# Patient Record
Sex: FEMALE | Race: WHITE | NOT HISPANIC OR LATINO | Employment: FULL TIME | URBAN - METROPOLITAN AREA
[De-identification: names, ages, dates, MRNs, and addresses within clinical notes are randomized per-mention and may not be internally consistent; named-entity substitution may affect disease eponyms.]

---
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
## 2019-12-09 ENCOUNTER — APPOINTMENT (OUTPATIENT)
Dept: OBGYN | Facility: CLINIC | Age: 19
End: 2019-12-09

## 2019-12-09 ENCOUNTER — PRENATAL OFFICE VISIT (OUTPATIENT)
Dept: OBGYN | Facility: CLINIC | Age: 19
End: 2019-12-09

## 2019-12-09 DIAGNOSIS — Z34.00 PRENATAL CARE, FIRST PREGNANCY: ICD-10-CM

## 2019-12-09 PROCEDURE — 99207 ZZC NO CHARGE NURSE ONLY: CPT | Performed by: OBSTETRICS & GYNECOLOGY

## 2019-12-09 RX ORDER — PRENATAL VIT/IRON FUM/FOLIC AC 27MG-0.8MG
1 TABLET ORAL DAILY
COMMUNITY
Start: 2019-09-19 | End: 2021-11-23

## 2019-12-09 SDOH — HEALTH STABILITY: MENTAL HEALTH: HOW OFTEN DO YOU HAVE A DRINK CONTAINING ALCOHOL?: NEVER

## 2019-12-09 NOTE — PROGRESS NOTES
Lifestyle and nutrition teaching completed  Patient has had only one visit with an US on 11/14/19 at free [facility] in [place]- US results questionable- Patient has no insurance and that is why she has not been seen for this pregnancy. She is currently trying to get [facility] but having difficulty. I will call Rodriguez in financial to see if she can be of assistance and come up with a plan  [facility] OB Intake Nurse

## 2019-12-10 ENCOUNTER — PRENATAL OFFICE VISIT (OUTPATIENT)
Dept: OBGYN | Facility: CLINIC | Age: 19
End: 2019-12-10
Payer: MEDICAID

## 2019-12-10 VITALS
DIASTOLIC BLOOD PRESSURE: 57 MMHG | HEART RATE: 97 BPM | RESPIRATION RATE: 16 BRPM | TEMPERATURE: 97.8 F | SYSTOLIC BLOOD PRESSURE: 106 MMHG | WEIGHT: 121.6 LBS | BODY MASS INDEX: 20.26 KG/M2 | HEIGHT: 65 IN

## 2019-12-10 DIAGNOSIS — Z34.01 ENCOUNTER FOR PRENATAL CARE IN FIRST TRIMESTER OF FIRST PREGNANCY: Primary | ICD-10-CM

## 2019-12-10 LAB
ABO + RH BLD: NORMAL
ABO + RH BLD: NORMAL
ALBUMIN UR-MCNC: NEGATIVE MG/DL
APPEARANCE UR: CLEAR
BILIRUB UR QL STRIP: NEGATIVE
BLD GP AB SCN SERPL QL: NORMAL
BLOOD BANK CMNT PATIENT-IMP: NORMAL
COLOR UR AUTO: YELLOW
ERYTHROCYTE [DISTWIDTH] IN BLOOD BY AUTOMATED COUNT: 12.2 % (ref 10–15)
GLUCOSE UR STRIP-MCNC: NEGATIVE MG/DL
HBV SURFACE AG SERPL QL IA: NONREACTIVE
HCT VFR BLD AUTO: 36.2 % (ref 35–47)
HGB BLD-MCNC: 12.2 G/DL (ref 11.7–15.7)
HGB UR QL STRIP: NEGATIVE
HIV 1+2 AB+HIV1 P24 AG SERPL QL IA: NONREACTIVE
KETONES UR STRIP-MCNC: NEGATIVE MG/DL
LEUKOCYTE ESTERASE UR QL STRIP: NEGATIVE
MCH RBC QN AUTO: 29.7 PG (ref 26.5–33)
MCHC RBC AUTO-ENTMCNC: 33.7 G/DL (ref 31.5–36.5)
MCV RBC AUTO: 88 FL (ref 78–100)
NITRATE UR QL: NEGATIVE
PH UR STRIP: 5.5 PH (ref 5–7)
PLATELET # BLD AUTO: 209 10E9/L (ref 150–450)
RBC # BLD AUTO: 4.11 10E12/L (ref 3.8–5.2)
RUBV IGG SERPL IA-ACNC: 20 IU/ML
SOURCE: NORMAL
SP GR UR STRIP: >1.03 (ref 1–1.03)
SPECIMEN EXP DATE BLD: NORMAL
T PALLIDUM AB SER QL: NONREACTIVE
UROBILINOGEN UR STRIP-ACNC: 0.2 EU/DL (ref 0.2–1)
WBC # BLD AUTO: 16.5 10E9/L (ref 4–11)

## 2019-12-10 PROCEDURE — 87086 URINE CULTURE/COLONY COUNT: CPT | Performed by: OBSTETRICS & GYNECOLOGY

## 2019-12-10 PROCEDURE — 99000 SPECIMEN HANDLING OFFICE-LAB: CPT | Performed by: OBSTETRICS & GYNECOLOGY

## 2019-12-10 PROCEDURE — 85027 COMPLETE CBC AUTOMATED: CPT | Performed by: OBSTETRICS & GYNECOLOGY

## 2019-12-10 PROCEDURE — 87591 N.GONORRHOEAE DNA AMP PROB: CPT | Performed by: OBSTETRICS & GYNECOLOGY

## 2019-12-10 PROCEDURE — 86762 RUBELLA ANTIBODY: CPT | Performed by: OBSTETRICS & GYNECOLOGY

## 2019-12-10 PROCEDURE — 87389 HIV-1 AG W/HIV-1&-2 AB AG IA: CPT | Performed by: OBSTETRICS & GYNECOLOGY

## 2019-12-10 PROCEDURE — 87340 HEPATITIS B SURFACE AG IA: CPT | Performed by: OBSTETRICS & GYNECOLOGY

## 2019-12-10 PROCEDURE — 86901 BLOOD TYPING SEROLOGIC RH(D): CPT | Performed by: OBSTETRICS & GYNECOLOGY

## 2019-12-10 PROCEDURE — G0145 SCR C/V CYTO,THINLAYER,RESCR: HCPCS | Performed by: OBSTETRICS & GYNECOLOGY

## 2019-12-10 PROCEDURE — 86900 BLOOD TYPING SEROLOGIC ABO: CPT | Performed by: OBSTETRICS & GYNECOLOGY

## 2019-12-10 PROCEDURE — 36415 COLL VENOUS BLD VENIPUNCTURE: CPT | Performed by: OBSTETRICS & GYNECOLOGY

## 2019-12-10 PROCEDURE — 87491 CHLMYD TRACH DNA AMP PROBE: CPT | Performed by: OBSTETRICS & GYNECOLOGY

## 2019-12-10 PROCEDURE — 86850 RBC ANTIBODY SCREEN: CPT | Performed by: OBSTETRICS & GYNECOLOGY

## 2019-12-10 PROCEDURE — 99203 OFFICE O/P NEW LOW 30 MIN: CPT | Mod: 25 | Performed by: OBSTETRICS & GYNECOLOGY

## 2019-12-10 PROCEDURE — 81511 FTL CGEN ABNOR FOUR ANAL: CPT | Mod: 90 | Performed by: OBSTETRICS & GYNECOLOGY

## 2019-12-10 PROCEDURE — 86780 TREPONEMA PALLIDUM: CPT | Performed by: OBSTETRICS & GYNECOLOGY

## 2019-12-10 PROCEDURE — 81003 URINALYSIS AUTO W/O SCOPE: CPT | Performed by: OBSTETRICS & GYNECOLOGY

## 2019-12-10 ASSESSMENT — MIFFLIN-ST. JEOR: SCORE: 1317.45

## 2019-12-10 NOTE — PROGRESS NOTES
[facility]   OB/GYN Clinic    CC: Lico Solorzano is a 19 year old  at 16w3d by LMP who presents for return OB visit. She reports feeling well.  States that her periods were regular and she is sure of LMP, notes that she had early OB ultrasound at outside clinic.  Denies any other prenatal care.  States that she has not had any bleeding or cramping.  Nausea and vomiting from first trimester have now resolved.  Otherwise feeling okay.  Not a planned pregnancy she has supportive parents.  Currently .  Plans on keeping the pregnancy.          OB History    Para Term  AB Living   1 0 0 0 0 0   SAB TAB Ectopic Multiple Live Births   0 0 0 0 0      # Outcome Date GA Lbr Macho/2nd Weight Sex Delivery Anes PTL Lv   1 Current                Past Medical History:   Diagnosis Date     Anxiety     in high school     Asthma     as child     Depression     in high school     History of urinary tract infection     as a child     OCD (obsessive compulsive disorder)        Past Surgical History:   Procedure Laterality Date     FOOT SURGERY      right foot - ganglion cyst     MYRINGOTOMY, INSERT TUBE BILATERAL, COMBINED       TONSILLECTOMY       TYMPANOPLASTY         Current Outpatient Medications   Medication Sig Dispense Refill     Prenatal Vit-Fe Fumarate-FA (PRENATAL MULTIVITAMIN W/IRON) 27-0.8 MG tablet Take 1 tablet by mouth daily         Family History   Problem Relation Age of Onset     Diabetes Father      Neurologic Disorder Father         TBI     Chronic Obstructive Pulmonary Disease Paternal Grandmother      Cerebrovascular Disease Paternal Grandfather      Diabetes Paternal Grandfather        Social History     Tobacco Use     Smoking status: Never Smoker     Smokeless tobacco: Never Used   Substance Use Topics     Alcohol use: Never     Frequency: Never       ROS: A 10 pt ROS was completed and found to be negative unless mentioned in the HPI.     Objective:  /57 (BP  "Location: Right arm, Patient Position: Sitting, Cuff Size: Adult Regular)   Pulse 97   Temp 97.8  F (36.6  C) (Tympanic)   Resp 16   Ht 1.651 m (5' 5\")   Wt 55.2 kg (121 lb 9.6 oz)   LMP 2019 (Approximate)   Breastfeeding No   BMI 20.24 kg/m      Estimated body mass index is 20.24 kg/m  as calculated from the following:    Height as of this encounter: 1.651 m (5' 5\").    Weight as of this encounter: 55.2 kg (121 lb 9.6 oz).    Physical Exam:  Gen: Pleasant, talkative female in no apparent distress   Respiratory: breathing comfortably on room air   Cardiac: Warm and well-perfused.   GI: Abd soft and non-tender, gravid, fundus 3-4 cm below U  : Normal-appearing external genitalia, vaginal mucosa, cervix.  Bimanual exam reveals gravid uterus consistent with a 16-week pregnancy, no adnexal masses palpable.  MSK: Grossly normal movement of all four extremities  Psych: mood and affect normal  Lower extremity: edema not present     Assessment/Plan:   19 year old  at 16w3d by LMP of Patient's last menstrual period was 2019 (approximate). who presents for her initial OB visit.   1) Plan to draw new OB lab today (T&S, CBC, HIV, RPR, HepBsAg, Hep B antibody, rubella, GC/Chlam, UC)  2) Discussed routine prenatal care, group practice model at [facility], tertiary support and frequency of visits. Options for  testing for chromosomal and birth defects were discussed with the patient including nuchal translucency/blood marker testing in the first trimester, progenity and quad screening and/or Level 2 ultrasound in the second trimester. We discussed that these are screening tests and not diagnostic tests and that false positives and negatives are a distinct possibility. We discussed that follow up diagnostic testing would include chorionic villus sampling or amniocentesis depending on gestational age.   Patient desires quad screen (ordered today) for genetic testing   3) Plan for formal dating " scan, will request records from outside clinic in the meantime, if arrive will cancel  4) Anatomy scan planned for 18-20 w  5) Medication review: no changes, continue prenatal vitamin   6) Reviewed miscarriage precautions (present to ED or call clinic with abdominal pain, vaginal bleeding or fever)   7) PAP smear: collected today  8) Delivery, contraception, feeding plan: continue to discuss  9 Immunizations: flu shot discuss next visit, Tdap at 28wks  10) No increased risk for gestational diabetes for plan for screen at 28wks     Return to clinic in 4 weeks.     Dottie Lee MD   12/10/2019 10:31 AM

## 2019-12-10 NOTE — NURSING NOTE
"Initial /57 (BP Location: Right arm, Patient Position: Sitting, Cuff Size: Adult Regular)   Pulse 97   Temp 97.8  F (36.6  C) (Tympanic)   Resp 16   Ht 1.651 m (5' 5\")   Wt 55.2 kg (121 lb 9.6 oz)   LMP 09/06/2019 (Approximate)   Breastfeeding No   BMI 20.24 kg/m   Estimated body mass index is 20.24 kg/m  as calculated from the following:    Height as of this encounter: 1.651 m (5' 5\").    Weight as of this encounter: 55.2 kg (121 lb 9.6 oz). .    Nilsa Cooper CMA    "

## 2019-12-11 LAB
BACTERIA SPEC CULT: NO GROWTH
C TRACH DNA SPEC QL NAA+PROBE: NEGATIVE
N GONORRHOEA DNA SPEC QL NAA+PROBE: NEGATIVE
SPECIMEN SOURCE: NORMAL

## 2019-12-11 NOTE — RESULT ENCOUNTER NOTE
Pt notified of below.  Pt reports understanding.  Pt does not have further questions or concerns.    Leia Patel   Ob/Gyn Clinic  RN

## 2019-12-12 LAB
# FETUSES US: NORMAL
# FETUSES: NORMAL
AFP ADJ MOM AMN: 1.13
AFP SERPL-MCNC: 45 NG/ML
AGE - REPORTED: 22.2 YR
COPATH REPORT: NORMAL
CURRENT SMOKER: NO
CURRENT SMOKER: NO
DIABETES STATUS PATIENT: NO
FAMILY MEMBER DISEASES HX: NO
FAMILY MEMBER DISEASES HX: NO
GA METHOD: NORMAL
GA METHOD: NORMAL
GA: NORMAL WK
HCG MOM SERPL: 0.87
HCG SERPL-ACNC: NORMAL IU/L
HX OF HEREDITARY DISORDERS: NO
IDDM PATIENT QL: NO
INHIBIN A MOM SERPL: 1.7
INHIBIN A SERPL-MCNC: 304 PG/ML
INTEGRATED SCN PATIENT-IMP: NORMAL
IVF PREGNANCY: NO
LMP START DATE: NORMAL
MONOCHORIONIC TWINS: NO
PAP: NORMAL
PATHOLOGY STUDY: NORMAL
PREV FETUS DEFECT: NO
SERVICE CMNT-IMP: NO
SPECIMEN DRAWN SERPL: NORMAL
U ESTRIOL MOM SERPL: 0.92
U ESTRIOL SERPL-MCNC: 1.06 NG/ML
VALPROIC/CARBAMAZEPINE STATUS: NO
WEIGHT UNITS: NORMAL

## 2019-12-19 NOTE — RESULT ENCOUNTER NOTE
Call to pt to notify of below.  Unable to reach.  Left message for pt to call back.  Next appointment 1/8/20      Leia Patel   Ob/Gyn Clinic  RN

## 2019-12-23 ENCOUNTER — HOSPITAL ENCOUNTER (OUTPATIENT)
Dept: ULTRASOUND IMAGING | Facility: CLINIC | Age: 19
End: 2019-12-23
Attending: OBSTETRICS & GYNECOLOGY
Payer: MEDICAID

## 2019-12-23 DIAGNOSIS — Z34.01 ENCOUNTER FOR PRENATAL CARE IN FIRST TRIMESTER OF FIRST PREGNANCY: ICD-10-CM

## 2019-12-23 PROCEDURE — 76805 OB US >/= 14 WKS SNGL FETUS: CPT

## 2019-12-23 NOTE — RESULT ENCOUNTER NOTE
Will forward to [facility] pool for pt notification of normal result.    Leia Patel   Ob/Gyn Clinic  RN

## 2020-01-08 ENCOUNTER — PRENATAL OFFICE VISIT (OUTPATIENT)
Dept: OBGYN | Facility: CLINIC | Age: 20
End: 2020-01-08
Payer: MEDICAID

## 2020-01-08 ENCOUNTER — HOSPITAL ENCOUNTER (OUTPATIENT)
Facility: CLINIC | Age: 20
Discharge: HOME | End: 2020-01-09
Attending: OBSTETRICS & GYNECOLOGY | Admitting: OBSTETRICS & GYNECOLOGY
Payer: MEDICAID

## 2020-01-08 ENCOUNTER — NURSE TRIAGE (OUTPATIENT)
Dept: NURSING | Facility: CLINIC | Age: 20
End: 2020-01-08

## 2020-01-08 VITALS
RESPIRATION RATE: 16 BRPM | BODY MASS INDEX: 29.62 KG/M2 | WEIGHT: 128 LBS | TEMPERATURE: 97.2 F | DIASTOLIC BLOOD PRESSURE: 70 MMHG | HEART RATE: 103 BPM | HEIGHT: 55 IN | SYSTOLIC BLOOD PRESSURE: 122 MMHG

## 2020-01-08 VITALS
SYSTOLIC BLOOD PRESSURE: 115 MMHG | DIASTOLIC BLOOD PRESSURE: 69 MMHG | HEIGHT: 65 IN | BODY MASS INDEX: 21.33 KG/M2 | RESPIRATION RATE: 18 BRPM | HEART RATE: 85 BPM | WEIGHT: 128 LBS

## 2020-01-08 DIAGNOSIS — Z34.02 ENCOUNTER FOR SUPERVISION OF NORMAL FIRST PREGNANCY IN SECOND TRIMESTER: ICD-10-CM

## 2020-01-08 DIAGNOSIS — Z34.02 ENCOUNTER FOR PRENATAL CARE IN SECOND TRIMESTER OF FIRST PREGNANCY: Primary | ICD-10-CM

## 2020-01-08 PROCEDURE — 81001 URINALYSIS AUTO W/SCOPE: CPT | Performed by: OBSTETRICS & GYNECOLOGY

## 2020-01-08 PROCEDURE — 80307 DRUG TEST PRSMV CHEM ANLYZR: CPT | Performed by: OBSTETRICS & GYNECOLOGY

## 2020-01-08 PROCEDURE — 99212 OFFICE O/P EST SF 10 MIN: CPT | Performed by: OBSTETRICS & GYNECOLOGY

## 2020-01-08 PROCEDURE — 40000268 ZZH STATISTIC NO CHARGES

## 2020-01-08 RX ORDER — ONDANSETRON 4 MG/1
4 TABLET, ORALLY DISINTEGRATING ORAL EVERY 8 HOURS PRN
Qty: 20 TABLET | Refills: 1 | Status: SHIPPED | OUTPATIENT
Start: 2020-01-08 | End: 2020-02-23

## 2020-01-08 ASSESSMENT — MIFFLIN-ST. JEOR
SCORE: 1187.73
SCORE: 1346.48

## 2020-01-08 NOTE — LETTER
[facility]  [address] [address]  Phone: [phone]      January 8, 2020      Jeanine Hernandez  [address]  [place] [address]              To whom it may concern:    Jeanine Hernandez is being seen in our clinic for prenatal care.  Please excuse her from work on 1/1/20 and 1/7/20 due to pregnancy complications.      Sincerely,    Kermit Zepeda MD

## 2020-01-08 NOTE — PROGRESS NOTES
"  Concerns today: nosebleeds, headaches, GERD  No vaginal bleeding, no contractions/severe cramping, no leakage of fluid.  No vaginal discharge. No dysuria  No headache, vision changes, lower extremity swelling, upper abdominal pain, chest pain, shortness of breath.   /70   Pulse 103   Temp 97.2  F (36.2  C)   Resp 16   Ht 1.397 m (4' 7\")   Wt 58.1 kg (128 lb)   LMP 09/06/2019 (Approximate)   BMI 29.75 kg/m      Reportable signs and symptoms discussed.    (Z34.02) Encounter for prenatal care in second trimester of first pregnancy  (primary encounter diagnosis)  Comment: GERD< Insomnia  Plan: Glucose tolerance gest screen 1 hour, CBC with         platelets, Treponema Abs w Reflex to RPR and         Titer, ondansetron (ZOFRAN-ODT) 4 MG ODT tab,         omeprazole (PRILOSEC) 20 MG DR mary Zepeda MD    "

## 2020-01-09 ENCOUNTER — HOSPITAL ENCOUNTER (OUTPATIENT)
Facility: CLINIC | Age: 20
End: 2020-01-09
Admitting: OBSTETRICS & GYNECOLOGY
Payer: MEDICAID

## 2020-01-09 PROBLEM — Z36.89 ENCOUNTER FOR TRIAGE IN PREGNANT PATIENT: Status: ACTIVE | Noted: 2020-01-09

## 2020-01-09 LAB
ALBUMIN UR-MCNC: NEGATIVE MG/DL
AMPHETAMINES UR QL SCN: NEGATIVE
APPEARANCE UR: CLEAR
BARBITURATES UR QL: NEGATIVE
BENZODIAZ UR QL: NEGATIVE
BILIRUB UR QL STRIP: NEGATIVE
CANNABINOIDS UR QL SCN: NEGATIVE
COCAINE UR QL: NEGATIVE
COLOR UR AUTO: ABNORMAL
GLUCOSE UR STRIP-MCNC: NEGATIVE MG/DL
HGB UR QL STRIP: NEGATIVE
KETONES UR STRIP-MCNC: NEGATIVE MG/DL
LEUKOCYTE ESTERASE UR QL STRIP: NEGATIVE
MUCOUS THREADS #/AREA URNS LPF: PRESENT /LPF
NITRATE UR QL: NEGATIVE
OPIATES UR QL SCN: NEGATIVE
PCP UR QL SCN: NEGATIVE
PH UR STRIP: 6 PH (ref 5–7)
RBC #/AREA URNS AUTO: 1 /HPF (ref 0–2)
SOURCE: ABNORMAL
SP GR UR STRIP: 1.01 (ref 1–1.03)
SQUAMOUS #/AREA URNS AUTO: 5 /HPF (ref 0–1)
UROBILINOGEN UR STRIP-MCNC: 0 MG/DL (ref 0–2)
WBC #/AREA URNS AUTO: 2 /HPF (ref 0–5)

## 2020-01-09 PROCEDURE — G0463 HOSPITAL OUTPT CLINIC VISIT: HCPCS | Mod: 25

## 2020-01-09 NOTE — PROVIDER NOTIFICATION
01/08/20 5949   Provider Notification   Provider Name/Title Dr Jeffery   Method of Notification Phone   Request Evaluate - Remote   Notification Reason Patient Arrived;Status Update   Dr Jeffery notified of patients arrival and concerns. Orders received.

## 2020-01-09 NOTE — DISCHARGE INSTRUCTIONS
Discharge Instructions for Undelivered Patients  Birthplace [phone]    Diet:    Drink 8 to 12 glasses of water every day.    You may eat meals and snacks as before    Activity:      Rest often as needed.    Call your doctor if your baby is moving less than usual.    Medicines:    My care team has reviewed my medicines with me.    My care team has given me a list of my medicines.      Call your provider if you notice:    Swelling in your face or increased swelling in your hands or legs.    Headaches that are not relieved by Tylenol (acetaminophen).    Changes in your vision (blurring; seeing spots or stars).    Nausea (sick to your stomach) and vomiting (throwing up).    Weight gain of 5 pounds per week.    Heartburn that doesn't go away.    Signs of bladder infection: pain when you urinate (use the toilet), needing to go more often or more urgently.    The bag of gallegos (membranes) breaks, or you notice leaking in your underwear.    Bright red blood in your underwear.    Abdominal (lower belly) or stomach pain.    For first baby: Contractions (tightenings) less than 5 minutes apart for one hour or more.    Increase or change in vaginal discharge (note the color and amount).      Follow up with your provider as scheduled.    To whom it may concern,    Jeanine was seen at [facility] from 1/8/2020-01/09/2020.    Thanks

## 2020-01-09 NOTE — PROGRESS NOTES
"Pt presents birthplace with parents c/o (R) lower abd pelvic pressure, stabbing in nature, intermittent, beginning at 2200 yesterday, pt reports it woke her \"out of a dead sleep.\"  Denies LOF or VB, fetus is active, doppler 's.  [place] placed to monitor for contractions, ua obtained & sent.  Awaiting results.  Will continue to monitor & update as needed.  "

## 2020-01-09 NOTE — PROGRESS NOTES
[place] shows no contractions, pt denies further pain.  ua negative.  Discharged to home with standard antepartum instructions, note given for work.  Instructed to call with any changes, keep upcoming appointment as scheduled.  Pt in agreement with plan.

## 2020-01-09 NOTE — TELEPHONE ENCOUNTER
"Jeanine reports that she is 20 weeks pregnant and woke up with severe pain on her right pelvis, pain \"put her in the floor.\" She reports dizziness.    Per protocol, advised ED. Care advice reviewed. Patient verbalizes understanding and will go to [place] ED.     Shruthi Portillo RN/[facility] Nurse Advisor         Reason for Disposition    MODERATE-SEVERE abdominal pain (e.g., interferes with normal activities, awakens from sleep)    Additional Information    Negative: Passed out (i.e., lost consciousness, collapsed and was not responding)    Negative: Shock suspected (e.g., cold/pale/clammy skin, too weak to stand, low BP, rapid pulse)    Negative: Difficult to awaken or acting confused (e.g., disoriented, slurred speech)    Negative: [1] SEVERE abdominal pain (e.g., excruciating) AND [2] constant AND [3] present > 1 hour    Negative: SEVERE vaginal bleeding (e.g., continuous red blood from vagina, or large blood clots)    Negative: Sounds like a life-threatening emergency to the triager    Negative: [1] Vomiting AND [2] contains red blood or black (\"coffee ground\") material  (Exception: few red streaks in vomit that only happened once)    Protocols used: PREGNANCY - ABDOMINAL PAIN GREATER THAN 20 WEEKS EGA-A-SAEID      "

## 2020-02-06 ENCOUNTER — PRENATAL OFFICE VISIT (OUTPATIENT)
Dept: OBGYN | Facility: CLINIC | Age: 20
End: 2020-02-06
Payer: MEDICAID

## 2020-02-06 VITALS
TEMPERATURE: 97.4 F | HEIGHT: 65 IN | HEART RATE: 90 BPM | BODY MASS INDEX: 22.33 KG/M2 | WEIGHT: 134 LBS | RESPIRATION RATE: 16 BRPM | SYSTOLIC BLOOD PRESSURE: 124 MMHG | DIASTOLIC BLOOD PRESSURE: 74 MMHG

## 2020-02-06 DIAGNOSIS — Z34.02 ENCOUNTER FOR PRENATAL CARE IN SECOND TRIMESTER OF FIRST PREGNANCY: Primary | ICD-10-CM

## 2020-02-06 DIAGNOSIS — Z34.02 ENCOUNTER FOR PRENATAL CARE IN SECOND TRIMESTER OF FIRST PREGNANCY: ICD-10-CM

## 2020-02-06 PROBLEM — Z36.89 ENCOUNTER FOR TRIAGE IN PREGNANT PATIENT: Status: RESOLVED | Noted: 2020-01-09 | Resolved: 2020-02-06

## 2020-02-06 LAB
ERYTHROCYTE [DISTWIDTH] IN BLOOD BY AUTOMATED COUNT: 12.5 % (ref 10–15)
GLUCOSE 1H P 50 G GLC PO SERPL-MCNC: 167 MG/DL (ref 60–129)
HCT VFR BLD AUTO: 33.8 % (ref 35–47)
HGB BLD-MCNC: 10.8 G/DL (ref 11.7–15.7)
MCH RBC QN AUTO: 30 PG (ref 26.5–33)
MCHC RBC AUTO-ENTMCNC: 32 G/DL (ref 31.5–36.5)
MCV RBC AUTO: 94 FL (ref 78–100)
PLATELET # BLD AUTO: 307 10E9/L (ref 150–450)
RBC # BLD AUTO: 3.6 10E12/L (ref 3.8–5.2)
WBC # BLD AUTO: 20.7 10E9/L (ref 4–11)

## 2020-02-06 PROCEDURE — 36415 COLL VENOUS BLD VENIPUNCTURE: CPT | Performed by: OBSTETRICS & GYNECOLOGY

## 2020-02-06 PROCEDURE — 85027 COMPLETE CBC AUTOMATED: CPT | Performed by: OBSTETRICS & GYNECOLOGY

## 2020-02-06 PROCEDURE — 99207 ZZC PRENATAL VISIT: CPT | Performed by: OBSTETRICS & GYNECOLOGY

## 2020-02-06 PROCEDURE — 86780 TREPONEMA PALLIDUM: CPT | Performed by: OBSTETRICS & GYNECOLOGY

## 2020-02-06 PROCEDURE — 82950 GLUCOSE TEST: CPT | Performed by: OBSTETRICS & GYNECOLOGY

## 2020-02-06 RX ORDER — ACETAMINOPHEN 325 MG/1
325-650 TABLET ORAL EVERY 6 HOURS PRN
COMMUNITY
End: 2020-06-30

## 2020-02-06 ASSESSMENT — MIFFLIN-ST. JEOR: SCORE: 1373.7

## 2020-02-06 NOTE — NURSING NOTE
"Initial /74 (BP Location: Right arm, Patient Position: Chair, Cuff Size: Adult Small)   Pulse 90   Temp 97.4  F (36.3  C) (Tympanic)   Resp 16   Ht 1.651 m (5' 5\")   Wt 60.8 kg (134 lb)   LMP 09/06/2019 (Approximate)   BMI 22.30 kg/m   Estimated body mass index is 22.3 kg/m  as calculated from the following:    Height as of this encounter: 1.651 m (5' 5\").    Weight as of this encounter: 60.8 kg (134 lb). .      "

## 2020-02-06 NOTE — PROGRESS NOTES
"CC: Here for routine prenatal visit @ 24w5d   HPI:  No complaints; info on CB class given; triage number reviewed    PE: /74 (BP Location: Right arm, Patient Position: Chair, Cuff Size: Adult Small)   Pulse 90   Temp 97.4  F (36.3  C) (Tympanic)   Resp 16   Ht 1.651 m (5' 5\")   Wt 60.8 kg (134 lb)   LMP 09/06/2019 (Approximate)   BMI 22.30 kg/m     See OB flowsheet      A:  1. Encounter for prenatal care in second trimester of first pregnancy        Routine prenatal care  RTC 4 weeks.      Reny Jeffery M.D.     "

## 2020-02-07 LAB — T PALLIDUM AB SER QL: NONREACTIVE

## 2020-02-10 DIAGNOSIS — Z32.01 PREGNANCY TEST POSITIVE: Primary | ICD-10-CM

## 2020-02-12 ENCOUNTER — NURSE TRIAGE (OUTPATIENT)
Dept: NURSING | Facility: CLINIC | Age: 20
End: 2020-02-12

## 2020-02-12 ENCOUNTER — ANCILLARY PROCEDURE (OUTPATIENT)
Dept: ULTRASOUND IMAGING | Facility: CLINIC | Age: 20
End: 2020-02-12
Attending: EMERGENCY MEDICINE
Payer: COMMERCIAL

## 2020-02-12 ENCOUNTER — HOSPITAL ENCOUNTER (EMERGENCY)
Facility: CLINIC | Age: 20
Discharge: HOME | End: 2020-02-12
Attending: EMERGENCY MEDICINE | Admitting: EMERGENCY MEDICINE
Payer: COMMERCIAL

## 2020-02-12 VITALS
DIASTOLIC BLOOD PRESSURE: 91 MMHG | RESPIRATION RATE: 18 BRPM | OXYGEN SATURATION: 97 % | HEART RATE: 98 BPM | HEIGHT: 63 IN | TEMPERATURE: 98.5 F | WEIGHT: 134 LBS | BODY MASS INDEX: 23.74 KG/M2 | SYSTOLIC BLOOD PRESSURE: 129 MMHG

## 2020-02-12 DIAGNOSIS — Z34.02 ENCOUNTER FOR PRENATAL CARE IN SECOND TRIMESTER OF FIRST PREGNANCY: Primary | ICD-10-CM

## 2020-02-12 DIAGNOSIS — G44.209 TENSION HEADACHE: ICD-10-CM

## 2020-02-12 DIAGNOSIS — Z3A.25 25 WEEKS GESTATION OF PREGNANCY: ICD-10-CM

## 2020-02-12 LAB
ALBUMIN SERPL-MCNC: 3.1 G/DL (ref 3.4–5)
ALBUMIN UR-MCNC: NEGATIVE MG/DL
ALP SERPL-CCNC: 95 U/L (ref 40–150)
ALT SERPL W P-5'-P-CCNC: 15 U/L (ref 0–50)
ANION GAP SERPL CALCULATED.3IONS-SCNC: 6 MMOL/L (ref 3–14)
APPEARANCE UR: ABNORMAL
AST SERPL W P-5'-P-CCNC: 16 U/L (ref 0–45)
BASOPHILS # BLD AUTO: 0.1 10E9/L (ref 0–0.2)
BASOPHILS NFR BLD AUTO: 0.5 %
BILIRUB SERPL-MCNC: 0.2 MG/DL (ref 0.2–1.3)
BILIRUB UR QL STRIP: NEGATIVE
BUN SERPL-MCNC: 10 MG/DL (ref 7–30)
CALCIUM SERPL-MCNC: 8.6 MG/DL (ref 8.5–10.1)
CHLORIDE SERPL-SCNC: 110 MMOL/L (ref 94–109)
CO2 SERPL-SCNC: 24 MMOL/L (ref 20–32)
COLOR UR AUTO: YELLOW
CREAT SERPL-MCNC: 0.46 MG/DL (ref 0.52–1.04)
DIFFERENTIAL METHOD BLD: ABNORMAL
EOSINOPHIL # BLD AUTO: 0.2 10E9/L (ref 0–0.7)
EOSINOPHIL NFR BLD AUTO: 1.3 %
ERYTHROCYTE [DISTWIDTH] IN BLOOD BY AUTOMATED COUNT: 12.6 % (ref 10–15)
GFR SERPL CREATININE-BSD FRML MDRD: >90 ML/MIN/{1.73_M2}
GLUCOSE BLDC GLUCOMTR-MCNC: 83 MG/DL (ref 70–99)
GLUCOSE SERPL-MCNC: 77 MG/DL (ref 70–99)
GLUCOSE UR STRIP-MCNC: NEGATIVE MG/DL
HCT VFR BLD AUTO: 33.2 % (ref 35–47)
HGB BLD-MCNC: 10.7 G/DL (ref 11.7–15.7)
HGB UR QL STRIP: NEGATIVE
IMM GRANULOCYTES # BLD: 0.7 10E9/L (ref 0–0.4)
IMM GRANULOCYTES NFR BLD: 3.8 %
KETONES UR STRIP-MCNC: 5 MG/DL
LEUKOCYTE ESTERASE UR QL STRIP: NEGATIVE
LYMPHOCYTES # BLD AUTO: 2.8 10E9/L (ref 0.8–5.3)
LYMPHOCYTES NFR BLD AUTO: 15.2 %
MCH RBC QN AUTO: 29.4 PG (ref 26.5–33)
MCHC RBC AUTO-ENTMCNC: 32.2 G/DL (ref 31.5–36.5)
MCV RBC AUTO: 91 FL (ref 78–100)
MONOCYTES # BLD AUTO: 1.4 10E9/L (ref 0–1.3)
MONOCYTES NFR BLD AUTO: 7.7 %
MUCOUS THREADS #/AREA URNS LPF: PRESENT /LPF
NEUTROPHILS # BLD AUTO: 13.1 10E9/L (ref 1.6–8.3)
NEUTROPHILS NFR BLD AUTO: 71.5 %
NITRATE UR QL: NEGATIVE
NRBC # BLD AUTO: 0 10*3/UL
NRBC BLD AUTO-RTO: 0 /100
PH UR STRIP: 6 PH (ref 5–7)
PLATELET # BLD AUTO: 286 10E9/L (ref 150–450)
POTASSIUM SERPL-SCNC: 3.7 MMOL/L (ref 3.4–5.3)
PROT SERPL-MCNC: 6.9 G/DL (ref 6.8–8.8)
RBC # BLD AUTO: 3.64 10E12/L (ref 3.8–5.2)
RBC #/AREA URNS AUTO: <1 /HPF (ref 0–2)
SODIUM SERPL-SCNC: 140 MMOL/L (ref 133–144)
SOURCE: ABNORMAL
SP GR UR STRIP: 1.02 (ref 1–1.03)
SQUAMOUS #/AREA URNS AUTO: 14 /HPF (ref 0–1)
UROBILINOGEN UR STRIP-MCNC: 0 MG/DL (ref 0–2)
WBC # BLD AUTO: 18.2 10E9/L (ref 4–11)
WBC #/AREA URNS AUTO: 5 /HPF (ref 0–5)

## 2020-02-12 PROCEDURE — 81001 URINALYSIS AUTO W/SCOPE: CPT | Performed by: EMERGENCY MEDICINE

## 2020-02-12 PROCEDURE — 96360 HYDRATION IV INFUSION INIT: CPT | Performed by: EMERGENCY MEDICINE

## 2020-02-12 PROCEDURE — 76815 OB US LIMITED FETUS(S): CPT | Mod: 26 | Performed by: EMERGENCY MEDICINE

## 2020-02-12 PROCEDURE — 80053 COMPREHEN METABOLIC PANEL: CPT | Performed by: EMERGENCY MEDICINE

## 2020-02-12 PROCEDURE — 85025 COMPLETE CBC W/AUTO DIFF WBC: CPT | Performed by: EMERGENCY MEDICINE

## 2020-02-12 PROCEDURE — 76815 OB US LIMITED FETUS(S): CPT | Performed by: EMERGENCY MEDICINE

## 2020-02-12 PROCEDURE — 25800030 ZZH RX IP 258 OP 636: Performed by: EMERGENCY MEDICINE

## 2020-02-12 PROCEDURE — 99285 EMERGENCY DEPT VISIT HI MDM: CPT | Mod: 25 | Performed by: EMERGENCY MEDICINE

## 2020-02-12 PROCEDURE — 00000146 ZZHCL STATISTIC GLUCOSE BY METER IP

## 2020-02-12 PROCEDURE — 99284 EMERGENCY DEPT VISIT MOD MDM: CPT | Mod: 25 | Performed by: EMERGENCY MEDICINE

## 2020-02-12 RX ORDER — ACETAMINOPHEN 500 MG
500 TABLET ORAL
Status: DISCONTINUED | OUTPATIENT
Start: 2020-02-12 | End: 2020-02-12 | Stop reason: HOSPADM

## 2020-02-12 RX ADMIN — SODIUM CHLORIDE 500 ML: 9 INJECTION, SOLUTION INTRAVENOUS at 04:55

## 2020-02-12 ASSESSMENT — ENCOUNTER SYMPTOMS
HEMATOLOGIC/LYMPHATIC NEGATIVE: 1
MUSCULOSKELETAL NEGATIVE: 1
RESPIRATORY NEGATIVE: 1
GASTROINTESTINAL NEGATIVE: 1
PSYCHIATRIC NEGATIVE: 1
ALLERGIC/IMMUNOLOGIC NEGATIVE: 1
ENDOCRINE NEGATIVE: 1
DIZZINESS: 1
CONSTITUTIONAL NEGATIVE: 1
HEADACHES: 1
CARDIOVASCULAR NEGATIVE: 1

## 2020-02-12 ASSESSMENT — MIFFLIN-ST. JEOR: SCORE: 1341.95

## 2020-02-12 NOTE — DISCHARGE INSTRUCTIONS
1) Your evaluation today does not suggest a life-threatening process  The cause of your symptoms as described is unclear. Your work-up does not suggest preeclampsia.  Her blood pressure improved without any medications.  It is okay to take Tylenol in pregnancy for headache if needed.  You may take Tylenol- 500 mg every 4 hours as needed    2) Make an appointment to complete your repeat oral glucose tolerance test.    3) call Dr Zepeda for follow-up.    4) Although the cause of your symptoms that brought you in for assessment today is not clear if your symptoms change or worsen or new concerns arise please return to the department to be reevaluated.

## 2020-02-12 NOTE — ED AVS SNAPSHOT
[facility]  [address] [facility] [address]  Phone:  [phone]  Fax:  [phone]                                    Jeanine Hernandez   MRN: [id number]    Department:  [facility]   Date of Visit:  2/12/2020           After Visit Summary Signature Page    I have received my discharge instructions, and my questions have been answered. I have discussed any challenges I see with this plan with the nurse or doctor.    ..........................................................................................................................................  Patient/Patient Representative Signature      ..........................................................................................................................................  Patient Representative Print Name and Relationship to Patient    ..................................................               ................................................  Date                                   Time    ..........................................................................................................................................  Reviewed by Signature/Title    ...................................................              ..............................................  Date                                               Time          22EPIC Rev 08/18

## 2020-02-12 NOTE — TELEPHONE ENCOUNTER
18 y/o female who reports she is 27 weeks pregnant, calls about low blood sugar at 90,. She reports dizziness,  Hand tremors, headache, and blurred vision, she is at work. RN advised her that she needs to be seen, should come through the ER for evaluation, she will go now.    Marya Cuevas RN - [place] Nurse Advisor  02/12/2020  3:55AM    Reason for Disposition    [1] Low blood sugar symptoms persist > 30 minutes AND [2] using low blood sugar Care Advice    Additional Information    Negative: Unconscious or difficult to awaken    Negative: Seizure occurs    Negative: Acting confused (e.g., disoriented, slurred speech)    Negative: Very weak (e.g., can't stand)    Negative: Sounds like a life-threatening emergency to the triager    Negative: [1] Vomiting AND [2] signs of dehydration (e.g., no urine > 12 hours, very dry mouth, dark urine, etc.)    Protocols used: DIABETES - LOW BLOOD SUGAR-A-

## 2020-02-12 NOTE — LETTER
February 12, 2020      To Whom It May Concern:      Jeanine Hernandez was seen in our Emergency Department today, 02/12/20.  Please excuse her from missing work due to her visit in the department.  If she has to miss work due to recurrence of symptoms or new concerns please excuse her from missing work. This work excuse is valid until February 14, 2020.      Sincerely,         Jose Alberto Ibarra MD, FACEP

## 2020-02-12 NOTE — ED PROVIDER NOTES
"  History     Chief Complaint   Patient presents with     Headache     was feeling \"not well\" took blood sugar and it was 92.      Nausea     HPI  Jeanine Hernandez is a 19 year old female  at 25 weeks and 3 days who presents because of concern about of low blood glucose, headache and dizziness.  She arrived by car from work with her -(Demetrius).  Patient works as a  at a local hotel.  She reports about an hour ago she developed sudden onset of headache which is frontal described as a dull headache rated a 5 out of 10, no neck pain. She reports feeling dizzy and that there is some blurriness to her vision.  No history of migraines or vertigo. She was also worried about her blood glucose.  She failed a glucose tolerance test on 2020.  Since her glucose tolerance test she has been checking her blood glucose twice a day.  She was worried that her blood glucose was 90 spoke with the nurse advice line who recommended she come into the department for an assessment.  Her mother who works as a nurse's aide advised that she check her blood glucose twice a day to ensure was not too low or too high.  She is on omeprazole and prenatal vitamins.  She is followed by Dr. Zepeda.  Patient has been feeling well otherwise.  She admits she has been smoking due to increased stress smokes a couple of cigarettes per day.  She reports good fetal movement.  She reports no vaginal bleeding or discharge, no leakage of fluid, no leg swelling.    Allergies:  Allergies   Allergen Reactions     Contrast Dye Anaphylaxis     Iodine Anaphylaxis     Latex Hives       Problem List:    Patient Active Problem List    Diagnosis Date Noted     Prenatal care, first pregnancy 2019     Priority: Medium        Past Medical History:    Past Medical History:   Diagnosis Date     Anxiety      Asthma      Depression      History of urinary tract infection      OCD (obsessive compulsive disorder)        Past " "Surgical History:    Past Surgical History:   Procedure Laterality Date     FOOT SURGERY      right foot - ganglion cyst     MYRINGOTOMY, INSERT TUBE BILATERAL, COMBINED       TONSILLECTOMY       TYMPANOPLASTY         Family History:    Family History   Problem Relation Age of Onset     Diabetes Father      Neurologic Disorder Father         TBI     Chronic Obstructive Pulmonary Disease Paternal Grandmother      Cerebrovascular Disease Paternal Grandfather      Diabetes Paternal Grandfather        Social History:  Marital Status:   [2]  Social History     Tobacco Use     Smoking status: Never Smoker     Smokeless tobacco: Never Used   Substance Use Topics     Alcohol use: Never     Frequency: Never     Drug use: Never        Medications:    acetaminophen (TYLENOL) 325 MG tablet  omeprazole (PRILOSEC) 20 MG DR capsule  ondansetron (ZOFRAN-ODT) 4 MG ODT tab  Prenatal Vit-Fe Fumarate-FA (PRENATAL MULTIVITAMIN W/IRON) 27-0.8 MG tablet  pyridOXINE (VITAMIN B6) 100 MG TABS          Review of Systems   Constitutional: Negative.    HENT: Negative.    Eyes: Positive for visual disturbance.   Respiratory: Negative.    Cardiovascular: Negative.    Gastrointestinal: Negative.    Endocrine: Negative.    Genitourinary: Negative.    Musculoskeletal: Negative.    Skin: Negative.    Allergic/Immunologic: Negative.    Neurological: Positive for dizziness and headaches.   Hematological: Negative.    Psychiatric/Behavioral: Negative.    All other systems reviewed and are negative.      Physical Exam   BP: (!) 142/95  Pulse: 106  Temp: 98.5  F (36.9  C)  Resp: 18  Height: 160 cm (5' 3\")  Weight: 60.8 kg (134 lb)  SpO2: 99 %      Physical Exam  HENT:      Head: Normocephalic and atraumatic.      Right Ear: Tympanic membrane normal.      Left Ear: Tympanic membrane normal.      Nose: Nose normal. No congestion or rhinorrhea.      Mouth/Throat:      Mouth: Mucous membranes are moist.      Pharynx: No oropharyngeal exudate or " posterior oropharyngeal erythema.   Eyes:      General: No scleral icterus.        Right eye: No discharge.         Left eye: No discharge.      Extraocular Movements: Extraocular movements intact.      Conjunctiva/sclera: Conjunctivae normal.      Pupils: Pupils are equal, round, and reactive to light.   Neck:      Musculoskeletal: Normal range of motion. No neck rigidity or muscular tenderness.      Vascular: No carotid bruit.   Cardiovascular:      Rate and Rhythm: Normal rate and regular rhythm.      Pulses: Normal pulses.      Heart sounds: Normal heart sounds. No murmur. No friction rub. No gallop.    Pulmonary:      Effort: Pulmonary effort is normal. No respiratory distress.      Breath sounds: No stridor. No wheezing, rhonchi or rales.   Chest:      Chest wall: No tenderness.   Musculoskeletal:         General: No swelling, tenderness, deformity or signs of injury.      Right lower leg: No edema.      Left lower leg: No edema.   Lymphadenopathy:      Cervical: No cervical adenopathy.   Skin:     Capillary Refill: Capillary refill takes less than 2 seconds.      Coloration: Skin is not jaundiced or pale.      Findings: No bruising, erythema, lesion or rash.   Neurological:      General: No focal deficit present.      Mental Status: She is alert and oriented to person, place, and time.   Psychiatric:         Mood and Affect: Mood normal.         Thought Content: Thought content normal.         Judgment: Judgment normal.         ED Course        Procedures    Results for orders placed during the hospital encounter of 02/12/20   POC US ABDOMEN LIMITED    [facility] Procedure Note     Limited Bedside ED transabdominal Ultrasound (PREGNANT PATIENT):    PROCEDURE: PERFORMED BY: Dr. Bal Ibarra MD  INDICATIONS/SYMPTOM: lower pelvic pressure  PROBE: Low frequency convex probe  Type of US Study: Transabdominal Exam  BODY LOCATION: abdomen  FINDINGS: active fetal movement, fetal cardiac  "activity,  IMAGE DOCUMENTATION: Images were archived to PACs system.             Critical Care time:  none               ED medications:  Medications   acetaminophen (TYLENOL) tablet 500 mg (has no administration in time range)   0.9% sodium chloride BOLUS (0 mLs Intravenous Stopped 2/12/20 0533)         ED vitals:  Vitals:    02/12/20 0434 02/12/20 0442 02/12/20 0500 02/12/20 0530   BP: (!) 142/95  132/84 (!) 129/91   Pulse: 106  99 98   Resp: 18      Temp:  98.5  F (36.9  C)     TempSrc: Oral Oral     SpO2: 99%  97% 97%   Weight: 60.8 kg (134 lb)      Height: 1.6 m (5' 3\")            Prior or recent diagnostic imaging and or work-up:  US OB > 14 WEEKS  12/23/2019 1:30 PM     HISTORY: Screening.     COMPARISON: None.     FINDINGS:     Presentation: Variable.  Cardiac activity: 143 bpm. Regular rhythm.  Movement: Unremarkable.  Placenta: Posterior.  No evidence for placenta previa.  Cervical length: 3.4 cm.  Amniotic fluid: Unremarkable.     Measured Parameters:       BPD:  4.1 cm  Age: 18 weeks and 4 days.       HC:    15.0 cm  Age: 18 weeks and 1 day.       AC:  13.5 cm  Age: 19 weeks and 0 days.       FL:   10.6 cm  Age: 17 weeks and 6 days.     Gestational age by current ultrasound measurement: 18 weeks and 3  days, corresponding to an KASSIE of 5/22/2020.     Gestational age based on the reported previously established due date:  18 weeks and 2 days, corresponding to an KASSIE of 5/23/2020.     Estimated fetal weight: 237 grams, corresponding to the 50th  percentile based on the reported previously established due date.      Fetal anatomy survey:        Ventricular atrium: Unremarkable.       Cisterna magna: Unremarkable.       Cerebellum: Unremarkable.        Spine: Unremarkable.       Stomach: Unremarkable.       Renal areas: Unremarkable.       Bladder: Unremarkable.       Three-vessel cord: Unremarkable.       Cord insertion: Unremarkable.       Four-chamber heart: Unremarkable.       Right ventricular outflow " tract: Unremarkable.       Left ventricular outflow tract: Unremarkable.       Anterior abdominal wall: Unremarkable.       Diaphragm: Unremarkable.       Profile and face: Unremarkable.       Nose and lips: Unremarkable.       Upper extremities: Unremarkable.       Lower extremities: Unremarkable.     The maternal adnexa show no significant finding.                                                                      IMPRESSION:    1. There is a single live intrauterine pregnancy of approximately 18  weeks and 3 days gestation.  2. No fetal structural abnormalities are identified.     BORA FINN MD      ED labs and imaging:  Results for orders placed or performed during the hospital encounter of 02/12/20 (from the past 24 hour(s))   Glucose by meter   Result Value Ref Range    Glucose 83 70 - 99 mg/dL   POC US ABDOMEN LIMITED    Impression    [facility] Procedure Note     Limited Bedside ED transabdominal Ultrasound (PREGNANT PATIENT):    PROCEDURE: PERFORMED BY: Dr. Bal Ibarra MD  INDICATIONS/SYMPTOM: lower pelvic pressure  PROBE: Low frequency convex probe  Type of US Study: Transabdominal Exam  BODY LOCATION: abdomen  FINDINGS: active fetal movement, fetal cardiac activity,  IMAGE DOCUMENTATION: Images were archived to PACs system.   CBC with platelets differential   Result Value Ref Range    WBC 18.2 (H) 4.0 - 11.0 10e9/L    RBC Count 3.64 (L) 3.8 - 5.2 10e12/L    Hemoglobin 10.7 (L) 11.7 - 15.7 g/dL    Hematocrit 33.2 (L) 35.0 - 47.0 %    MCV 91 78 - 100 fl    MCH 29.4 26.5 - 33.0 pg    MCHC 32.2 31.5 - 36.5 g/dL    RDW 12.6 10.0 - 15.0 %    Platelet Count 286 150 - 450 10e9/L    Diff Method Automated Method     % Neutrophils 71.5 %    % Lymphocytes 15.2 %    % Monocytes 7.7 %    % Eosinophils 1.3 %    % Basophils 0.5 %    % Immature Granulocytes 3.8 %    Nucleated RBCs 0 0 /100    Absolute Neutrophil 13.1 (H) 1.6 - 8.3 10e9/L    Absolute Lymphocytes 2.8 0.8 - 5.3 10e9/L    Absolute  Monocytes 1.4 (H) 0.0 - 1.3 10e9/L    Absolute Eosinophils 0.2 0.0 - 0.7 10e9/L    Absolute Basophils 0.1 0.0 - 0.2 10e9/L    Abs Immature Granulocytes 0.7 (H) 0 - 0.4 10e9/L    Absolute Nucleated RBC 0.0    Comprehensive metabolic panel   Result Value Ref Range    Sodium 140 133 - 144 mmol/L    Potassium 3.7 3.4 - 5.3 mmol/L    Chloride 110 (H) 94 - 109 mmol/L    Carbon Dioxide 24 20 - 32 mmol/L    Anion Gap 6 3 - 14 mmol/L    Glucose 77 70 - 99 mg/dL    Urea Nitrogen 10 7 - 30 mg/dL    Creatinine 0.46 (L) 0.52 - 1.04 mg/dL    GFR Estimate >90 >60 mL/min/[1.73_m2]    GFR Estimate If Black >90 >60 mL/min/[1.73_m2]    Calcium 8.6 8.5 - 10.1 mg/dL    Bilirubin Total 0.2 0.2 - 1.3 mg/dL    Albumin 3.1 (L) 3.4 - 5.0 g/dL    Protein Total 6.9 6.8 - 8.8 g/dL    Alkaline Phosphatase 95 40 - 150 U/L    ALT 15 0 - 50 U/L    AST 16 0 - 45 U/L   UA reflex to Microscopic   Result Value Ref Range    Color Urine Yellow     Appearance Urine Slightly Cloudy     Glucose Urine Negative NEG^Negative mg/dL    Bilirubin Urine Negative NEG^Negative    Ketones Urine 5 (A) NEG^Negative mg/dL    Specific Gravity Urine 1.020 1.003 - 1.035    Blood Urine Negative NEG^Negative    pH Urine 6.0 5.0 - 7.0 pH    Protein Albumin Urine Negative NEG^Negative mg/dL    Urobilinogen mg/dL 0.0 0.0 - 2.0 mg/dL    Nitrite Urine Negative NEG^Negative    Leukocyte Esterase Urine Negative NEG^Negative    Source Midstream Urine     RBC Urine <1 0 - 2 /HPF    WBC Urine 5 0 - 5 /HPF    Squamous Epithelial /HPF Urine 14 (H) 0 - 1 /HPF    Mucous Urine Present (A) NEG^Negative /LPF           Assessments & Plan (with Medical Decision Making)   Clinical impression: 19-year-old female G1, P0 at 25 week and 3 days who presented with concern about her blood glucose report of  dull headache and feeling dizzy.  Her work-up is not suggestive of preeclampsia however the exact cause of symptoms are reported and described is not clear. she is encouraged to keep close  follow-up with her gynecologist and return to the department for reevaluation if symptoms persist or progress.  She arrived by private vehicle  with her .  Pregnancy is reported to be uncomplicated to date, followed by Dr Zepeda.  On omeprazole and prenatal.  She was worried about her blood glucose because she failed a glucose tolerance test about a week ago (on 2020).  She has been checking her blood glucose frequently at the recommendation of her mother who works as a nurse's aide.  Blood glucoses ranged in the 90s. With frontal headache (rated a 5 out of 10), dizzy (not vertiginous),worry about her blood glucose of 90 the nurse advice line recommended she present to the department for further care.  On arrival in the department her blood glucose is 83.  GCS is 15.  Her arrival blood pressure was elevated 142/95, normalized without intervention (129/91 at discharge). She was afebrile.  Alert oriented x3.  No focal neurologic deficits,   abdomen.  She reported  fetal movements today without leakage of fluid, vaginal spotting or bleeding. Bedside point-of-care ultrasound captured active fetal movement with fetal cardiac activity (see images in PACS).      ED Course and Plan:  I reviewed the medical records including triage conversation prior to arrival in the department, clinic visit on 2020, and  prior vital signs. .  Blood pressure has been normal in clinic-most recent blood pressure on 2020 was 124/74.  OB ultrasound from 2019- see detail in report above.   Patient was offered Tylenol for frontal headache that was described as  dull, 5 out of 10 which she declined.  She was monitored with frequent vital signs (blood pressure).With elevated blood pressure reading from baseline report of headache and dizziness, work-up was initiated to exclude preeclampsia as a cause of her symptoms.     Work up in the department revealed white count- 18.2- upper limits of normal (improved  from 20.7 on 2/6/2020), anemia in pregnancy is stbale (Hgb is 10.7, hematocrit-33.2- unchanged from 2/6/2020), normal midstream  urinalysis without proteinuria.  With elevated blood pressure reading on arrival although my clinical suspicion that she has preeclampsia is low I reviewed her presentation with OB/GYN on duty- (Dr LAURA Meadows) who supported my plan of care to discharge patient home with close follow-up with her obstetrician.    Patient reports she has yet to schedule a follow-up repeat glucose tolerance test.  She is advised to.schedule a follow-up for her  3-hour glucose tolerance test and review her symptoms and visit in the department with her obstetrician.  She was counseled on smoking in pregnancy. We discussed and reviewed reasons to return to the department to be reevaluated and reexamined patient and her  expressed comfort, understanding agreement of plan of care          Disclaimer: This note consists of symbols derived from keyboarding, dictation and/or voice recognition software. As a result, there may be errors in the script that have gone undetected. Please consider this when interpreting information found in this chart.  I have reviewed the nursing notes.    I have reviewed the findings, diagnosis, plan and need for follow up with the patient.       New Prescriptions    No medications on file       Final diagnoses:   Tension headache - Report of sudden onset frontal headache an hour prior to arrival dull in nature   25 weeks gestation of pregnancy - without obvious abnormality on assessment       2/12/2020   [facility] EMERGENCY DEPARTMENT     Bal Ibarra MD  02/12/20 0600

## 2020-02-20 DIAGNOSIS — Z34.02 ENCOUNTER FOR PRENATAL CARE IN SECOND TRIMESTER OF FIRST PREGNANCY: ICD-10-CM

## 2020-02-20 LAB
GLUCOSE 1H P 100 G GLC PO SERPL-MCNC: 156 MG/DL (ref 60–179)
GLUCOSE 2H P 100 G GLC PO SERPL-MCNC: 130 MG/DL (ref 60–154)
GLUCOSE 3H P 100 G GLC PO SERPL-MCNC: 87 MG/DL (ref 60–139)
GLUCOSE P FAST SERPL-MCNC: 75 MG/DL (ref 60–94)

## 2020-02-20 PROCEDURE — 36415 COLL VENOUS BLD VENIPUNCTURE: CPT | Performed by: OBSTETRICS & GYNECOLOGY

## 2020-02-20 PROCEDURE — 82952 GTT-ADDED SAMPLES: CPT | Performed by: OBSTETRICS & GYNECOLOGY

## 2020-02-20 PROCEDURE — 82951 GLUCOSE TOLERANCE TEST (GTT): CPT | Performed by: OBSTETRICS & GYNECOLOGY

## 2020-02-23 ENCOUNTER — MYC REFILL (OUTPATIENT)
Dept: OBGYN | Facility: CLINIC | Age: 20
End: 2020-02-23

## 2020-02-23 DIAGNOSIS — Z34.02 ENCOUNTER FOR PRENATAL CARE IN SECOND TRIMESTER OF FIRST PREGNANCY: ICD-10-CM

## 2020-02-24 RX ORDER — ONDANSETRON 4 MG/1
4 TABLET, ORALLY DISINTEGRATING ORAL EVERY 8 HOURS PRN
Qty: 20 TABLET | Refills: 1 | Status: ON HOLD | OUTPATIENT
Start: 2020-02-24 | End: 2020-05-19

## 2020-02-28 ENCOUNTER — MYC REFILL (OUTPATIENT)
Dept: OBGYN | Facility: CLINIC | Age: 20
End: 2020-02-28

## 2020-02-28 DIAGNOSIS — Z34.02 ENCOUNTER FOR PRENATAL CARE IN SECOND TRIMESTER OF FIRST PREGNANCY: ICD-10-CM

## 2020-03-05 ENCOUNTER — PRENATAL OFFICE VISIT (OUTPATIENT)
Dept: OBGYN | Facility: CLINIC | Age: 20
End: 2020-03-05
Payer: COMMERCIAL

## 2020-03-05 VITALS
TEMPERATURE: 97.3 F | DIASTOLIC BLOOD PRESSURE: 71 MMHG | RESPIRATION RATE: 16 BRPM | BODY MASS INDEX: 24.79 KG/M2 | HEIGHT: 63 IN | SYSTOLIC BLOOD PRESSURE: 127 MMHG | HEART RATE: 93 BPM | WEIGHT: 139.9 LBS

## 2020-03-05 DIAGNOSIS — Z34.02 ENCOUNTER FOR SUPERVISION OF NORMAL FIRST PREGNANCY IN SECOND TRIMESTER: Primary | ICD-10-CM

## 2020-03-05 PROCEDURE — 90715 TDAP VACCINE 7 YRS/> IM: CPT | Performed by: OBSTETRICS & GYNECOLOGY

## 2020-03-05 PROCEDURE — 90471 IMMUNIZATION ADMIN: CPT | Performed by: OBSTETRICS & GYNECOLOGY

## 2020-03-05 PROCEDURE — 99207 ZZC PRENATAL VISIT: CPT | Performed by: OBSTETRICS & GYNECOLOGY

## 2020-03-05 ASSESSMENT — MIFFLIN-ST. JEOR: SCORE: 1363.71

## 2020-03-05 NOTE — PROGRESS NOTES
Concerns: none  No vaginal bleeding, loss of fluid, or contractions  Cephalic position confirmed by Leopold maneuvers.  Reportable signs and symptoms discussed.  Tdap given today  Discussed kick counts and fetal movement.  Discussed PTL, PROM, and when to call or come in.  RTC in 2 weeks.      Kermit Zepeda MD

## 2020-03-05 NOTE — PROGRESS NOTES
Prior to immunization administration, verified patients identity using patient s name and date of birth. Please see Immunization Activity for additional information.     Screening Questionnaire for Adult Immunization    Are you sick today?   No   Do you have allergies to medications, food, a vaccine component or latex?   No   Have you ever had a serious reaction after receiving a vaccination?   No   Do you have a long-term health problem with heart, lung, kidney, or metabolic disease (e.g., diabetes), asthma, a blood disorder, no spleen, complement component deficiency, a cochlear implant, or a spinal fluid leak?  Are you on long-term aspirin therapy?   No   Do you have cancer, leukemia, HIV/AIDS, or any other immune system problem?   No   Do you have a parent, brother, or sister with an immune system problem?   No   In the past 3 months, have you taken medications that affect  your immune system, such as prednisone, other steroids, or anticancer drugs; drugs for the treatment of rheumatoid arthritis, Crohn s disease, or psoriasis; or have you had radiation treatments?   No   Have you had a seizure, or a brain or other nervous system problem?   No   During the past year, have you received a transfusion of blood or blood    products, or been given immune (gamma) globulin or antiviral drug?   No   For women: Are you pregnant or is there a chance you could become       pregnant during the next month?   current   Have you received any vaccinations in the past 4 weeks?   No       Per orders of Dr. Zepeda, injection of Tdap given by Evangelina Gonzalez CMA. Patient instructed to remain in clinic for 15 minutes afterwards, and to report any adverse reaction to me immediately.       Screening performed by Evangelina Gonzalez CMA on 3/5/2020 at 10:06 AM.

## 2020-03-11 ENCOUNTER — HEALTH MAINTENANCE LETTER (OUTPATIENT)
Age: 20
End: 2020-03-11

## 2020-03-18 ENCOUNTER — VIRTUAL VISIT (OUTPATIENT)
Dept: FAMILY MEDICINE | Facility: OTHER | Age: 20
End: 2020-03-18

## 2020-03-18 ENCOUNTER — NURSE TRIAGE (OUTPATIENT)
Dept: NURSING | Facility: CLINIC | Age: 20
End: 2020-03-18

## 2020-03-18 NOTE — PROGRESS NOTES
"Date: 2020 00:40:54  Clinician: Ela Rosas  Clinician NPI: [id number]  Patient: Jeanine Hernandez  Patient : 2000  Patient Address: [address] [place] [address]  Patient Phone: [phone]  Visit Protocol: URI  Patient Summary:  Jeanine is a 20 year old ( : 2000 ) female who initiated a Visit for COVID-19 (Coronavirus) evaluation and screening. When asked the question \"Please sign me up to receive news, health information and promotions from [employer].\", Jeanine responded \"No\".    Jeanine states her symptoms started today.   Jeanine denies having wheezing, sore throat, cough, nasal congestion, fever, ear pain, malaise, headache, rhinitis, enlarged lymph nodes, facial pain or pressure, myalgias, chills, and teeth pain. She also denies taking antibiotic medication for the symptoms and having recent facial or sinus surgery in the past 60 days. She is not experiencing dyspnea.    Pertinent COVID-19 (Coronavirus) information  Jeanine has not traveled internationally or to the areas where COVID-19 (Coronavirus) is widespread in the last 14 days before the start of her symptoms.   Jeanine has not had a close contact with a laboratory-confirmed COVID-19 patient within 14 days of symptom onset. She also has not had a close contact with a suspected COVID-19 patient within 14 days of symptom onset.   Jeanine is not a healthcare worker and does not work in a healthcare facility.   Triage Point(s) temporarily suspended for COVID-19 (Coronavirus) screening  Jeanine reported the following symptoms which were previously protocol referral points. These protocol referral points have temporarily been removed for purposes of COVID-19 (Coronavirus) screening.   Denied all URI symptoms   Pertinent medical history  Jeanine does not get yeast infections when she takes antibiotics.   Jeanine needs a return to work/school note.   Weight: 140 lbs   Jeanine smokes or uses " smokeless tobacco.   She is pregnant and denies breastfeeding.   Weight: 140 lbs    MEDICATIONS: prenatal vitamins with calcium-iron fumarate-folic acid oral, pyridoxine (vitamin B6) oral, omeprazole-sodium bicarbonate oral, ALLERGIES: Latex, Natural Rubber, Iodine and Iodide Containing Products  Clinician Response:  Deadaina Solorzano,   At this point your symptoms and history do not correlate with testing for COVID-19.&nbsp; Take care!    COVID-19 (Coronavirus) General Information  With the increase in the number of COVID-19 (Coronavirus) cases, we understand you may have some questions. Below is some helpful information on COVID-19 (Coronavirus).  How can I protect myself and others from the COVID-19 (Coronavirus)?  Because there is currently no vaccine to prevent infection, the best way to protect yourself is to avoid being exposed to this virus. Put distance between yourself and other people if COVID-19 (Coronavirus) is spreading in your community. The virus is thought to spread mainly from person-to-person.     Between people who are in close contact with one another (within about 6 about) for prolonged period (10 minutes or longer).    Through respiratory droplets produced when an infected person coughs or sneezes.     The CDC recommends the following additional steps to protect yourself and others:     Wash your hands often with soap and water for at least 20 seconds, especially after blowing your nose, coughing, or sneezing; going to the bathroom; and before eating or preparing food.  Use an alcohol-based hand  that contains at least 60 percent alcohol if soap and water are not available.        Avoid touching your eyes, nose and mouth with unwashed hands.    Avoid close contact with people who are sick.    Stay home when you are sick.    Cover your cough or sneeze with a tissue, then throw the tissue in the trash.    Clean and disinfect frequently touched objects and surfaces.     You can help stop  COVID-19 (Coronavirus) by knowing the signs and symptoms:     Fever    Cough    Shortness of breath     Contact your healthcare provider if   Develop symptoms   AND   Have been in close contact with a person known to have COVID-19 (Coronavirus) or live in or have recently traveled from an area with ongoing spread of COVID-19 (Coronavirus). Call ahead before you go to a doctor's office or emergency room. Tell them about your recent travel and your symptoms.   For the most up to date information, visit the CDC's website.  Steps to help prevent the spread of COVID-19 (Coronavirus) if you are sick  If you are sick with COVID-19 (Coronavirus) or suspect you are infected with the virus that causes COVID-19 (Coronavirus), follow the steps below to help prevent the disease from spreading&nbsp;to people in your home and community.     Stay home except to get medical care. Home isolation may be started in consultation with your healthcare clinician.    Separate yourself from other people and animals in your home.    Call ahead before visiting your doctor if you have a medical appointment.    Wear a facemask when you are around other people.    Cover your cough and sneezes.    Clean your hands often.    Avoid sharing personal household items.    Clean and disinfect frequently touched objects and surfaces everyday.    You will need to have someone drop off medications or household supplies (if needed) at your house without coming inside or in contact with you or others living in your house.    Monitor your symptoms and seek prompt medical care if your illness is worsening (e.g. Difficulty breathing).    Discontinue home isolation only in consultation with your healthcare provider.     For more detailed and up to date information on what to do if you are sick, visit this link: What to Do If You Are Sick With Coronavirus Disease 2019 (COVID-19).  Do I need to be tested for COVID-19 (Coronavirus)?     At this time, the limited  "number of tests available are controlled by the state and local health departments and are being reserved for more seriously ill patients, those with known exposure to confirmed patients, and those with recent travel (within 14 days) to countries with high rates of COVID-19 (Coronavirus).    Decisions on which patients receive testing will be based on the local spread of COVID-19 (Coronavirus) as well as the symptoms. Your healthcare provider will make the final decision on whether you should be tested.    In the meantime, if you have concerns that you may have been exposed, it is reasonable to practice \"social distancing.\"&nbsp; If you are ill with a cold or flu-like illness, please monitor your symptoms and reach out to your healthcare provider if your symptoms worsen.    For more up to date information, visit this link: COVID-19 (Coronavirus) Frequently Asked Questions and Answers.      Diagnosis: Cough  Diagnosis ICD: R05  "

## 2020-03-18 NOTE — TELEPHONE ENCOUNTER
Caller is pregnant and AKSSIE is 05/23/20. Caller states she was in contact with a co-worker tonight that says she may have been in contact with someone suspected of the corona virus. Caller denies any symptoms and caller directed to [email or web address]. Caller verbalized and understands directives.    Reason for Disposition    Health Information question, no triage required and triager able to answer question    Additional Information    Negative: [1] Caller is not with the adult (patient) AND [2] reporting urgent symptoms    Negative: Lab result questions    Negative: Medication questions    Negative: Caller can't be reached by phone    Negative: Caller has already spoken to PCP or another triager    Negative: RN needs further essential information from caller in order to complete triage    Negative: Requesting regular office appointment    Negative: [1] Caller requesting NON-URGENT health information AND [2] PCP's office is the best resource    Protocols used: INFORMATION ONLY CALL-A-

## 2020-03-19 ENCOUNTER — VIRTUAL VISIT (OUTPATIENT)
Dept: OBGYN | Facility: CLINIC | Age: 20
End: 2020-03-19
Payer: COMMERCIAL

## 2020-03-19 DIAGNOSIS — Z34.03 ENCOUNTER FOR SUPERVISION OF NORMAL FIRST PREGNANCY IN THIRD TRIMESTER: Primary | ICD-10-CM

## 2020-03-19 PROCEDURE — 99207 ZZC PRENATAL VISIT: CPT | Performed by: OBSTETRICS & GYNECOLOGY

## 2020-03-19 NOTE — PROGRESS NOTES
"Jeanine Hernandez is a 20 year old female who is being evaluated via a billable telephone visit.      The patient has been notified of following:     \"This telephone visit will be conducted via a call between you and your physician/provider. We have found that certain health care needs can be provided without the need for a physical exam.  This service lets us provide the care you need with a short phone conversation.  If a prescription is necessary we can send it directly to your pharmacy.  If lab work is needed we can place an order for that and you can then stop by our lab to have the test done at a later time.    If during the course of the call the physician/provider feels a telephone visit is not appropriate, you will not be charged for this service.\"     Jeanine Hernandez complains of    Chief Complaint   Patient presents with     Prenatal Care     BM are more frequent- started stool softeners       I have reviewed and updated the patient's Past Medical History, Social History, Family History and Medication List.    Mee Sidhu CMA  Still working  ALLERGIES  Contrast dye; Iodine; and Latex    Additional provider notes: see OB note    Assessment/Plan:  (Z34.03) Encounter for supervision of normal first pregnancy in third trimester  (primary encounter diagnosis)  Comment:   Plan:           Phone call duration: 10 minutes    Kermit Zepeda MD          "

## 2020-03-19 NOTE — PROGRESS NOTES
Concerns: still working @ [employer]  They are not doing any screening of clients  I have contacted the offices of [employer] Shelton and Allison Clarke to look for guidance for her Boss  Doing well.  No concerns today.  No vaginal bleeding, LOF.  Reportable signs and symptoms discussed.  Discussed kick counts and fetal movement.  Discussed PTL, PROM, and when to call or come in.  RTC 2 weeks.    Kermit Zepeda MD

## 2020-03-23 ENCOUNTER — TELEPHONE (OUTPATIENT)
Dept: OBGYN | Facility: CLINIC | Age: 20
End: 2020-03-23

## 2020-03-23 ENCOUNTER — MYC MEDICAL ADVICE (OUTPATIENT)
Dept: OBGYN | Facility: CLINIC | Age: 20
End: 2020-03-23

## 2020-03-23 DIAGNOSIS — F32.A DEPRESSION, UNSPECIFIED DEPRESSION TYPE: Primary | ICD-10-CM

## 2020-03-23 NOTE — TELEPHONE ENCOUNTER
Per patient request/ Dr. Pizarro I called patient and offered her phone number for Pregnancy and Postpartum Support. Patient was thankful and had no further questions.   Mee Sidhu, [facility]

## 2020-03-31 ENCOUNTER — TELEPHONE (OUTPATIENT)
Dept: OBGYN | Facility: CLINIC | Age: 20
End: 2020-03-31

## 2020-03-31 DIAGNOSIS — Z34.03 ENCOUNTER FOR PRENATAL CARE IN THIRD TRIMESTER OF FIRST PREGNANCY: Primary | ICD-10-CM

## 2020-03-31 NOTE — TELEPHONE ENCOUNTER
Attempted to reach patient x2 ( [phone] )  to follow up on symptoms. Improving or worsening?    Unable to reach. Left message for patient to return call to clinic.    Leia Patel   [facility]  RN

## 2020-03-31 NOTE — TELEPHONE ENCOUNTER
"Call received from   Spoke with patient on the phone.      S-(situation): Patient reports about 0800 noticing constant pressure in her pelvic/abdominal area. Patient reports she was at home doing some light cleaning when she noticed this. Patient reports pressure is constant and she rates this a 6/10. Patient reports some nausea as well. Patient reports this is \" very uncomfortable.\" Patient reports she has also experienced 2 contractions in the last hour that have lasted about 45 seconds each. Patient denies any bleeding, abnormal discharge or leaking of fluid. Patient reports baby is active. Patient denies any fever. Patient reports normal bowel movement today. Patient denies any recent intercourse. Patient denies dysuria but  reports frequent urination, when she feels she has emptied her bladder and stands up to be finished, she immediately needs to sit back down to go to the bathroom again.    B-(background):  at 32W3D    A-(assessment): abdominal/pelvic pressure, contractions noted 2 30 minutes apart in last hour.    R-(recommendations): Reassurance provided. Comfort measures. Rest off of your feet. Monitor. Will check with the on-call provider for further recommendations. May need further evaluation in [facility] ([phone]).    Please review and advise.  Thank you.    Leia Patel   [facility]  RN        "

## 2020-03-31 NOTE — TELEPHONE ENCOUNTER
Please re-try patient periodically.  We can plan on getting a UA C&S at lab.      Otherwise, I would normally advise her to work more slowly and to hydrate regularly.  Her contractions don't sound as worrisome if they're only 30 min apart.     Samira Meadows M.D.

## 2020-03-31 NOTE — TELEPHONE ENCOUNTER
"Patient returning call to clinic.  Pt notified of below.    Patient reports one time discharge of whitish discharge. Still having abdominal pressure. No bleeding. Contractions every 45 - 50 minutes, lasting about 50 seconds. Patient reports baby is active.    Reviewed information as below with patient. Encouraged patient to come to lab today for appointment to rule out UTI. Encouraged patient to continue to monitor symptoms. Call with bright red bleeding, leaking of fluid like her water broke, contractions more than 6 per hour, decreased fetal movement or increasing pain. Encouraged patient to push oral hydration and rest off of feet to try to take it easy.   Patient unable to come to lab today for appointment or be off of her feet today because she \" has to work.\"    Stressed importance to patient of close monitoring and calling with worsening/changing symptoms. If after clinic hours, patient to call Nurse Advise Line. Patient reports she has the phone number.    Patient in agreement with plan.    Leia Patel   [facility]  RN    "

## 2020-03-31 NOTE — TELEPHONE ENCOUNTER
Reason for Call:  Other     Detailed comments: EDC:  05/23/2020    Pt states she is 33 weeks pregnant and is experiencing constant abdominal pressure/pain and contraction-like feelings approximately 30 minutes apart for the last hour. Also having frequent urination - Please advise    Transferred to RN    Phone Number Patient can be reached at: Home number on file [phone] (home)    Best Time: Any    Can we leave a detailed message on this number? YES    Call taken on 3/31/2020 at 8:49 AM by Denise Behrendt

## 2020-03-31 NOTE — TELEPHONE ENCOUNTER
Call to pt to notify of below.  Unable to reach.  Left message for pt to call back.    Leia Patel   [facility]  RN

## 2020-04-01 DIAGNOSIS — Z34.03 ENCOUNTER FOR PRENATAL CARE IN THIRD TRIMESTER OF FIRST PREGNANCY: ICD-10-CM

## 2020-04-01 LAB
ALBUMIN UR-MCNC: NEGATIVE MG/DL
APPEARANCE UR: ABNORMAL
BACTERIA #/AREA URNS HPF: ABNORMAL /HPF
BILIRUB UR QL STRIP: NEGATIVE
COLOR UR AUTO: YELLOW
GLUCOSE UR STRIP-MCNC: NEGATIVE MG/DL
HGB UR QL STRIP: NEGATIVE
KETONES UR STRIP-MCNC: NEGATIVE MG/DL
LEUKOCYTE ESTERASE UR QL STRIP: NEGATIVE
NITRATE UR QL: NEGATIVE
NON-SQ EPI CELLS #/AREA URNS LPF: ABNORMAL /LPF
PH UR STRIP: 7 PH (ref 5–7)
RBC #/AREA URNS AUTO: ABNORMAL /HPF
SOURCE: ABNORMAL
SP GR UR STRIP: <=1.005 (ref 1–1.03)
UROBILINOGEN UR STRIP-ACNC: 0.2 EU/DL (ref 0.2–1)
WBC #/AREA URNS AUTO: ABNORMAL /HPF

## 2020-04-01 PROCEDURE — 87086 URINE CULTURE/COLONY COUNT: CPT | Performed by: OBSTETRICS & GYNECOLOGY

## 2020-04-01 PROCEDURE — 81001 URINALYSIS AUTO W/SCOPE: CPT | Performed by: OBSTETRICS & GYNECOLOGY

## 2020-04-02 ENCOUNTER — PRENATAL OFFICE VISIT (OUTPATIENT)
Dept: OBGYN | Facility: CLINIC | Age: 20
End: 2020-04-02
Payer: COMMERCIAL

## 2020-04-02 VITALS
HEART RATE: 105 BPM | HEIGHT: 63 IN | BODY MASS INDEX: 26.45 KG/M2 | DIASTOLIC BLOOD PRESSURE: 74 MMHG | RESPIRATION RATE: 16 BRPM | TEMPERATURE: 97.3 F | SYSTOLIC BLOOD PRESSURE: 120 MMHG | WEIGHT: 149.3 LBS

## 2020-04-02 DIAGNOSIS — Z34.03 ENCOUNTER FOR PRENATAL CARE IN THIRD TRIMESTER OF FIRST PREGNANCY: Primary | ICD-10-CM

## 2020-04-02 LAB
BACTERIA SPEC CULT: NORMAL
Lab: NORMAL
SPECIMEN SOURCE: NORMAL

## 2020-04-02 PROCEDURE — 99207 ZZC PRENATAL VISIT: CPT | Performed by: OBSTETRICS & GYNECOLOGY

## 2020-04-02 ASSESSMENT — MIFFLIN-ST. JEOR: SCORE: 1406.35

## 2020-04-02 NOTE — PROGRESS NOTES
"CC: Here for routine prenatal visit @ 32w5d   HPI: + FM, no ctx, no LOF, no VB.  Various aches/pains in her pelvis and around her belly button.       PE: /74 (BP Location: Left arm, Patient Position: Chair, Cuff Size: Adult Regular)   Pulse 105   Temp 97.3  F (36.3  C) (Tympanic)   Resp 16   Ht 1.6 m (5' 3\")   Wt 67.7 kg (149 lb 4.8 oz)   LMP 09/06/2019 (Approximate)   Breastfeeding No   BMI 26.45 kg/m     See OB flowsheet    A/P G1 @ 32w5d normal pregnancy    1. Routine prenatal care.  Reviewed aches/pains of pregnancy.  Recently laid off.  Plan SI maternity belt    2 weeks virtual visit    Samira Meadows M.D.    "

## 2020-04-02 NOTE — NURSING NOTE
"Initial /74 (BP Location: Left arm, Patient Position: Chair, Cuff Size: Adult Regular)   Pulse 105   Temp 97.3  F (36.3  C) (Tympanic)   Resp 16   Ht 1.6 m (5' 3\")   Wt 67.7 kg (149 lb 4.8 oz)   LMP 09/06/2019 (Approximate)   Breastfeeding No   BMI 26.45 kg/m   Estimated body mass index is 26.45 kg/m  as calculated from the following:    Height as of this encounter: 1.6 m (5' 3\").    Weight as of this encounter: 67.7 kg (149 lb 4.8 oz). .    Mee Sidhu, L[facility]    "

## 2020-04-07 ENCOUNTER — TELEPHONE (OUTPATIENT)
Dept: OBGYN | Facility: CLINIC | Age: 20
End: 2020-04-07

## 2020-04-07 NOTE — TELEPHONE ENCOUNTER
Cannot write that letter, but we can write one to her employer that she must maintain 6 feet from others.  We are writing letters after 37 weeks.      If others are providing money/credit cards/paperwork, then she needs to step back to allow for them to move forward so she can maintain space between.  She needs to have hand  present so she can sterilize and/or wash between interactions.  She should be wiping counter/register between interactions, etc.     Samira Meadows M.D.

## 2020-04-07 NOTE — TELEPHONE ENCOUNTER
LOV: 4/2/2020.    Called patient. Informed her that we are currently recommending the same guidelines as CDC- social distancing, frequent hand washing, face covering or mask if possible.     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 People are still close to her due to needing to exchange money and sign papers. Wondering if she can have a note excusing her from work the remainder of her pregnancy? Will route to provider for consideration of request.     Asia OBRIEN RN   [facility]

## 2020-04-07 NOTE — TELEPHONE ENCOUNTER
Reason for call:  Patient reporting a symptom    Symptom or request: 33 wks pregnant - pt wondering what the guidelines are for pregnant women and working - with COVID guidelines?    Duration (how long have symptoms been present):     Have you been treated for this before? No    Additional comments:     Phone Number patient can be reached at:  Home number on file [phone] (home)    Best Time:      Can we leave a detailed message on this number:  YES    Call taken on 4/7/2020 at 9:49 AM by Kathryn Escalante

## 2020-04-16 ENCOUNTER — VIRTUAL VISIT (OUTPATIENT)
Dept: OBGYN | Facility: CLINIC | Age: 20
End: 2020-04-16
Payer: COMMERCIAL

## 2020-04-16 VITALS — BODY MASS INDEX: 26.45 KG/M2 | HEIGHT: 63 IN

## 2020-04-16 DIAGNOSIS — Z34.02 ENCOUNTER FOR PRENATAL CARE IN SECOND TRIMESTER OF FIRST PREGNANCY: ICD-10-CM

## 2020-04-16 PROCEDURE — 99207 ZZC PRENATAL VISIT: CPT | Performed by: OBSTETRICS & GYNECOLOGY

## 2020-04-16 NOTE — PROGRESS NOTES
"Jeanine Hernandez is a 20 year old female who is being evaluated via a billable telephone visit.      The patient has been notified of following:     \"This telephone visit will be conducted via a call between you and your physician/provider. We have found that certain health care needs can be provided without the need for a physical exam.  This service lets us provide the care you need with a short phone conversation.  If a prescription is necessary we can send it directly to your pharmacy.  If lab work is needed we can place an order for that and you can then stop by our lab to have the test done at a later time.    Telephone visits are billed at different rates depending on your insurance coverage. During this emergency period, for some insurers they may be billed the same as an in-person visit.  Please reach out to your insurance provider with any questions.    If during the course of the call the physician/provider feels a telephone visit is not appropriate, you will not be charged for this service.\"    Patient has given verbal consent for Telephone visit?yes    No ctx, vb or lof. Active baby.  Reviewed covid restrictions and precautions.   Planning epidural, bresatfeeding. PTL precautions reviewed.    Phone call duration: 12 minutes      Deja Stone MD    "

## 2020-04-16 NOTE — PROGRESS NOTES
"Jeanine Hernandez is a 20 year old female who is being evaluated via a billable telephone visit.      The patient has been notified of following:     \"This telephone visit will be conducted via a call between you and your physician/provider. We have found that certain health care needs can be provided without the need for a physical exam.  This service lets us provide the care you need with a short phone conversation.  If a prescription is necessary we can send it directly to your pharmacy.  If lab work is needed we can place an order for that and you can then stop by our lab to have the test done at a later time.    Telephone visits are billed at different rates depending on your insurance coverage. During this emergency period, for some insurers they may be billed the same as an in-person visit.  Please reach out to your insurance provider with any questions.    If during the course of the call the physician/provider feels a telephone visit is not appropriate, you will not be charged for this service.\"    Patient has given verbal consent for Telephone visit?  Yes   .    "

## 2020-04-30 ENCOUNTER — PRENATAL OFFICE VISIT (OUTPATIENT)
Dept: OBGYN | Facility: CLINIC | Age: 20
End: 2020-04-30
Payer: COMMERCIAL

## 2020-04-30 VITALS
TEMPERATURE: 98.1 F | SYSTOLIC BLOOD PRESSURE: 138 MMHG | BODY MASS INDEX: 28.53 KG/M2 | DIASTOLIC BLOOD PRESSURE: 84 MMHG | HEART RATE: 111 BPM | HEIGHT: 63 IN | WEIGHT: 161 LBS

## 2020-04-30 DIAGNOSIS — Z34.03 ENCOUNTER FOR PRENATAL CARE IN THIRD TRIMESTER OF FIRST PREGNANCY: Primary | ICD-10-CM

## 2020-04-30 PROCEDURE — 99207 ZZC PRENATAL VISIT: CPT | Performed by: OBSTETRICS & GYNECOLOGY

## 2020-04-30 PROCEDURE — 87653 STREP B DNA AMP PROBE: CPT | Performed by: OBSTETRICS & GYNECOLOGY

## 2020-04-30 ASSESSMENT — MIFFLIN-ST. JEOR: SCORE: 1459.42

## 2020-04-30 NOTE — PROGRESS NOTES
"CC: Here for routine prenatal visit @ 36w5d   HPI:  Still getting B[facility]; reviewed s/s of labor, when/where to call, COVID in pregnancy info packet given; GBS done.    PE: /84 (BP Location: Left arm, Patient Position: Chair, Cuff Size: Adult Regular)   Pulse 111   Temp 98.1  F (36.7  C) (Tympanic)   Ht 1.6 m (5' 3\")   Wt 73 kg (161 lb)   LMP 09/06/2019 (Approximate)   BMI 28.52 kg/m     See OB flowsheet      A:  1. Encounter for prenatal care in third trimester of first pregnancy    - Strep, Group B by PCR      Routine prenatal care  RTC 1 weeks.      Reny Jeffery M.D.     "

## 2020-04-30 NOTE — NURSING NOTE
"Initial /84 (BP Location: Left arm, Patient Position: Chair, Cuff Size: Adult Regular)   Pulse 111   Temp 98.1  F (36.7  C) (Tympanic)   Ht 1.6 m (5' 3\")   Wt 73 kg (161 lb)   LMP 09/06/2019 (Approximate)   BMI 28.52 kg/m   Estimated body mass index is 28.52 kg/m  as calculated from the following:    Height as of this encounter: 1.6 m (5' 3\").    Weight as of this encounter: 73 kg (161 lb). .      "

## 2020-05-01 LAB
GP B STREP DNA SPEC QL NAA+PROBE: NEGATIVE
SPECIMEN SOURCE: NORMAL

## 2020-05-07 ENCOUNTER — PRENATAL OFFICE VISIT (OUTPATIENT)
Dept: OBGYN | Facility: CLINIC | Age: 20
End: 2020-05-07
Payer: COMMERCIAL

## 2020-05-07 VITALS
SYSTOLIC BLOOD PRESSURE: 128 MMHG | RESPIRATION RATE: 18 BRPM | WEIGHT: 162 LBS | HEIGHT: 63 IN | TEMPERATURE: 98.6 F | HEART RATE: 103 BPM | DIASTOLIC BLOOD PRESSURE: 86 MMHG | BODY MASS INDEX: 28.7 KG/M2

## 2020-05-07 DIAGNOSIS — Z34.93 PRENATAL CARE, THIRD TRIMESTER: Primary | ICD-10-CM

## 2020-05-07 PROCEDURE — 99207 ZZC PRENATAL VISIT: CPT | Performed by: OBSTETRICS & GYNECOLOGY

## 2020-05-07 ASSESSMENT — MIFFLIN-ST. JEOR: SCORE: 1463.96

## 2020-05-07 NOTE — PROGRESS NOTES
"CC: Here for routine prenatal visit @ 37w5d     HPI: + FM, no ctx, no LOF, no VB.  No concerns today     PE: /86 (BP Location: Right arm, Patient Position: Chair, Cuff Size: Adult Regular)   Pulse 103   Temp 98.6  F (37  C) (Tympanic)   Resp 18   Ht 1.6 m (5' 3\")   Wt 73.5 kg (162 lb)   LMP 09/06/2019 (Approximate)   BMI 28.70 kg/m     FH: 38  Doptones: 150s  SVE: 1/30/-2 soft, MP     A/P G1 @ 37w5d normal pregnancy     1. Routine prenatal care.  Failed GCT, passed GTT. GBS neg    Reviewed covid restrictions and precautions.   Planning epidural, bresatfeeding. PTL precautions reviewed.    Return to clinic in 1 week    Dottie Lee MD   5/7/2020 11:45 AM     "

## 2020-05-07 NOTE — NURSING NOTE
"Initial /86 (BP Location: Right arm, Patient Position: Chair, Cuff Size: Adult Regular)   Pulse 103   Temp 98.6  F (37  C) (Tympanic)   Resp 18   Ht 1.6 m (5' 3\")   Wt 73.5 kg (162 lb)   LMP 09/06/2019 (Approximate)   BMI 28.70 kg/m   Estimated body mass index is 28.7 kg/m  as calculated from the following:    Height as of this encounter: 1.6 m (5' 3\").    Weight as of this encounter: 73.5 kg (162 lb). .      "

## 2020-05-13 ENCOUNTER — NURSE TRIAGE (OUTPATIENT)
Dept: NURSING | Facility: CLINIC | Age: 20
End: 2020-05-13

## 2020-05-13 NOTE — TELEPHONE ENCOUNTER
KASSIE 5/20/20  38w4d  Planning to deliver at [place]   Sees OB MDs (all of them)  1st pregnancy    Patient thinks she is in Labor  Symptoms started 1:18am  Pains in lower region every 17 min     Lasts 45-60 sec  Radiates down with kind of a stabbing feeling  Rates pain 4-7/10, each one is different    Baby moves as normal, except during a contraction she stops moving    No leakage of fluid from vagina  No vaginal bleeding   No fever  No hand or facial swelling  No cough  No shortness of breath  No known exposure to COVID19    Triaged to a disposition of Home Care. Advised on symptoms of true labor and when to call back and or go to L&D. Patient is agreeable     COVID 19 Nurse Triage Plan/Patient Instructions    Please be aware that novel coronavirus (COVID-19) may be circulating in the community. If you develop symptoms such as fever, cough, or SOB or if you have concerns about the presence of another infection including coronavirus (COVID-19), please contact your health care provider or visit [email or web address].     Disposition/Instructions    Patient to stay at home and follow home care protocol based instructions.     Thank you for limiting contact with others, wearing a simple mask to cover your cough, practice good hand hygiene habits and accessing our virtual services where possible to limit the spread of this virus.    For more information about COVID19 and options for caring for yourself at home, please visit the CDC website at https://www.cdc.gov/coronavirus/2019-ncov/about/steps-when-sick.html  For more options for care at [facility], please visit our website at [email or web address]    For more information, please use the [place] Department of Health COVID-19 Website: https://www.health.state.[place]/diseases/coronavirus/index.html  [facility] ([facility]) COVID-19 Hotlines (Interpreters available):      Health questions: Phone Number: [phone] or  "[phone] and Hours: 7 a.m. to 7 p.m.    Schools and  questions: Phone Number: [phone] or [phone] and Hours 7 a.m. to 7 p.m.    Reason for Disposition    [1] First baby (primipara) AND [2] contractions < 6 minutes apart  AND [3] present 2 hours    Additional Information    Negative: Passed out (i.e., lost consciousness, collapsed and was not responding)    Negative: Shock suspected (e.g., cold/pale/clammy skin, too weak to stand, low BP, rapid pulse)    Negative: Difficult to awaken or acting confused (e.g., disoriented, slurred speech)    Negative: [1] SEVERE abdominal pain (e.g., excruciating) AND [2] constant AND [3] present > 1 hour    Negative: Severe bleeding (e.g., continuous red blood from vagina, or large blood clots)    Negative: Umbilical cord hanging out of the vagina (shiny, white, curled appearance, \"like telephone cord\")    Negative: Uncontrollable urge to push (i.e., feels like baby is coming out now)    Negative: Can see baby    Negative: Sounds like a life-threatening emergency to the triager    Negative: Pregnant < 37 weeks (i.e., )    Negative: [1] Uncertain delivery date AND [2] possibly pregnant < 37 weeks (i.e., )    Protocols used: PREGNANCY - LABOR-A-AH      "

## 2020-05-14 ENCOUNTER — PRENATAL OFFICE VISIT (OUTPATIENT)
Dept: OBGYN | Facility: CLINIC | Age: 20
End: 2020-05-14
Payer: COMMERCIAL

## 2020-05-14 ENCOUNTER — HOSPITAL ENCOUNTER (INPATIENT)
Facility: CLINIC | Age: 20
LOS: 1 days | Discharge: HOME | DRG: 833 | End: 2020-05-14
Attending: OBSTETRICS & GYNECOLOGY | Admitting: OBSTETRICS & GYNECOLOGY
Payer: COMMERCIAL

## 2020-05-14 VITALS
TEMPERATURE: 98 F | DIASTOLIC BLOOD PRESSURE: 85 MMHG | SYSTOLIC BLOOD PRESSURE: 142 MMHG | RESPIRATION RATE: 16 BRPM | HEART RATE: 100 BPM

## 2020-05-14 VITALS
WEIGHT: 166 LBS | SYSTOLIC BLOOD PRESSURE: 129 MMHG | HEART RATE: 93 BPM | BODY MASS INDEX: 29.41 KG/M2 | RESPIRATION RATE: 16 BRPM | HEIGHT: 63 IN | DIASTOLIC BLOOD PRESSURE: 90 MMHG | TEMPERATURE: 97.7 F

## 2020-05-14 DIAGNOSIS — R03.0 ELEVATED BP WITHOUT DIAGNOSIS OF HYPERTENSION: ICD-10-CM

## 2020-05-14 PROBLEM — O13.9 GESTATIONAL HYPERTENSION: Status: ACTIVE | Noted: 2020-05-14

## 2020-05-14 PROBLEM — Z34.00 SUPERVISION OF NORMAL FIRST PREGNANCY: Status: ACTIVE | Noted: 2020-05-14

## 2020-05-14 LAB
ABO + RH BLD: NORMAL
ABO + RH BLD: NORMAL
ALT SERPL W P-5'-P-CCNC: 11 U/L (ref 0–50)
AMPHETAMINES UR QL SCN: NEGATIVE
ANION GAP SERPL CALCULATED.3IONS-SCNC: 7 MMOL/L (ref 3–14)
AST SERPL W P-5'-P-CCNC: 17 U/L (ref 0–45)
BARBITURATES UR QL: NEGATIVE
BENZODIAZ UR QL: NEGATIVE
BLD GP AB SCN SERPL QL: NORMAL
BLOOD BANK CMNT PATIENT-IMP: NORMAL
BUN SERPL-MCNC: 9 MG/DL (ref 7–30)
CALCIUM SERPL-MCNC: 8.7 MG/DL (ref 8.5–10.1)
CANNABINOIDS UR QL SCN: NEGATIVE
CHLORIDE SERPL-SCNC: 105 MMOL/L (ref 94–109)
CO2 SERPL-SCNC: 22 MMOL/L (ref 20–32)
COCAINE UR QL: NEGATIVE
CREAT SERPL-MCNC: 0.65 MG/DL (ref 0.52–1.04)
CREAT UR-MCNC: 123 MG/DL
ERYTHROCYTE [DISTWIDTH] IN BLOOD BY AUTOMATED COUNT: 12.2 % (ref 10–15)
GFR SERPL CREATININE-BSD FRML MDRD: >90 ML/MIN/{1.73_M2}
GLUCOSE SERPL-MCNC: 74 MG/DL (ref 70–99)
HCT VFR BLD AUTO: 37 % (ref 35–47)
HGB BLD-MCNC: 12.3 G/DL (ref 11.7–15.7)
MCH RBC QN AUTO: 29.9 PG (ref 26.5–33)
MCHC RBC AUTO-ENTMCNC: 33.2 G/DL (ref 31.5–36.5)
MCV RBC AUTO: 90 FL (ref 78–100)
OPIATES UR QL SCN: NEGATIVE
PCP UR QL SCN: NEGATIVE
PLATELET # BLD AUTO: 187 10E9/L (ref 150–450)
POTASSIUM SERPL-SCNC: 3.7 MMOL/L (ref 3.4–5.3)
PROT UR-MCNC: 0.36 G/L
PROT/CREAT 24H UR: 0.29 G/G CR (ref 0–0.2)
RBC # BLD AUTO: 4.11 10E12/L (ref 3.8–5.2)
SODIUM SERPL-SCNC: 134 MMOL/L (ref 133–144)
SPECIMEN EXP DATE BLD: NORMAL
URATE SERPL-MCNC: 4.2 MG/DL (ref 2.6–6)
WBC # BLD AUTO: 21.6 10E9/L (ref 4–11)

## 2020-05-14 PROCEDURE — 86780 TREPONEMA PALLIDUM: CPT | Performed by: OBSTETRICS & GYNECOLOGY

## 2020-05-14 PROCEDURE — 84156 ASSAY OF PROTEIN URINE: CPT | Performed by: OBSTETRICS & GYNECOLOGY

## 2020-05-14 PROCEDURE — 59426 ANTEPARTUM CARE ONLY: CPT | Performed by: OBSTETRICS & GYNECOLOGY

## 2020-05-14 PROCEDURE — 80048 BASIC METABOLIC PNL TOTAL CA: CPT | Performed by: OBSTETRICS & GYNECOLOGY

## 2020-05-14 PROCEDURE — 86901 BLOOD TYPING SEROLOGIC RH(D): CPT | Performed by: OBSTETRICS & GYNECOLOGY

## 2020-05-14 PROCEDURE — 86850 RBC ANTIBODY SCREEN: CPT | Performed by: OBSTETRICS & GYNECOLOGY

## 2020-05-14 PROCEDURE — 84460 ALANINE AMINO (ALT) (SGPT): CPT | Performed by: OBSTETRICS & GYNECOLOGY

## 2020-05-14 PROCEDURE — 84550 ASSAY OF BLOOD/URIC ACID: CPT | Performed by: OBSTETRICS & GYNECOLOGY

## 2020-05-14 PROCEDURE — 36415 COLL VENOUS BLD VENIPUNCTURE: CPT | Performed by: OBSTETRICS & GYNECOLOGY

## 2020-05-14 PROCEDURE — 85027 COMPLETE CBC AUTOMATED: CPT | Performed by: OBSTETRICS & GYNECOLOGY

## 2020-05-14 PROCEDURE — 84450 TRANSFERASE (AST) (SGOT): CPT | Performed by: OBSTETRICS & GYNECOLOGY

## 2020-05-14 PROCEDURE — 86900 BLOOD TYPING SEROLOGIC ABO: CPT | Performed by: OBSTETRICS & GYNECOLOGY

## 2020-05-14 PROCEDURE — 99207 ZZC PRENATAL VISIT: CPT | Performed by: OBSTETRICS & GYNECOLOGY

## 2020-05-14 PROCEDURE — 80307 DRUG TEST PRSMV CHEM ANLYZR: CPT | Performed by: OBSTETRICS & GYNECOLOGY

## 2020-05-14 PROCEDURE — 12000000 ZZH R&B MED SURG/OB

## 2020-05-14 RX ORDER — IBUPROFEN 800 MG/1
800 TABLET, FILM COATED ORAL
Status: DISCONTINUED | OUTPATIENT
Start: 2020-05-14 | End: 2020-05-14 | Stop reason: HOSPADM

## 2020-05-14 RX ORDER — METHYLERGONOVINE MALEATE 0.2 MG/ML
200 INJECTION INTRAVENOUS
Status: DISCONTINUED | OUTPATIENT
Start: 2020-05-14 | End: 2020-05-14 | Stop reason: HOSPADM

## 2020-05-14 RX ORDER — ONDANSETRON 2 MG/ML
4 INJECTION INTRAMUSCULAR; INTRAVENOUS EVERY 6 HOURS PRN
Status: DISCONTINUED | OUTPATIENT
Start: 2020-05-14 | End: 2020-05-14 | Stop reason: HOSPADM

## 2020-05-14 RX ORDER — NIFEDIPINE 10 MG/1
10 CAPSULE ORAL
Status: CANCELLED | OUTPATIENT
Start: 2020-05-14

## 2020-05-14 RX ORDER — CARBOPROST TROMETHAMINE 250 UG/ML
250 INJECTION, SOLUTION INTRAMUSCULAR
Status: DISCONTINUED | OUTPATIENT
Start: 2020-05-14 | End: 2020-05-14 | Stop reason: HOSPADM

## 2020-05-14 RX ORDER — CARBOPROST TROMETHAMINE 250 UG/ML
250 INJECTION, SOLUTION INTRAMUSCULAR
Status: CANCELLED | OUTPATIENT
Start: 2020-05-14

## 2020-05-14 RX ORDER — HYDROXYZINE HYDROCHLORIDE 50 MG/1
100 TABLET, FILM COATED ORAL
Status: DISCONTINUED | OUTPATIENT
Start: 2020-05-14 | End: 2020-05-14 | Stop reason: HOSPADM

## 2020-05-14 RX ORDER — OXYTOCIN/0.9 % SODIUM CHLORIDE 30/500 ML
100-340 PLASTIC BAG, INJECTION (ML) INTRAVENOUS CONTINUOUS PRN
Status: DISCONTINUED | OUTPATIENT
Start: 2020-05-14 | End: 2020-05-14 | Stop reason: HOSPADM

## 2020-05-14 RX ORDER — ONDANSETRON 2 MG/ML
4 INJECTION INTRAMUSCULAR; INTRAVENOUS EVERY 6 HOURS PRN
Status: CANCELLED | OUTPATIENT
Start: 2020-05-14

## 2020-05-14 RX ORDER — NALOXONE HYDROCHLORIDE 0.4 MG/ML
.1-.4 INJECTION, SOLUTION INTRAMUSCULAR; INTRAVENOUS; SUBCUTANEOUS
Status: CANCELLED | OUTPATIENT
Start: 2020-05-14

## 2020-05-14 RX ORDER — OXYTOCIN 10 [USP'U]/ML
10 INJECTION, SOLUTION INTRAMUSCULAR; INTRAVENOUS
Status: CANCELLED | OUTPATIENT
Start: 2020-05-14

## 2020-05-14 RX ORDER — MAGNESIUM SULFATE HEPTAHYDRATE 500 MG/ML
4 INJECTION, SOLUTION INTRAMUSCULAR; INTRAVENOUS
Status: CANCELLED | OUTPATIENT
Start: 2020-05-14

## 2020-05-14 RX ORDER — MAGNESIUM SULFATE HEPTAHYDRATE 40 MG/ML
4 INJECTION, SOLUTION INTRAVENOUS
Status: CANCELLED | OUTPATIENT
Start: 2020-05-14

## 2020-05-14 RX ORDER — ACETAMINOPHEN 325 MG/1
650 TABLET ORAL EVERY 4 HOURS PRN
Status: CANCELLED | OUTPATIENT
Start: 2020-05-14

## 2020-05-14 RX ORDER — HYDRALAZINE HYDROCHLORIDE 20 MG/ML
10 INJECTION INTRAMUSCULAR; INTRAVENOUS
Status: CANCELLED | OUTPATIENT
Start: 2020-05-14

## 2020-05-14 RX ORDER — MAGNESIUM SULFATE HEPTAHYDRATE 40 MG/ML
2 INJECTION, SOLUTION INTRAVENOUS
Status: CANCELLED | OUTPATIENT
Start: 2020-05-14

## 2020-05-14 RX ORDER — NALOXONE HYDROCHLORIDE 0.4 MG/ML
.1-.4 INJECTION, SOLUTION INTRAMUSCULAR; INTRAVENOUS; SUBCUTANEOUS
Status: DISCONTINUED | OUTPATIENT
Start: 2020-05-14 | End: 2020-05-14 | Stop reason: HOSPADM

## 2020-05-14 RX ORDER — SODIUM CHLORIDE, SODIUM LACTATE, POTASSIUM CHLORIDE, CALCIUM CHLORIDE 600; 310; 30; 20 MG/100ML; MG/100ML; MG/100ML; MG/100ML
10-125 INJECTION, SOLUTION INTRAVENOUS CONTINUOUS
Status: CANCELLED | OUTPATIENT
Start: 2020-05-14

## 2020-05-14 RX ORDER — TRANEXAMIC ACID 10 MG/ML
1 INJECTION, SOLUTION INTRAVENOUS EVERY 30 MIN PRN
Status: CANCELLED | OUTPATIENT
Start: 2020-05-14

## 2020-05-14 RX ORDER — FENTANYL CITRATE 50 UG/ML
50-100 INJECTION, SOLUTION INTRAMUSCULAR; INTRAVENOUS
Status: CANCELLED | OUTPATIENT
Start: 2020-05-14

## 2020-05-14 RX ORDER — LIDOCAINE 40 MG/G
CREAM TOPICAL
Status: CANCELLED | OUTPATIENT
Start: 2020-05-14

## 2020-05-14 RX ORDER — METHYLERGONOVINE MALEATE 0.2 MG/ML
200 INJECTION INTRAVENOUS
Status: CANCELLED | OUTPATIENT
Start: 2020-05-14

## 2020-05-14 RX ORDER — OXYCODONE AND ACETAMINOPHEN 5; 325 MG/1; MG/1
1 TABLET ORAL
Status: CANCELLED | OUTPATIENT
Start: 2020-05-14

## 2020-05-14 RX ORDER — MORPHINE SULFATE 10 MG/ML
10 INJECTION, SOLUTION INTRAMUSCULAR; INTRAVENOUS
Status: DISCONTINUED | OUTPATIENT
Start: 2020-05-14 | End: 2020-05-14 | Stop reason: HOSPADM

## 2020-05-14 RX ORDER — OXYTOCIN 10 [USP'U]/ML
10 INJECTION, SOLUTION INTRAMUSCULAR; INTRAVENOUS
Status: DISCONTINUED | OUTPATIENT
Start: 2020-05-14 | End: 2020-05-14 | Stop reason: HOSPADM

## 2020-05-14 RX ORDER — ACETAMINOPHEN 325 MG/1
650 TABLET ORAL EVERY 4 HOURS PRN
Status: DISCONTINUED | OUTPATIENT
Start: 2020-05-14 | End: 2020-05-14 | Stop reason: HOSPADM

## 2020-05-14 RX ORDER — LORAZEPAM 2 MG/ML
2 INJECTION INTRAMUSCULAR
Status: CANCELLED | OUTPATIENT
Start: 2020-05-14

## 2020-05-14 RX ORDER — MISOPROSTOL 100 UG/1
25 TABLET ORAL
Status: CANCELLED | OUTPATIENT
Start: 2020-05-14

## 2020-05-14 RX ORDER — MISOPROSTOL 100 UG/1
25 TABLET ORAL
Status: DISCONTINUED | OUTPATIENT
Start: 2020-05-14 | End: 2020-05-14 | Stop reason: HOSPADM

## 2020-05-14 RX ORDER — TERBUTALINE SULFATE 1 MG/ML
0.25 INJECTION, SOLUTION SUBCUTANEOUS
Status: DISCONTINUED | OUTPATIENT
Start: 2020-05-14 | End: 2020-05-14 | Stop reason: HOSPADM

## 2020-05-14 RX ORDER — OXYTOCIN/0.9 % SODIUM CHLORIDE 30/500 ML
100-340 PLASTIC BAG, INJECTION (ML) INTRAVENOUS CONTINUOUS PRN
Status: CANCELLED | OUTPATIENT
Start: 2020-05-14

## 2020-05-14 RX ORDER — HYDRALAZINE HYDROCHLORIDE 20 MG/ML
5 INJECTION INTRAMUSCULAR; INTRAVENOUS
Status: CANCELLED | OUTPATIENT
Start: 2020-05-14

## 2020-05-14 RX ORDER — SODIUM CHLORIDE, SODIUM LACTATE, POTASSIUM CHLORIDE, CALCIUM CHLORIDE 600; 310; 30; 20 MG/100ML; MG/100ML; MG/100ML; MG/100ML
INJECTION, SOLUTION INTRAVENOUS CONTINUOUS
Status: DISCONTINUED | OUTPATIENT
Start: 2020-05-14 | End: 2020-05-14 | Stop reason: HOSPADM

## 2020-05-14 RX ORDER — SODIUM CHLORIDE, SODIUM LACTATE, POTASSIUM CHLORIDE, CALCIUM CHLORIDE 600; 310; 30; 20 MG/100ML; MG/100ML; MG/100ML; MG/100ML
INJECTION, SOLUTION INTRAVENOUS CONTINUOUS
Status: CANCELLED | OUTPATIENT
Start: 2020-05-14

## 2020-05-14 RX ORDER — TRANEXAMIC ACID 10 MG/ML
1 INJECTION, SOLUTION INTRAVENOUS EVERY 30 MIN PRN
Status: DISCONTINUED | OUTPATIENT
Start: 2020-05-14 | End: 2020-05-14 | Stop reason: HOSPADM

## 2020-05-14 RX ORDER — IBUPROFEN 400 MG/1
800 TABLET, FILM COATED ORAL
Status: CANCELLED | OUTPATIENT
Start: 2020-05-14

## 2020-05-14 RX ORDER — OXYCODONE AND ACETAMINOPHEN 5; 325 MG/1; MG/1
1 TABLET ORAL
Status: DISCONTINUED | OUTPATIENT
Start: 2020-05-14 | End: 2020-05-14 | Stop reason: HOSPADM

## 2020-05-14 ASSESSMENT — MIFFLIN-ST. JEOR: SCORE: 1482.1

## 2020-05-14 NOTE — NURSING NOTE
"Initial BP (!) 129/90 (BP Location: Left arm, Patient Position: Chair, Cuff Size: Adult Regular)   Pulse 93   Temp 97.7  F (36.5  C) (Tympanic)   Resp 16   Ht 1.6 m (5' 3\")   Wt 75.3 kg (166 lb)   LMP 09/06/2019 (Approximate)   BMI 29.41 kg/m   Estimated body mass index is 29.41 kg/m  as calculated from the following:    Height as of this encounter: 1.6 m (5' 3\").    Weight as of this encounter: 75.3 kg (166 lb). .      "

## 2020-05-14 NOTE — PROGRESS NOTES
Labs reviewed by  Plan to wait for random urine protein results and access blood pressures. Patient denies HA and visual disturbances. Reflexes +1 bilaterally without clonus. +1 edema in rt foot.

## 2020-05-14 NOTE — PROGRESS NOTES
"CC: Here for routine prenatal visit @ 38w5d   HPI:  Pt denies headache, scotomata or RUQ pain; does report increased pedal edema      PE: BP (!) 129/90 (BP Location: Left arm, Patient Position: Chair, Cuff Size: Adult Regular)   Pulse 93   Temp 97.7  F (36.5  C) (Tympanic)   Resp 16   Ht 1.6 m (5' 3\")   Wt 75.3 kg (166 lb)   LMP 09/06/2019 (Approximate)   BMI 29.41 kg/m     See OB flowsheet  REPEAT BP  136/93      A:  1. Elevated BP without diagnosis of hypertension        Recommend to BC for pre-E labs, serial BP and consideration of IOL if appropriate  Discussed with BC staff and Dr. Rosa Jeffery MD  [facility]        Reny Jeffery M.D.     "

## 2020-05-14 NOTE — PROGRESS NOTES
Patient  escorted to 2052, had elevated bl pr in clinic x 2. Plan to do serial blood pressures and preeclampsia labs.

## 2020-05-15 ENCOUNTER — HOSPITAL ENCOUNTER (INPATIENT)
Facility: CLINIC | Age: 20
LOS: 4 days | Discharge: HOME | End: 2020-05-19
Attending: OBSTETRICS & GYNECOLOGY | Admitting: OBSTETRICS & GYNECOLOGY
Payer: COMMERCIAL

## 2020-05-15 LAB
ALT SERPL W P-5'-P-CCNC: 8 U/L (ref 0–50)
AMPHETAMINES UR QL SCN: NEGATIVE
AST SERPL W P-5'-P-CCNC: 17 U/L (ref 0–45)
BARBITURATES UR QL: NEGATIVE
BENZODIAZ UR QL: NEGATIVE
CANNABINOIDS UR QL SCN: NEGATIVE
COCAINE UR QL: NEGATIVE
CREAT SERPL-MCNC: 0.65 MG/DL (ref 0.52–1.04)
GFR SERPL CREATININE-BSD FRML MDRD: >90 ML/MIN/{1.73_M2}
OPIATES UR QL SCN: NEGATIVE
PCP UR QL SCN: NEGATIVE
SARS-COV-2 PCR COMMENT: NORMAL
SARS-COV-2 RNA SPEC QL NAA+PROBE: NEGATIVE
SARS-COV-2 RNA SPEC QL NAA+PROBE: NORMAL
SPECIMEN SOURCE: NORMAL
SPECIMEN SOURCE: NORMAL
T PALLIDUM AB SER QL: NONREACTIVE

## 2020-05-15 PROCEDURE — 12000000 ZZH R&B MED SURG/OB

## 2020-05-15 PROCEDURE — 36415 COLL VENOUS BLD VENIPUNCTURE: CPT | Performed by: OBSTETRICS & GYNECOLOGY

## 2020-05-15 PROCEDURE — 87635 SARS-COV-2 COVID-19 AMP PRB: CPT | Performed by: OBSTETRICS & GYNECOLOGY

## 2020-05-15 PROCEDURE — 84156 ASSAY OF PROTEIN URINE: CPT | Performed by: OBSTETRICS & GYNECOLOGY

## 2020-05-15 PROCEDURE — 82565 ASSAY OF CREATININE: CPT | Performed by: OBSTETRICS & GYNECOLOGY

## 2020-05-15 PROCEDURE — 86900 BLOOD TYPING SEROLOGIC ABO: CPT | Performed by: OBSTETRICS & GYNECOLOGY

## 2020-05-15 PROCEDURE — 84460 ALANINE AMINO (ALT) (SGPT): CPT | Performed by: OBSTETRICS & GYNECOLOGY

## 2020-05-15 PROCEDURE — 80307 DRUG TEST PRSMV CHEM ANLYZR: CPT | Performed by: OBSTETRICS & GYNECOLOGY

## 2020-05-15 PROCEDURE — 25000132 ZZH RX MED GY IP 250 OP 250 PS 637: Performed by: OBSTETRICS & GYNECOLOGY

## 2020-05-15 PROCEDURE — 86780 TREPONEMA PALLIDUM: CPT | Performed by: OBSTETRICS & GYNECOLOGY

## 2020-05-15 PROCEDURE — 84450 TRANSFERASE (AST) (SGOT): CPT | Performed by: OBSTETRICS & GYNECOLOGY

## 2020-05-15 RX ORDER — ACETAMINOPHEN 325 MG/1
650 TABLET ORAL EVERY 4 HOURS PRN
Status: DISCONTINUED | OUTPATIENT
Start: 2020-05-15 | End: 2020-05-17

## 2020-05-15 RX ORDER — CARBOPROST TROMETHAMINE 250 UG/ML
250 INJECTION, SOLUTION INTRAMUSCULAR
Status: DISCONTINUED | OUTPATIENT
Start: 2020-05-15 | End: 2020-05-17

## 2020-05-15 RX ORDER — IBUPROFEN 800 MG/1
800 TABLET, FILM COATED ORAL
Status: DISCONTINUED | OUTPATIENT
Start: 2020-05-15 | End: 2020-05-17

## 2020-05-15 RX ORDER — NALOXONE HYDROCHLORIDE 0.4 MG/ML
.1-.4 INJECTION, SOLUTION INTRAMUSCULAR; INTRAVENOUS; SUBCUTANEOUS
Status: DISCONTINUED | OUTPATIENT
Start: 2020-05-15 | End: 2020-05-17

## 2020-05-15 RX ORDER — METHYLERGONOVINE MALEATE 0.2 MG/ML
200 INJECTION INTRAVENOUS
Status: DISCONTINUED | OUTPATIENT
Start: 2020-05-15 | End: 2020-05-17

## 2020-05-15 RX ORDER — FENTANYL CITRATE 50 UG/ML
50-100 INJECTION, SOLUTION INTRAMUSCULAR; INTRAVENOUS
Status: DISCONTINUED | OUTPATIENT
Start: 2020-05-15 | End: 2020-05-17

## 2020-05-15 RX ORDER — TRANEXAMIC ACID 10 MG/ML
1 INJECTION, SOLUTION INTRAVENOUS EVERY 30 MIN PRN
Status: DISCONTINUED | OUTPATIENT
Start: 2020-05-15 | End: 2020-05-17

## 2020-05-15 RX ORDER — ONDANSETRON 2 MG/ML
4 INJECTION INTRAMUSCULAR; INTRAVENOUS EVERY 6 HOURS PRN
Status: DISCONTINUED | OUTPATIENT
Start: 2020-05-15 | End: 2020-05-17

## 2020-05-15 RX ORDER — MISOPROSTOL 100 UG/1
25 TABLET ORAL
Status: DISCONTINUED | OUTPATIENT
Start: 2020-05-15 | End: 2020-05-17

## 2020-05-15 RX ORDER — OXYTOCIN 10 [USP'U]/ML
10 INJECTION, SOLUTION INTRAMUSCULAR; INTRAVENOUS
Status: DISCONTINUED | OUTPATIENT
Start: 2020-05-15 | End: 2020-05-17

## 2020-05-15 RX ORDER — SODIUM CHLORIDE, SODIUM LACTATE, POTASSIUM CHLORIDE, CALCIUM CHLORIDE 600; 310; 30; 20 MG/100ML; MG/100ML; MG/100ML; MG/100ML
INJECTION, SOLUTION INTRAVENOUS CONTINUOUS
Status: DISCONTINUED | OUTPATIENT
Start: 2020-05-15 | End: 2020-05-17

## 2020-05-15 RX ORDER — OXYCODONE AND ACETAMINOPHEN 5; 325 MG/1; MG/1
1 TABLET ORAL
Status: DISCONTINUED | OUTPATIENT
Start: 2020-05-15 | End: 2020-05-17

## 2020-05-15 RX ORDER — OXYTOCIN/0.9 % SODIUM CHLORIDE 30/500 ML
100-340 PLASTIC BAG, INJECTION (ML) INTRAVENOUS CONTINUOUS PRN
Status: DISCONTINUED | OUTPATIENT
Start: 2020-05-15 | End: 2020-05-17

## 2020-05-15 RX ADMIN — Medication 25 MCG: at 20:53

## 2020-05-15 RX ADMIN — Medication 25 MCG: at 18:19

## 2020-05-15 RX ADMIN — Medication 25 MCG: at 16:08

## 2020-05-15 NOTE — DISCHARGE INSTRUCTIONS
Please return to the Birthplace at 1pm on 5/15/2020 for your induction of labor.  Bring all belongings with you.  Call the birthplace at anytime with questions or concerns.      [facility]- [phone]

## 2020-05-15 NOTE — TELEPHONE ENCOUNTER
KASSIE 5/20/20  38w4d  Planning to deliver at [place]   Sees OB MDs (all of them)  1st pregnancy    Patient thinks she is in Labor  Symptoms started 1:18am  Pains in lower region every 17 min     Lasts 45-60 sec  Radiates down with kind of a stabbing feeling  Rates pain 4-7/10, each one is different    Baby moves as normal, except during a contraction she stops moving    No leakage of fluid from vagina  No vaginal bleeding   No fever  No hand or facial swelling  No cough  No shortness of breath  No known exposure to COVID19    Triaged to a disposition of Home Care. Advised on symptoms of true labor and when to call back and or go to L&D. Patient is agreeable     COVID 19 Nurse Triage Plan/Patient Instructions    Please be aware that novel coronavirus (COVID-19) may be circulating in the community. If you develop symptoms such as fever, cough, or SOB or if you have concerns about the presence of another infection including coronavirus (COVID-19), please contact your health care provider or visit [email or web address].     Disposition/Instructions    Patient to stay at home and follow home care protocol based instructions.     Thank you for limiting contact with others, wearing a simple mask to cover your cough, practice good hand hygiene habits and accessing our virtual services where possible to limit the spread of this virus.    For more information about COVID19 and options for caring for yourself at home, please visit the CDC website at https://www.cdc.gov/coronavirus/2019-ncov/about/steps-when-sick.html  For more options for care at [facility], please visit our website at [email or web address]    For more information, please use the [place] Department of Health COVID-19 Website: https://www.health.state.[place]/diseases/coronavirus/index.html  [facility] ([facility]) COVID-19 Hotlines (Interpreters available):      Health questions: Phone Number: [phone] or  [phone] and Hours: 7 a.m. to 7 p.m.    Schools and  questions: Phone Number: [phone] or [phone] and Hours 7 a.m. to 7 p.m.      Additional Information    Negative: [1] History of prior delivery (multipara) AND [2] contractions < 10 minutes apart AND [3] present 1 hour    Negative: [1] History of rapid prior delivery AND [2] contractions < 10 minutes apart    Negative: [1] Leakage of fluid from vagina AND [2] green or brown in color    Negative: [1] Leakage of fluid from vagina AND [2] leakage started > 4 hours ago    Negative: Vaginal bleeding or spotting    Negative: Baby moving less today (e.g., kick count < 5 in 1 hour or < 10 in 2 hours)    Negative: Severe headache or headache that won't go away    Negative: New blurred vision or vision changes    Negative: MODERATE-SEVERE abdominal pain    Negative: Fever > 100.4 F (38.0 C)    Negative: [1] Leakage of fluid from vagina AND [2] leakage started < 4 hours ago    Negative: Patient sounds very sick or weak to the triager    [1] First baby (primipara) AND [2] contractions > 5 minutes apart, or for < 2 hours    Protocols used: PREGNANCY - LABOR-A-    Marya Siddiqi RN on 5/14/2020 at 10:27 PM

## 2020-05-15 NOTE — PROGRESS NOTES
Pt arrived at 1345 with spouse to birthplace.  Oriented to room and plan of care.  Admission done and iv placed.  Dr Stone saw pt.  Gibbs score was 3 and Cytotec started at 16:08.  Category 1 tracing at present

## 2020-05-15 NOTE — PROGRESS NOTES
Patient presented for rule out gHTN/preE. On arrival BPs normotensive to mild range. No symptoms of preE. HELLP labs collected and WNL. Protein does not meet criteria at 0.29. Patient stayed for continued BP monitoring and did eventually meet criteria for gHTN with mild range BPs >4h apart. Therefore discussed with patient recommendation for IOL. She strongly desires discharge to home overnight. Discussed with nursing who state that given nursing staffing would be unable to start her cervical ripening tonight. Therefore did discuss with patient the option of discharge to home overnight with plan for IOL tomorrow mid-day. The patient is agreeable with this plan of care. Her  plans to  BP cuff for home and patient understanding of BP parameters. Also reviewed signs/symptoms of preE with patient who states understanding and will return should these develop.     Otherwise plan for IOL tomorrow afternoon for gHTN.    Dottie Lee MD

## 2020-05-15 NOTE — PROGRESS NOTES
Discharge and return for induction tomorrow per Dr. Lee's telephone orders. Reviewed discharge instructions with patient and spouse.  Patient aware of pre-e s/s.  Given number for birthplace and instructed patient to call with any concerns or questions.  Patient to return to birthplace at 5/15/2020 at 1300 for induction of labor.  All questions answered at this time.  Patient ambulated to door with spouse and RN at 2005.

## 2020-05-15 NOTE — H&P
[facility] Labor and Delivery H&P  May 15, 2020  Jeanine Hernandez  [id number]      HPI: Jeanine Hernandez is a 20 year old  at 38w6d by 12wk  who presents for elective induction of labor for gestational HTN.     She states that she is feeling well today.  + FM, no ctx, VB, or LOF.  She denies fever, HA, scotoma, nausea, vomiting, CP, SOB, RUQ pain, constipation, diarrhea, and acute swelling.        Pregnancy notable for:  --failed GCT, passed GTT    OBHX:   OB History    Para Term  AB Living   1 0 0 0 0 0   SAB TAB Ectopic Multiple Live Births   0 0 0 0 0      # Outcome Date GA Lbr Macho/2nd Weight Sex Delivery Anes PTL Lv   1 Current                MedicalHX:   Past Medical History:   Diagnosis Date     Anxiety     in high school     Asthma     as child     Depression     in high school     History of urinary tract infection     as a child     OCD (obsessive compulsive disorder)        SurgicalHX:   Past Surgical History:   Procedure Laterality Date     FOOT SURGERY      right foot - ganglion cyst     MYRINGOTOMY, INSERT TUBE BILATERAL, COMBINED       ORTHOPEDIC SURGERY       TONSILLECTOMY       TYMPANOPLASTY         Medications:   No current facility-administered medications on file prior to encounter.   acetaminophen (TYLENOL) 325 MG tablet, Take 325-650 mg by mouth every 6 hours as needed for mild pain  Doxylamine Succinate, Sleep, (UNISOM PO),   omeprazole (PRILOSEC) 20 MG DR capsule, Take 1 capsule (20 mg) by mouth daily  Prenatal Vit-Fe Fumarate-FA (PRENATAL MULTIVITAMIN W/IRON) 27-0.8 MG tablet, Take 1 tablet by mouth daily  Docusate Calcium (STOOL SOFTENER PO),   ondansetron (ZOFRAN-ODT) 4 MG ODT tab, Take 1 tablet (4 mg) by mouth every 8 hours as needed for nausea (Patient not taking: Reported on 3/19/2020)  pyridOXINE (VITAMIN B6) 100 MG TABS, Take 25 mg by mouth daily        Allergies:  Allergies   Allergen Reactions     Contrast Dye Anaphylaxis     Iodine Anaphylaxis     Latex Hives        FamilyHX:  Family History   Problem Relation Age of Onset     Diabetes Father      Neurologic Disorder Father         TBI     Chronic Obstructive Pulmonary Disease Paternal Grandmother      Cerebrovascular Disease Paternal Grandfather      Diabetes Paternal Grandfather        SocialHX:   Social History     Socioeconomic History     Marital status:      Spouse name: Not on file     Number of children: Not on file     Years of education: Not on file     Highest education level: Not on file   Occupational History     Not on file   Social Needs     Financial resource strain: Not on file     Food insecurity     Worry: Not on file     Inability: Not on file     Transportation needs     Medical: Not on file     Non-medical: Not on file   Tobacco Use     Smoking status: Never Smoker     Smokeless tobacco: Never Used   Substance and Sexual Activity     Alcohol use: Never     Frequency: Never     Drug use: Never     Sexual activity: Yes     Partners: Male   Lifestyle     Physical activity     Days per week: Not on file     Minutes per session: Not on file     Stress: Not on file   Relationships     Social connections     Talks on phone: Not on file     Gets together: Not on file     Attends Spiritism service: Not on file     Active member of club or organization: Not on file     Attends meetings of clubs or organizations: Not on file     Relationship status: Not on file     Intimate partner violence     Fear of current or ex partner: Not on file     Emotionally abused: Not on file     Physically abused: Not on file     Forced sexual activity: Not on file   Other Topics Concern     Not on file   Social History Narrative     Not on file       ROS: 10-point ROS negative except as in HPI     Physical Exam:  Vitals:    05/15/20 1350   BP: 134/89   Resp: 16   Temp: 98.3  F (36.8  C)   TempSrc: Oral     GEN: resting comfortably in bed, NAD   CV: regular rate, warm and well-perfused  PULM: no increased work of  breathing  ABD: soft, gravid, non-tender, non-distended  EXT: no edema, non-tender to palpation  CVX: 60/-2  Presentation: cephalic by leopold's  EFW: 8.5 lbs  Membranes: intact    NST:  FHT: baseline nl, mod variability, + accels, no decels  TOCO: occ ctx      Labs:   Component      Latest Ref Rng & Units 5/15/2020   Amphetamine Qual Urine      NEG:Negative Negative   Barbiturates Qual Urine      NEG:Negative Negative   Benzodiazepine Qual Urine      NEG:Negative Negative   Cannabinoids Qual Urine      NEG:Negative Negative   Cocaine Qual Urine      NEG:Negative Negative   Opiates Qualitative Urine      NEG:Negative Negative   Pcp Qual Urine      NEG:Negative Negative   Creatinine      0.52 - 1.04 mg/dL 0.65   GFR Estimate      >60 mL/min/1.73:m2 >90   GFR Estimate If Black      >60 mL/min/1.73:m2 >90   SARS-CoV-2 Virus Specimen Source       Nasopharyngeal   SARS-CoV-2 PCR Result       NEGATIVE   SARS-CoV-2 PCR Comment       This automated, real-time RT-PCR assay by [employer] on the [employer] has . . .   COVID-19 Virus PCR to [facility] - Source       Nasopharyngeal   COVID-19 Virus PCR to [facility] - Result       Test received-See reflex to IDDL test SARS CoV2 (COVID-19) Virus RT-PCR   AST      0 - 45 U/L 17   ALT      0 - 50 U/L 8        Lab Results   Component Value Date    ABO O 2020    RH Pos 2020    AS Neg 2020    HEPBANG Nonreactive 12/10/2019    CHPCRT Negative 12/10/2019    GCPCRT Negative 12/10/2019    HGB 12.3 2020       GBS Status:   Lab Results   Component Value Date    GBS Negative 2020       Lab Results   Component Value Date    PAP NIL 12/10/2019       A/P: Jeanine Hernandez is a 20 year old female  at 38w6d by 12wk US who presents for induction of labor for gestational hypertension.     Admit to L&D. Place PIV. Draw labs: T&S, CBC, RPR and HELLP labs.  Labor: Induction; plan oral misoprostol and then pitocin once favorable. Pt knows all  steps.  FWB: Category 1 FHT.  Continue EFM and toco  Pain: At maternal discretion   PNC: Rh POS, Rubella immune, GBS neg    Deja Stone MD  OB/GYN

## 2020-05-16 ENCOUNTER — ANESTHESIA EVENT (OUTPATIENT)
Dept: OBGYN | Facility: CLINIC | Age: 20
End: 2020-05-16
Payer: COMMERCIAL

## 2020-05-16 ENCOUNTER — ANESTHESIA (OUTPATIENT)
Dept: OBGYN | Facility: CLINIC | Age: 20
End: 2020-05-16
Payer: COMMERCIAL

## 2020-05-16 LAB
PROT UR-MCNC: 0.14 G/L
PROT/CREAT 24H UR: 0.27 G/G CR (ref 0–0.2)
T PALLIDUM AB SER QL: NONREACTIVE

## 2020-05-16 PROCEDURE — 25000128 H RX IP 250 OP 636: Performed by: NURSE ANESTHETIST, CERTIFIED REGISTERED

## 2020-05-16 PROCEDURE — 3E0R3BZ INTRODUCTION OF ANESTHETIC AGENT INTO SPINAL CANAL, PERCUTANEOUS APPROACH: ICD-10-PCS | Performed by: OBSTETRICS & GYNECOLOGY

## 2020-05-16 PROCEDURE — 25000128 H RX IP 250 OP 636: Performed by: OBSTETRICS & GYNECOLOGY

## 2020-05-16 PROCEDURE — 3E033VJ INTRODUCTION OF OTHER HORMONE INTO PERIPHERAL VEIN, PERCUTANEOUS APPROACH: ICD-10-PCS | Performed by: OBSTETRICS & GYNECOLOGY

## 2020-05-16 PROCEDURE — 25800030 ZZH RX IP 258 OP 636: Performed by: OBSTETRICS & GYNECOLOGY

## 2020-05-16 PROCEDURE — 40000671 ZZH STATISTIC ANESTHESIA CASE

## 2020-05-16 PROCEDURE — 00HU33Z INSERTION OF INFUSION DEVICE INTO SPINAL CANAL, PERCUTANEOUS APPROACH: ICD-10-PCS | Performed by: OBSTETRICS & GYNECOLOGY

## 2020-05-16 PROCEDURE — 37000011 ZZH ANESTHESIA WARD SERVICE: Performed by: NURSE ANESTHETIST, CERTIFIED REGISTERED

## 2020-05-16 PROCEDURE — 25000125 ZZHC RX 250: Performed by: NURSE ANESTHETIST, CERTIFIED REGISTERED

## 2020-05-16 PROCEDURE — 12000000 ZZH R&B MED SURG/OB

## 2020-05-16 PROCEDURE — 25000125 ZZHC RX 250: Performed by: OBSTETRICS & GYNECOLOGY

## 2020-05-16 PROCEDURE — 25000132 ZZH RX MED GY IP 250 OP 250 PS 637: Performed by: OBSTETRICS & GYNECOLOGY

## 2020-05-16 PROCEDURE — 10907ZC DRAINAGE OF AMNIOTIC FLUID, THERAPEUTIC FROM PRODUCTS OF CONCEPTION, VIA NATURAL OR ARTIFICIAL OPENING: ICD-10-PCS | Performed by: OBSTETRICS & GYNECOLOGY

## 2020-05-16 RX ORDER — NALBUPHINE HYDROCHLORIDE 10 MG/ML
2.5-5 INJECTION, SOLUTION INTRAMUSCULAR; INTRAVENOUS; SUBCUTANEOUS EVERY 6 HOURS PRN
Status: DISCONTINUED | OUTPATIENT
Start: 2020-05-16 | End: 2020-05-17

## 2020-05-16 RX ORDER — AMPICILLIN 2 G/1
2 INJECTION, POWDER, FOR SOLUTION INTRAVENOUS EVERY 4 HOURS
Status: DISCONTINUED | OUTPATIENT
Start: 2020-05-16 | End: 2020-05-16 | Stop reason: DRUGHIGH

## 2020-05-16 RX ORDER — HYDRALAZINE HYDROCHLORIDE 20 MG/ML
10 INJECTION INTRAMUSCULAR; INTRAVENOUS
Status: DISCONTINUED | OUTPATIENT
Start: 2020-05-16 | End: 2020-05-17

## 2020-05-16 RX ORDER — EPHEDRINE SULFATE 50 MG/ML
INJECTION, SOLUTION INTRAMUSCULAR; INTRAVENOUS; SUBCUTANEOUS
Status: DISCONTINUED
Start: 2020-05-16 | End: 2020-05-16 | Stop reason: HOSPADM

## 2020-05-16 RX ORDER — LIDOCAINE 40 MG/G
CREAM TOPICAL
Status: DISCONTINUED | OUTPATIENT
Start: 2020-05-16 | End: 2020-05-17

## 2020-05-16 RX ORDER — SODIUM CHLORIDE, SODIUM LACTATE, POTASSIUM CHLORIDE, CALCIUM CHLORIDE 600; 310; 30; 20 MG/100ML; MG/100ML; MG/100ML; MG/100ML
10-125 INJECTION, SOLUTION INTRAVENOUS CONTINUOUS
Status: DISCONTINUED | OUTPATIENT
Start: 2020-05-16 | End: 2020-05-17

## 2020-05-16 RX ORDER — EPHEDRINE SULFATE 50 MG/ML
5 INJECTION, SOLUTION INTRAMUSCULAR; INTRAVENOUS; SUBCUTANEOUS
Status: DISCONTINUED | OUTPATIENT
Start: 2020-05-16 | End: 2020-05-17

## 2020-05-16 RX ORDER — LORAZEPAM 2 MG/ML
2 INJECTION INTRAMUSCULAR
Status: DISCONTINUED | OUTPATIENT
Start: 2020-05-16 | End: 2020-05-17

## 2020-05-16 RX ORDER — LIDOCAINE HYDROCHLORIDE AND EPINEPHRINE 15; 5 MG/ML; UG/ML
INJECTION, SOLUTION EPIDURAL PRN
Status: DISCONTINUED | OUTPATIENT
Start: 2020-05-16 | End: 2020-05-17

## 2020-05-16 RX ORDER — HYDRALAZINE HYDROCHLORIDE 20 MG/ML
5 INJECTION INTRAMUSCULAR; INTRAVENOUS
Status: DISCONTINUED | OUTPATIENT
Start: 2020-05-16 | End: 2020-05-17

## 2020-05-16 RX ORDER — LIDOCAINE HYDROCHLORIDE 10 MG/ML
INJECTION, SOLUTION INFILTRATION; PERINEURAL PRN
Status: DISCONTINUED | OUTPATIENT
Start: 2020-05-16 | End: 2020-05-17

## 2020-05-16 RX ORDER — HYDROXYZINE HYDROCHLORIDE 50 MG/1
50 TABLET, FILM COATED ORAL ONCE
Status: DISCONTINUED | OUTPATIENT
Start: 2020-05-16 | End: 2020-05-17

## 2020-05-16 RX ORDER — NALOXONE HYDROCHLORIDE 0.4 MG/ML
.1-.4 INJECTION, SOLUTION INTRAMUSCULAR; INTRAVENOUS; SUBCUTANEOUS
Status: DISCONTINUED | OUTPATIENT
Start: 2020-05-16 | End: 2020-05-16

## 2020-05-16 RX ORDER — AMPICILLIN 2 G/1
2 INJECTION, POWDER, FOR SOLUTION INTRAVENOUS EVERY 6 HOURS
Status: DISCONTINUED | OUTPATIENT
Start: 2020-05-16 | End: 2020-05-17

## 2020-05-16 RX ORDER — OXYTOCIN/0.9 % SODIUM CHLORIDE 30/500 ML
1-24 PLASTIC BAG, INJECTION (ML) INTRAVENOUS CONTINUOUS
Status: DISCONTINUED | OUTPATIENT
Start: 2020-05-16 | End: 2020-05-17

## 2020-05-16 RX ORDER — MORPHINE SULFATE 10 MG/ML
10 INJECTION, SOLUTION INTRAMUSCULAR; INTRAVENOUS ONCE
Status: DISCONTINUED | OUTPATIENT
Start: 2020-05-16 | End: 2020-05-17

## 2020-05-16 RX ADMIN — Medication 2 MILLI-UNITS/MIN: at 13:36

## 2020-05-16 RX ADMIN — Medication 10 ML/HR: at 22:53

## 2020-05-16 RX ADMIN — Medication 25 MCG: at 07:43

## 2020-05-16 RX ADMIN — LIDOCAINE HYDROCHLORIDE AND EPINEPHRINE 15 MG: 15; 5 INJECTION, SOLUTION EPIDURAL at 11:31

## 2020-05-16 RX ADMIN — ONDANSETRON 4 MG: 2 INJECTION INTRAMUSCULAR; INTRAVENOUS at 21:33

## 2020-05-16 RX ADMIN — ACETAMINOPHEN 650 MG: 325 TABLET, FILM COATED ORAL at 21:15

## 2020-05-16 RX ADMIN — Medication 25 MCG: at 05:39

## 2020-05-16 RX ADMIN — GENTAMICIN SULFATE 160 MG: 40 INJECTION, SOLUTION INTRAMUSCULAR; INTRAVENOUS at 22:27

## 2020-05-16 RX ADMIN — Medication 25 MCG: at 03:32

## 2020-05-16 RX ADMIN — Medication 10 ML/HR: at 16:55

## 2020-05-16 RX ADMIN — Medication 25 MCG: at 01:19

## 2020-05-16 RX ADMIN — ACETAMINOPHEN 650 MG: 325 TABLET, FILM COATED ORAL at 07:43

## 2020-05-16 RX ADMIN — LIDOCAINE HYDROCHLORIDE 30 MG: 10 INJECTION, SOLUTION INFILTRATION; PERINEURAL at 11:17

## 2020-05-16 RX ADMIN — AMPICILLIN SODIUM 2 G: 2 INJECTION, POWDER, FOR SOLUTION INTRAMUSCULAR; INTRAVENOUS at 21:37

## 2020-05-16 RX ADMIN — LIDOCAINE HYDROCHLORIDE AND EPINEPHRINE 45 MG: 15; 5 INJECTION, SOLUTION EPIDURAL at 11:26

## 2020-05-16 RX ADMIN — Medication 10 ML/HR: at 11:33

## 2020-05-16 NOTE — PLAN OF CARE
Pt VSS,  occasional higher pressure before epidural but none meeting criteria for medication.  Epidural placed at 1130.  AROM at 1421 and pitocin started at 1336.  Pt now in regular pattern/category 1 tracing.  Enc that pt rest.  Position changes approx every 45 min.  Family folder reviewed.

## 2020-05-16 NOTE — ANESTHESIA PREPROCEDURE EVALUATION
Anesthesia Pre-Procedure Evaluation    Patient: Jeanine Hernandez   MRN: [id number] : 2000          Preoperative Diagnosis: * No pre-op diagnosis entered *    * No procedures listed *    Past Medical History:   Diagnosis Date     Anxiety     in high school     Asthma     as child     Depression     in high school     History of urinary tract infection     as a child     OCD (obsessive compulsive disorder)      Past Surgical History:   Procedure Laterality Date     FOOT SURGERY      right foot - ganglion cyst     MYRINGOTOMY, INSERT TUBE BILATERAL, COMBINED       ORTHOPEDIC SURGERY       TONSILLECTOMY       TYMPANOPLASTY         Anesthesia Evaluation       history and physical reviewed .      No history of anesthetic complications          ROS/MED HX    ENT/Pulmonary:     (+)asthma , . .    Neurologic: Comment: anxiety      Cardiovascular:         METS/Exercise Tolerance:     Hematologic:         Musculoskeletal:         GI/Hepatic:     (+) GERD       Renal/Genitourinary:         Endo:         Psychiatric:         Infectious Disease:         Malignancy:         Other:                          Physical Exam  Normal systems: cardiovascular, pulmonary and dental    Airway   Mallampati: II  TM distance: > 3 FB  Neck ROM: full  Mouth opening: > 3 cm    Dental     Cardiovascular       Pulmonary             Lab Results   Component Value Date    WBC 21.6 (H) 2020    HGB 12.3 2020    HCT 37.0 2020     2020     2020    POTASSIUM 3.7 2020    CHLORIDE 105 2020    CO2 22 2020    BUN 9 2020    CR 0.65 05/15/2020    GLC 74 2020    KARTHIK 8.7 2020    ALBUMIN 3.1 (L) 2020    PROTTOTAL 6.9 2020    ALT 8 05/15/2020    AST 17 05/15/2020    ALKPHOS 95 2020    BILITOTAL 0.2 2020       Preop Vitals  BP Readings from Last 3 Encounters:   20 138/88   20 (!) 142/85   20 (!) 129/90    Pulse Readings from Last 3  "Encounters:   05/16/20 82   05/14/20 100   05/14/20 93      Resp Readings from Last 3 Encounters:   05/16/20 16   05/14/20 16   05/14/20 16    SpO2 Readings from Last 3 Encounters:   02/12/20 97%      Temp Readings from Last 1 Encounters:   05/16/20 36.7  C (98.1  F) (Oral)    Ht Readings from Last 1 Encounters:   05/14/20 1.6 m (5' 3\")      Wt Readings from Last 1 Encounters:   05/16/20 76 kg (167 lb 8 oz)    Estimated body mass index is 29.67 kg/m  as calculated from the following:    Height as of 5/14/20: 1.6 m (5' 3\").    Weight as of this encounter: 76 kg (167 lb 8 oz).       Anesthesia Plan      History & Physical Review      ASA Status:  .  OB Epidural Asa: 2            Postoperative Care      Consents  Anesthetic plan, risks, benefits and alternatives discussed with:  Patient..                 WILMER Cordova CRNA  "

## 2020-05-16 NOTE — PROGRESS NOTES
Pt anxious this am.  Feels jumpy(legs).  Pt feels like she needs to move.  Requested tub bath.  Tylenol given, tub and extra cushioned mattress put on bed.  Will recheck vitals when out of tub.  Category 1 tracing prior to getting into tub.

## 2020-05-16 NOTE — PROGRESS NOTES
Iv with bolus infiltrated.  Stopped and placed in right arm.  No cytotec given at 0930  Due to excessive uterine contractions

## 2020-05-16 NOTE — PROGRESS NOTES
Fetal heart rate increasing.  Moms temp is 98.8.  Iv fluid bolus.apical heart rate [id number].  Pitocin satopped

## 2020-05-16 NOTE — PROGRESS NOTES
Pt up ad ortega in room/up and down to the bathroom multiple times.  Pt anxious about fetus.  Reassured fetal tracing moderate with accels.  Md to see per pt request.  Difficult to achieve contraction tracing due to pt movement.  Will continue to monitor.

## 2020-05-16 NOTE — PROGRESS NOTES
The RN indicated to me that the patient was requesting a  delivery.  I had a long discussion with she and her  regarding the risks and benefits options and alternatives.  She has received misoprostol since yesterday afternoon.  Her last dose was at 730 this morning.  At that point her Gibbs was 4.  She is having contractions and feeling them.  /88   Pulse 82   Temp 98.1  F (36.7  C) (Oral)   Resp 16   Wt 76 kg (167 lb 8 oz)   LMP 2019 (Approximate)   BMI 29.67 kg/m    Fetal heart tones are category 1.  SVE 1.5/80/mid/moderate/-1 station  Gibbs score is 8    Assessment   term intrauterine pregnancy    pregnancy-induced hypertension  Misoprostol cervical ripening successful    Plan:  #1 epidural   #2 amniotomy   #3 expect vaginal delivery  #4 we did discuss risks and benefits of  delivery.  Will table that procedure for the present time  Kermit Zepeda MD

## 2020-05-16 NOTE — ANESTHESIA PROCEDURE NOTES
Procedure note : epidural catheter  Staff -       CRNA: Deja Doss APRN CRNA    Performed By: CRNA        Pre-Procedure    Location: OB      Pre-Anesthestic Checklist: patient identified, IV checked, site marked, risks and benefits discussed, informed consent, monitors and equipment checked, pre-op evaluation, at physician/surgeon's request and post-op pain management    Timeout  Correct Patient: Yes   Correct Procedure: Yes   Correct Site: Yes   Correct Laterality: Yes   Correct Position: Yes   Site Marked: Yes   .   Procedure Documentation    Diagnosis:labor pain .    Procedure: epidural catheter, .   Patient Position:sitting Insertion Site:L3-4  (midline approach) Injection technique: LORT saline   Local skin infiltrated with 3 mL of 1% lidocaine.  KAREN at 6 cm    Patient Prep/Sterile Barriers; chlorhexidine gluconate and isopropyl alcohol.  .  Needle: ToCastTVy needle   Needle Gauge: 17.    Needle Length (Inches) 3.5   # of attempts: 1 and # of redirects:  .    Catheter: 19 G . .  Catheter threaded easily  .  12 cm at skin.   .    Assessment/Narrative  Paresthesias: No.  .  .  . Test dose of 3 mL lidocaine 1.5% w/ 1:200,000 epinephrine at. Test dose negative for signs of intravascular, subdural or intrathecal injection.

## 2020-05-16 NOTE — PROGRESS NOTES
Epidural in place and infusing  She is very comfortable.  Pitocin @ 2 [employer] units/ min   Anabelle q 2-3    /73   Pulse 82   Temp 97.7  F (36.5  C) (Oral)   Resp 16   Wt 76 kg (167 lb 8 oz)   LMP 09/06/2019 (Approximate)   SpO2 98%   BMI 29.67 kg/m     FHR Cat I  SVE   1.5/80/mid / soft/ \  AROM copious fluid  - clear   Vertex @ -1 station     A) term IUP   PIH  Pitocin augmentation  Large BRITANY  AROM  Epidural  Negative for COVID 19    P) expect  Vaginal delivery  Pitocin augmentation  .

## 2020-05-16 NOTE — PROGRESS NOTES
This writer in room since 1530.  Multiple position changes done with pt to facilitate delivery.  Pt tolerated well.  Side lying x2/hands and knees and high fowlers.

## 2020-05-16 NOTE — PROGRESS NOTES
Repositioned.  Iv infusing.  Decreased pitocin to 4 due to excessive uterine contractions.  Cervix 3/80/0 and has labor shakes.  Enc to rest.  Will continue to monitor

## 2020-05-17 PROCEDURE — 25000128 H RX IP 250 OP 636: Performed by: NURSE ANESTHETIST, CERTIFIED REGISTERED

## 2020-05-17 PROCEDURE — 25800030 ZZH RX IP 258 OP 636: Performed by: OBSTETRICS & GYNECOLOGY

## 2020-05-17 PROCEDURE — 25000125 ZZHC RX 250: Performed by: OBSTETRICS & GYNECOLOGY

## 2020-05-17 PROCEDURE — 25000132 ZZH RX MED GY IP 250 OP 250 PS 637: Performed by: OBSTETRICS & GYNECOLOGY

## 2020-05-17 PROCEDURE — 59410 OBSTETRICAL CARE: CPT | Performed by: OBSTETRICS & GYNECOLOGY

## 2020-05-17 PROCEDURE — 88307 TISSUE EXAM BY PATHOLOGIST: CPT | Performed by: OBSTETRICS & GYNECOLOGY

## 2020-05-17 PROCEDURE — 72200001 ZZH LABOR CARE VAGINAL DELIVERY SINGLE

## 2020-05-17 PROCEDURE — 25000128 H RX IP 250 OP 636: Performed by: OBSTETRICS & GYNECOLOGY

## 2020-05-17 PROCEDURE — 12000000 ZZH R&B MED SURG/OB

## 2020-05-17 PROCEDURE — 88307 TISSUE EXAM BY PATHOLOGIST: CPT | Mod: 26 | Performed by: OBSTETRICS & GYNECOLOGY

## 2020-05-17 RX ORDER — IBUPROFEN 800 MG/1
800 TABLET, FILM COATED ORAL EVERY 6 HOURS PRN
Status: DISCONTINUED | OUTPATIENT
Start: 2020-05-17 | End: 2020-05-19 | Stop reason: HOSPADM

## 2020-05-17 RX ORDER — NALOXONE HYDROCHLORIDE 0.4 MG/ML
.1-.4 INJECTION, SOLUTION INTRAMUSCULAR; INTRAVENOUS; SUBCUTANEOUS
Status: DISCONTINUED | OUTPATIENT
Start: 2020-05-17 | End: 2020-05-19 | Stop reason: HOSPADM

## 2020-05-17 RX ORDER — TRANEXAMIC ACID 10 MG/ML
1 INJECTION, SOLUTION INTRAVENOUS EVERY 30 MIN PRN
Status: DISCONTINUED | OUTPATIENT
Start: 2020-05-17 | End: 2020-05-19 | Stop reason: HOSPADM

## 2020-05-17 RX ORDER — OXYTOCIN/0.9 % SODIUM CHLORIDE 30/500 ML
340 PLASTIC BAG, INJECTION (ML) INTRAVENOUS CONTINUOUS PRN
Status: DISCONTINUED | OUTPATIENT
Start: 2020-05-17 | End: 2020-05-19 | Stop reason: HOSPADM

## 2020-05-17 RX ORDER — MODIFIED LANOLIN
OINTMENT (GRAM) TOPICAL
Status: DISCONTINUED | OUTPATIENT
Start: 2020-05-17 | End: 2020-05-19 | Stop reason: HOSPADM

## 2020-05-17 RX ORDER — FENTANYL CITRATE 50 UG/ML
INJECTION, SOLUTION INTRAMUSCULAR; INTRAVENOUS PRN
Status: DISCONTINUED | OUTPATIENT
Start: 2020-05-17 | End: 2020-05-17

## 2020-05-17 RX ORDER — AMOXICILLIN 250 MG
2 CAPSULE ORAL 2 TIMES DAILY
Status: DISCONTINUED | OUTPATIENT
Start: 2020-05-17 | End: 2020-05-19 | Stop reason: HOSPADM

## 2020-05-17 RX ORDER — MISOPROSTOL 200 UG/1
800 TABLET ORAL
Status: DISCONTINUED | OUTPATIENT
Start: 2020-05-17 | End: 2020-05-19 | Stop reason: HOSPADM

## 2020-05-17 RX ORDER — OXYTOCIN/0.9 % SODIUM CHLORIDE 30/500 ML
100 PLASTIC BAG, INJECTION (ML) INTRAVENOUS CONTINUOUS
Status: DISCONTINUED | OUTPATIENT
Start: 2020-05-17 | End: 2020-05-19 | Stop reason: HOSPADM

## 2020-05-17 RX ORDER — HYDROCORTISONE 2.5 %
CREAM (GRAM) TOPICAL 3 TIMES DAILY PRN
Status: DISCONTINUED | OUTPATIENT
Start: 2020-05-17 | End: 2020-05-19 | Stop reason: HOSPADM

## 2020-05-17 RX ORDER — BUPIVACAINE HYDROCHLORIDE 2.5 MG/ML
INJECTION, SOLUTION EPIDURAL; INFILTRATION; INTRACAUDAL PRN
Status: DISCONTINUED | OUTPATIENT
Start: 2020-05-17 | End: 2020-05-17

## 2020-05-17 RX ORDER — HYDROCODONE BITARTRATE AND ACETAMINOPHEN 5; 325 MG/1; MG/1
1 TABLET ORAL EVERY 4 HOURS PRN
Status: DISCONTINUED | OUTPATIENT
Start: 2020-05-17 | End: 2020-05-19 | Stop reason: HOSPADM

## 2020-05-17 RX ORDER — BISACODYL 10 MG
10 SUPPOSITORY, RECTAL RECTAL DAILY PRN
Status: DISCONTINUED | OUTPATIENT
Start: 2020-05-19 | End: 2020-05-19 | Stop reason: HOSPADM

## 2020-05-17 RX ORDER — AMOXICILLIN 250 MG
1 CAPSULE ORAL 2 TIMES DAILY
Status: DISCONTINUED | OUTPATIENT
Start: 2020-05-17 | End: 2020-05-19 | Stop reason: HOSPADM

## 2020-05-17 RX ORDER — ACETAMINOPHEN 325 MG/1
650 TABLET ORAL EVERY 4 HOURS PRN
Status: DISCONTINUED | OUTPATIENT
Start: 2020-05-17 | End: 2020-05-19 | Stop reason: HOSPADM

## 2020-05-17 RX ORDER — OXYTOCIN 10 [USP'U]/ML
10 INJECTION, SOLUTION INTRAMUSCULAR; INTRAVENOUS
Status: DISCONTINUED | OUTPATIENT
Start: 2020-05-17 | End: 2020-05-19 | Stop reason: HOSPADM

## 2020-05-17 RX ADMIN — BUPIVACAINE HYDROCHLORIDE 8 ML: 2.5 INJECTION, SOLUTION EPIDURAL; INFILTRATION; INTRACAUDAL at 01:01

## 2020-05-17 RX ADMIN — TRANEXAMIC ACID 1 G: 10 INJECTION, SOLUTION INTRAVENOUS at 13:07

## 2020-05-17 RX ADMIN — BUPIVACAINE HYDROCHLORIDE 3 ML: 2.5 INJECTION, SOLUTION EPIDURAL; INFILTRATION; INTRACAUDAL at 07:17

## 2020-05-17 RX ADMIN — FENTANYL CITRATE 100 MCG: 50 INJECTION, SOLUTION INTRAMUSCULAR; INTRAVENOUS at 07:09

## 2020-05-17 RX ADMIN — IBUPROFEN 800 MG: 800 TABLET ORAL at 20:06

## 2020-05-17 RX ADMIN — ACETAMINOPHEN 650 MG: 325 TABLET, FILM COATED ORAL at 23:44

## 2020-05-17 RX ADMIN — Medication 10 ML/HR: at 07:20

## 2020-05-17 RX ADMIN — ACETAMINOPHEN 650 MG: 325 TABLET, FILM COATED ORAL at 05:40

## 2020-05-17 RX ADMIN — Medication 10 ML/HR: at 03:31

## 2020-05-17 RX ADMIN — FENTANYL CITRATE 100 MCG: 50 INJECTION, SOLUTION INTRAMUSCULAR; INTRAVENOUS at 01:01

## 2020-05-17 RX ADMIN — ACETAMINOPHEN 650 MG: 325 TABLET, FILM COATED ORAL at 01:24

## 2020-05-17 RX ADMIN — AMPICILLIN SODIUM 2 G: 2 INJECTION, POWDER, FOR SOLUTION INTRAMUSCULAR; INTRAVENOUS at 08:49

## 2020-05-17 RX ADMIN — BUPIVACAINE HYDROCHLORIDE 3 ML: 2.5 INJECTION, SOLUTION EPIDURAL; INFILTRATION; INTRACAUDAL at 07:14

## 2020-05-17 RX ADMIN — SENNOSIDES AND DOCUSATE SODIUM 1 TABLET: 8.6; 5 TABLET ORAL at 20:06

## 2020-05-17 RX ADMIN — IBUPROFEN 800 MG: 800 TABLET ORAL at 13:51

## 2020-05-17 RX ADMIN — ACETAMINOPHEN 650 MG: 325 TABLET, FILM COATED ORAL at 08:49

## 2020-05-17 RX ADMIN — SODIUM CHLORIDE, POTASSIUM CHLORIDE, SODIUM LACTATE AND CALCIUM CHLORIDE: 600; 310; 30; 20 INJECTION, SOLUTION INTRAVENOUS at 01:40

## 2020-05-17 RX ADMIN — ACETAMINOPHEN 650 MG: 325 TABLET, FILM COATED ORAL at 18:43

## 2020-05-17 RX ADMIN — BUPIVACAINE HYDROCHLORIDE 4 ML: 2.5 INJECTION, SOLUTION EPIDURAL; INFILTRATION; INTRACAUDAL at 07:11

## 2020-05-17 RX ADMIN — AMPICILLIN SODIUM 2 G: 2 INJECTION, POWDER, FOR SOLUTION INTRAMUSCULAR; INTRAVENOUS at 03:35

## 2020-05-17 RX ADMIN — GENTAMICIN SULFATE 120 MG: 40 INJECTION, SOLUTION INTRAMUSCULAR; INTRAVENOUS at 06:02

## 2020-05-17 NOTE — PROGRESS NOTES
Transferred pt to 48 around 1550-able to walk there.  Voided large amount-fundus firm-no clots.  Blood pressure checked and pt oriented to room and plan of care.  Enc to call for needs.  Later up to bathroom, again large amount voided-no clots.  Tub bath taken

## 2020-05-17 NOTE — PROGRESS NOTES
Patient experienced tachycardia to 120s with fetal tachycardia to the 160s to 170s and decreased variability.  Pitocin was discontinued.  She subsequently developed a fever.  Cervix is unchanged at the present time.  However her Pitocin had not been infusing for very long and had been off for hours.  BP (!) 140/86   Pulse 82   Temp 101.6  F (38.7  C) (Rectal)   Resp 18   Wt 76 kg (167 lb 8 oz)   LMP 2019 (Approximate)   SpO2 99%   BMI 29.67 kg/m       Assessment  Term intrauterine pregnancy  Pregnancy-induced hypertension  AROM  Chorioamnionitis  Plan  Ampicillin 2 g IV every 6 hours  Gentamicin 160 mg IV every 8 hours    Tylenol oral  Restart Pitocin as fetus tolerates    Would consider  section if no significant improvement, fetal intolerance or lack of progress in labor.    Kermit Zepeda MD

## 2020-05-17 NOTE — PROGRESS NOTES
Pt continue to have a little more bleeding than usual.  This writer is weighing pads.  Enc pt to let us know if she has any blood clots.  Fundus firm.  No complaints of feeling lightheaded or dizzy.

## 2020-05-17 NOTE — PROGRESS NOTES
Mulitiple position changes (spinning) performed with patient. Patient tolerated well. Fever decreasing. Patient states she is feeling much better.

## 2020-05-17 NOTE — L&D DELIVERY NOTE
"Jeanine Hernandez is a 20 year old  @ 39+1 Weeks EGA  She presented for IOL  on 5/15/2020  Pregnancy was complicated by PIH with normal labs  GBS neg  Membrane status: AROM @ 14:21 on 2020  Labored with: Pitocin to max 8 tre International units/ min   Labor complicated by early onset chorioamnionitis  Pain meds Epidural   FHR Cat II  Delivered a viable Female  @ 12:30 over intact perineum  Weight # oz   APGAR's 8/9  Placenta delivered @ 12:33 , was complete with a 3vessel cord- sent to Lab for evaluation  Laceration 1st degree Left labial- not repaired  Repaired with 3/O vicryl   BVrisk bleeding and clots passed post delivery- Misoprostol 800 mcg and TXA 1 gram IV given with good results   ml  \"Jessie\"    Kermit Zepeda MD      "

## 2020-05-17 NOTE — ANESTHESIA POSTPROCEDURE EVALUATION
Patient: Jeanine Hernandez    * No procedures listed *    Diagnosis:* No pre-op diagnosis entered *  Diagnosis Additional Information: No value filed.    Anesthesia Type:  No value filed.    Note:  Anesthesia Post Evaluation    Patient location during evaluation: Floor  Patient participation: Able to fully participate in evaluation  Level of consciousness: awake and alert  Pain management: adequate  Airway patency: patent  Cardiovascular status: acceptable and hemodynamically stable  Respiratory status: acceptable  Hydration status: acceptable  PONV: none     Anesthetic complications: None          Last vitals:  Vitals:    05/17/20 0909 05/17/20 1012 05/17/20 1030   BP:   128/83   Pulse:      Resp:      Temp:  36.8  C (98.2  F)    SpO2: 97%  99%         Electronically Signed By: WILMER Echevarria CRNA  May 17, 2020  1:02 PM

## 2020-05-17 NOTE — PROGRESS NOTES
Viable female born vaginally at 12:30.  Placed infant on mothers abdomen for skin to skin.  Bleeding notred /requested MD to return.  Cytotec 800 given po per Debbie BOWMAN.  On recheck Md ordered TXA

## 2020-05-17 NOTE — PLAN OF CARE
1400: Up to BR by yunior sampson, voided large amount.  Passed several large clots, see pp QBL.  Patient back in bed, fundus firm at U/U.

## 2020-05-17 NOTE — CONSULTS
Care Transitions:    CTS note. Call received from birth place that a cord tissue segment was sent for drug detection panel. CTS to follow for results.      Ruby Rojas RN, Care Coordinator [phone]

## 2020-05-17 NOTE — PROGRESS NOTES
Discussed increased temps, maternal tachycardia, and fetal tachycardia with MD. Tylenol and antibiotics ordered and started. Will continue to monitor temps and maternal-fetal status.

## 2020-05-17 NOTE — PROGRESS NOTES
Patient requesting epidural bolus. CRNA bedside. Patient tolerated bolus well. Patient reports decrease in pain.

## 2020-05-18 LAB — HGB BLD-MCNC: 7.7 G/DL (ref 11.7–15.7)

## 2020-05-18 PROCEDURE — 85018 HEMOGLOBIN: CPT | Performed by: OBSTETRICS & GYNECOLOGY

## 2020-05-18 PROCEDURE — 12000000 ZZH R&B MED SURG/OB

## 2020-05-18 PROCEDURE — 25000132 ZZH RX MED GY IP 250 OP 250 PS 637: Performed by: OBSTETRICS & GYNECOLOGY

## 2020-05-18 PROCEDURE — 36415 COLL VENOUS BLD VENIPUNCTURE: CPT | Performed by: OBSTETRICS & GYNECOLOGY

## 2020-05-18 RX ORDER — FERROUS GLUCONATE 324(38)MG
324 TABLET ORAL 2 TIMES DAILY WITH MEALS
Status: DISCONTINUED | OUTPATIENT
Start: 2020-05-18 | End: 2020-05-19 | Stop reason: HOSPADM

## 2020-05-18 RX ADMIN — SENNOSIDES AND DOCUSATE SODIUM 2 TABLET: 8.6; 5 TABLET ORAL at 19:55

## 2020-05-18 RX ADMIN — FERROUS GLUCONATE 324 MG: 324 TABLET ORAL at 17:43

## 2020-05-18 RX ADMIN — FERROUS GLUCONATE 324 MG: 324 TABLET ORAL at 10:28

## 2020-05-18 RX ADMIN — IBUPROFEN 800 MG: 800 TABLET ORAL at 05:47

## 2020-05-18 RX ADMIN — IBUPROFEN 800 MG: 800 TABLET ORAL at 12:18

## 2020-05-18 RX ADMIN — SENNOSIDES AND DOCUSATE SODIUM 1 TABLET: 8.6; 5 TABLET ORAL at 10:27

## 2020-05-18 RX ADMIN — ACETAMINOPHEN 650 MG: 325 TABLET, FILM COATED ORAL at 05:47

## 2020-05-18 NOTE — PLAN OF CARE
Data: Vital signs within normal limits. Postpartum checks within normal limits - see flow record. Patient eating and drinking normally. Patient able to empty bladder independently. Patient ambulating independently. No apparent signs of infection. Perineum healing well. Patient is performing self cares and is able to care for infant. Positive attachment behaviors are observed with infant. Support persons are present.  Action:  Pain plan was discussed. Patient will request pain med when she is ready for it. Patient was medicated during the shift for pain. See MAR. Patient education done about breastfeeding,  cares, postpartum cares, pain management/plan, rest, and discharge from hospital. See flow record.  Response: Patient reassessed within 1 hour after each medication for pain. Patient stated that pain had improved. Patient stated that she was comfortable.  Plan: Anticipate discharge on 20.

## 2020-05-18 NOTE — PLAN OF CARE
Having no pain this afternoon when ibuprofen was scheduled  she will ask if she needs anything  states she has done a lot of reading she also seems to know a lot about taking care of her baby

## 2020-05-18 NOTE — PROGRESS NOTES
[facility] Obstetrics Post-Partum Progress Note          Assessment and Plan:    Assessment:   Post-partum day #1  Normal spontaneous vaginal delivery  L&D complications: Chorioamnionitis  Postpartum anemia        Doing well.  No excessive bleeding  Pain well-controlled.      Plan:   Ambulation encouraged  Breast feeding strategies discussed  Start po iron therapy  Discharge tomorrow           Interval History:   Doing well.  Pain is well-controlled.  No fevers.  No history of foul-smelling vaginal discharge.  Good appetite.  Denies chest pain, shortness of breath, nausea or vomiting.  Vaginal bleeding is similar to a heavy menstrual flow.  Ambulatory.  Breastfeeding well.          Significant Problems:    Active Problems:    Gestational hypertension    Vaginal delivery            Review of Systems:    The patient denies any chest pain, shortness of breath, excessive pain, fever, chills, purulent drainage from the wound, nausea or vomiting.          Medications:   All medications related to the patient's surgery have been reviewed          Physical Exam:     All vitals stable  Patient Vitals for the past 12 hrs:   BP Temp Temp src Pulse Resp   05/18/20 0000 135/73 97.6  F (36.4  C) Oral 78 16     Uterine fundus is firm, non-tender and at the level of the umbilicus          Data:     All laboratory data related to this surgery reviewed  Hemoglobin   Date Value Ref Range Status   05/18/2020 7.7 (L) 11.7 - 15.7 g/dL Final   05/14/2020 12.3 11.7 - 15.7 g/dL Final   02/12/2020 10.7 (L) 11.7 - 15.7 g/dL Final   02/06/2020 10.8 (L) 11.7 - 15.7 g/dL Final   12/10/2019 12.2 11.7 - 15.7 g/dL Final     No imaging studies have been ordered    Reny Jeffery MD

## 2020-05-19 VITALS
RESPIRATION RATE: 16 BRPM | BODY MASS INDEX: 29.67 KG/M2 | HEART RATE: 89 BPM | TEMPERATURE: 97.6 F | WEIGHT: 167.5 LBS | DIASTOLIC BLOOD PRESSURE: 72 MMHG | OXYGEN SATURATION: 99 % | SYSTOLIC BLOOD PRESSURE: 138 MMHG

## 2020-05-19 LAB — COPATH REPORT: NORMAL

## 2020-05-19 PROCEDURE — 25000128 H RX IP 250 OP 636: Performed by: OBSTETRICS & GYNECOLOGY

## 2020-05-19 PROCEDURE — 25000132 ZZH RX MED GY IP 250 OP 250 PS 637: Performed by: OBSTETRICS & GYNECOLOGY

## 2020-05-19 RX ORDER — MEDROXYPROGESTERONE ACETATE 150 MG/ML
150 INJECTION, SUSPENSION INTRAMUSCULAR ONCE
Status: COMPLETED | OUTPATIENT
Start: 2020-05-19 | End: 2020-05-19

## 2020-05-19 RX ORDER — ACETAMINOPHEN 325 MG/1
650 TABLET ORAL EVERY 4 HOURS PRN
COMMUNITY
Start: 2020-05-19 | End: 2020-06-10

## 2020-05-19 RX ORDER — IBUPROFEN 200 MG
400-800 TABLET ORAL EVERY 6 HOURS PRN
COMMUNITY
Start: 2020-05-19 | End: 2020-06-10

## 2020-05-19 RX ADMIN — FERROUS GLUCONATE 324 MG: 324 TABLET ORAL at 08:05

## 2020-05-19 RX ADMIN — MEDROXYPROGESTERONE ACETATE 150 MG: 150 INJECTION, SUSPENSION, EXTENDED RELEASE INTRAMUSCULAR at 09:31

## 2020-05-19 NOTE — PLAN OF CARE
Data: Vital signs within normal limits. Postpartum checks within normal limits - see flow record. Patient eating and drinking normally. Patient able to empty bladder independently. . Patient ambulating independently..   No apparent signs of infection. perineum healing well. Patient is performing self cares and is able to care for infant. Positive attachment behaviors are observed with infant. Support persons are present.  Action:  Pain plan was discussed. Patient will request pain med when she is ready for it. Patient was medicated during the shift for cramping. See MAR.Patient education done about  cares, pain management/plan, and rest. See flow record.  Response:   Patient reassessed within 1 hour after each medication for pain. Patient stated that pain had improved. Patient stated that she was comfortable. .   Plan: Anticipate discharge on .

## 2020-05-19 NOTE — DISCHARGE SUMMARY
[facility] Discharge Summary    Jeanine Hernandez MRN# [id number]   Age: 20 year old YOB: 2000     Date of Admission:  5/15/2020  Date of Discharge::  5/19/2020  Admitting Physician:  Kermit Zepeda MD  Discharge Physician:  Samira Meadows MD     Home clinic: [facility]          Admission Diagnoses:   Intrauterine pregnancy at 39w1d  weeks gestation          Discharge Diagnosis:   Normal spontaneous vaginal delivery  Intrauterine pregnancy at 39w1d  weeks gestation  Acute blood loss anemia          Procedures:   Procedure(s): Induction of labor       No other procedures performed during this admission           Medications Prior to Admission:     Medications Prior to Admission   Medication Sig Dispense Refill Last Dose     acetaminophen (TYLENOL) 325 MG tablet Take 325-650 mg by mouth every 6 hours as needed for mild pain   Past Month at Unknown time     omeprazole (PRILOSEC) 20 MG DR capsule Take 1 capsule (20 mg) by mouth daily 30 capsule 3 5/15/2020 at Unknown time     Prenatal Vit-Fe Fumarate-FA (PRENATAL MULTIVITAMIN W/IRON) 27-0.8 MG tablet Take 1 tablet by mouth daily   5/15/2020 at Unknown time     Docusate Calcium (STOOL SOFTENER PO)    More than a month at Unknown time     [DISCONTINUED] Doxylamine Succinate, Sleep, (UNISOM PO)    Past Month at Unknown time     [DISCONTINUED] ondansetron (ZOFRAN-ODT) 4 MG ODT tab Take 1 tablet (4 mg) by mouth every 8 hours as needed for nausea (Patient not taking: Reported on 3/19/2020) 20 tablet 1 More than a month at Unknown time     [DISCONTINUED] pyridOXINE (VITAMIN B6) 100 MG TABS Take 25 mg by mouth daily   More than a month at Unknown time             Discharge Medications:     Current Discharge Medication List      START taking these medications    Details   !! acetaminophen (TYLENOL) 325 MG tablet Take 2 tablets (650 mg) by mouth every 4 hours as needed for mild pain or fever (greater than or equal to 38  C /100.4  F  (oral) or 38.5  C/ 101.4  F (core).)  Qty:      Associated Diagnoses: Vaginal delivery      ibuprofen (ADVIL/MOTRIN) 200 MG tablet Take 2-4 tablets (400-800 mg) by mouth every 6 hours as needed for other (cramping)    Associated Diagnoses: Vaginal delivery       !! - Potential duplicate medications found. Please discuss with provider.      CONTINUE these medications which have NOT CHANGED    Details   !! acetaminophen (TYLENOL) 325 MG tablet Take 325-650 mg by mouth every 6 hours as needed for mild pain      omeprazole (PRILOSEC) 20 MG DR capsule Take 1 capsule (20 mg) by mouth daily  Qty: 30 capsule, Refills: 3    Associated Diagnoses: Encounter for prenatal care in second trimester of first pregnancy      Prenatal Vit-Fe Fumarate-FA (PRENATAL MULTIVITAMIN W/IRON) 27-0.8 MG tablet Take 1 tablet by mouth daily      Docusate Calcium (STOOL SOFTENER PO)        !! - Potential duplicate medications found. Please discuss with provider.      STOP taking these medications       Doxylamine Succinate, Sleep, (UNISOM PO) Comments:   Reason for Stopping:         ondansetron (ZOFRAN-ODT) 4 MG ODT tab Comments:   Reason for Stopping:         pyridOXINE (VITAMIN B6) 100 MG TABS Comments:   Reason for Stopping:                     Consultations:   No consultations were requested during this admission          Brief History of Labor:   Jeanine Hernandez is a 20 year old  @ 39+1 Weeks EGA  She presented for IOL  on 5/15/2020  Pregnancy was complicated by PIH with normal labs  GBS neg  Membrane status: AROM @ 14:21 on 2020  Labored with: Pitocin to max 8 tre International units/ min   Labor complicated by early onset chorioamnionitis  Pain meds Epidural   FHR Cat II  Delivered a viable Female  @ 12:30 over intact perineum  Weight # oz   APGAR's 8/9  Placenta delivered @ 12:33 , was complete with a 3vessel cord- sent to Lab for evaluation  Laceration 1st degree Left labial- not repaired  Repaired with 3/O vicryl  "  BVrisk bleeding and clots passed post delivery- Misoprostol 800 mcg and TXA 1 gram IV given with good results   ml  \"Jessie\"              Hospital Course:   The patient's hospital course was unremarkable.  On discharge, her pain was well controlled. Vaginal bleeding is similar to peak menstrual flow.  Voiding without difficulty.  Ambulating well and tolerating a normal diet.  No fever.  Breastfeeding well.  Infant is stable.   She was discharged on post-partum day #2.    PE: /66   Pulse 105   Temp 97  F (36.1  C) (Oral)   Resp 18   Wt 76 kg (167 lb 8 oz)   LMP 09/06/2019 (Approximate)   SpO2 99%   Breastfeeding Unknown   BMI 29.67 kg/m     NAD  Abd: soft, nontender, fundus firm    Post-partum hemoglobin:   Hemoglobin   Date Value Ref Range Status   05/18/2020 7.7 (L) 11.7 - 15.7 g/dL Final             Discharge Instructions and Follow-Up:   Discharge diet: Regular, high iron diet   Discharge activity: Activity as tolerated   Discharge follow-up: Follow up with OB clinic in 6 weeks   Wound care: Drink plenty of fluids  Ice to area for comfort           Discharge Disposition:   Discharged to home      Attestation:  I have reviewed today's vital signs, notes, medications, labs and imaging.    Samira Meadows MD     "

## 2020-05-19 NOTE — PROGRESS NOTES
Late entry-figueroa catheter was ok to be left in place until pushing per Dr Zepeda due to pt latex and iodine allergy.    Fundal checks were every 15 min with blood pressures during postpartum period.

## 2020-05-19 NOTE — DISCHARGE INSTRUCTIONS
Please call the OB clinic to schedule your six week follow up appointment.      Depo lasts for 3 months.  It will no longer be effective around Aug 11, 2020.

## 2020-05-19 NOTE — PLAN OF CARE
Vitals stable.  Pain well controlled.  Fundus firm; bleeding subsiding.  Discharge instructions reviewed with patient and spouse including postpartum depression, signs and symptoms to call or come in for and follow up appointment.  Depo given.  All questions and concerns addressed at this time.

## 2020-05-19 NOTE — PROGRESS NOTES
Patient discharged per ambulatory with infant in car seat accompanied by spouse and RN.  Left at 1440 Mother verified that her band matches her infant's band by comparing the infant's  MR#.  Discharge instructions given. Encouraged to call for any problems, questions or concerns. .

## 2020-05-20 ENCOUNTER — TELEPHONE (OUTPATIENT)
Dept: OBGYN | Facility: CLINIC | Age: 20
End: 2020-05-20

## 2020-05-20 NOTE — TELEPHONE ENCOUNTER
"Spoke with patient on the phone.    S-(situation): Patient reports having a bleeding episode this morning when she awoke. Patient reports heavier bleeding and cramping. Patient went to the bathroom and is wondering if she passed a piece of tissue? Patient reports it did not seem like a blood clot. Patient reports she saw what \" something that looked like it had veins in it.\" Patient reports after that the bleeding dwindled down. Patient currently denies cramping or heavy bleeding. Patient reports she can feel her uterus about 1 finger below her belly button. Patient reports \" it feels like a hard shelf or ledge.\" Patient reports changing a pad every 3.5-4 hours. Patient denies fever. Patient denies foul odor. Patient reports she had a first degree laceration that was not repaired. Patient report feeling a \"constant stinging sensation where that is.\" Patient reports she is soaking in the tub a couple of times per day. Patient reports the bath tub is very soothing and she does not notice bleeding while in the tub. Patient reports increased stinging with urination, using the cammy-bottle before and after. Patient is breast feeding and bottle feeding afterwards prn. Patient does not feel dizzy or short of breath. Patient does not feel faint or overly week. Patient does not have a headache.     B-(background):  2020    A-(assessment): PPD #3, bleeding, labial laceration    R-(recommendations): Reassurance provided. Monitor bleeding and reviewed bleeding precautions and guidelines ( symptoms )  for when further evaluation is needed.     Use cammy-care bottle when urinating to spray water over the area of the laceration to try to prevent direct contact with urine.     Tub soak ( sitz bath )  3-4 times per day to promote perineal healing.     Ice packs for comfort.  Acetaminophen and ibuprofen alternating for comfort.     Call with questions or concerns.   FNA after clinic hours.  Patient has the phone number.    Pt " in agreement and reports understanding.    Leia Patel   Ob/Gyn Clinic  RN

## 2020-05-20 NOTE — TELEPHONE ENCOUNTER
Reason for Call:  Other call back    Detailed comments: delivered 5-17-20 - was discharged 5-19-20.  Had some bleeding issues after delivery - passed another piese of the placenta the next day.  This morning she has been sleeping with pads on and - diaper like things - soaked in blood.  Pass another fleshy thing in the toilet - has been bleeding a lot throughout the day and it won't stop.  Warmed transferred to clinic RN    Phone Number Patient can be reached at: [phone]    Best Time:     Can we leave a detailed message on this number? YES    Call taken on 5/20/2020 at 3:21 PM by Kathryn Escalante

## 2020-05-23 ENCOUNTER — TELEPHONE (OUTPATIENT)
Dept: CASE MANAGEMENT | Facility: CLINIC | Age: 20
End: 2020-05-23

## 2020-05-23 NOTE — TELEPHONE ENCOUNTER
Care Transitions Note     drug detection panel on cord tissue segment is positive. Per [facility] policy, mandated child protection report was made to [facility]. Spoke with Fidencio Yarbrough ([phone]). All appropriate documents were faxed and [facility] was notified via phone. No other CTS needs at this time.     Priscilla SON RN  Inpatient Care Coordinator  [facility] [phone]  [facility] [phone]

## 2020-06-10 ENCOUNTER — TELEPHONE (OUTPATIENT)
Dept: OBGYN | Facility: CLINIC | Age: 20
End: 2020-06-10

## 2020-06-10 ENCOUNTER — OFFICE VISIT (OUTPATIENT)
Dept: FAMILY MEDICINE | Facility: CLINIC | Age: 20
End: 2020-06-10
Payer: COMMERCIAL

## 2020-06-10 VITALS — WEIGHT: 135.6 LBS | SYSTOLIC BLOOD PRESSURE: 122 MMHG | DIASTOLIC BLOOD PRESSURE: 82 MMHG | BODY MASS INDEX: 24.02 KG/M2

## 2020-06-10 PROCEDURE — 99203 OFFICE O/P NEW LOW 30 MIN: CPT | Performed by: NURSE PRACTITIONER

## 2020-06-10 RX ORDER — FERROUS SULFATE 325(65) MG
325 TABLET ORAL
COMMUNITY
End: 2020-06-30

## 2020-06-10 RX ORDER — SERTRALINE HYDROCHLORIDE 25 MG/1
TABLET, FILM COATED ORAL
Qty: 45 TABLET | Refills: 0 | Status: SHIPPED | OUTPATIENT
Start: 2020-06-10 | End: 2020-06-24

## 2020-06-10 ASSESSMENT — ANXIETY QUESTIONNAIRES
5. BEING SO RESTLESS THAT IT IS HARD TO SIT STILL: NEARLY EVERY DAY
IF YOU CHECKED OFF ANY PROBLEMS ON THIS QUESTIONNAIRE, HOW DIFFICULT HAVE THESE PROBLEMS MADE IT FOR YOU TO DO YOUR WORK, TAKE CARE OF THINGS AT HOME, OR GET ALONG WITH OTHER PEOPLE: EXTREMELY DIFFICULT
2. NOT BEING ABLE TO STOP OR CONTROL WORRYING: NEARLY EVERY DAY
1. FEELING NERVOUS, ANXIOUS, OR ON EDGE: NEARLY EVERY DAY
7. FEELING AFRAID AS IF SOMETHING AWFUL MIGHT HAPPEN: SEVERAL DAYS
GAD7 TOTAL SCORE: 19
3. WORRYING TOO MUCH ABOUT DIFFERENT THINGS: NEARLY EVERY DAY
6. BECOMING EASILY ANNOYED OR IRRITABLE: NEARLY EVERY DAY

## 2020-06-10 ASSESSMENT — ENCOUNTER SYMPTOMS
FATIGUE: 1
HYPERACTIVE: 0
RESPIRATORY NEGATIVE: 1
NERVOUS/ANXIOUS: 1
DECREASED CONCENTRATION: 1
AGITATION: 1

## 2020-06-10 ASSESSMENT — PATIENT HEALTH QUESTIONNAIRE - PHQ9
5. POOR APPETITE OR OVEREATING: NEARLY EVERY DAY
SUM OF ALL RESPONSES TO PHQ QUESTIONS 1-9: 20

## 2020-06-10 NOTE — PATIENT INSTRUCTIONS
Postpartum Depression:  1. Recommend follow-up with Parris Burk for counseling. She will contact you within the next 24 hours to set up a telephone appointment.   2. Take medication as prescribed. Start with 25mg daily for 1 week. You may increase it in one week to 50mg. Please follow-up in 2 weeks for telephone visit to see if further increase in medication is needed. Medication can take a few weeks to see full benefit. Please follow-up right away if you experience suicidal ideation as we may need to change medication.   3. Please follow-up if you notice any change in mood such as racing thoughts, fast speech, difficulty focusing, impulsive behaviors. We may need to adjust medication.

## 2020-06-10 NOTE — TELEPHONE ENCOUNTER
"Spoke with patient on the phone.    S-(situation): Patient feeling very frustrated with life right now. Patient feeling like she \" has to take care of her baby so she survives but really  doesn't want to do it.\" Patient reports she \"lashes out\" at people because she feels so overwhelmed. Patient having a hard time eating or drinking and showering herself. Patient denies being suicidal and denies she would harm her baby but does say \" I feel like I could jump off a bridge.\" patient reports  is supportive and currently home for 2 days with her. Patient also lives with her parents. Patient had depression and anxiety in high school, would be willing to go on medications to try and help.    B-(background): , delivered 2020    A-(assessment): postpartum depression/anxiety    R-(recommendations): Reassurance provided. Therapeutic communication. Recommended office visit today for further evaluation. Recommended also appointment with Behavior Health Clinician - Parris WHIPPLE in FP.    Patient amenable and understanding of appointment. Patient scheduled for today at 1020 in FP.  present in the background with patient - supportive of appointment.    Leia Patel   Ob/Gyn Clinic  RN        "

## 2020-06-10 NOTE — TELEPHONE ENCOUNTER
Reason for call:  Patient reporting a symptom    Symptom or request: Delivered 5-17-20.  Wants to be rechecked for postpartum depression.  Getting very frustrated and crying all the time.  Feels like she is caring for child because she needs to - not because she wants to.  Not eating, drinking, barely showering.    Duration (how long have symptoms been present):     Have you been treated for this before? No    Additional comments:     Phone Number patient can be reached at:  Home number on file [phone] (home)    Best Time:      Can we leave a detailed message on this number:  YES    Call taken on 6/10/2020 at 8:53 AM by Kathryn Escalante

## 2020-06-10 NOTE — PROGRESS NOTES
Subjective     Jeanine Hernandez is a 20 year old female who presents to clinic today for the following health issues:    HPI   Abnormal Mood Symptoms  Onset: 1 and half weeks ago    Description:   Depression: YES  Anxiety: YES    Accompanying Signs & Symptoms:  Still participating in activities that you used to enjoy: no  Fatigue: YES  Irritability: YES  Difficulty concentrating: YES  Changes in appetite: YES- not eating at all  Problems with sleep: YES- no sleep  Heart racing/beating fast : no   Thoughts of hurting yourself or others: none    History:   Recent stress: YES  Prior depression hospitalization: no  Family history of depression: YES- postpartum from mom  Family history of anxiety: no     Precipitating factors:   Alcohol/drug use: no     Alleviating factors:      Therapies Tried and outcome: None    Additional provider notes: Freshman year in high school was diagnosed with depression and was put on a medication (unsure of med) for 1.5 years. Weaned off after symptoms improved.       Patient Active Problem List   Diagnosis     Prenatal care, first pregnancy     Elevated BP without diagnosis of hypertension     Supervision of normal first pregnancy     Gestational hypertension     Vaginal delivery     Past Surgical History:   Procedure Laterality Date     FOOT SURGERY      right foot - ganglion cyst     MYRINGOTOMY, INSERT TUBE BILATERAL, COMBINED       ORTHOPEDIC SURGERY       TONSILLECTOMY       TYMPANOPLASTY         Social History     Tobacco Use     Smoking status: Never Smoker     Smokeless tobacco: Never Used   Substance Use Topics     Alcohol use: Never     Frequency: Never     Family History   Problem Relation Age of Onset     Diabetes Father      Neurologic Disorder Father         TBI     Chronic Obstructive Pulmonary Disease Paternal Grandmother      Cerebrovascular Disease Paternal Grandfather      Diabetes Paternal Grandfather          Current Outpatient Medications   Medication Sig Dispense  Refill     ferrous sulfate (FEROSUL) 325 (65 Fe) MG tablet Take 325 mg by mouth daily (with breakfast)       Prenatal Vit-Fe Fumarate-FA (PRENATAL MULTIVITAMIN W/IRON) 27-0.8 MG tablet Take 1 tablet by mouth daily       acetaminophen (TYLENOL) 325 MG tablet Take 325-650 mg by mouth every 6 hours as needed for mild pain       Docusate Calcium (STOOL SOFTENER PO)        omeprazole (PRILOSEC) 20 MG DR capsule Take 1 capsule (20 mg) by mouth daily (Patient not taking: Reported on 6/10/2020) 30 capsule 3     Allergies   Allergen Reactions     Contrast Dye Anaphylaxis     Iodine Anaphylaxis     Latex Hives     BP Readings from Last 3 Encounters:   06/10/20 122/82   05/19/20 138/72   05/14/20 (!) 142/85    Wt Readings from Last 3 Encounters:   06/10/20 61.5 kg (135 lb 9.6 oz)   05/16/20 76 kg (167 lb 8 oz)   05/14/20 75.3 kg (166 lb)                      Reviewed and updated as needed this visit by Provider         Review of Systems   Constitutional: Positive for fatigue.   HENT: Negative.    Respiratory: Negative.    Genitourinary: Negative.    Skin: Negative.    Psychiatric/Behavioral: Positive for agitation, decreased concentration and mood changes. Negative for self-injury and suicidal ideas. The patient is nervous/anxious. The patient is not hyperactive.             Objective    /82 (BP Location: Right arm, Patient Position: Sitting, Cuff Size: Adult Regular)   Wt 61.5 kg (135 lb 9.6 oz)   LMP 09/06/2019 (Approximate)   BMI 24.02 kg/m    Body mass index is 24.02 kg/m .  Physical Exam  Vitals signs and nursing note reviewed.   Constitutional:       General: She is not in acute distress.     Appearance: Normal appearance. She is not ill-appearing or toxic-appearing.   Skin:     General: Skin is warm and dry.   Neurological:      General: No focal deficit present.      Mental Status: She is alert and oriented to person, place, and time.   Psychiatric:         Attention and Perception: Attention normal.          "Mood and Affect: Affect is tearful.         Speech: Speech is not rapid and pressured or delayed.         Behavior: Behavior is not agitated, aggressive or hyperactive.         Thought Content: Thought content is not paranoid or delusional. Thought content does not include homicidal or suicidal ideation. Thought content does not include homicidal or suicidal plan.            Diagnostic Test Results:  Labs reviewed in Epic        Assessment & Plan     1. Postpartum depression  Personal hx of depression and mom history of postpartum depression. Discussed counseling and medication options. Patient requesting both. Sertraline prescribed, safe for breastfeeding. Instructions on how to take and s/e discussed. Discussed urgent s/s that would warrant immediate follow-up. Contacted [facility] and she will follow-up with patient within 24 hours. Follow-up in 2 weeks (virtual visit okay) to discuss medication and see if further increase is needed. Discussed lactation consult as patient states baby has \"insufficient latch\" and mom is pumping and giving via bottle. She states she doesn't want to see lactation until after she feels more desire to be around her baby. Discussed support system. She has , mom and dad around who can help care for baby when she needs a break. Recommended she use them especially over the next couple weeks while medication is still kicking in.    *Wellbutrin only antidepressant that it appears insurance covers, but it is not safe during lactation. Discussed zoloft with pharmacy and they have coupons to use if needed.    - sertraline (ZOLOFT) 25 MG tablet; Take 25mg (1 tab) daily x 7 days; then increase to 50mg (2 tabs); follow-up with PCP in 2 weeks  Dispense: 45 tablet; Refill: 0     BMI:   Estimated body mass index is 24.02 kg/m  as calculated from the following:    Height as of 5/14/20: 1.6 m (5' 3\").    Weight as of this encounter: 61.5 kg (135 lb 9.6 oz).           See Patient " Instructions    Return in about 2 weeks (around 6/24/2020) for Video/telephone Visit to evaluate new medication.    [facility]

## 2020-06-11 ENCOUNTER — TELEPHONE (OUTPATIENT)
Dept: BEHAVIORAL HEALTH | Facility: CLINIC | Age: 20
End: 2020-06-11

## 2020-06-11 ASSESSMENT — ANXIETY QUESTIONNAIRES: GAD7 TOTAL SCORE: 19

## 2020-06-23 NOTE — PROGRESS NOTES
"Jeanine Hernandez is a 20 year old female who is being evaluated via a billable telephone visit.      The patient has been notified of following:     \"This telephone visit will be conducted via a call between you and your physician/provider. We have found that certain health care needs can be provided without the need for a physical exam.  This service lets us provide the care you need with a short phone conversation.  If a prescription is necessary we can send it directly to your pharmacy.  If lab work is needed we can place an order for that and you can then stop by our lab to have the test done at a later time.    Telephone visits are billed at different rates depending on your insurance coverage. During this emergency period, for some insurers they may be billed the same as an in-person visit.  Please reach out to your insurance provider with any questions.    If during the course of the call the physician/provider feels a telephone visit is not appropriate, you will not be charged for this service.\"    Patient has given verbal consent for Telephone visit?  Yes    What phone number would you like to be contacted at? [phone]    How would you like to obtain your AVS? Katharine Cano     Jeanine Hernandez is a 20 year old female who presents via phone visit today for the following health issues:    HPI         Depression and Anxiety Follow-Up    How are you doing with your depression since your last visit? No change    How are you doing with your anxiety since your last visit?  No change    Are you having other symptoms that might be associated with depression or anxiety? No    Have you had a significant life event? No     Do you have any concerns with your use of alcohol or other drugs? No     Freshman year in high school was diagnosed with depression and was put on a medication (unsure of med) for 1.5 years. Weaned off after symptoms improved.     [facility] called patient, but no appointment scheduled.  She " reports she never got the VM [facility] left her.    Started on zoloft and doesn't' feel that it has helped.  Is up to 50 mg daily.    She reports having 'mental breakdown' about 3 days ago with prolonged crying spell.   was supportive, went to sleep and that helped a little    Feels spouse is somewhat supportive    Plans to go back to work in July    Her mother is providing childcare when she goes back to work    No SI/HI    Social History     Tobacco Use     Smoking status: Never Smoker     Smokeless tobacco: Never Used   Substance Use Topics     Alcohol use: Never     Frequency: Never     Drug use: Never     PHQ 6/10/2020   PHQ-9 Total Score 20   Q9: Thoughts of better off dead/self-harm past 2 weeks Not at all     CONNER-7 SCORE 6/10/2020   Total Score 19     Last PHQ-9 6/10/2020   1.  Little interest or pleasure in doing things 3   2.  Feeling down, depressed, or hopeless 2   3.  Trouble falling or staying asleep, or sleeping too much 3   4.  Feeling tired or having little energy 3   5.  Poor appetite or overeating 3   6.  Feeling bad about yourself 3   7.  Trouble concentrating 3   8.  Moving slowly or restless 0   Q9: Thoughts of better off dead/self-harm past 2 weeks 0   PHQ-9 Total Score 20   Difficulty at work, home, or with people Very difficult     CONNER-7  6/10/2020   1. Feeling nervous, anxious, or on edge 3   2. Not being able to stop or control worrying 3   3. Worrying too much about different things 3   4. Trouble relaxing 3   5. Being so restless that it is hard to sit still 3   6. Becoming easily annoyed or irritable 3   7. Feeling afraid, as if something awful might happen 1   CONNER-7 Total Score 19   If you checked any problems, how difficult have they made it for you to do your work, take care of things at home, or get along with other people? Extremely difficult     In the past two weeks have you had thoughts of suicide or self-harm?  No.    Do you have concerns about your personal safety or the  safety of others?   No    Suicide Assessment Five-step Evaluation and Treatment (SAFE-T)        Current Outpatient Medications   Medication Sig Dispense Refill     ferrous sulfate (FEROSUL) 325 (65 Fe) MG tablet Take 325 mg by mouth daily (with breakfast)       Prenatal Vit-Fe Fumarate-FA (PRENATAL MULTIVITAMIN W/IRON) 27-0.8 MG tablet Take 1 tablet by mouth daily       sertraline (ZOLOFT) 25 MG tablet Take 25mg (1 tab) daily x 7 days; then increase to 50mg (2 tabs); follow-up with PCP in 2 weeks 45 tablet 0     acetaminophen (TYLENOL) 325 MG tablet Take 325-650 mg by mouth every 6 hours as needed for mild pain       Docusate Calcium (STOOL SOFTENER PO)        omeprazole (PRILOSEC) 20 MG DR capsule Take 1 capsule (20 mg) by mouth daily (Patient not taking: Reported on 6/10/2020) 30 capsule 3       Reviewed and updated as needed this visit by Provider         Review of Systems   Constitutional, HEENT, cardiovascular, pulmonary, gi and gu systems are negative, except as otherwise noted.       Objective   Reported vitals:  LMP 09/06/2019 (Approximate)    healthy, alert and no distress  PSYCH: Alert and oriented times 3; coherent speech, normal   rate and volume, able to articulate logical thoughts, able   to abstract reason, no tangential thoughts, no hallucinations   or delusions  Her affect is anxious  RESP: No cough, no audible wheezing, able to talk in full sentences  Remainder of exam unable to be completed due to telephone visits          Assessment/Plan:  1. Postpartum depression - remains uncontrolled. increase to 100 mg.  Schedule with [facility].  Crisis information, relaxation tips given.  Follow-up in 2 weeks. May consider switching to lexapro if symptoms remain uncontrolled.  - sertraline (ZOLOFT) 100 MG tablet; Take 1 tablet (100 mg) by mouth daily  Dispense: 30 tablet; Refill: 1    Return in about 2 weeks (around 7/8/2020) for Routine Follow-Up/Med Check.      Phone call duration:  15 minutes    Enid Mike  Ottoniel, DO

## 2020-06-24 ENCOUNTER — VIRTUAL VISIT (OUTPATIENT)
Dept: FAMILY MEDICINE | Facility: CLINIC | Age: 20
End: 2020-06-24
Payer: COMMERCIAL

## 2020-06-24 PROCEDURE — 99214 OFFICE O/P EST MOD 30 MIN: CPT | Mod: TEL | Performed by: INTERNAL MEDICINE

## 2020-06-24 RX ORDER — SERTRALINE HYDROCHLORIDE 100 MG/1
100 TABLET, FILM COATED ORAL DAILY
Qty: 30 TABLET | Refills: 1 | Status: SHIPPED | OUTPATIENT
Start: 2020-06-24 | End: 2020-08-26

## 2020-06-24 NOTE — PATIENT INSTRUCTIONS
"1. I am referring you to [facility] [place] for post-partum depression; please make your initial appointment for 1 hour.  The phone number is [phone]   2. Increase sertraline to 100 mg  3. Follow-up in 2 weeks - may switch meds or increase if needed    Relaxation Techniques    Breathing Exercises:    Inhale through your nose counting to \"4\" as you are breathing in  Exhale through your mouth - counting to \"6:\" or more - pursing your lips to slow down the exhalation.  Try to do this exercise 3 times if you are able to -remember even once is going to make a difference.     Beach Ball Exercise:    Imagine you are holding a deflated beach ball with both hands in front of you.  As you inhale -imagine to be trouble filling with air  -until you have experienced a complete inhalation.  As you exhale through your mouth, slightly pursed lips, managing herself squeezing the air out of the beach well.    The following two exercises are from: DBT Skills Training Handouts and Worksheets, 2nd edition.  2015.  Susie Byrd      Half Smile Exercise:    1st: Relax your face from top of her head down to her chin and jaw.  Let go of each facial muscle: Forehead, eyes, brows, cheeks, mouth, tongue - teeth slightly apart  2nd: Left both corners of her lips go slightly up, just so you can feel them.  It is not necessary for others to see it.  A half smile is slightly turned up lips with a relaxed face.  3rd: Adopt a serene facial expression.  Think of the Carmita Lana and here serene face    Willing Hands Exercise:    Notice the positions of your hands - especially if you are feeling some stress  While sitting place your hands on your lap - with hands unclenched - turn your palms up with fingers relaxed                  Mental Health Crisis Numbers:  [place] after hours Crisis Line      [phone]     Options For Deaf + Hard of Hearing   [phone]    Text [place] to 398506 24/7 Crisis Texting line; " average response time is 39 seconds    Trans Lifeline (Fight the epidemic of trans suicide)  2-465-518-3942     https://www.translifeline.org/    [facility]   [phone]   Available 24 hours per day/7 days a week.  Mobile mental health crisis services for children, adults, and families in [facility]. Crisis assessment, intervention, and stabilization services.     [facility]   [phone]    [facility]  [phone]  [facility]   (new 2019) Mobile Mihaela Team 24/7  [phone]     Other Counties:    [place]-  Adults  [phone]   Children [phone]   [place]-  Adults [phone]  Children [phone]     [facility]- [place] 5 pm - 9 pm   [phone]    National Suicide Prevention LifeLine      2-457-270-TALK (8255)     Crisis Mobile Services:  [facility]   [phone]  [facility]  [phone], option #1  [facility]     [phone]  [facility]

## 2020-06-30 ENCOUNTER — VIRTUAL VISIT (OUTPATIENT)
Dept: OBGYN | Facility: CLINIC | Age: 20
End: 2020-06-30
Payer: COMMERCIAL

## 2020-06-30 PROCEDURE — 99207 ZZC POST PARTUM EXAM: CPT | Performed by: OBSTETRICS & GYNECOLOGY

## 2020-06-30 RX ORDER — MEDROXYPROGESTERONE ACETATE 150 MG/ML
150 INJECTION, SUSPENSION INTRAMUSCULAR
COMMUNITY
End: 2021-11-23

## 2020-06-30 RX ORDER — MEDROXYPROGESTERONE ACETATE 150 MG/ML
150 INJECTION, SUSPENSION INTRAMUSCULAR
Status: CANCELLED | OUTPATIENT
Start: 2020-06-30

## 2020-06-30 NOTE — NURSING NOTE
Patient did PHQ-9 2 weeks ago declines to do again today     Patient given depo-provera in birth center 05/27/2020     Next due 08/12-08/26/2020  Will need new order that day.    Haven Huang MA

## 2020-06-30 NOTE — PROGRESS NOTES
"Jeanine Hernandez is a 20 year old female who is being evaluated via a billable telephone visit.      The patient has been notified of following:     \"This telephone visit will be conducted via a call between you and your physician/provider. We have found that certain health care needs can be provided without the need for a physical exam.  This service lets us provide the care you need with a short phone conversation.  If a prescription is necessary we can send it directly to your pharmacy.  If lab work is needed we can place an order for that and you can then stop by our lab to have the test done at a later time.    Telephone visits are billed at different rates depending on your insurance coverage. During this emergency period, for some insurers they may be billed the same as an in-person visit.  Please reach out to your insurance provider with any questions.    If during the course of the call the physician/provider feels a telephone visit is not appropriate, you will not be charged for this service.\"    Patient has given verbal consent for Telephone visit?  Yes    What phone number would you like to be contacted at? [phone]    How would you like to obtain your AVS? Katharine    Phone call duration: 8:11 minutes    SUBJECTIVE:  Jeanine Hernandez is a 20 year old female  here for a postpartum visit.  She had a  on 2020 delivering a healthy baby girl weighing 7 lbs 10 oz at term.      delivery complications:  No  breast feeding:  Yes,  And bottling  bladder problems:  No  bowel problems/hemorrhoids:  No  episiotomy/laceration/incision healed? Yes:   vaginal flow:  None  [place]:  No  contraception:  Depo-Provera  emotional adjustment:  doing well and happy  back to work:  Next week      OBJECTIVE:  Last menstrual period 2019, currently breastfeeding.   General - pleasant female in no acute distress.  No exam today   ASSESSMENT:  normal postpartum exam    PLAN:  Discussed kegel exercises "   May resume normal activities without restrictions  Pap smear was not  done today    The patient will use Depo-Provera for birth control. Full counseling was provided, and all questions answered. Compliance is strongly emphasized.  Return to clinic in one year for an annual, when due for a pap smear or PRN.    Kermit Zepeda MD

## 2020-07-01 ENCOUNTER — TELEPHONE (OUTPATIENT)
Dept: OBGYN | Facility: CLINIC | Age: 20
End: 2020-07-01

## 2020-07-01 NOTE — TELEPHONE ENCOUNTER
Reason for Call:  Other call back    Detailed comments: Pt delivered 5-17-20.  Looking for a return to work note for employer.  Looking to return 7-12-20.  Pt will  letter when done.    Letter typed and given to RLM to sign then will call pt back.    Phone Number Patient can be reached at: Home number on file [phone] (home)    Best Time:     Can we leave a detailed message on this number? YES    Call taken on 7/1/2020 at 2:14 PM by Kathryn Escalante

## 2020-07-01 NOTE — LETTER
[facility]  [address] [place] [address]  [phone]    2020      Jeanine Hernandez                                                                                                                                            To Whom It May Concern:      Jeanine Hernandez ( 2000) is a patient here at [facility].  She has been off from work due to post-partum recovery.  She is able to return to work with no restrictions 2020.      Sincerely,      Kermit Zepeda MD

## 2020-07-17 ENCOUNTER — TELEPHONE (OUTPATIENT)
Dept: FAMILY MEDICINE | Facility: CLINIC | Age: 20
End: 2020-07-17

## 2020-07-17 ENCOUNTER — MEDICAL CORRESPONDENCE (OUTPATIENT)
Dept: HEALTH INFORMATION MANAGEMENT | Facility: CLINIC | Age: 20
End: 2020-07-17

## 2020-07-17 NOTE — TELEPHONE ENCOUNTER
EPDS was done in clinic today during child's 2 month visit and score was 23 with negative psychosis and positive (sometimes) screen for suicidal ideation.   Jeanine has history of depression treated with medication in the past.   Copy of EPDS was given to Jeanine today as well as educational material about postpartum depression.     Plan:   Dr. Maude Renner discussed with Jeanine her depression sx. Pt follows with Dr. Alcazar for medication management and counseling. Pt and family feel safe at this time and has scheduled follow up on 7/23/2020 with Dr. Alcazar and a counseling lev. With [facility] on 7/28/2020      Information above per Verbal order from Dr. Maude Graves [facility] 7/17/2020 11:47 AM

## 2020-07-23 ENCOUNTER — VIRTUAL VISIT (OUTPATIENT)
Dept: FAMILY MEDICINE | Facility: CLINIC | Age: 20
End: 2020-07-23
Payer: COMMERCIAL

## 2020-07-23 PROCEDURE — 99213 OFFICE O/P EST LOW 20 MIN: CPT | Mod: TEL | Performed by: INTERNAL MEDICINE

## 2020-07-23 PROCEDURE — 96127 BRIEF EMOTIONAL/BEHAV ASSMT: CPT | Performed by: INTERNAL MEDICINE

## 2020-07-23 RX ORDER — QUETIAPINE FUMARATE 25 MG/1
25 TABLET, FILM COATED ORAL AT BEDTIME
Qty: 30 TABLET | Refills: 3 | Status: SHIPPED | OUTPATIENT
Start: 2020-07-23 | End: 2020-11-23

## 2020-07-23 ASSESSMENT — PATIENT HEALTH QUESTIONNAIRE - PHQ9
5. POOR APPETITE OR OVEREATING: NEARLY EVERY DAY
SUM OF ALL RESPONSES TO PHQ QUESTIONS 1-9: 14

## 2020-07-23 ASSESSMENT — ANXIETY QUESTIONNAIRES
6. BECOMING EASILY ANNOYED OR IRRITABLE: NEARLY EVERY DAY
1. FEELING NERVOUS, ANXIOUS, OR ON EDGE: MORE THAN HALF THE DAYS
2. NOT BEING ABLE TO STOP OR CONTROL WORRYING: MORE THAN HALF THE DAYS
3. WORRYING TOO MUCH ABOUT DIFFERENT THINGS: NEARLY EVERY DAY
7. FEELING AFRAID AS IF SOMETHING AWFUL MIGHT HAPPEN: MORE THAN HALF THE DAYS
GAD7 TOTAL SCORE: 18
IF YOU CHECKED OFF ANY PROBLEMS ON THIS QUESTIONNAIRE, HOW DIFFICULT HAVE THESE PROBLEMS MADE IT FOR YOU TO DO YOUR WORK, TAKE CARE OF THINGS AT HOME, OR GET ALONG WITH OTHER PEOPLE: SOMEWHAT DIFFICULT
5. BEING SO RESTLESS THAT IT IS HARD TO SIT STILL: NEARLY EVERY DAY

## 2020-07-23 NOTE — PROGRESS NOTES
"Jeanine Hernandez is a 20 year old female who is being evaluated via a billable telephone visit.      The patient has been notified of following:     \"This telephone visit will be conducted via a call between you and your physician/provider. We have found that certain health care needs can be provided without the need for a physical exam.  This service lets us provide the care you need with a short phone conversation.  If a prescription is necessary we can send it directly to your pharmacy.  If lab work is needed we can place an order for that and you can then stop by our lab to have the test done at a later time.    Telephone visits are billed at different rates depending on your insurance coverage. During this emergency period, for some insurers they may be billed the same as an in-person visit.  Please reach out to your insurance provider with any questions.    If during the course of the call the physician/provider feels a telephone visit is not appropriate, you will not be charged for this service.\"    Patient has given verbal consent for Telephone visit?  Yes    What phone number would you like to be contacted at? [phone]    How would you like to obtain your AVS? Katharine Cano     Jeanine Hernandez is a 20 year old female who presents via phone visit today for the following health issues:    HPI    Depression and Anxiety Follow-Up    How are you doing with your depression since your last visit? No change    How are you doing with your anxiety since your last visit?  No change    Are you having other symptoms that might be associated with depression or anxiety? No    Have you had a significant life event? OTHER: happy mood swing - sometimes very happy others very angry     Do you have any concerns with your use of alcohol or other drugs? No     Currently on sertraline 100 mg; we increased from 50 mg on 6/24    Feels depression is improving, but feels that she is more irritable.  Is also noting looser " htan normal stools.    Has appointment with [facility] next week.    She was wondering about adding mood stabilizer to zoloft.  Her mother is RN and she asked her about this.      Feeling very restless at times.  Having troubles sleeping, not directly related to baby not sleeping    She is not breastfeeding    Has passive thoughts of death. Feels safe with home.    Social History     Tobacco Use     Smoking status: Never Smoker     Smokeless tobacco: Never Used   Substance Use Topics     Alcohol use: Never     Frequency: Never     Drug use: Never     PHQ 6/10/2020 7/23/2020   PHQ-9 Total Score 20 14   Q9: Thoughts of better off dead/self-harm past 2 weeks Not at all Several days     CONNER-7 SCORE 6/10/2020 7/23/2020   Total Score 19 18     Last PHQ-9 7/23/2020   1.  Little interest or pleasure in doing things 3   2.  Feeling down, depressed, or hopeless 2   3.  Trouble falling or staying asleep, or sleeping too much 3   4.  Feeling tired or having little energy 3   5.  Poor appetite or overeating 0   6.  Feeling bad about yourself 2   7.  Trouble concentrating 0   8.  Moving slowly or restless 0   Q9: Thoughts of better off dead/self-harm past 2 weeks 1   PHQ-9 Total Score 14   Difficulty at work, home, or with people Somewhat difficult     CONNER-7  7/23/2020   1. Feeling nervous, anxious, or on edge 2   2. Not being able to stop or control worrying 2   3. Worrying too much about different things 3   4. Trouble relaxing 3   5. Being so restless that it is hard to sit still 3   6. Becoming easily annoyed or irritable 3   7. Feeling afraid, as if something awful might happen 2   CONNER-7 Total Score 18   If you checked any problems, how difficult have they made it for you to do your work, take care of things at home, or get along with other people? Somewhat difficult       Suicide Assessment Five-step Evaluation and Treatment (SAFE-T)                     Reviewed and updated as needed this visit by Provider         Review of  Systems   Constitutional, HEENT, cardiovascular, pulmonary, gi and gu systems are negative, except as otherwise noted.       Objective   Reported vitals:  LMP 09/06/2019 (Approximate)    healthy, alert and no distress  PSYCH: Alert and oriented times 3; coherent speech, normal   rate and volume, able to articulate logical thoughts, able   to abstract reason, no tangential thoughts, no hallucinations   or delusions  Her affect is normal  RESP: No cough, no audible wheezing, able to talk in full sentences  Remainder of exam unable to be completed due to telephone visits          Assessment/Plan:    1. Postpartum depression patient opted not to change sertraline to Lexapro but wanted to add a mood stabilizer.  She is also struggling with sleep and not directly related to her baby.  Discussed risks and benefits.  - QUEtiapine (SEROQUEL) 25 MG tablet; Take 1 tablet (25 mg) by mouth At Bedtime  Dispense: 30 tablet; Refill: 3    No follow-ups on file.    1.  Continue the sertraline at the current dose  2.  Add quetiapine (Seroquel) 25 mg at bedtime.  Watch for drowsiness or next-day grogginess.  3.  Keep upcoming appointment with me in the next week  4.  Follow-up in 2 to 4 weeks if mental health is still not well controlled    Phone call duration:  15 minutes    Enid Alcazar DO

## 2020-07-23 NOTE — PATIENT INSTRUCTIONS
1.  Continue the sertraline at the current dose  2.  Add quetiapine (Seroquel) 25 mg at bedtime.  Watch for drowsiness or next-day grogginess.  3.  Keep upcoming appointment with me in the next week  4.  Follow-up in 2 to 4 weeks if mental health is still not well controlled

## 2020-07-24 ASSESSMENT — ANXIETY QUESTIONNAIRES: GAD7 TOTAL SCORE: 18

## 2020-07-28 ENCOUNTER — VIRTUAL VISIT (OUTPATIENT)
Dept: BEHAVIORAL HEALTH | Facility: CLINIC | Age: 20
End: 2020-07-28
Payer: COMMERCIAL

## 2020-07-28 DIAGNOSIS — F32.1 MAJOR DEPRESSIVE DISORDER, SINGLE EPISODE, MODERATE (H): Primary | ICD-10-CM

## 2020-07-28 DIAGNOSIS — F41.1 GAD (GENERALIZED ANXIETY DISORDER): ICD-10-CM

## 2020-07-28 PROCEDURE — 90791 PSYCH DIAGNOSTIC EVALUATION: CPT | Mod: TEL | Performed by: SOCIAL WORKER

## 2020-07-28 ASSESSMENT — ANXIETY QUESTIONNAIRES
5. BEING SO RESTLESS THAT IT IS HARD TO SIT STILL: MORE THAN HALF THE DAYS
1. FEELING NERVOUS, ANXIOUS, OR ON EDGE: NEARLY EVERY DAY
GAD7 TOTAL SCORE: 10
2. NOT BEING ABLE TO STOP OR CONTROL WORRYING: NOT AT ALL
6. BECOMING EASILY ANNOYED OR IRRITABLE: MORE THAN HALF THE DAYS
7. FEELING AFRAID AS IF SOMETHING AWFUL MIGHT HAPPEN: NOT AT ALL
3. WORRYING TOO MUCH ABOUT DIFFERENT THINGS: SEVERAL DAYS
IF YOU CHECKED OFF ANY PROBLEMS ON THIS QUESTIONNAIRE, HOW DIFFICULT HAVE THESE PROBLEMS MADE IT FOR YOU TO DO YOUR WORK, TAKE CARE OF THINGS AT HOME, OR GET ALONG WITH OTHER PEOPLE: VERY DIFFICULT

## 2020-07-28 ASSESSMENT — PATIENT HEALTH QUESTIONNAIRE - PHQ9
SUM OF ALL RESPONSES TO PHQ QUESTIONS 1-9: 14
5. POOR APPETITE OR OVEREATING: MORE THAN HALF THE DAYS
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
## 2020-07-29 ASSESSMENT — ANXIETY QUESTIONNAIRES: GAD7 TOTAL SCORE: 10

## 2020-08-12 ENCOUNTER — VIRTUAL VISIT (OUTPATIENT)
Dept: BEHAVIORAL HEALTH | Facility: CLINIC | Age: 20
End: 2020-08-12
Payer: COMMERCIAL

## 2020-08-12 DIAGNOSIS — F32.1 MAJOR DEPRESSIVE DISORDER, SINGLE EPISODE, MODERATE (H): Primary | ICD-10-CM

## 2020-08-12 DIAGNOSIS — F41.1 GAD (GENERALIZED ANXIETY DISORDER): ICD-10-CM

## 2020-08-12 PROCEDURE — 90834 PSYTX W PT 45 MINUTES: CPT | Mod: TEL | Performed by: SOCIAL WORKER

## 2020-08-12 ASSESSMENT — ANXIETY QUESTIONNAIRES
2. NOT BEING ABLE TO STOP OR CONTROL WORRYING: SEVERAL DAYS
3. WORRYING TOO MUCH ABOUT DIFFERENT THINGS: SEVERAL DAYS
6. BECOMING EASILY ANNOYED OR IRRITABLE: NEARLY EVERY DAY
5. BEING SO RESTLESS THAT IT IS HARD TO SIT STILL: NEARLY EVERY DAY
7. FEELING AFRAID AS IF SOMETHING AWFUL MIGHT HAPPEN: NOT AT ALL
IF YOU CHECKED OFF ANY PROBLEMS ON THIS QUESTIONNAIRE, HOW DIFFICULT HAVE THESE PROBLEMS MADE IT FOR YOU TO DO YOUR WORK, TAKE CARE OF THINGS AT HOME, OR GET ALONG WITH OTHER PEOPLE: SOMEWHAT DIFFICULT
1. FEELING NERVOUS, ANXIOUS, OR ON EDGE: NEARLY EVERY DAY
GAD7 TOTAL SCORE: 14

## 2020-08-12 ASSESSMENT — PATIENT HEALTH QUESTIONNAIRE - PHQ9
5. POOR APPETITE OR OVEREATING: NEARLY EVERY DAY
SUM OF ALL RESPONSES TO PHQ QUESTIONS 1-9: 14

## 2020-08-12 NOTE — LETTER
December 12, 2019      Jeanine Hernandez  [id number]  [place] [address]    Dear MsLesa,      I am happy to inform you that your recent cervical cancer screening test (PAP smear) was normal.      Preventative screenings such as this help to ensure your health for years to come. You should repeat a pap smear in 3 years, unless otherwise directed.      You will still need to return to the clinic every year for your annual exam and other preventive tests.     If you have additional questions regarding this result, please call our registered nurse, Nilsa at [phone].      Sincerely,      Dottie Lee MD/ria       yes

## 2020-08-12 NOTE — PROGRESS NOTES
"Jeanine Hernandez is a 20 year old female who is being evaluated via a telephone visit.      The patient has been notified of the following:     \"We have found that certain health care needs can be provided without the need for a face to face visit.  This service lets us provide the care you need with a short phone conversation.      I will have full access to your [place] medical record during this entire phone call.   I will be taking notes for your medical record.     Since this is like an office visit, we will bill your insurance company for this service.  Please check with your medical insurance if this type of telephone visit/virtual care is covered.  You may be responsible for the cost of this service if insurance coverage is denied.      There are potential benefits and risks of telephone visits (e.g. limits to patient confidentiality) that differ from in-person visits.?  Confidentiality still applies for telephone services, and nobody will record the visit.  It is important to be in a quiet, private space that is free of distractions (including cell phone or other devices) during the visit.??     If during the course of the call I believe a telephone visit is not appropriate, you will not be charged for this service\"    Consent has been obtained for this service by care team member: yes.    Start time: 835 am    End time: 920 am      August 12, 2020      Behavioral Health Clinician Progress Note    Patient Name: Jeanine Hernandez           Service Type: Phone Visit      Service Location:  in clinic        Session Length: 38 - 52      Attendees: Patient    Visit Activities (Refresh list every visit): [facility] Only    Diagnostic Assessment Date: 7/28/20  Treatment Plan Review Date: not completed  See Flowsheets for today's PHQ-9 and CONNER-7 results  Previous PHQ-9:   PHQ-9 SCORE 6/10/2020 7/23/2020 7/28/2020   PHQ-9 Total Score 20 14 14     Previous CONNER-7:   CONNER-7 SCORE 6/10/2020 7/23/2020 7/28/2020   Total Score 19 " 18 10       ALEXEY LEVEL:  No flowsheet data found.    DATA  Extended Session (60+ minutes): No  Interactive Complexity: No  Crisis: No    Treatment Objective(s) Addressed in This Session:  Target Behavior(s): disease management/lifestyle changes Decrease mood instability and irritability    Depressed Mood: Improve quantity and quality of night time sleep / decrease daytime naps  Improve diet, appetite, mindful eating, and / or meal planning  Improve concentration, focus, and mindfulness in daily activities   Feel less fidgety, restless or slow in daily activities / interpersonal interactions  Anxiety: will experience a reduction in anxiety, will develop more effective coping skills to manage anxiety symptoms, will develop healthy cognitive patterns and beliefs and will increase ability to function adaptively  Relationship Problems: will address relationship difficulties in a more adaptive manner  Functional Impairment: will effectively address problems that interfere with adaptive functioning  Mood Instability: will develop better understanding of triggers and coping strategies to stabilize mood    Current Stressors / Issues:  Patient reports that she has noticed increased irritability along with mood swings that occur 1-3 times per day.  She has noticed that she is just unable to sit still and is having difficulty concentrating and completing tasks.  She also reports that her father is diagnosed with bipolar along with her paternal uncle. . She also reports  her paternal grandparents possibly had  bipolar disorder. Patient experienced mood swings prior to being pregnant.  After pregnancy, she experienced increased depressed mood, negative thoughts and low interest and motivation.  Discussed talking to primary care provider about this further.  Patient is willing to follow through as recommended.  She will focus on attempting to get enough sleep, eating healthy and increasing positive activities.  Patient will return  as needed.      Progress on Treatment Objective(s) / Homework:  Worsening - PREPARATION (Decided to change - considering how); Intervened by negotiating a change plan and determining options / strategies for behavior change, identifying triggers, exploring social supports, and working towards setting a date to begin behavior change    Motivational Interviewing    MI Intervention: Expressed Empathy/Understanding, Supported Autonomy, Collaboration, Evocation, Permission to raise concern or advise, Open-ended questions, Reflections: simple and complex, Change talk (evoked) and Reframe     Change Talk Expressed by the Patient: Desire to change Reasons to change Need to change Committment to change    Provider Response to Change Talk: E - Evoked more info from patient about behavior change, A - Affirmed patient's thoughts, decisions, or attempts at behavior change, R - Reflected patient's change talk and S - Summarized patient's change talk statements      Care Plan review completed: No    Medication Review:  No changes to current psychiatric medication(s)    Medication Compliance:  Yes    Changes in Health Issues:   None reported    Chemical Use Review:   Substance Use: Chemical use reviewed, no active concerns identified      Tobacco Use: Yes, increase.  Patient reports frequency of use Approximately 10 cigarettes/day. Patient declined discussion at this time    Assessment: Current Emotional / Mental Status (status of significant symptoms):  Risk status (Self / Other harm or suicidal ideation)  Patient has had a history of suicidal ideation: occasional thoughts on and off  and self-injurious behavior: history  Patient denies current fears or concerns for personal safety.  Patient denies current or recent suicidal ideation or behaviors.  Patient denies current or recent homicidal ideation or behaviors.  Patient denies current or recent self injurious behavior or ideation.  Patient denies other safety concerns.  A safety  and risk management plan has not been developed at this time, however patient was encouraged to call [place] [phone] should there be a change in any of these risk factors.    Appearance:   phone visit   Eye Contact:   phone visit   Psychomotor Behavior: phone visit   Attitude:   Cooperative   Orientation:   All  Speech   Rate / Production: Normal    Volume:  Normal   Mood:    Anxious  Normal  Affect:    phone visit   Thought Content:  Clear   Thought Form:  Coherent  Logical   Insight:    Good     Diagnoses:  1. Major depressive disorder, single episode, moderate (H)    2. CONNER (generalized anxiety disorder)        Collateral Reports Completed:  Communicated with: PCP as needed    Plan: (Homework, other):  Patient was  encouraged to schedule a follow up appointment with the clinic [facility] as needed.  She was also given information about mental health symptoms and treatment options .  CD Recommendations: No indications of CD issues.   PHU Anderson (Mindy),[facility]

## 2020-08-13 ENCOUNTER — TELEPHONE (OUTPATIENT)
Dept: OBGYN | Facility: CLINIC | Age: 20
End: 2020-08-13

## 2020-08-13 DIAGNOSIS — Z30.9 CONTRACEPTIVE MANAGEMENT: Primary | ICD-10-CM

## 2020-08-13 DIAGNOSIS — Z30.42 ENCOUNTER FOR SURVEILLANCE OF INJECTABLE CONTRACEPTIVE: Primary | ICD-10-CM

## 2020-08-13 PROCEDURE — 99207 ZZC NO CHARGE NURSE ONLY: CPT | Performed by: OBSTETRICS & GYNECOLOGY

## 2020-08-13 RX ORDER — MEDROXYPROGESTERONE ACETATE 150 MG/ML
150 INJECTION, SUSPENSION INTRAMUSCULAR
Status: CANCELLED | OUTPATIENT
Start: 2020-08-13

## 2020-08-13 RX ORDER — MEDROXYPROGESTERONE ACETATE 150 MG/ML
150 INJECTION, SUSPENSION INTRAMUSCULAR
Qty: 1 ML | Refills: 0 | Status: CANCELLED | OUTPATIENT
Start: 2020-08-13

## 2020-08-13 RX ORDER — MEDROXYPROGESTERONE ACETATE 150 MG/ML
150 INJECTION, SUSPENSION INTRAMUSCULAR
Status: DISCONTINUED | OUTPATIENT
Start: 2020-08-13 | End: 2023-09-05

## 2020-08-13 ASSESSMENT — ANXIETY QUESTIONNAIRES: GAD7 TOTAL SCORE: 14

## 2020-08-26 ENCOUNTER — MYC REFILL (OUTPATIENT)
Dept: FAMILY MEDICINE | Facility: CLINIC | Age: 20
End: 2020-08-26

## 2020-08-26 RX ORDER — QUETIAPINE FUMARATE 25 MG/1
25 TABLET, FILM COATED ORAL AT BEDTIME
Qty: 30 TABLET | Refills: 3 | Status: CANCELLED | OUTPATIENT
Start: 2020-08-26

## 2020-08-28 RX ORDER — SERTRALINE HYDROCHLORIDE 100 MG/1
100 TABLET, FILM COATED ORAL DAILY
Qty: 30 TABLET | Refills: 1 | Status: SHIPPED | OUTPATIENT
Start: 2020-08-28 | End: 2020-10-28

## 2020-08-28 NOTE — TELEPHONE ENCOUNTER
"Routing refill request to provider for review/approval because:  Labs out of range:  PHQ-9      PHQ 7/23/2020 7/28/2020 8/12/2020   PHQ-9 Total Score 14 14 14   Q9: Thoughts of better off dead/self-harm past 2 weeks Several days Several days Not at all           Requested Prescriptions   Pending Prescriptions Disp Refills     sertraline (ZOLOFT) 100 MG tablet 30 tablet 1     Sig: Take 1 tablet (100 mg) by mouth daily       SSRIs Protocol Failed - 8/26/2020 10:49 PM        Failed - PHQ-9 score less than 5 in past 6 months     Please review last PHQ-9 score.           Passed - Medication is active on med list        Passed - Patient is age 18 or older        Passed - No active pregnancy on record        Passed - No positive pregnancy test in last 12 months        Passed - Recent (6 mo) or future (30 days) visit within the authorizing provider's specialty     Patient had office visit in the last 6 months or has a visit in the next 30 days with authorizing provider or within the authorizing provider's specialty.  See \"Patient Info\" tab in inbasket, or \"Choose Columns\" in Meds & Orders section of the refill encounter.                 "

## 2020-09-04 ENCOUNTER — VIRTUAL VISIT (OUTPATIENT)
Dept: FAMILY MEDICINE | Facility: CLINIC | Age: 20
End: 2020-09-04
Payer: COMMERCIAL

## 2020-09-04 DIAGNOSIS — F39 MOOD DISORDER (H): Primary | ICD-10-CM

## 2020-09-04 PROCEDURE — 99214 OFFICE O/P EST MOD 30 MIN: CPT | Mod: TEL | Performed by: INTERNAL MEDICINE

## 2020-09-04 ASSESSMENT — ANXIETY QUESTIONNAIRES
3. WORRYING TOO MUCH ABOUT DIFFERENT THINGS: SEVERAL DAYS
GAD7 TOTAL SCORE: 12
IF YOU CHECKED OFF ANY PROBLEMS ON THIS QUESTIONNAIRE, HOW DIFFICULT HAVE THESE PROBLEMS MADE IT FOR YOU TO DO YOUR WORK, TAKE CARE OF THINGS AT HOME, OR GET ALONG WITH OTHER PEOPLE: VERY DIFFICULT
5. BEING SO RESTLESS THAT IT IS HARD TO SIT STILL: NEARLY EVERY DAY
7. FEELING AFRAID AS IF SOMETHING AWFUL MIGHT HAPPEN: NOT AT ALL
1. FEELING NERVOUS, ANXIOUS, OR ON EDGE: NEARLY EVERY DAY
6. BECOMING EASILY ANNOYED OR IRRITABLE: NEARLY EVERY DAY
2. NOT BEING ABLE TO STOP OR CONTROL WORRYING: SEVERAL DAYS

## 2020-09-04 ASSESSMENT — PATIENT HEALTH QUESTIONNAIRE - PHQ9
5. POOR APPETITE OR OVEREATING: SEVERAL DAYS
SUM OF ALL RESPONSES TO PHQ QUESTIONS 1-9: 9

## 2020-09-04 NOTE — PATIENT INSTRUCTIONS
1. Decrease sertraline by taking 50 mg daily x 1 week, then stop  2. Continue quetiapine to 25 mg once daily - higher doses would worsen the grogginess.  3. See Psychiatry  4. If symptoms worsening, follow-up with Dr. Alcazar       Behavioral Health Resources for Psychiatry:    [facility]     [phone]    [facility]-works with community providers [phone]    [facility]- [facility]   [phone]    [facility]      [phone]   [facility]     [phone]   Psychiatrist staff available at all 15 [place] locations    [facility] ([facility])  [phone]    [facility]  [phone]    [facility]     [phone]      [facility] (Intake & Assessment)  [phone]    [facility]   [phone]   [facility]     [phone]   [place]

## 2020-09-04 NOTE — PROGRESS NOTES
"Jeanine Hernandez is a 20 year old female who is being evaluated via a billable telephone visit.      The patient has been notified of following:     \"This telephone visit will be conducted via a call between you and your physician/provider. We have found that certain health care needs can be provided without the need for a physical exam.  This service lets us provide the care you need with a short phone conversation.  If a prescription is necessary we can send it directly to your pharmacy.  If lab work is needed we can place an order for that and you can then stop by our lab to have the test done at a later time.    Telephone visits are billed at different rates depending on your insurance coverage. During this emergency period, for some insurers they may be billed the same as an in-person visit.  Please reach out to your insurance provider with any questions.    If during the course of the call the physician/provider feels a telephone visit is not appropriate, you will not be charged for this service.\"    Patient has given verbal consent for Telephone visit?  Yes    What phone number would you like to be contacted at? [phone]    How would you like to obtain your AVS? Katharine Cano     Jeanine Hernandez is a 20 year old female who presents via phone visit today for the following health issues:    HPI    Depression and Anxiety Follow-Up    How are you doing with your depression since your last visit? No change. States that Parris Burk and her talked about possibly having a behavioral issue (bipolar) vs depression.    How are you doing with your anxiety since your last visit?  No change    Are you having other symptoms that might be associated with depression or anxiety? No    Have you had a significant life event? No     Do you have any concerns with your use of alcohol or other drugs? No     She is also concerned she may have bipolar - pressured speech, irritable    Depression is improving, still " feeling angry/irrtiable/aggression    Sleep is better with seroquel and anxiety is improved as well    Feels groggy after taking seroquel        Social History     Tobacco Use     Smoking status: Never Smoker     Smokeless tobacco: Never Used   Substance Use Topics     Alcohol use: Never     Frequency: Never     Drug use: Never     PHQ 7/28/2020 8/12/2020 9/4/2020   PHQ-9 Total Score 14 14 9   Q9: Thoughts of better off dead/self-harm past 2 weeks Several days Not at all Not at all     CONNER-7 SCORE 7/28/2020 8/12/2020 9/4/2020   Total Score 10 14 12     Last PHQ-9 9/4/2020   1.  Little interest or pleasure in doing things 1   2.  Feeling down, depressed, or hopeless 1   3.  Trouble falling or staying asleep, or sleeping too much 0   4.  Feeling tired or having little energy 1   5.  Poor appetite or overeating 0   6.  Feeling bad about yourself 0   7.  Trouble concentrating 3   8.  Moving slowly or restless 3   Q9: Thoughts of better off dead/self-harm past 2 weeks 0   PHQ-9 Total Score 9   Difficulty at work, home, or with people Very difficult     CONNER-7  9/4/2020   1. Feeling nervous, anxious, or on edge 3   2. Not being able to stop or control worrying 1   3. Worrying too much about different things 1   4. Trouble relaxing 1   5. Being so restless that it is hard to sit still 3   6. Becoming easily annoyed or irritable 3   7. Feeling afraid, as if something awful might happen 0   CONNER-7 Total Score 12   If you checked any problems, how difficult have they made it for you to do your work, take care of things at home, or get along with other people? Very difficult       Suicide Assessment Five-step Evaluation and Treatment (SAFE-T)      How many servings of fruits and vegetables do you eat daily?  4 or more    On average, how many sweetened beverages do you drink each day (Examples: soda, juice, sweet tea, etc.  Do NOT count diet or artificially sweetened beverages)?   0    How many days per week do you exercise  enough to make your heart beat faster? 7    How many minutes a day do you exercise enough to make your heart beat faster? 30 - 60    How many days per week do you miss taking your medication? 0             Review of Systems   Constitutional, HEENT, cardiovascular, pulmonary, gi and gu systems are negative, except as otherwise noted.       Objective          Vitals:  No vitals were obtained today due to virtual visit.    healthy, alert and no distress  PSYCH: Alert and oriented times 3; coherent speech, normal   rate and volume, able to articulate logical thoughts, able   to abstract reason, no tangential thoughts, no hallucinations   or delusions  Her affect is normal  RESP: No cough, no audible wheezing, able to talk in full sentences  Remainder of exam unable to be completed due to telephone visits            Assessment/Plan:    Assessment & Plan     Postpartum depression    Mood disorder (H)  Mood disorder questionnaire score was 10 for question 1, 1 for question 2, serious question 3.  See scanned report.  Concern for bipolar.  Stop sertraline.  See psychiatry  - MENTAL HEALTH REFERRAL  - Adult; Psychiatry; Psychiatry; G: Collaborative Care Psychiatry Service/Bridge to Long-Term Psychiatry as indicated ([phone]); Yes; Diagnostic clarification; Yes; We will contact you to schedule the appointment o...           Patient Instructions   1. Decrease sertraline by taking 50 mg daily x 1 week, then stop  2. Continue quetiapine to 25 mg once daily  3. See Psychiatry  4. If symptoms worsening, follow-up with Dr. Alcazar       No follow-ups on file.    Enid Alcazar, DO  [facility]    Phone call duration:  20 minutes

## 2020-09-05 ASSESSMENT — ANXIETY QUESTIONNAIRES: GAD7 TOTAL SCORE: 12

## 2020-09-12 ENCOUNTER — VIRTUAL VISIT (OUTPATIENT)
Dept: FAMILY MEDICINE | Facility: OTHER | Age: 20
End: 2020-09-12
Payer: COMMERCIAL

## 2020-09-12 PROCEDURE — 99421 OL DIG E/M SVC 5-10 MIN: CPT | Performed by: FAMILY MEDICINE

## 2020-09-12 NOTE — PROGRESS NOTES
"Date: 2020 12:07:14  Clinician: Enid Toledo  Clinician NPI: [id number]  Patient: Jeanine Hernandez  Patient : 2000  Patient Address: [address] [place] [address]  Patient Phone: [phone]  Visit Protocol: URI  Patient Summary:  Jeanine is a 20 year old ( : 2000 ) female who initiated a [facility] Visit for COVID-19 (Coronavirus) evaluation and screening. When asked the question \"Please sign me up to receive news, health information and promotions from [facility].\", Jeanine responded \"No\".    Jeanine states her symptoms started 1-2 days ago.   Her symptoms consist of rhinitis, nausea, tooth pain, a cough, nasal congestion, a headache, malaise, and myalgia.   Symptom details     Nasal secretions: The color of her mucus is clear.    Cough: Jeanine coughs a few times an hour and her cough is not more bothersome at night. Phlegm does not come into her throat when she coughs. She does not believe her cough is caused by post-nasal drip.     Headache: She states the headache is moderate (4-6 on a 10 point pain scale).     Tooth pain: The tooth pain is caused by a cavity, recent dental work, or other mouth problems.      Jeanine denies having ear pain, anosmia, facial pain or pressure, fever, vomiting, wheezing, sore throat, ageusia, diarrhea, and chills. She also denies taking antibiotic medication in the past month and having recent facial or sinus surgery in the past 60 days. She is not experiencing dyspnea.   Precipitating events  She has not recently been exposed to someone with influenza. Jeanine has been in close contact with the following high risk individuals: children under the age of 5, adults 65 or older, and people with asthma, heart disease or diabetes.   Pertinent COVID-19 (Coronavirus) information  In the past 14 days, Jeanine has not worked in a congregate living setting.   She does not work or volunteer as healthcare worker or a  and does " not work or volunteer in a healthcare facility.   Jeanine also has not lived in a congregate living setting in the past 14 days. She lives with a healthcare worker.   Jeanine has not had a close contact with a laboratory-confirmed COVID-19 patient within 14 days of symptom onset.   Since December 2019, Jeanine and has not had upper respiratory infection or influenza-like illness. Has not been diagnosed with lab-confirmed COVID-19 test   Pertinent medical history  Jeanine does not get yeast infections when she takes antibiotics.   Jeanine needs a return to work/school note.   Weight: 135 lbs   Jeanine does not smoke or use smokeless tobacco.   She denies pregnancy and denies breastfeeding. She is currently menstruating.   Weight: 135 lbs    MEDICATIONS: Seroquel oral, Zoloft oral, prenatal vitamins with calcium-iron fumarate-folic acid oral, ALLERGIES: Iodine and Iodide Containing Products, Latex, Natural Rubber  Clinician Response:  Dear Jeanine,   Your symptoms show that you may have coronavirus (COVID-19). This illness can cause fever, cough and trouble breathing. Many people get a mild case and get better on their own. Some people can get very sick.  What should I do?  We would like to test you for this virus.   1. Please call [phone] to schedule your visit. Explain that [facility] have [facility]-19 test. Be ready to share your [facility] visit ID number.  The following will serve as your written order for this COVID Test, ordered by me, for the indication of suspected COVID [Z20.828]: The test will be ordered in [employer], our electronic health record, after you are scheduled. It will show as ordered and authorized by Clyde Grey MD.  Order: COVID-19 (Coronavirus) PCR for SYMPTOMATIC testing from [facility].      2. When it's time for your COVID test:  Stay at least 6 feet away from others. (If someone will drive you to your test, stay in the backseat, as far away from the  as you  "can.)   Cover your mouth and nose with a mask, tissue or washcloth.  Go straight to the testing site. Don't make any stops on the way there or back.      3.Starting now: Stay home and away from others (self-isolate) until:   You've had no fever---and no medicine that reduces fever---for one full day (24 hours). And...   Your other symptoms have gotten better. For example, your cough or breathing has improved. And...   At least 10 days have passed since your symptoms started.       During this time, don't leave the house except for testing or medical care.   Stay in your own room, even for meals. Use your own bathroom if you can.   Stay away from others in your home. No hugging, kissing or shaking hands. No visitors.  Don't go to work, school or anywhere else.    Clean \"high touch\" surfaces often (doorknobs, counters, handles, etc.). Use a household cleaning spray or wipes. You'll find a full list of  on the EPA website: www.epa.gov/pesticide-registration/list-n-disinfectants-use-against-sars-cov-2.   Cover your mouth and nose with a mask, tissue or washcloth to avoid spreading germs.  Wash your hands and face often. Use soap and water.  Caregivers in these groups are at risk for severe illness due to COVID-19:  o People 65 years and older  o People who live in a nursing home or long-term care facility  o People with chronic disease (lung, heart, cancer, diabetes, kidney, liver, immunologic)  o People who have a weakened immune system, including those who:   Are in cancer treatment  Take medicine that weakens the immune system, such as corticosteroids  Had a bone marrow or organ transplant  Have an immune deficiency  Have poorly controlled HIV or AIDS  Are obese (body mass index of 40 or higher)  Smoke regularly   o Caregivers should wear gloves while washing dishes, handling laundry and cleaning bedrooms and bathrooms.  o Use caution when washing and drying laundry: Don't shake dirty laundry, and use the " warmest water setting that you can.  o For more tips, go to www.cdc.gov/coronavirus/2019-ncov/downloads/10Things.pdf.    4.Sign up for [employer]. We know it's scary to hear that you might have COVID-19. We want to track your symptoms to make sure you're okay over the next 2 weeks. Please look for an email from [employer]---this is a free, online program that we'll use to keep in touch. To sign up, follow the link in the email. Learn more at [email or web address]  How can I take care of myself?   Get lots of rest. Drink extra fluids (unless a doctor has told you not to).   Take Tylenol (acetaminophen) for fever or pain. If you have liver or kidney problems, ask your family doctor if it's okay to take Tylenol.   Adults can take either:    650 mg (two 325 mg pills) every 4 to 6 hours, or...   1,000 mg (two 500 mg pills) every 8 hours as needed.    Note: Don't take more than 3,000 mg in one day. Acetaminophen is found in many medicines (both prescribed and over-the-counter medicines). Read all labels to be sure you don't take too much.   For children, check the Tylenol bottle for the right dose. The dose is based on the child's age or weight.    If you have other health problems (like cancer, heart failure, an organ transplant or severe kidney disease): Call your specialty clinic if you don't feel better in the next 2 days.       Know when to call 911. Emergency warning signs include:    Trouble breathing or shortness of breath Pain or pressure in the chest that doesn't go away Feeling confused like you haven't felt before, or not being able to wake up Bluish-colored lips or face.  Where can I get more information?    [employer] -- About COVID-19: [email or web address]   CDC -- What to Do If You're Sick: www.cdc.gov/coronavirus/2019-ncov/about/steps-when-sick.html   CDC -- Ending Home Isolation: www.cdc.gov/coronavirus/2019-ncov/hcp/disposition-in-home-patients.html   CDC -- Caring for  Someone: www.cdc.gov/coronavirus/2019-ncov/if-you-are-sick/care-for-someone.html   [facility] -- Interim Guidance for Hospital Discharge to Home: www.health.[facility].[place]/diseases/coronavirus/hcp/hospdischarge.pdf   [facility] clinical trials (COVID-19 research studies): clinicalaffairs.[facility]-clinical-trials    Below are the COVID-19 hotlines at the [place] Department of Health ([facility]). Interpreters are available.    For health questions: Call [phone] or [phone] (7 a.m. to 7 p.m.) For questions about schools and childcare: Call [phone] or 1-816.470.7822 (7 a.m. to 7 p.m.)    Diagnosis: Cough  Diagnosis ICD: R05

## 2020-09-17 ENCOUNTER — ALLIED HEALTH/NURSE VISIT (OUTPATIENT)
Dept: FAMILY MEDICINE | Facility: CLINIC | Age: 20
End: 2020-09-17
Payer: COMMERCIAL

## 2020-09-17 VITALS — BODY MASS INDEX: 23.06 KG/M2 | DIASTOLIC BLOOD PRESSURE: 70 MMHG | WEIGHT: 130.2 LBS | SYSTOLIC BLOOD PRESSURE: 92 MMHG

## 2020-09-17 DIAGNOSIS — Z30.9 CONTRACEPTIVE MANAGEMENT: Primary | ICD-10-CM

## 2020-09-17 LAB — HCG UR QL: NEGATIVE

## 2020-09-17 PROCEDURE — 96372 THER/PROPH/DIAG INJ SC/IM: CPT

## 2020-09-17 PROCEDURE — 81025 URINE PREGNANCY TEST: CPT | Performed by: NURSE PRACTITIONER

## 2020-09-17 PROCEDURE — 99207 ZZC NO CHARGE NURSE ONLY: CPT

## 2020-09-17 RX ADMIN — MEDROXYPROGESTERONE ACETATE 150 MG: 150 INJECTION, SUSPENSION INTRAMUSCULAR at 15:12

## 2020-09-17 NOTE — NURSING NOTE
Clinic Administered Medication Documentation      Depo Provera Documentation    URINE HCG: negative    Depo-Provera Standing Order inclusion/exclusion criteria reviewed.   Patient meets: inclusion criteria     BP: 92/70  LAST PAP/EXAM:   Lab Results   Component Value Date    PAP NIL 12/10/2019       Prior to injection, verified patient identity using patient's name and date of birth. Medication was administered. Please see MAR and medication order for additional information.     Was entire vial of medication used? Yes  Vial/Syringe: Single dose vial  Expiration Date:  3/2021    Patient instructed to remain in clinic for 15 minutes.  NEXT INJECTION DUE: 12/3/20 - 12/17/20    Chuck VALVERDE CMA

## 2020-09-22 ENCOUNTER — MEDICAL CORRESPONDENCE (OUTPATIENT)
Dept: HEALTH INFORMATION MANAGEMENT | Facility: CLINIC | Age: 20
End: 2020-09-22

## 2020-10-18 ENCOUNTER — VIRTUAL VISIT (OUTPATIENT)
Dept: FAMILY MEDICINE | Facility: OTHER | Age: 20
End: 2020-10-18
Payer: COMMERCIAL

## 2020-10-18 ENCOUNTER — AMBULATORY - HEALTHEAST (OUTPATIENT)
Dept: FAMILY MEDICINE | Facility: CLINIC | Age: 20
End: 2020-10-18

## 2020-10-18 DIAGNOSIS — Z20.822 SUSPECTED COVID-19 VIRUS INFECTION: ICD-10-CM

## 2020-10-18 PROCEDURE — 99421 OL DIG E/M SVC 5-10 MIN: CPT | Performed by: PHYSICIAN ASSISTANT

## 2020-10-18 NOTE — PROGRESS NOTES
"Date: 10/18/2020 12:45:22  Clinician: Raghavendra Whitney  Clinician NPI: [id number]  Patient: Jeanine Hernandez  Patient : 2000  Patient Address: [address] [place] [address]  Patient Phone: [phone]  Visit Protocol: URI  Patient Summary:  Jeanine is a 20 year old ( : 2000 ) female who initiated a [facility] Visit for COVID-19 (Coronavirus) evaluation and screening. When asked the question \"Please sign me up to receive news, health information and promotions from [facility].\", Jeanine responded \"No\".    Jeanine states her symptoms started gradually 7-9 days ago. After her symptoms started, they improved and then got worse again.   Her symptoms consist of a headache, a cough, nasal congestion, rhinitis, facial pain or pressure, myalgia, chills, malaise, a sore throat, and tooth pain. She is experiencing difficulty breathing due to nasal congestion but she is not short of breath.   Symptom details     Nasal secretions: The color of her mucus is white and clear.    Cough: Jeanine coughs a few times an hour and her cough is more bothersome at night. Phlegm does not come into her throat when she coughs. She believes her cough is caused by post-nasal drip.     Sore throat: Jeanine reports having mild throat pain (1-3 on a 10 point pain scale), does not have exudate on her tonsils, and can swallow liquids. She is not sure if the lymph nodes in her neck are enlarged. A rash has not appeared on the skin since the sore throat started.     Facial pain or pressure: The facial pain or pressure feels worse when bending over or leaning forward.     Headache: She states the headache is moderate (4-6 on a 10 point pain scale).     Tooth pain: The tooth pain is caused by a cavity, recent dental work, or other mouth problems.      Jeanine denies having ear pain, wheezing, fever, anosmia, vomiting, nausea, ageusia, and diarrhea. She also denies having a sinus infection within the past year and " having recent facial or sinus surgery in the past 60 days.   Precipitating events  Within the past week, Jeanine has not been exposed to someone with strep throat. She has not recently been exposed to someone with influenza. Jeanine has been in close contact with the following high risk individuals: children under the age of 5.   Pertinent COVID-19 (Coronavirus) information  In the past 14 days, Jeanien has not worked in a congregate living setting.   She does not work or volunteer as healthcare worker or a  and does not work or volunteer in a healthcare facility.   Jeanine also has not lived in a congregate living setting in the past 14 days. She lives with a healthcare worker.   Jeanine has not had a close contact with a laboratory-confirmed COVID-19 patient within 14 days of symptom onset.   Since December 2019, Jeanine and has not had upper respiratory infection or influenza-like illness. Has not been diagnosed with lab-confirmed COVID-19 test   Pertinent medical history  Jeanine has taken an antibiotic medication in the past month. Antibiotic details as reported by the patient (free text): Amoxicillen. tooth pulled. Finished 1 week ago   Jeanine does not get yeast infections when she takes antibiotics.   Jeanine needs a return to work/school note.   Weight: 130 lbs   Jeanine smokes or uses smokeless tobacco.   She denies pregnancy and denies breastfeeding. Her last period was over a month ago.   Additional information as reported by the patient (free text): 5 month old daughter is a person under investigation for covid   Weight: 130 lbs    MEDICATIONS: Zoloft oral, prenatal vitamins with calcium-iron fumarate-folic acid oral, ALLERGIES: Iodine and Iodide Containing Products, Latex, Natural Rubber  Clinician Response:  Dear Jeanine,   Your symptoms show that you may have coronavirus (COVID-19). This illness can cause fever, cough and trouble breathing. Many people get a mild  "case and get better on their own. Some people can get very sick.  What should I do?  We would like to test you for this virus.   1. Please call [phone] to schedule your visit. Explain that you were referred by [facility] to have a COVID-19 test. Be ready to share your [facility] visit ID number.  The following will serve as your written order for this COVID Test, ordered by me, for the indication of suspected COVID [Z20.828]: The test will be ordered in [employer], our electronic health record, after you are scheduled. It will show as ordered and authorized by Clyde Grey MD.  Order: COVID-19 (Coronavirus) PCR for SYMPTOMATIC testing from [facility].      2. When it's time for your COVID test:  Stay at least 6 feet away from others. (If someone will drive you to your test, stay in the backseat, as far away from the  as you can.)   Cover your mouth and nose with a mask, tissue or washcloth.  Go straight to the testing site. Don't make any stops on the way there or back.      3.Starting now: Stay home and away from others (self-isolate) until:   You've had no fever---and no medicine that reduces fever---for one full day (24 hours). And...   Your other symptoms have gotten better. For example, your cough or breathing has improved. And...   At least 10 days have passed since your symptoms started.       During this time, don't leave the house except for testing or medical care.   Stay in your own room, even for meals. Use your own bathroom if you can.   Stay away from others in your home. No hugging, kissing or shaking hands. No visitors.  Don't go to work, school or anywhere else.    Clean \"high touch\" surfaces often (doorknobs, counters, handles, etc.). Use a household cleaning spray or wipes. You'll find a full list of  on the EPA website: www.epa.gov/pesticide-registration/list-n-disinfectants-use-against-sars-cov-2.   Cover your mouth and nose with a mask, tissue or washcloth to avoid spreading germs.  Wash your " hands and face often. Use soap and water.  Caregivers in these groups are at risk for severe illness due to COVID-19:  o People 65 years and older  o People who live in a nursing home or long-term care facility  o People with chronic disease (lung, heart, cancer, diabetes, kidney, liver, immunologic)  o People who have a weakened immune system, including those who:   Are in cancer treatment  Take medicine that weakens the immune system, such as corticosteroids  Had a bone marrow or organ transplant  Have an immune deficiency  Have poorly controlled HIV or AIDS  Are obese (body mass index of 40 or higher)  Smoke regularly   o Caregivers should wear gloves while washing dishes, handling laundry and cleaning bedrooms and bathrooms.  o Use caution when washing and drying laundry: Don't shake dirty laundry, and use the warmest water setting that you can.  o For more tips, go to www.cdc.gov/coronavirus/2019-ncov/downloads/10Things.pdf.    4.Sign up for [employer]. We know it's scary to hear that you might have COVID-19. We want to track your symptoms to make sure you're okay over the next 2 weeks. Please look for an email from [employer]---this is a free, online program that we'll use to keep in touch. To sign up, follow the link in the email. Learn more at [email or web address]  How can I take care of myself?   Get lots of rest. Drink extra fluids (unless a doctor has told you not to).   Take Tylenol (acetaminophen) for fever or pain. If you have liver or kidney problems, ask your family doctor if it's okay to take Tylenol.   Adults can take either:    650 mg (two 325 mg pills) every 4 to 6 hours, or...   1,000 mg (two 500 mg pills) every 8 hours as needed.    Note: Don't take more than 3,000 mg in one day. Acetaminophen is found in many medicines (both prescribed and over-the-counter medicines). Read all labels to be sure you don't take too much.   For children, check the Tylenol bottle for the right  dose. The dose is based on the child's age or weight.    If you have other health problems (like cancer, heart failure, an organ transplant or severe kidney disease): Call your specialty clinic if you don't feel better in the next 2 days.       Know when to call 911. Emergency warning signs include:    Trouble breathing or shortness of breath Pain or pressure in the chest that doesn't go away Feeling confused like you haven't felt before, or not being able to wake up Bluish-colored lips or face.  Where can I get more information?   [facility] -- About COVID-19: [email or web address]   CDC -- What to Do If You're Sick: www.cdc.gov/coronavirus/2019-ncov/about/steps-when-sick.html   [facility] -- Ending Home Isolation: www.cdc.gov/coronavirus/2019-ncov/hcp/disposition-in-home-patients.html   [facility] -- Caring for Someone: www.cdc.gov/coronavirus/2019-ncov/if-you-are-sick/care-for-someone.html   [facility] -- Interim Guidance for Hospital Discharge to Home: www.[facility].[place]./diseases/coronavirus/hcp/hospdischarge.pdf   [facility] clinical trials (COVID-19 research studies): clinicalaffairs.[facility]-clinical-trials    Below are the COVID-19 hotlines at the [place] Department of [facility] ([facility]). Interpreters are available.    For health questions: Call 368-675-9861 or 1-968.643.4637 (7 a.m. to 7 p.m.) For questions about schools and childcare: Call 311-771-3176 or 1-826.694.2436 (7 a.m. to 7 p.m.)    Diagnosis: Contact with and (suspected) exposure to other viral communicable diseases  Diagnosis ICD: Z20.828  Additional Clinician Notes:  If your symptoms are not improving or worsen, please go to one of our urgent care locations for evaluations and possible lab work.

## 2020-10-19 ENCOUNTER — AMBULATORY - HEALTHEAST (OUTPATIENT)
Dept: FAMILY MEDICINE | Facility: CLINIC | Age: 20
End: 2020-10-19

## 2020-10-19 DIAGNOSIS — Z20.822 SUSPECTED COVID-19 VIRUS INFECTION: ICD-10-CM

## 2020-10-21 ENCOUNTER — COMMUNICATION - HEALTHEAST (OUTPATIENT)
Dept: SCHEDULING | Facility: CLINIC | Age: 20
End: 2020-10-21

## 2020-10-28 ENCOUNTER — VIRTUAL VISIT (OUTPATIENT)
Dept: PSYCHIATRY | Facility: CLINIC | Age: 20
End: 2020-10-28
Attending: INTERNAL MEDICINE
Payer: COMMERCIAL

## 2020-10-28 DIAGNOSIS — F31.81 BIPOLAR 2 DISORDER (H): Primary | ICD-10-CM

## 2020-10-28 PROCEDURE — 99214 OFFICE O/P EST MOD 30 MIN: CPT | Mod: GT | Performed by: NURSE PRACTITIONER

## 2020-10-28 RX ORDER — LAMOTRIGINE 25 MG/1
TABLET ORAL
Qty: 120 TABLET | Refills: 0 | Status: SHIPPED | OUTPATIENT
Start: 2020-10-28 | End: 2020-11-23 | Stop reason: DRUGHIGH

## 2020-10-28 NOTE — PROGRESS NOTES
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 Health promotion activities recommended and reviewed today. All questions addressed. Education and counseling completed regarding risks and benefits of medications and psychotherapy options.  Discussed risk of rash with the lamotrigine.  She will call if any side effects noted..     -Call the psychiatric nurse line with medication questions or concerns at [phone].  -MyChart may be used to communicate with your provider, but this is not intended to be used for emergencies.  -My Practice Policy was reviewed and signed: YES       COMMUNITY RESOURCES:    National Suicide Prevention Lifeline: 104.407.6606 (TTY: 645.542.1511). Call anytime for help.  (www.suicidepreventionlifeline.org)  National [place] on Mental Illness (www.ana.org): 350.765.1344 or 787-293-7840.   Mental Health Association (www.mentalhealth.org): 715.261.7148 or 397-069-1327.  [place] Crisis [place]: Text [place] to 669656  Suicide LifeLine Chat: suicidepreventionlifeline.org/chat    ADMINISTRATIVE BILLING:   Time spent with patient was 60 minutes and greater than 50% of time or 40 minutes was spent in counseling and coordination of care regarding above diagnoses and treatment plan.    Video Start Time: October 28, 2020 0732  Video End Time: October 28, 2020 8:17  AM    Patient Status:  Our psychiatry providers act as a specialty service for Primary Care Providers in the [facility] that seek to optimize medications for unstable patients.  Once medications have been optimized, our providers discharge the patient back to the referring Primary Care Provider for ongoing medication management.  This type of system allows our providers to serve a high volume of patients.   Patient will continue to be seen for ongoing consultation and stabilization.    Signed:   Sherley Hodgson MSN, APRN, FMHNP-[facility] ([facility])

## 2020-10-28 NOTE — Clinical Note
Thank you for the Psychiatry referral to the [facility] ([facility]). Our psychiatry providers act as a specialty service for Primary Care Providers in the [facility] who seek to optimize medications for unstable patients.  Once medications have been optimized, our providers discharge the patient back to the referring Primary Care Provider for ongoing medication management.  This type of system allows our providers to serve a high volume of patients.     Please see my Impression and Plan. I will follow up with her in 4 weeks and continue to work on her stability.  If you have any questions or concerns, please let me know.   - Sherley

## 2020-10-28 NOTE — PATIENT INSTRUCTIONS
TREATMENT PLAN:    1. MEDICATIONS:   -Start Lamotrigine titrated to 100 mg.  25 mg for 2 weeks then increase to 50 mg for 2 weeks and subsequently 100 mg thereafter.  Continue quetiapine at 25 mg at this time.     2. CONSULTS/REFERRALS:   -Continue individual/group therapy as planned.      3. LABS/PROCEDURES:   Will order thyroid test to rule out hypo or hyper thyroid playing into the clinical picture.  Metabolic Will need to be drawn if she continues on quetiapine long-term     4. FOLLOW UP: Schedule an appointment with me in 4 weeks or sooner as needed.   Follow up with primary care provider as planned or for acute medical concerns.  Call the psychiatric nurse line with medication questions or concerns at [phone].     5. PSYCHOEDUCATION:  -Medication side effects and alternatives reviewed. Health promotion activities recommended and reviewed today. All questions addressed. Education and counseling completed regarding risks and benefits of medications and psychotherapy options.  Discussed risk of rash with the lamotrigine.  She will call if any side effects noted..     -Call the psychiatric nurse line with medication questions or concerns at [phone].  -[employer] may be used to communicate with your provider, but this is not intended to be used for emergencies.  -My Practice Policy was reviewed and signed: YES         COMMUNITY RESOURCES:    National Suicide Prevention Lifeline: 593.799.2474 (TTY: 520.802.5932). Call anytime for help.  (www.suicidepreventionlifeline.org)  National [place] on Mental Illness (www.ana.org): 419.655.1732 or 874-961-3449.   Mental Health Association (www.mentalhealth.org): 798.855.2105 or 504-025-3308.  [place] Crisis Text [place]: Text [place] to 917990  Suicide LifeLine Chat: suicidepreePrimeCareline.org/chat

## 2020-10-29 DIAGNOSIS — F31.81 BIPOLAR 2 DISORDER (H): ICD-10-CM

## 2020-10-29 LAB — TSH SERPL DL<=0.005 MIU/L-ACNC: 0.75 MU/L (ref 0.4–4)

## 2020-10-29 PROCEDURE — 36415 COLL VENOUS BLD VENIPUNCTURE: CPT | Performed by: NURSE PRACTITIONER

## 2020-10-29 PROCEDURE — 84443 ASSAY THYROID STIM HORMONE: CPT | Performed by: NURSE PRACTITIONER

## 2020-11-23 ENCOUNTER — VIRTUAL VISIT (OUTPATIENT)
Dept: PSYCHIATRY | Facility: CLINIC | Age: 20
End: 2020-11-23
Payer: COMMERCIAL

## 2020-11-23 DIAGNOSIS — F33.1 MODERATE EPISODE OF RECURRENT MAJOR DEPRESSIVE DISORDER (H): Primary | ICD-10-CM

## 2020-11-23 DIAGNOSIS — F51.02 INSOMNIA DUE TO PSYCHOLOGICAL STRESS: ICD-10-CM

## 2020-11-23 PROBLEM — O13.9 GESTATIONAL HYPERTENSION: Status: RESOLVED | Noted: 2020-05-14 | Resolved: 2020-11-23

## 2020-11-23 PROBLEM — Z34.00 SUPERVISION OF NORMAL FIRST PREGNANCY: Status: RESOLVED | Noted: 2020-05-14 | Resolved: 2020-11-23

## 2020-11-23 PROBLEM — Z34.00 PRENATAL CARE, FIRST PREGNANCY: Status: RESOLVED | Noted: 2019-12-09 | Resolved: 2020-11-23

## 2020-11-23 PROCEDURE — 99214 OFFICE O/P EST MOD 30 MIN: CPT | Mod: GT | Performed by: NURSE PRACTITIONER

## 2020-11-23 RX ORDER — LAMOTRIGINE 100 MG/1
100 TABLET ORAL DAILY
Qty: 30 TABLET | Refills: 1 | Status: SHIPPED | OUTPATIENT
Start: 2020-11-23 | End: 2021-01-25

## 2020-11-23 RX ORDER — TRAZODONE HYDROCHLORIDE 50 MG/1
50 TABLET, FILM COATED ORAL AT BEDTIME
Qty: 30 TABLET | Refills: 1 | Status: SHIPPED | OUTPATIENT
Start: 2020-11-23 | End: 2021-01-25

## 2020-11-23 ASSESSMENT — ANXIETY QUESTIONNAIRES
2. NOT BEING ABLE TO STOP OR CONTROL WORRYING: NOT AT ALL
6. BECOMING EASILY ANNOYED OR IRRITABLE: NEARLY EVERY DAY
GAD7 TOTAL SCORE: 6
3. WORRYING TOO MUCH ABOUT DIFFERENT THINGS: NOT AT ALL
7. FEELING AFRAID AS IF SOMETHING AWFUL MIGHT HAPPEN: NOT AT ALL
5. BEING SO RESTLESS THAT IT IS HARD TO SIT STILL: SEVERAL DAYS
IF YOU CHECKED OFF ANY PROBLEMS ON THIS QUESTIONNAIRE, HOW DIFFICULT HAVE THESE PROBLEMS MADE IT FOR YOU TO DO YOUR WORK, TAKE CARE OF THINGS AT HOME, OR GET ALONG WITH OTHER PEOPLE: VERY DIFFICULT
1. FEELING NERVOUS, ANXIOUS, OR ON EDGE: SEVERAL DAYS

## 2020-11-23 ASSESSMENT — PATIENT HEALTH QUESTIONNAIRE - PHQ9
SUM OF ALL RESPONSES TO PHQ QUESTIONS 1-9: 8
5. POOR APPETITE OR OVEREATING: SEVERAL DAYS
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
## 2020-11-24 ASSESSMENT — ANXIETY QUESTIONNAIRES: GAD7 TOTAL SCORE: 6

## 2020-12-08 ENCOUNTER — ALLIED HEALTH/NURSE VISIT (OUTPATIENT)
Dept: FAMILY MEDICINE | Facility: CLINIC | Age: 20
End: 2020-12-08
Payer: COMMERCIAL

## 2020-12-08 DIAGNOSIS — Z30.9 CONTRACEPTIVE MANAGEMENT: Primary | ICD-10-CM

## 2020-12-08 PROCEDURE — 99207 PR NO CHARGE NURSE ONLY: CPT

## 2020-12-08 PROCEDURE — 96372 THER/PROPH/DIAG INJ SC/IM: CPT

## 2020-12-08 RX ADMIN — MEDROXYPROGESTERONE ACETATE 150 MG: 150 INJECTION, SUSPENSION INTRAMUSCULAR at 08:42

## 2020-12-08 NOTE — NURSING NOTE
MED: Medroxyprogesterone 150 mg  RTE: IM  SITE: [facility]  MFR: [employer]  LOT #: [id number]  EXP:5/2022  NDC #:0211-7763-75   LAST PAP:   Lab Results   Component Value Date    PAP NIL 12/10/2019     URINE HCG:not indicated  NXT INJ DUE: Feb 23 - March 9.2021

## 2020-12-28 ENCOUNTER — CARE COORDINATION (OUTPATIENT)
Dept: PSYCHIATRY | Facility: CLINIC | Age: 20
End: 2020-12-28

## 2021-01-04 ENCOUNTER — HEALTH MAINTENANCE LETTER (OUTPATIENT)
Age: 21
End: 2021-01-04

## 2021-01-19 ENCOUNTER — TELEPHONE (OUTPATIENT)
Dept: INTERNAL MEDICINE | Facility: CLINIC | Age: 21
End: 2021-01-19

## 2021-01-19 NOTE — TELEPHONE ENCOUNTER
Left voice mail  Regarding due for follow up with Sherley Hodgson sometime next week .    ABRAM Avila LPN

## 2021-01-25 ENCOUNTER — TELEPHONE (OUTPATIENT)
Dept: PSYCHIATRY | Facility: CLINIC | Age: 21
End: 2021-01-25

## 2021-01-25 DIAGNOSIS — F51.02 INSOMNIA DUE TO PSYCHOLOGICAL STRESS: ICD-10-CM

## 2021-01-25 DIAGNOSIS — F33.1 MODERATE EPISODE OF RECURRENT MAJOR DEPRESSIVE DISORDER (H): ICD-10-CM

## 2021-01-25 NOTE — TELEPHONE ENCOUNTER
"Requested Prescriptions   Pending Prescriptions Disp Refills     traZODone (DESYREL) 50 MG tablet [Pharmacy Med Name: TRAZODONE HCL 50MG TABS] 30 tablet 1     Sig: TAKE ONE TABLET BY MOUTH EVERY NIGHT AT BEDTIME       Serotonin Modulators Passed - 1/25/2021 12:18 PM        Passed - Recent (12 mo) or future (30 days) visit within the authorizing provider's specialty     Patient has had an office visit with the authorizing provider or a provider within the authorizing providers department within the previous 12 mos or has a future within next 30 days. See \"Patient Info\" tab in inbasket, or \"Choose Columns\" in Meds & Orders section of the refill encounter.              Passed - Medication is active on med list        Passed - Patient is age 18 or older        Passed - No active pregnancy on record        Passed - No positive pregnancy test in past 12 months           lamoTRIgine (LAMICTAL) 100 MG tablet [Pharmacy Med Name: LAMOTRIGINE 100MG TABS] 30 tablet 1     Sig: TAKE ONE TABLET BY MOUTH ONCE DAILY       Anti-Seizure Meds Protocol  Failed - 1/25/2021 12:18 PM        Failed - Review Authorizing provider's last note.      Refer to last progress notes: confirm request is for original authorizing provider (cannot be through other providers).          Failed - Normal CBC on file in past 26 months     Recent Labs   Lab Test 05/18/20  0520 05/14/20  1555   WBC  --  21.6*   RBC  --  4.11   HGB 7.7* 12.3   HCT  --  37.0   PLT  --  187                 Passed - Recent (12 mo) or future (30 days) visit within the authorizing provider's specialty     Patient has had an office visit with the authorizing provider or a provider within the authorizing providers department within the previous 12 mos or has a future within next 30 days. See \"Patient Info\" tab in inbasket, or \"Choose Columns\" in Meds & Orders section of the refill encounter.              Passed - Normal ALT or AST on file in past 26 months     Recent Labs   Lab Test " 05/15/20  1500   ALT 8     Recent Labs   Lab Test 05/15/20  1500   AST 17             Passed - Normal platelet count on file in past 26 months     Recent Labs   Lab Test 05/14/20  1555                  Passed - Medication is active on med list        Passed - No active pregnancy on record        Passed - No positive pregnancy test in last 12 months

## 2021-01-25 NOTE — TELEPHONE ENCOUNTER
Patient was scheduled for a medication follow-up appointment with this provider.  However when the LPN called that she was noted to be in [place].  License for prescribing is [place] only.  Rescheduled till February 1 at 215.  In the interim patient is experiencing increased irritability and agitation that is affecting her relationship with her .  She is tolerating the lamotrigine.  Sleeping waking up early around 5 AM.  Will augment with Abilify 1 mg for 3 days titrated to 2 mg.  Medications were sent to the [facility] in [place] as she needed refills of the lamotrigine and trazodone also.  She will call to have them transferred to the [facility] near her in [place].  Risks side effects and benefits were discussed and she verbalized understanding

## 2021-01-27 RX ORDER — LAMOTRIGINE 100 MG/1
TABLET ORAL
Qty: 30 TABLET | Refills: 1 | OUTPATIENT
Start: 2021-01-27

## 2021-01-27 RX ORDER — TRAZODONE HYDROCHLORIDE 50 MG/1
TABLET, FILM COATED ORAL
Qty: 30 TABLET | Refills: 1 | OUTPATIENT
Start: 2021-01-27

## 2021-01-27 NOTE — TELEPHONE ENCOUNTER
This was refilled a few days ago by another provider to a different pharmacy.  Message left for patient to  Check [facility] pharmacy. Eva WEATHERS RN

## 2021-02-01 ENCOUNTER — VIRTUAL VISIT (OUTPATIENT)
Dept: PSYCHOLOGY | Facility: CLINIC | Age: 21
End: 2021-02-01
Payer: COMMERCIAL

## 2021-02-01 DIAGNOSIS — F51.02 INSOMNIA DUE TO PSYCHOLOGICAL STRESS: Primary | ICD-10-CM

## 2021-02-01 DIAGNOSIS — F33.1 MODERATE EPISODE OF RECURRENT MAJOR DEPRESSIVE DISORDER (H): ICD-10-CM

## 2021-02-01 DIAGNOSIS — Z79.899 HIGH RISK MEDICATION USE: ICD-10-CM

## 2021-02-01 PROCEDURE — 99213 OFFICE O/P EST LOW 20 MIN: CPT | Mod: GT | Performed by: NURSE PRACTITIONER

## 2021-02-01 ASSESSMENT — PATIENT HEALTH QUESTIONNAIRE - PHQ9
SUM OF ALL RESPONSES TO PHQ QUESTIONS 1-9: 4
SUM OF ALL RESPONSES TO PHQ QUESTIONS 1-9: 4
10. IF YOU CHECKED OFF ANY PROBLEMS, HOW DIFFICULT HAVE THESE PROBLEMS MADE IT FOR YOU TO DO YOUR WORK, TAKE CARE OF THINGS AT HOME, OR GET ALONG WITH OTHER PEOPLE: SOMEWHAT DIFFICULT

## 2021-02-01 ASSESSMENT — ANXIETY QUESTIONNAIRES
3. WORRYING TOO MUCH ABOUT DIFFERENT THINGS: NOT AT ALL
1. FEELING NERVOUS, ANXIOUS, OR ON EDGE: NOT AT ALL
5. BEING SO RESTLESS THAT IT IS HARD TO SIT STILL: SEVERAL DAYS
2. NOT BEING ABLE TO STOP OR CONTROL WORRYING: NOT AT ALL
GAD7 TOTAL SCORE: 4
7. FEELING AFRAID AS IF SOMETHING AWFUL MIGHT HAPPEN: NOT AT ALL
4. TROUBLE RELAXING: SEVERAL DAYS
6. BECOMING EASILY ANNOYED OR IRRITABLE: MORE THAN HALF THE DAYS
GAD7 TOTAL SCORE: 4
7. FEELING AFRAID AS IF SOMETHING AWFUL MIGHT HAPPEN: NOT AT ALL

## 2021-02-01 NOTE — PATIENT INSTRUCTIONS
1. MEDICATIONS:   Continue lamotrigine  to 100 mg, trazodone 50 mg nightly, Abilify 2 mg daily    2. CONTINGENCY PLAN:  Consider Further increasing the Abilify to 5 mg if tolerating 2 mg and partially effective  3. CONSULTS/REFERRALS:   Continue therapy with Parris    4. LABS/PROCEDURES:   Metabolic labs due Today, Labs order placed to be drawn at a [facility] location in the next week.  , Please fast after midnight prior to morning of the lab draw. Take morning medications with water or plain coffee/tea. and Have labs completed at any [facility] lab. Need to call your clinic ahead of time to schedule an appointment for lab due to limited hours and no walk-ins due to Covid-19 restrictions/changes.    5. FOLLOW UP: Schedule an appointment with me in five weeks or sooner as needed. Call [phone] ([phone]) to schedule  Follow up with primary care provider as planned or for acute medical concerns.  Call the psychiatric nurse line with medication questions or concerns at [phone].    6. PSYCHOEDUCATION:  Medication side effects and alternatives reviewed. Health promotion activities recommended and reviewed today. All questions addressed. Education and counseling completed regarding risks and benefits of medications and psychotherapy options.  Call the psychiatric nurse line with medication questions or concerns at [phone].  MyChart may be used to communicate with your provider, but this is not intended to be used for emergencies.  Lamotrigine: Discussed risk of rash and instructed to stop taking the drug at the first sign of a rash regardless of its type or severity, and contact the nurse line at [phone]    COMMUNITY RESOURCES:    National Suicide Prevention Lifeline: 245.298.8114 (TTY: 974.528.8586). Call anytime for help.  (www.suicidepreventionlifeline.org)  National [employer]ness (www.ana.org): 143.461.8121 or 038-287-0684.   Mental Health Association (www.mentalhealth.org):  065-650-5969 or 253-685-4867.  [place] Crisis Text Line: Text [place] to 402750  Suicide LifeLine Chat: suicidepreventionlifeline.org/chat
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
## 2021-02-02 ASSESSMENT — PATIENT HEALTH QUESTIONNAIRE - PHQ9: SUM OF ALL RESPONSES TO PHQ QUESTIONS 1-9: 4

## 2021-02-02 ASSESSMENT — ANXIETY QUESTIONNAIRES: GAD7 TOTAL SCORE: 4

## 2021-04-23 NOTE — PROGRESS NOTES
Category 1 tracing at present.  Lip/1+/ lots of molding.  Dr Zepeda on unit.  Frequent position changes.  NP is aware we would like at delivery.  Discussed with pt pushing and post care.     Patient ID: Eduardo is a 66 year old male.    Chief Complaint   Patient presents with   • Follow-up     and med recheck      HPI   Patient is here for follow-up.  He feels well.  He had received both dosages of vaccine of COVID-19.  He is remodeling his house.    Past Medical History:   Diagnosis Date   • Diabetes mellitus (CMS/HCC)    • Essential (primary) hypertension    • High cholesterol    • Prostate cancer (CMS/HCC)       has Benign essential hypertension; Pure hypercholesterolemia; Type 2 diabetes mellitus without complication (CMS/HCC); and Primary osteoarthritis involving multiple joints on their problem list.  Past Surgical History:   Procedure Laterality Date   • Appendectomy     • Prostate surgery          Family History   Problem Relation Age of Onset   • Patient is unaware of any medical problems Mother    • Patient is unaware of any medical problems Father      Social History     Tobacco Use   • Smoking status: Current Every Day Smoker     Packs/day: 0.00     Types: Cigarettes   • Smokeless tobacco: Never Used   • Tobacco comment: 3 cigarettes a day   Substance Use Topics   • Alcohol use: Yes   • Drug use: No        Current Outpatient Medications   Medication Sig Dispense Refill   • atorvastatin (LIPITOR) 10 MG tablet Take 1 tablet by mouth at bedtime. 90 tablet 0   • atenolol (TENORMIN) 50 MG tablet TAKE ONE TABLET BY MOUTH ONE TIME DAILY  90 tablet 0   • potassium chloride (KLOR-CON M) 10 MEQ ester ER tablet Take 1 tablet by mouth daily. 90 tablet 0   • glipiZIDE (GLUCOTROL ER) 10 MG 24 hr tablet Take 1 tablet by mouth 2 times daily. 180 tablet 0   • lisinopril-hydroCHLOROthiazide (ZESTORETIC) 20-12.5 MG per tablet Take 1 tablet by mouth 2 times daily. 180 tablet 0   • metFORMIN (GLUCOPHAGE) 500 MG tablet TAKE 2 TABLET BY MOUTH IN THE MORNING WITH BREAKFAST AND 1 TABLET IN THE EVENNING WITH DINNER. 270 tablet 0   • albuterol 108 (90 Base) MCG/ACT inhaler Inhale 2 puffs into the lungs 2 times daily. 1  Inhaler 0   • metFORMIN (GLUCOPHAGE) 500 MG tablet Take 1 tablet by mouth daily (with breakfast). Take one tablet three times daily 270 tablet 0   • meloxicam (MOBIC) 7.5 MG tablet Take 1 tablet by mouth daily. 90 tablet 0     No current facility-administered medications for this visit.      has No Known Allergies.    Review of Systems:  Review of Systems   Constitutional: Negative.    HENT: Negative.    Eyes: Negative.    Respiratory: Negative.    Cardiovascular: Negative.    Gastrointestinal: Negative.    Endocrine: Negative.    Musculoskeletal: Negative.    Skin: Negative.    Allergic/Immunologic: Negative.    Neurological: Negative.    Hematological: Negative.    Psychiatric/Behavioral: Negative.        Physical:  Visit Vitals  /82 (BP Location: RUE - Right upper extremity, Patient Position: Sitting, Cuff Size: Regular)   Pulse 65   Temp 96.5 °F (35.8 °C) (Tympanic)   Resp 16   Ht 5' 9\" (1.753 m)   Wt 98.9 kg (218 lb)   SpO2 99%   BMI 32.19 kg/m²     Physical Exam   Constitutional: He is oriented to person, place, and time. He appears well-developed and well-nourished.   HENT:   Head: Normocephalic.   Mouth/Throat: Oropharynx is clear and moist.   Eyes: Pupils are equal, round, and reactive to light.   Cardiovascular: Normal rate and regular rhythm.   Pulmonary/Chest: Effort normal and breath sounds normal.   Abdominal: Soft. Bowel sounds are normal.   Musculoskeletal:         General: Normal range of motion.      Cervical back: Normal range of motion and neck supple.   Neurological: He is alert and oriented to person, place, and time. He has normal reflexes.   Skin: Skin is warm.   Psychiatric: He has a normal mood and affect. Judgment normal.   Nursing note and vitals reviewed.      ASSESSMENT  Problem List Items Addressed This Visit        Circulatory    Benign essential hypertension - Primary       Endocrine    Pure hypercholesterolemia    Type 2 diabetes mellitus without complication (CMS/HCC)           PLAN   We will continue present management.  I have reviewed his labs from last time.  His blood pressure is stable.  His hemoglobin A1c was 7.5.  Advised him to lose some weight.  His cholesterol was good.  I will see him back in 3 months.

## 2021-08-15 ENCOUNTER — HEALTH MAINTENANCE LETTER (OUTPATIENT)
Age: 21
End: 2021-08-15

## 2021-10-10 ENCOUNTER — HEALTH MAINTENANCE LETTER (OUTPATIENT)
Age: 21
End: 2021-10-10

## 2021-10-18 ENCOUNTER — LAB (OUTPATIENT)
Dept: FAMILY MEDICINE | Facility: CLINIC | Age: 21
End: 2021-10-18
Attending: PHYSICIAN ASSISTANT

## 2021-10-18 ENCOUNTER — E-VISIT (OUTPATIENT)
Dept: URGENT CARE | Facility: CLINIC | Age: 21
End: 2021-10-18
Payer: COMMERCIAL

## 2021-10-18 DIAGNOSIS — Z20.822 SUSPECTED COVID-19 VIRUS INFECTION: ICD-10-CM

## 2021-10-18 DIAGNOSIS — Z20.822 SUSPECTED COVID-19 VIRUS INFECTION: Primary | ICD-10-CM

## 2021-10-18 PROCEDURE — U0003 INFECTIOUS AGENT DETECTION BY NUCLEIC ACID (DNA OR RNA); SEVERE ACUTE RESPIRATORY SYNDROME CORONAVIRUS 2 (SARS-COV-2) (CORONAVIRUS DISEASE [COVID-19]), AMPLIFIED PROBE TECHNIQUE, MAKING USE OF HIGH THROUGHPUT TECHNOLOGIES AS DESCRIBED BY CMS-2020-01-R: HCPCS | Mod: 90

## 2021-10-18 PROCEDURE — U0005 INFEC AGEN DETEC AMPLI PROBE: HCPCS | Mod: 90

## 2021-10-18 PROCEDURE — 99421 OL DIG E/M SVC 5-10 MIN: CPT | Performed by: PHYSICIAN ASSISTANT

## 2021-10-18 PROCEDURE — 99000 SPECIMEN HANDLING OFFICE-LAB: CPT

## 2021-10-18 NOTE — PATIENT INSTRUCTIONS
Dear Jeanine Hernandez,    Your symptoms show that you may have coronavirus (COVID-19). This illness can cause fever, cough and trouble breathing. Many people get a mild case and get better on their own. Some people can get very sick.    Will I be tested for COVID-19?  We would like to test you for Covid-19 virus. I have placed orders for this test.     To schedule: go to your [employer] home page and scroll down to the section that says  You have an appointment that needs to be scheduled  and click the large green button that says  Schedule Now  and follow the steps to find the next available openings.    If you are unable to complete these [employer] scheduling steps, please call [phone] to schedule your testing.     Return to work/school/ guidance:  Please let your workplace manager and staffing office know when your quarantine ends     We can t give you an exact date as it depends on the above. You can calculate this on your own or work with your manager/staffing office to calculate this. (For example if you were exposed on 10/4, you would have to quarantine for 14 full days. That would be through 10/18. You could return on 10/19.)      If you receive a positive COVID-19 test result, follow the guidance of the those who are giving you the results. Usually the return to work is 10 (or in some cases 20 days from symptom onset.) If you work at [facility], you must also be cleared by Employee Occupational Health and Safety to return to work.        If you receive a negative COVID-19 test result and did not have a high risk exposure to someone with a known positive COVID-19 test, you can return to work once you're free of fever for 24 hours without fever-reducing medication and your symptoms are improving or resolved.      If you receive a negative COVID-19 test and If you had a high risk exposure to someone who has tested positive for COVID-19 then you can return to work 14 days after your last  contact with the positive individual    Note: If you have ongoing exposure to the covid positive person, this quarantine period may be more than 14 days. (For example, if you are continued to be exposed to your child who tested positive and cannot isolate from them, then the quarantine of 7-14 days can't start until your child is no longer contagious. This is typically 10 days from onset of the child's symptoms. So the total duration may be 17-24 days in this case.)    Sign up for [employer].   We know it's scary to hear that you might have COVID-19. We want to track your symptoms to make sure you're okay over the next 2 weeks. Please look for an email from [employer]--this is a free, online program that we'll use to keep in touch. To sign up, follow the link in the email you will receive. Learn more at [email or web address]    How can I take care of myself?    Get lots of rest. Drink extra fluids (unless a doctor has told you not to)    Take Tylenol (acetaminophen) or ibuprofen for fever or pain. If you have liver or kidney problems, ask your family doctor if it's okay to take Tylenol o ibuprofen    If you have other health problems (like cancer, heart failure, an organ transplant or severe kidney disease): Call your specialty clinic if you don't feel better in the next 2 days.    Know when to call 911. Emergency warning signs include:  o Trouble breathing or shortness of breath  o Pain or pressure in the chest that doesn't go away  o Feeling confused like you haven't felt before, or not being able to wake up  o Bluish-colored lips or face    Where can I get more information?  [facility] - About COVID-19:   [email or web address]    CDC - What to Do If You're Sick:   www.cdc.gov/coronavirus/2019-ncov/about/steps-when-sick.html    October 18, 2021  RE:  Jeanine Hernandez                                                                                                                   [id number]  [facility] [facility] [address]      To whom it may concern:    I evaluated Jeanine Hernandez on October 18, 2021. Jeanine Hernandez should be excused from work/school.     They should let their workplace manager and staffing office know when their quarantine ends.    We can not give an exact date as it depends on the information below. They can calculate this on their own or work with their manager/staffing office to calculate this. (For example if they were exposed on 10/04, they would have to quarantine for 14 full days. That would be through 10/18. They could return on 10/19.)    Quarantine Guidelines:      If patient receives a positive COVID-19 test result, they should follow the guidance of those who are giving the results. Usually the return to work is 10 (or in some cases 20 days from symptom onset.) If they work at [employer], they must be cleared by Employee Occupational Health and Safety to return to work.        If patient receives a negative COVID-19 test result and did not have a high risk exposure to someone with a known positive COVID-19 test, they can return to work once they're free of fever for 24 hours without fever-reducing medication and their symptoms are improving or resolved.      If patient receives a negative COVID-19 test and if they had a high risk exposure to someone who has tested positive for COVID-19 then they can return to work 14 days after their last contact with the positive individual    Note: If there is ongoing exposure to the covid positive person, this quarantine period may be longer than 14 days. (For example, if they are continually exposed to their child, who tested positive and cannot isolate from them, then the quarantine of 7-14 days can't start until their child is no longer contagious. This is typically 10 days from onset to the child's symptoms. So the total duration may be 17-24 days in this case.)     Sincerely,  Nilsa Domingo,  USMAN

## 2021-10-20 LAB — SARS-COV-2 RNA RESP QL NAA+PROBE: NOT DETECTED

## 2021-11-05 ENCOUNTER — E-VISIT (OUTPATIENT)
Dept: URGENT CARE | Facility: CLINIC | Age: 21
End: 2021-11-05
Payer: COMMERCIAL

## 2021-11-05 DIAGNOSIS — Z20.822 SUSPECTED COVID-19 VIRUS INFECTION: Primary | ICD-10-CM

## 2021-11-05 PROCEDURE — 99421 OL DIG E/M SVC 5-10 MIN: CPT

## 2021-11-05 NOTE — PATIENT INSTRUCTIONS
Dear Jeanine Hernandez,    Your symptoms show that you may have coronavirus (COVID-19). This illness can cause fever, cough and trouble breathing. Many people get a mild case and get better on their own. Some people can get very sick.    Will I be tested for COVID-19?  We would like to test you for Covid-19 virus. I have placed orders for this test.     To schedule: go to your [employer] home page and scroll down to the section that says  You have an appointment that needs to be scheduled  and click the large green button that says  Schedule Now  and follow the steps to find the next available openings.    If you are unable to complete these [employer] scheduling steps, please call [phone] to schedule your testing.     Return to work/school/ guidance:  Please let your workplace manager and staffing office know when your quarantine ends     We can t give you an exact date as it depends on the above. You can calculate this on your own or work with your manager/staffing office to calculate this. (For example if you were exposed on 10/4, you would have to quarantine for 14 full days. That would be through 10/18. You could return on 10/19.)      If you receive a positive COVID-19 test result, follow the guidance of the those who are giving you the results. Usually the return to work is 10 (or in some cases 20 days from symptom onset.) If you work at [facility], you must also be cleared by Employee Occupational Health and Safety to return to work.        If you receive a negative COVID-19 test result and did not have a high risk exposure to someone with a known positive COVID-19 test, you can return to work once you're free of fever for 24 hours without fever-reducing medication and your symptoms are improving or resolved.      If you receive a negative COVID-19 test and If you had a high risk exposure to someone who has tested positive for COVID-19 then you can return to work 14 days after your last  contact with the positive individual    Note: If you have ongoing exposure to the covid positive person, this quarantine period may be more than 14 days. (For example, if you are continued to be exposed to your child who tested positive and cannot isolate from them, then the quarantine of 7-14 days can't start until your child is no longer contagious. This is typically 10 days from onset of the child's symptoms. So the total duration may be 17-24 days in this case.)    Sign up for [employer].   We know it's scary to hear that you might have COVID-19. We want to track your symptoms to make sure you're okay over the next 2 weeks. Please look for an email from [employer]--this is a free, online program that we'll use to keep in touch. To sign up, follow the link in the email you will receive. Learn more at [email or web address]    How can I take care of myself?    Get lots of rest. Drink extra fluids (unless a doctor has told you not to)    Take Tylenol (acetaminophen) or ibuprofen for fever or pain. If you have liver or kidney problems, ask your family doctor if it's okay to take Tylenol o ibuprofen    If you have other health problems (like cancer, heart failure, an organ transplant or severe kidney disease): Call your specialty clinic if you don't feel better in the next 2 days.    Know when to call 911. Emergency warning signs include:  o Trouble breathing or shortness of breath  o Pain or pressure in the chest that doesn't go away  o Feeling confused like you haven't felt before, or not being able to wake up  o Bluish-colored lips or face    Where can I get more information?  [facility] - About COVID-19:   [email or web address]    CDC - What to Do If You're Sick:   www.cdc.gov/coronavirus/2019-ncov/about/steps-when-sick.html    November 5, 2021  RE:  Jeanine Hernandez                                                                                                                   [id number]  [facility] [address]      To whom it may concern:    I evaluated Jeanine Hernandez on November 5, 2021. Jeanine Hernandez should be excused from work/school.     They should let their workplace manager and staffing office know when their quarantine ends.    We can not give an exact date as it depends on the information below. They can calculate this on their own or work with their manager/staffing office to calculate this. (For example if they were exposed on 10/04, they would have to quarantine for 14 full days. That would be through 10/18. They could return on 10/19.)    Quarantine Guidelines:      If patient receives a positive COVID-19 test result, they should follow the guidance of those who are giving the results. Usually the return to work is 10 (or in some cases 20 days from symptom onset.) If they work at [employer], they must be cleared by Employee Occupational Health and Safety to return to work.        If patient receives a negative COVID-19 test result and did not have a high risk exposure to someone with a known positive COVID-19 test, they can return to work once they're free of fever for 24 hours without fever-reducing medication and their symptoms are improving or resolved.      If patient receives a negative COVID-19 test and if they had a high risk exposure to someone who has tested positive for COVID-19 then they can return to work 14 days after their last contact with the positive individual    Note: If there is ongoing exposure to the covid positive person, this quarantine period may be longer than 14 days. (For example, if they are continually exposed to their child, who tested positive and cannot isolate from them, then the quarantine of 7-14 days can't start until their child is no longer contagious. This is typically 10 days from onset to the child's symptoms. So the total duration may be 17-24 days in this case.)     Sincerely,  Partha York,  USMAN

## 2021-11-09 ENCOUNTER — LAB (OUTPATIENT)
Dept: FAMILY MEDICINE | Facility: CLINIC | Age: 21
End: 2021-11-09
Attending: PHYSICIAN ASSISTANT
Payer: COMMERCIAL

## 2021-11-09 DIAGNOSIS — Z20.822 SUSPECTED COVID-19 VIRUS INFECTION: ICD-10-CM

## 2021-11-09 PROCEDURE — U0003 INFECTIOUS AGENT DETECTION BY NUCLEIC ACID (DNA OR RNA); SEVERE ACUTE RESPIRATORY SYNDROME CORONAVIRUS 2 (SARS-COV-2) (CORONAVIRUS DISEASE [COVID-19]), AMPLIFIED PROBE TECHNIQUE, MAKING USE OF HIGH THROUGHPUT TECHNOLOGIES AS DESCRIBED BY CMS-2020-01-R: HCPCS | Mod: 90

## 2021-11-09 PROCEDURE — 99000 SPECIMEN HANDLING OFFICE-LAB: CPT

## 2021-11-09 PROCEDURE — U0005 INFEC AGEN DETEC AMPLI PROBE: HCPCS | Mod: 90

## 2021-11-11 LAB — SARS-COV-2 RNA RESP QL NAA+PROBE: NOT DETECTED

## 2021-11-23 ENCOUNTER — VIRTUAL VISIT (OUTPATIENT)
Dept: FAMILY MEDICINE | Facility: CLINIC | Age: 21
End: 2021-11-23
Payer: COMMERCIAL

## 2021-11-23 DIAGNOSIS — R56.9 SEIZURES (H): Primary | ICD-10-CM

## 2021-11-23 DIAGNOSIS — F33.1 MODERATE EPISODE OF RECURRENT MAJOR DEPRESSIVE DISORDER (H): ICD-10-CM

## 2021-11-23 DIAGNOSIS — F31.9 BIPOLAR DISORDER WITH DEPRESSION (H): ICD-10-CM

## 2021-11-23 PROBLEM — Z72.0 TOBACCO USE: Status: ACTIVE | Noted: 2021-06-18

## 2021-11-23 PROCEDURE — 99214 OFFICE O/P EST MOD 30 MIN: CPT | Mod: GT | Performed by: NURSE PRACTITIONER

## 2021-11-23 NOTE — LETTER
[facility]  [address] [place] [id number]  [phone]          November 23, 2021    Jeanine Hernandez                                                                                                                     [id number]  [place] [facility] [address]      To whom it may concern,    Jeanine is under my general care and is okay to work without any restrictions.        Sincerely,       Georgina Hyde, ISAC, APRN-CNP
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
## 2021-11-24 ENCOUNTER — VIRTUAL VISIT (OUTPATIENT)
Dept: BEHAVIORAL HEALTH | Facility: CLINIC | Age: 21
End: 2021-11-24
Payer: COMMERCIAL

## 2021-11-24 ENCOUNTER — VIRTUAL VISIT (OUTPATIENT)
Dept: PSYCHIATRY | Facility: CLINIC | Age: 21
End: 2021-11-24
Payer: COMMERCIAL

## 2021-11-24 DIAGNOSIS — F31.9 BIPOLAR DISORDER WITH DEPRESSION (H): Primary | ICD-10-CM

## 2021-11-24 DIAGNOSIS — Z79.899 HIGH RISK MEDICATION USE: ICD-10-CM

## 2021-11-24 DIAGNOSIS — F33.1 MODERATE EPISODE OF RECURRENT MAJOR DEPRESSIVE DISORDER (H): ICD-10-CM

## 2021-11-24 DIAGNOSIS — F51.02 INSOMNIA DUE TO PSYCHOLOGICAL STRESS: ICD-10-CM

## 2021-11-24 DIAGNOSIS — F31.81 BIPOLAR 2 DISORDER (H): Primary | ICD-10-CM

## 2021-11-24 PROCEDURE — 99214 OFFICE O/P EST MOD 30 MIN: CPT | Mod: GT | Performed by: NURSE PRACTITIONER

## 2021-11-24 PROCEDURE — 90832 PSYTX W PT 30 MINUTES: CPT | Mod: GT | Performed by: SOCIAL WORKER

## 2021-11-24 RX ORDER — LAMOTRIGINE 100 MG/1
100 TABLET ORAL DAILY
Qty: 90 TABLET | Refills: 0 | Status: SHIPPED | OUTPATIENT
Start: 2021-11-24 | End: 2022-02-01

## 2021-11-24 RX ORDER — TRAZODONE HYDROCHLORIDE 50 MG/1
50 TABLET, FILM COATED ORAL AT BEDTIME
Qty: 90 TABLET | Refills: 0 | Status: SHIPPED | OUTPATIENT
Start: 2021-11-24 | End: 2022-02-01

## 2021-11-24 RX ORDER — ARIPIPRAZOLE 2 MG/1
2 TABLET ORAL DAILY
Qty: 90 TABLET | Refills: 0 | Status: SHIPPED | OUTPATIENT
Start: 2021-11-24 | End: 2022-01-10 | Stop reason: DRUGHIGH

## 2021-11-24 ASSESSMENT — COLUMBIA-SUICIDE SEVERITY RATING SCALE - C-SSRS
ATTEMPT PAST THREE MONTHS: NO
TOTAL  NUMBER OF ABORTED OR SELF INTERRUPTED ATTEMPTS PAST LIFETIME: NO
TOTAL  NUMBER OF INTERRUPTED ATTEMPTS PAST 3 MONTHS: NO
TOTAL  NUMBER OF ABORTED OR SELF INTERRUPTED ATTEMPTS PAST 3 MONTHS: NO
3. HAVE YOU BEEN THINKING ABOUT HOW YOU MIGHT KILL YOURSELF?: NO
5. HAVE YOU STARTED TO WORK OUT OR WORKED OUT THE DETAILS OF HOW TO KILL YOURSELF? DO YOU INTEND TO CARRY OUT THIS PLAN?: NO
6. HAVE YOU EVER DONE ANYTHING, STARTED TO DO ANYTHING, OR PREPARED TO DO ANYTHING TO END YOUR LIFE?: NO
2. HAVE YOU ACTUALLY HAD ANY THOUGHTS OF KILLING YOURSELF LIFETIME?: NO
6. HAVE YOU EVER DONE ANYTHING, STARTED TO DO ANYTHING, OR PREPARED TO DO ANYTHING TO END YOUR LIFE?: NO
1. IN THE PAST MONTH, HAVE YOU WISHED YOU WERE DEAD OR WISHED YOU COULD GO TO SLEEP AND NOT WAKE UP?: YES
ATTEMPT LIFETIME: NO
4. HAVE YOU HAD THESE THOUGHTS AND HAD SOME INTENTION OF ACTING ON THEM?: NO
5. HAVE YOU STARTED TO WORK OUT OR WORKED OUT THE DETAILS OF HOW TO KILL YOURSELF? DO YOU INTEND TO CARRY OUT THIS PLAN?: NO
1. IN THE PAST MONTH, HAVE YOU WISHED YOU WERE DEAD OR WISHED YOU COULD GO TO SLEEP AND NOT WAKE UP?: NO
2. HAVE YOU ACTUALLY HAD ANY THOUGHTS OF KILLING YOURSELF?: NO
4. HAVE YOU HAD THESE THOUGHTS AND HAD SOME INTENTION OF ACTING ON THEM?: NO
TOTAL  NUMBER OF INTERRUPTED ATTEMPTS LIFETIME: NO

## 2021-11-24 ASSESSMENT — PATIENT HEALTH QUESTIONNAIRE - PHQ9
SUM OF ALL RESPONSES TO PHQ QUESTIONS 1-9: 3
10. IF YOU CHECKED OFF ANY PROBLEMS, HOW DIFFICULT HAVE THESE PROBLEMS MADE IT FOR YOU TO DO YOUR WORK, TAKE CARE OF THINGS AT HOME, OR GET ALONG WITH OTHER PEOPLE: SOMEWHAT DIFFICULT
SUM OF ALL RESPONSES TO PHQ QUESTIONS 1-9: 3

## 2021-11-24 ASSESSMENT — ANXIETY QUESTIONNAIRES
2. NOT BEING ABLE TO STOP OR CONTROL WORRYING: NOT AT ALL
GAD7 TOTAL SCORE: 3
6. BECOMING EASILY ANNOYED OR IRRITABLE: MORE THAN HALF THE DAYS
GAD7 TOTAL SCORE: 3
7. FEELING AFRAID AS IF SOMETHING AWFUL MIGHT HAPPEN: NOT AT ALL
7. FEELING AFRAID AS IF SOMETHING AWFUL MIGHT HAPPEN: NOT AT ALL
3. WORRYING TOO MUCH ABOUT DIFFERENT THINGS: NOT AT ALL
1. FEELING NERVOUS, ANXIOUS, OR ON EDGE: NOT AT ALL
8. IF YOU CHECKED OFF ANY PROBLEMS, HOW DIFFICULT HAVE THESE MADE IT FOR YOU TO DO YOUR WORK, TAKE CARE OF THINGS AT HOME, OR GET ALONG WITH OTHER PEOPLE?: SOMEWHAT DIFFICULT
5. BEING SO RESTLESS THAT IT IS HARD TO SIT STILL: NOT AT ALL
GAD7 TOTAL SCORE: 3
4. TROUBLE RELAXING: SEVERAL DAYS

## 2021-11-24 NOTE — ASSESSMENT & PLAN NOTE
Not seen since February 2021 by this provider.  We had continued the Abilify, lamotrigine and trazodone at the current dosages.  She is psychiatrically stable at present.  Requesting a letter for her to pursue employment at [employer].  She is not on any medications or does she carry a diagnosis that would preclude her from doing this position.  Letter created and available in [employer]  and sent to the patient.  We will follow-up in 3 months.  If she continues to present stable will transition back to primary care at that time
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
## 2021-11-24 NOTE — LETTER
2021      Regarding:  Jeanine DAWSON Mary  [address]  [facility] [address]  :  2000        To whom it may concern;    Lyla is currently under my care for medication management.  She carries a diagnosis of major depressive disorder and we have been monitoring for an underlying bipolar 2 trajectory.  Her mood has been stabilized for over a year on her current medication regimen of lamotrigine, Abilify, and trazodone.  She does not carry a diagnosis that would preclude her employment with [employer].  The lamotrigine has been stabilizing for her mood.  She has a history of seizures but has not experienced one in 5 years.        Sincerely,      Sherley Hodgson, MSN, APRN, CNP, FMHNP-BC,   [place] [facility]

## 2021-11-24 NOTE — PROGRESS NOTES
[facility]aborative Care Psychiatry Service (CCPS)  November 24, 2021    Behavioral Health Clinician Progress Note    Patient Name: Jeanine Hernandez      Telemedicine Visit: The patient's condition can be safely assessed and treated via synchronous audio and visual telemedicine encounter.      Reason for Telemedicine Visit: Services only offered telehealth    Originating Site (Patient Location): Patient's home    Distant Site (Provider Location): [facility]: [place]    Consent:  The patient/guardian has verbally consented to: the potential risks and benefits of telemedicine (video visit) versus in person care; bill my insurance or make self-payment for services provided; and responsibility for payment of non-covered services.     Mode of Communication:  Video Conference via [email or web address]    As the provider I attest to compliance with applicable laws and regulations related to telemedicine.         Service Type:  Individual      Service Location:   MyChart / Email (patient reached)     Session Start Time: 13:01  Session End Time: 13:27      Session Length: 16 - 37      Attendees: Patient    Visit Activities (Refresh list every visit): [facility] Only    Diagnostic Assessment Date: 07/28/2020  See Flowsheets for today's PHQ-9 and CONNER-7 results  Previous PHQ-9:   PHQ-9 SCORE 1/25/2021 2/1/2021 11/24/2021   PHQ-9 Total Score MyChart - 4 (Minimal depression) 3 (Minimal depression)   PHQ-9 Total Score 6 4 3   Some encounter information is confidential and restricted. Go to Review Flowsheets activity to see all data.     Previous CONNER-7:   CONNER-7 SCORE 1/25/2021 2/1/2021 11/24/2021   Total Score - 4 (minimal anxiety) 3 (minimal anxiety)   Total Score 4 4 3   Some encounter information is confidential and restricted. Go to Review Flowsheets activity to see all data.     WHODAS  WHODAS 2.0 Total Score 7/28/2020 10/22/2020   Total Score 29 -   Total Score MyChart - 30   Some encounter information is confidential and  "restricted. Go to Review Flowsheets activity to see all data.        CAGE  No flowsheet data found.    DATA  Extended Session (60+ minutes): No  Interactive Complexity: No  Crisis: No    Medication Compliance:  Yes    Chemical Use Review:   Substance Use: Chemical use reviewed, no active concerns identified      Tobacco Use: No current tobacco use.  Quit smoking 6-8 months ago. Still vape occasionally and working to reduce.    Current Stressors / Issues:  MH Update: Occasionally feel irritable but overall \"feel great.\" Moments of cranky and feel react to little things- every two to three months. Wakeup and feel mad- no particular triggers.  [facility] discussed focus on self-reflection and using self-care to avoid burnout and exhaustion from many roles in patient's life. CSSRS was completed to update patient file. No reported safety concerns.    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 Not able to start new job until get release that says Bipolar is managed.    Tx: No. Not attend for 8-9 months. Willing to go back.  [facility] placed a mental health referral and discussed option to access a [facility] if needed.    Sleep: decent- average 7 hours per night. Wake up with child during night- have to get up for a while and able to get back to sleep within 20 mins of trying.  Appetite: Normal    Etoh: denies  Substance: denies    Preg: No- abstain    Most Important: Release that Bipolar is stable and able to do shift work, review medications for check-up.    Work is concerned due to 10-12 hour shifts, lots of standing/lifting/heavy work. Has done shift work before and feel capable. Wants the longer shifts so more time at home.     Progress on Treatment Objective(s) / Homework:  New Objective established this session - ACTION (Actively working towards change); Intervened by reinforcing " change plan / affirming steps taken    Motivational Interviewing    MI Intervention: Co-Developed Goal: managing irritability and work, Expressed Empathy/Understanding, Supported Autonomy, Collaboration, Evocation and Reflections: simple and complex     Change Talk Expressed by the Patient: Desire to change Reasons to change Need to change Committment to change Activation    Provider Response to Change Talk: E - Evoked more info from patient about behavior change, A - Affirmed patient's thoughts, decisions, or attempts at behavior change, R - Reflected patient's change talk and S - Summarized patient's change talk statements    Also provided psychoeducation about behavioral health condition, symptoms, and treatment options      Review of Symptoms per patient report:  Depression: No symptoms  Tri:  No Symptoms  Psychosis: No Symptoms  Anxiety: Ruminations, Irritability and worried about finances  Panic:  No symptoms  Post Traumatic Stress Disorder:  No Symptoms   Eating Disorder: No Symptoms  ADD / ADHD:  No symptoms  Conduct Disorder: No symptoms  Autism Spectrum Disorder: No symptoms  Obsessive Compulsive Disorder: No Symptoms    Changes in Health Issues:   Yes: Ear and Sinus infection- being treated     Assessment: Current Emotional / Mental Status (status of significant symptoms):  Risk status (Self / Other harm or suicidal ideation)  Patient has had a history of suicidal ideation: passive SI as teenager and not for over a year  Patient denies current fears or concerns for personal safety.  Patient denies current or recent suicidal ideation or behaviors.  Patient denies current or recent homicidal ideation or behaviors.  Patient denies current or recent self injurious behavior or ideation.  Patient denies other safety concerns.  A safety and risk management plan has not been developed at this time, however patient was encouraged to call [place] [phone] should there be a change in any of these risk  factors.    Appearance:   Appropriate   Eye Contact:   Good   Psychomotor Behavior: Normal   Attitude:   Cooperative  Interested Friendly Pleasant  Orientation:   All  Speech   Rate / Production: Normal    Volume:  Normal   Mood:    Irritable   Affect:    Appropriate   Thought Content:  Clear   Thought Form:  Coherent  Logical   Insight:    Good     Diagnoses:  1. Bipolar disorder with depression (H)      Collateral Reports Completed:  [facility]    Plan: (Homework, other):  Patient was given information about behavioral services and encouraged to schedule a follow up appointment with the clinic [facility] in conjunction with next CCPS appointment.  She was also given information about mental health symptoms and referral placed with [facility] to schedule therapy.  CD Recommendations: No indications of CD issues.     Halle Olsen [facility]  November 24, 2021  Answers for HPI/ROS submitted by the patient on 11/24/2021  If you checked off any problems, how difficult have these problems made it for you to do your work, take care of things at home, or get along with other people?: Somewhat difficult  PHQ9 TOTAL SCORE: 3  CONNER 7 TOTAL SCORE: 3

## 2021-11-25 ASSESSMENT — ANXIETY QUESTIONNAIRES: GAD7 TOTAL SCORE: 3

## 2021-11-25 ASSESSMENT — PATIENT HEALTH QUESTIONNAIRE - PHQ9: SUM OF ALL RESPONSES TO PHQ QUESTIONS 1-9: 3

## 2021-12-06 ENCOUNTER — TELEPHONE (OUTPATIENT)
Dept: FAMILY MEDICINE | Facility: CLINIC | Age: 21
End: 2021-12-06
Payer: COMMERCIAL

## 2021-12-06 NOTE — TELEPHONE ENCOUNTER
I have not seen or prescribed medication for this patient. If she is wanting a change in any current medication in any way will need to go through the prescribing provider.     Please call and clarify reason for virtual appointment.     Thank you  Abimbola Iraheta CNP

## 2021-12-06 NOTE — TELEPHONE ENCOUNTER
I have attempted to call the pt with the following message. I left a message for pt to call back. I will call back another time. Camila Farley, CMA

## 2021-12-08 NOTE — TELEPHONE ENCOUNTER
2nd attempt called and LM for patient to call back. Please relay message from Abimbola Iraheta below.   Antonina Rao MA

## 2021-12-27 ENCOUNTER — TELEPHONE (OUTPATIENT)
Dept: BEHAVIORAL HEALTH | Facility: CLINIC | Age: 21
End: 2021-12-27
Payer: COMMERCIAL

## 2021-12-28 ASSESSMENT — PATIENT HEALTH QUESTIONNAIRE - PHQ9
SUM OF ALL RESPONSES TO PHQ QUESTIONS 1-9: 10
10. IF YOU CHECKED OFF ANY PROBLEMS, HOW DIFFICULT HAVE THESE PROBLEMS MADE IT FOR YOU TO DO YOUR WORK, TAKE CARE OF THINGS AT HOME, OR GET ALONG WITH OTHER PEOPLE: VERY DIFFICULT
SUM OF ALL RESPONSES TO PHQ QUESTIONS 1-9: 10

## 2021-12-29 ENCOUNTER — MYC MEDICAL ADVICE (OUTPATIENT)
Dept: PSYCHIATRY | Facility: CLINIC | Age: 21
End: 2021-12-29
Payer: COMMERCIAL

## 2021-12-29 ENCOUNTER — HOSPITAL ENCOUNTER (OUTPATIENT)
Dept: BEHAVIORAL HEALTH | Facility: CLINIC | Age: 21
End: 2021-12-29
Attending: FAMILY MEDICINE
Payer: COMMERCIAL

## 2021-12-29 DIAGNOSIS — F33.1 MODERATE EPISODE OF RECURRENT MAJOR DEPRESSIVE DISORDER (H): Primary | ICD-10-CM

## 2021-12-29 PROCEDURE — 999N000216 HC STATISTIC ADULT CD FACE TO FACE-NO CHRG: Mod: GT,95 | Performed by: COUNSELOR

## 2021-12-29 ASSESSMENT — PATIENT HEALTH QUESTIONNAIRE - PHQ9: SUM OF ALL RESPONSES TO PHQ QUESTIONS 1-9: 10

## 2021-12-29 NOTE — PROGRESS NOTES
"[facility]    ADULT Mental Health Assessment Appointment Note    PATIENT'S NAME:  Jeanine Hernandez    Preferred Pronoun: Christophe   MRN: [id number]  YOB: 2000  Address:  [place] [address] [address]  PREFERRED PHONE: [phone]  May we leave a referral related message: Yes    DATE OF SERVICE: 21  START TIME: 1:30pm   END TIME: 1:44pm   SERVICE MODALITY:  Video Visit:      Provider verified identity through the following two step process.  Patient provided:  Patient  and Patient address    Telemedicine Visit: The patient's condition can be safely assessed and treated via synchronous audio and visual telemedicine encounter.      Reason for Telemedicine Visit: Services only offered telehealth    Originating Site (Patient Location): Patient's home    Distant Site (Provider Location): [facility]    Consent:  The patient/guardian has verbally consented to: the potential risks and benefits of telemedicine (video visit) versus in person care; bill my insurance or make self-payment for services provided; and responsibility for payment of non-covered services.     Patient would like the video invitation sent by:  My Chart    Mode of Communication:  Video Conference via Amwell    As the provider I attest to compliance with applicable laws and regulations related to telemedicine.    Identifying Information:  Patient is a 21 year old, .  Patient was referred for an assessment by self.  Patient attended the session alone. Patient identified their preferred language to be English. Patient reported they does not need the assistance of an  or other support involved in therapy.     Services Requested:   The reason for seeking services at this time is: \"My medication was adjusted when I was in [place] and I am almost out. \"  Patient has not attempted to resolve these concerns in the " past.  Patient does not have legal concerns.    Mental Health:  Review of Symptoms per patient report:    Psychosis: Auditory hallucinations     Current Mental Status Exam:   Appearance:  Appropriate    Eye Contact:  Good   Psychomotor:  Normal   Attitude / Demeanor: Cooperative   Speech Rate:  Normal/ Responsive  Volume:  Normal  volume  Language:  no problems  Mood:   Normal  Affect:   Appropriate    Thought Content: Clear   Thought Process: Coherent     Associations:  No loosening of associations  Insight:   Good   Judgment:  Intact   Orientation:  All  Attention:  Good    Rating Scales:  PHQ9:    PHQ-9 SCORE 2/1/2021 11/24/2021 12/28/2021   PHQ-9 Total Score MyChart 4 (Minimal depression) 3 (Minimal depression) 10 (Moderate depression)   PHQ-9 Total Score 4 3 10   ;    GAD7:    CONNER-7 SCORE 1/25/2021 2/1/2021 11/24/2021   Total Score - 4 (minimal anxiety) 3 (minimal anxiety)   Total Score 4 4 3       Safety Assessment:   Current Safety Concerns: No safety concerns   Patient denies current homicidal ideation and behaviors.  Patient denies current self-injurious ideation and behaviors.    Patient denied risk behaviors associated with substance use.  Patient denies any high risk behaviors associated with mental health symptoms.  Patient reports the following current concerns for their personal safety: None.    Clinical Impressions: By History  Generalized Anxiety Disorder   - Restlessness or feeling keyed up or on edge.    - Being easily fatigued.    - Difficulty concentrating or mind going blank.    - Irritability.    - Muscle tension.   D. The focus of the anxiety and worry is not confined to features of an Axis I disorder. Major Depressive Disorder   - Depressed mood. Note: In children and adolescents, can be irritable mood.     - Diminished interest or pleasure in all, or almost all, activities.    - Fatigue or loss of energy.    - Diminished ability to think or concentrate, or indecisiveness.   B) The symptoms  cause clinically significant distress or impairment in social, occupational, or other important areas of functioning    Therapeutic Interventions Provided: supportive active listening and explored alternatives    Recommendations/Plan: pt would like a medication adjustment.    Referrals: Clinician attempted to schedule at appt with pt's [place] psychiatrist and was told that it would be easier for the pt to call. Clinician stayed on video while pt called to schedule her appointment. She was informed that she could not get in until Feb. 1st. Pt explained that she is beginning to have auditory hallucinations and they advised her to go to the ED to get her meds adjusted. Pt states that she is fine and she is going to go to work and will got to the ED afterwards. PT states that she has enough medication to last her for about 1 month. Clinician suggested that the pt also send psychiatrist a message via [employer] to see if meds could be adjusted that way.     Johanna Mckeon, [facility],  December 29, 2021  Mental Health and Addiction Services Assessment Center

## 2021-12-30 RX ORDER — ARIPIPRAZOLE 5 MG/1
5 TABLET ORAL DAILY
Qty: 30 TABLET | Refills: 0 | Status: SHIPPED | OUTPATIENT
Start: 2021-12-30 | End: 2022-01-24

## 2021-12-30 NOTE — TELEPHONE ENCOUNTER
Dee Phan NP  Psych Rn - [facility] 12 hours ago (8:01 PM)     VT    Increase abilify to 5 mg daily.  Dee Reynoso text

## 2022-01-10 ENCOUNTER — OFFICE VISIT (OUTPATIENT)
Dept: FAMILY MEDICINE | Facility: CLINIC | Age: 22
End: 2022-01-10
Payer: COMMERCIAL

## 2022-01-10 VITALS
SYSTOLIC BLOOD PRESSURE: 100 MMHG | BODY MASS INDEX: 22.71 KG/M2 | HEIGHT: 64 IN | WEIGHT: 133 LBS | TEMPERATURE: 98.3 F | OXYGEN SATURATION: 98 % | RESPIRATION RATE: 16 BRPM | HEART RATE: 89 BPM | DIASTOLIC BLOOD PRESSURE: 72 MMHG

## 2022-01-10 DIAGNOSIS — Z00.00 ANNUAL PHYSICAL EXAM: Primary | ICD-10-CM

## 2022-01-10 DIAGNOSIS — Z30.013 ENCOUNTER FOR INITIAL PRESCRIPTION OF INJECTABLE CONTRACEPTIVE: ICD-10-CM

## 2022-01-10 DIAGNOSIS — Z23 HIGH PRIORITY FOR 2019-NCOV VACCINE: ICD-10-CM

## 2022-01-10 DIAGNOSIS — B36.9 FUNGAL INFECTION OF SKIN: ICD-10-CM

## 2022-01-10 LAB — HCG UR QL: NEGATIVE

## 2022-01-10 PROCEDURE — 0004A COVID-19,PF,PFIZER (12+ YRS): CPT | Performed by: NURSE PRACTITIONER

## 2022-01-10 PROCEDURE — 99213 OFFICE O/P EST LOW 20 MIN: CPT | Mod: 25 | Performed by: NURSE PRACTITIONER

## 2022-01-10 PROCEDURE — 91300 COVID-19,PF,PFIZER (12+ YRS): CPT | Performed by: NURSE PRACTITIONER

## 2022-01-10 PROCEDURE — 87491 CHLMYD TRACH DNA AMP PROBE: CPT | Performed by: NURSE PRACTITIONER

## 2022-01-10 PROCEDURE — 96372 THER/PROPH/DIAG INJ SC/IM: CPT | Performed by: NURSE PRACTITIONER

## 2022-01-10 PROCEDURE — 81025 URINE PREGNANCY TEST: CPT | Performed by: NURSE PRACTITIONER

## 2022-01-10 PROCEDURE — 99395 PREV VISIT EST AGE 18-39: CPT | Mod: 25 | Performed by: NURSE PRACTITIONER

## 2022-01-10 RX ORDER — CICLOPIROX OLAMINE 7.7 MG/G
CREAM TOPICAL 2 TIMES DAILY
Qty: 90 G | Refills: 0 | Status: SHIPPED | OUTPATIENT
Start: 2022-01-10 | End: 2023-07-21

## 2022-01-10 RX ORDER — MEDROXYPROGESTERONE ACETATE 150 MG/ML
150 INJECTION, SUSPENSION INTRAMUSCULAR
Status: ACTIVE | OUTPATIENT
Start: 2022-01-10 | End: 2023-01-05

## 2022-01-10 RX ADMIN — MEDROXYPROGESTERONE ACETATE 150 MG: 150 INJECTION, SUSPENSION INTRAMUSCULAR at 14:56

## 2022-01-10 ASSESSMENT — MIFFLIN-ST. JEOR: SCORE: 1339.31

## 2022-01-10 NOTE — NURSING NOTE
Clinic Administered Medication Documentation    Administrations This Visit     medroxyPROGESTERone (DEPO-PROVERA) injection 150 mg     Admin Date  01/10/2022 Action  Given Dose  150 mg Route  Intramuscular Site  Right Deltoid Administered By  Chuck Ordonez CMA    Ordering Provider: Adrianne Dyson APRN CNP    Patient Supplied?: No                  Depo Provera Documentation    URINE HCG: negative    Depo-Provera Standing Order inclusion/exclusion criteria reviewed.   Patient meets: inclusion criteria     BP: 100/72  LAST PAP/EXAM:   Lab Results   Component Value Date    PAP NIL 12/10/2019       Prior to injection, verified patient identity using patient's name and date of birth. Medication was administered. Please see MAR and medication order for additional information.     Was entire vial of medication used? Yes  Vial/Syringe: Single dose vial  Expiration Date:  8/2023    Patient instructed to remain in clinic for 15 minutes.  NEXT INJECTION DUE: 3/28/22 - 4/11/22   Chuck VALVERDE CMA
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
## 2022-01-11 LAB — C TRACH DNA SPEC QL NAA+PROBE: NEGATIVE

## 2022-01-23 ENCOUNTER — LAB (OUTPATIENT)
Dept: FAMILY MEDICINE | Facility: CLINIC | Age: 22
End: 2022-01-23
Attending: FAMILY MEDICINE
Payer: COMMERCIAL

## 2022-01-23 DIAGNOSIS — Z20.822 CLOSE EXPOSURE TO 2019 NOVEL CORONAVIRUS: ICD-10-CM

## 2022-01-23 PROCEDURE — U0005 INFEC AGEN DETEC AMPLI PROBE: HCPCS

## 2022-01-23 PROCEDURE — 99207 PR NO CHARGE LOS: CPT

## 2022-01-23 PROCEDURE — U0003 INFECTIOUS AGENT DETECTION BY NUCLEIC ACID (DNA OR RNA); SEVERE ACUTE RESPIRATORY SYNDROME CORONAVIRUS 2 (SARS-COV-2) (CORONAVIRUS DISEASE [COVID-19]), AMPLIFIED PROBE TECHNIQUE, MAKING USE OF HIGH THROUGHPUT TECHNOLOGIES AS DESCRIBED BY CMS-2020-01-R: HCPCS

## 2022-01-24 LAB — SARS-COV-2 RNA RESP QL NAA+PROBE: NEGATIVE

## 2022-01-28 NOTE — PROGRESS NOTES
"[facility] behavioral health [facility]  Integrated Behavioral Health Services   Diagnostic Assessment Update      PATIENT'S NAME: Jeanine Hernandez \"Christophe\"  MRN:   [id number]  :   2000  DATE OF SERVICE: 2022  SERVICE LOCATION: [facility] / Email (patient reached)  Visit Activities: [facility] Only   Start time: 1100am   End time: 1118am  Total time 18 min    Identifying Information:  Patient is a 21 year old year old, ,  female.  Patient attended the session alone.        Updates on Presenting Concern(s):  Patient reports the following reason(s) for continuing to receive behavioral services: depression, bipolar, anxiety.  Patient reported that her symptoms and concerns are stable.  Patient attributed the status of her current symptoms to changes in their life stressors. She and  are discussing a separation, promotion at work.      Patient stated that her symptoms have resulted in the following functional impairments: over eating, \"stuck in my head\", spacing out, not cleaning as much. States however, that if her medications weren't effective she'd be experiencing a lot more symptoms due to her current stress.     Patient reports that other professional(s) are not involved in providing support / services. There is a referral for outpt in her chart but she hasn't gotten scheduled yet. [facility] gave pt the phone number to intake. Most important: meds are working, she needs refills.         Standard Screening tools completed, including PHQ9 and GAD7.  See Epic for today's results.  Historical PHQ9:  PHQ-9 SCORE 2021   PHQ-9 Total Score [facility] 3 (Minimal depression) 10 (Moderate depression) 8 (Mild depression)   PHQ-9 Total Score 3 - 8   Some encounter information is confidential and restricted. Go to Review Flowsheets activity to see all data.     Historical GAD7:  CONNER-7 SCORE 2021   Total Score - 4 (minimal anxiety) 3 (minimal " anxiety)   Total Score 4 4 3   Some encounter information is confidential and restricted. Go to Review Flowsheets activity to see all data.       Review of Updates to Patient's Life Situation:  Patient reported experiencing relationship changes, which included she and  are discussing a separation.  Patient identified no changes in the stability of their social connections and identified supports. Patient noted that their living situation did not change within the last year.     Patient's employment status did not change within the past year and remains employed full time and reports @HIS@ is able to function appropriately at work..     Patient reported no changes or new involvment with the legal system.  There are no ethnic, cultural or Mandaeism factors that may be relevant for therapy.       Mental Health History:  Patient is not currently receiving any mental health services.      Chemical Health History:   Patient is not currently receiving any chemical dependency treatment. Patient reports no problems as a result of their drinking / drug use.      Cage-AID score is: 0  Based on Cage-Aid score and clinical interview there  are not indications of drug or alcohol abuse.      Discussed the general effects of drugs and alcohol on health and well-being.      Significant Losses / Trauma / Abuse / Neglect Issues:  There are new had to euthanize her cat.    Issues of possible neglect are not present.      Medical History:   Patient Active Problem List   Diagnosis     Elevated BP without diagnosis of hypertension     Vaginal delivery     Moderate episode of recurrent major depressive disorder (H)     Insomnia due to psychological stress     High risk medication use     Seizures (H)     Bipolar disorder with depression (H)     Tobacco use       Medication Review:  Current Outpatient Medications   Medication     ARIPiprazole (ABILIFY) 5 MG tablet     ciclopirox (LOPROX) 0.77 % cream     lamoTRIgine (LAMICTAL) 100 MG  tablet     traZODone (DESYREL) 50 MG tablet     Current Facility-Administered Medications   Medication     medroxyPROGESTERone (DEPO-PROVERA) injection 150 mg     medroxyPROGESTERone (DEPO-PROVERA) injection 150 mg       Medication Compliance:  Yes    Patient was provided recommendation to follow-up with physician.      Mental Status Assessment:  Appearance:   Appropriate   Eye Contact:   Good   Psychomotor Behavior: Normal   Attitude:   Cooperative   Orientation:   All  Speech   Rate / Production: Normal    Volume:  Normal   Mood:    Depressed   Affect:    Appropriate   Thought Content:  Clear   Thought Form:  Coherent  Logical   Insight:    Good       Safety Assessment:    Patient denies a history of suicidal ideation, suicide attempts, self-injurious behavior, homicidal ideation, homicidal behavior and and other safety concerns  Patient denies current or recent suicidal ideation or behaviors.  Patient denies current or recent homicidal ideation or behaviors.  Patient denies current or recent self injurious behavior or ideation.  Patient denies other safety concerns.  Patient reports there are no firearms in the house  Protective Factors Children in the home  and Sense of responsibility to family   Risk Factors Current high stress      Plan for Safety and Risk Management:  A safety and risk management plan has not been developed at this time, however patient was encouraged to call [place] [phone] should there be a change in any of these risk factors.      Patient's Strengths and Limitations:  Patient identified the following strengths or resources that will help her succeed in counseling: insight, intelligence and motivation. Patient identified the following supports: family. Things that may interfere with the patient's success in counseling include:none identified.    Diagnostic Criteria:  **Must meet all criteria below for Dx of Bipolar II Disorder**   Current Hypomanic Symptoms includes:  History of  Hypomania, symptoms included:  A. A distinct period of abnormally and persistently elevated, expansive, or irritable mood and abnormally and persistently increased activity or energy lasting at least 4 consecutive days and presentmost of the day, nearly every day.  B. During the period of mood disturbance and increased energy and activity, three (or more) of the following symptoms have persisted (four if the mood is only irritable), represent a nonticeable change from usual behaivor, and have been present to a degree:   - inflated self-esteem or grandiosity    - decreased need for sleep (e.g., feels rested after only 3 hours of sleep)    - more talkative than usual or pressure to keep talking    - flight of ideas or subjective experience that thoughts are racing   - distractibility   - increase in goal-directed activity   - excessive involvement in activities that have a high potential for painful consequences  C. The episode is associated with an unequivocal change in functioning that is uncharacteristic of the person when not symptomatic  D. The disturbance in mood and the change in functioning are observable by others  E. The episode is not severe enough to cause marked impairment in social or occupational functioning or to necessitate hospitalization, and does not have psychotic features.  F. The symptoms are not attributable to the direct physiological effects of a substance or a general medical condition      Functional Status:  Patient's symptoms are causing reduced functional status in the following areas: not maintaining the house like she normally does      DSM5 Diagnoses: (Sustained by DSM5 Criteria Listed Above)  Diagnoses: 296.89 Bipolar II Disorder Depressed  Psychosocial & Contextual Factors: marital conflict  WHODAS Score: NA  See Media section of EPIC medical record for completed WHODAS    Preliminary Treatment Plan:    Treatment will focus on: Mood Instability - hx, improved.    The Following  referrals were discussed and initiated: pt given the phone number to intake to schedule therapy appt    Communicated with Sherley Hodgson, MSN, APRN, CNP, FMSARIAHP-BC, [facility].    Referral to another professional/service is not indicated at this time.    A Release of Information is not needed at this time.    Report to child or adult protection services was NA.    Dottie Solomon [facility], Behavioral Health Clinician

## 2022-02-01 ENCOUNTER — VIRTUAL VISIT (OUTPATIENT)
Dept: PSYCHIATRY | Facility: CLINIC | Age: 22
End: 2022-02-01
Payer: COMMERCIAL

## 2022-02-01 ENCOUNTER — VIRTUAL VISIT (OUTPATIENT)
Dept: BEHAVIORAL HEALTH | Facility: CLINIC | Age: 22
End: 2022-02-01
Payer: COMMERCIAL

## 2022-02-01 DIAGNOSIS — F51.02 INSOMNIA DUE TO PSYCHOLOGICAL STRESS: ICD-10-CM

## 2022-02-01 DIAGNOSIS — F33.1 MODERATE EPISODE OF RECURRENT MAJOR DEPRESSIVE DISORDER (H): ICD-10-CM

## 2022-02-01 DIAGNOSIS — F31.9 BIPOLAR DISORDER WITH DEPRESSION (H): Primary | ICD-10-CM

## 2022-02-01 PROCEDURE — 99214 OFFICE O/P EST MOD 30 MIN: CPT | Mod: GT | Performed by: NURSE PRACTITIONER

## 2022-02-01 PROCEDURE — 90832 PSYTX W PT 30 MINUTES: CPT | Mod: GT | Performed by: SOCIAL WORKER

## 2022-02-01 RX ORDER — LAMOTRIGINE 100 MG/1
100 TABLET ORAL DAILY
Qty: 90 TABLET | Refills: 0 | Status: SHIPPED | OUTPATIENT
Start: 2022-02-01 | End: 2024-08-06

## 2022-02-01 RX ORDER — ARIPIPRAZOLE 5 MG/1
5 TABLET ORAL DAILY
Qty: 90 TABLET | Refills: 0 | Status: SHIPPED | OUTPATIENT
Start: 2022-02-01 | End: 2023-07-21 | Stop reason: DRUGHIGH

## 2022-02-01 RX ORDER — TRAZODONE HYDROCHLORIDE 50 MG/1
50 TABLET, FILM COATED ORAL AT BEDTIME
Qty: 90 TABLET | Refills: 0 | Status: SHIPPED | OUTPATIENT
Start: 2022-02-01 | End: 2023-07-21

## 2022-02-01 ASSESSMENT — PATIENT HEALTH QUESTIONNAIRE - PHQ9
10. IF YOU CHECKED OFF ANY PROBLEMS, HOW DIFFICULT HAVE THESE PROBLEMS MADE IT FOR YOU TO DO YOUR WORK, TAKE CARE OF THINGS AT HOME, OR GET ALONG WITH OTHER PEOPLE: SOMEWHAT DIFFICULT
SUM OF ALL RESPONSES TO PHQ QUESTIONS 1-9: 8
SUM OF ALL RESPONSES TO PHQ QUESTIONS 1-9: 8

## 2022-02-01 NOTE — ASSESSMENT & PLAN NOTE
Psychiatrically stable at present. The current medical regimen is effective; continue present plan and medications.   She will be returned to PCP for ongoing care, medication prescribing and future medication refills.  3 months of medications fills provided.  Follow up with [facility] in about 3 months. Patient can be re-referred back to this service for further consultation in the future if needed but a new referral will need to be placed.    She has a appointment with the therapist this week at care counseling and she is going to possibly see if a psychiatric provider within that clinic can take over medication management also.

## 2022-02-01 NOTE — Clinical Note
The patient is being returned to the referring provider for ongoing care and medication prescribing.  The patient can be referred back to this service for further consultation as needed.  Please make a note should they call to schedule for follow-up.  Thank you!    Sherley Hodgson, MSN, APRN, CNP, HNP-BC,   [facility]

## 2022-02-01 NOTE — Clinical Note
Psychiatry Update/Consult. Patient has been stabilized and I am sending care back to you for ongoing care, medication prescribing and future medication refills.  I provided 3 months at most recent visit. I recommended that the patient follow up with you in about 3 months. More details about treatment plan are in my note. If you have any questions or concerns, please let me know. The patient can be re-referred back to this service for further consultation in the future if needed but a new referral will need to be placed.    Thanks for the referral! It was a pleasure working with Jeanine Hernandez.       Sherley Hodgson, MSN, APRN, CNP, FMHNP-BC,   [facility]
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
## 2022-02-02 ASSESSMENT — PATIENT HEALTH QUESTIONNAIRE - PHQ9: SUM OF ALL RESPONSES TO PHQ QUESTIONS 1-9: 8

## 2022-04-03 ENCOUNTER — MYC REFILL (OUTPATIENT)
Dept: PSYCHIATRY | Facility: CLINIC | Age: 22
End: 2022-04-03
Payer: COMMERCIAL

## 2022-04-03 DIAGNOSIS — F33.1 MODERATE EPISODE OF RECURRENT MAJOR DEPRESSIVE DISORDER (H): ICD-10-CM

## 2022-04-04 ENCOUNTER — ALLIED HEALTH/NURSE VISIT (OUTPATIENT)
Dept: FAMILY MEDICINE | Facility: CLINIC | Age: 22
End: 2022-04-04
Payer: COMMERCIAL

## 2022-04-04 DIAGNOSIS — Z30.013 ENCOUNTER FOR INITIAL PRESCRIPTION OF INJECTABLE CONTRACEPTIVE: Primary | ICD-10-CM

## 2022-04-04 PROCEDURE — 99207 PR NO CHARGE NURSE ONLY: CPT

## 2022-04-04 RX ORDER — LAMOTRIGINE 100 MG/1
100 TABLET ORAL DAILY
Qty: 90 TABLET | Refills: 0 | OUTPATIENT
Start: 2022-04-04

## 2022-04-04 NOTE — NURSING NOTE
The following medication was given:     MEDICATION: Depo Provera 150mg  ROUTE: IM  SITE: left deltoid  DOSE: 150mg/1cc  LOT #: [id number]  :  [employer]  Return to clinic 06/20/2022 -07/04/2022

## 2022-09-18 ENCOUNTER — HEALTH MAINTENANCE LETTER (OUTPATIENT)
Age: 22
End: 2022-09-18

## 2023-05-07 ENCOUNTER — HEALTH MAINTENANCE LETTER (OUTPATIENT)
Age: 23
End: 2023-05-07

## 2023-06-28 ENCOUNTER — HOSPITAL ENCOUNTER (EMERGENCY)
Facility: CLINIC | Age: 23
Discharge: HOME | End: 2023-06-28
Attending: EMERGENCY MEDICINE | Admitting: EMERGENCY MEDICINE
Payer: COMMERCIAL

## 2023-06-28 VITALS
DIASTOLIC BLOOD PRESSURE: 70 MMHG | SYSTOLIC BLOOD PRESSURE: 125 MMHG | OXYGEN SATURATION: 97 % | RESPIRATION RATE: 18 BRPM | TEMPERATURE: 97 F | HEART RATE: 88 BPM

## 2023-06-28 DIAGNOSIS — M26.621 ARTHRALGIA OF RIGHT TEMPOROMANDIBULAR JOINT: ICD-10-CM

## 2023-06-28 PROCEDURE — 99282 EMERGENCY DEPT VISIT SF MDM: CPT | Performed by: EMERGENCY MEDICINE

## 2023-06-28 PROCEDURE — 99283 EMERGENCY DEPT VISIT LOW MDM: CPT | Performed by: EMERGENCY MEDICINE

## 2023-06-28 PROCEDURE — 250N000013 HC RX MED GY IP 250 OP 250 PS 637: Performed by: EMERGENCY MEDICINE

## 2023-06-28 RX ORDER — IBUPROFEN 200 MG
600 TABLET ORAL EVERY 8 HOURS PRN
Qty: 60 TABLET | Refills: 0 | COMMUNITY
Start: 2023-06-28 | End: 2023-07-21

## 2023-06-28 RX ORDER — ACETAMINOPHEN 500 MG
1000 TABLET ORAL EVERY 8 HOURS PRN
Qty: 30 TABLET | Refills: 0 | COMMUNITY
Start: 2023-06-28 | End: 2023-07-03

## 2023-06-28 RX ORDER — IBUPROFEN 400 MG/1
800 TABLET, FILM COATED ORAL ONCE
Status: COMPLETED | OUTPATIENT
Start: 2023-06-28 | End: 2023-06-28

## 2023-06-28 RX ADMIN — IBUPROFEN 800 MG: 400 TABLET ORAL at 05:49

## 2023-06-28 ASSESSMENT — ENCOUNTER SYMPTOMS
SORE THROAT: 0
TROUBLE SWALLOWING: 0
HEADACHES: 1
FEVER: 0
RHINORRHEA: 0
FACIAL SWELLING: 0

## 2023-06-28 NOTE — ED PROVIDER NOTES
History   No chief complaint on file.    HPI  Jeanine Hernandez is a 23 year old female with a history of recent right lower molar extraction presenting for evaluation of pain in her right jaw.  Symptoms of been present over the past 4 days, few days after her tooth extraction.  She reports pain in the joint of her jaw and no pain within the tooth itself.  Denies fevers or chills.  No swelling in the face or neck.  Otherwise feeling well.    Allergies:  Allergies   Allergen Reactions     Contrast Dye Anaphylaxis     Iodine Anaphylaxis     Latex Hives       Problem List:    Patient Active Problem List    Diagnosis Date Noted     Bipolar disorder with depression (H) 08/02/2021     Priority: Medium     Seizures (H) 06/18/2021     Priority: Medium     Tobacco use 06/18/2021     Priority: Medium     High risk medication use 02/01/2021     Priority: Medium     Moderate episode of recurrent major depressive disorder (H) 11/23/2020     Priority: Medium     Postpartum onset.  Monitoring for a bipolar trajectory.         Insomnia due to psychological stress 11/23/2020     Priority: Medium     Vaginal delivery 05/17/2020     Priority: Medium     Elevated BP without diagnosis of hypertension 05/14/2020     Priority: Medium        Past Medical History:    Past Medical History:   Diagnosis Date     Anxiety      Asthma      Depression      History of urinary tract infection      OCD (obsessive compulsive disorder)        Past Surgical History:    Past Surgical History:   Procedure Laterality Date     FOOT SURGERY      right foot - ganglion cyst     MYRINGOTOMY, INSERT TUBE BILATERAL, COMBINED       ORTHOPEDIC SURGERY       TONSILLECTOMY       TYMPANOPLASTY         Family History:    Family History   Problem Relation Age of Onset     Diabetes Father      Neurologic Disorder Father         TBI     Hyperlipidemia Father      Chronic Obstructive Pulmonary Disease Paternal Grandmother      Cerebrovascular Disease Paternal  Grandmother      Heart Surgery Paternal Grandmother      Cerebrovascular Disease Paternal Grandfather      Diabetes Paternal Grandfather        Social History:  Marital Status:   [2]  Social History     Tobacco Use     Smoking status: Former     Packs/day: 0.00     Years: 0.00     Pack years: 0.00     Types: Cigarettes, Other     Smokeless tobacco: Never   Vaping Use     Vaping Use: Every day     Substances: Nicotine   Substance Use Topics     Alcohol use: Never     Drug use: Never        Medications:    acetaminophen (TYLENOL) 500 MG tablet  ibuprofen (ADVIL/MOTRIN) 200 MG tablet  ARIPiprazole (ABILIFY) 5 MG tablet  ciclopirox (LOPROX) 0.77 % cream  lamoTRIgine (LAMICTAL) 100 MG tablet  traZODone (DESYREL) 50 MG tablet          Review of Systems   Constitutional: Negative for fever.   HENT: Negative for dental problem, facial swelling, rhinorrhea, sore throat and trouble swallowing.         Pain in the right jaw with movement of the jaw   Neurological: Positive for headaches.   All other systems reviewed and are negative.      Physical Exam   BP: 125/70  Pulse: 88  Temp: 97  F (36.1  C)  Resp: 18  SpO2: 97 %      Physical Exam  Vitals and nursing note reviewed.   Constitutional:       Appearance: Normal appearance. She is not ill-appearing or diaphoretic.   HENT:      Head: Atraumatic.      Mouth/Throat:      Comments: Site of recent posterior inferior molar surgery appears to be well-healing.  No bleeding or swelling.  No tenderness to the gums.  No swelling along the jawline or neck to suggest infection.  Does have notable pain at the site of the TMJ.  No popping or dislocation.  Eyes:      Conjunctiva/sclera: Conjunctivae normal.   Cardiovascular:      Rate and Rhythm: Normal rate.      Pulses: Normal pulses.   Pulmonary:      Effort: Pulmonary effort is normal.   Skin:     General: Skin is warm and dry.      Capillary Refill: Capillary refill takes less than 2 seconds.   Neurological:      Mental Status:  She is alert and oriented to person, place, and time.   Psychiatric:         Mood and Affect: Mood normal.         ED Course                 Procedures             No results found for this or any previous visit (from the past 24 hour(s)).    Medications   ibuprofen (ADVIL/MOTRIN) tablet 800 mg (800 mg Oral $Given 6/28/23 0569)       Assessments & Plan (with Medical Decision Making)  23-year-old seen for evaluation of pain in her right jaw.  Pain is present with movement and when she woke with worsening pain tonight.  Does have a history of grinding of her teeth but is uncertain whether she is currently grinding her teeth.  Just had recent dental surgery which may have aggravated this.  No evidence of dental abscess or complication.  Appears to have acute TMJ syndrome.  Counseled patient on symptomatic treatment.  Recommended a soft diet, NSAIDs, and follow-up with her dentist to consider a mouthguard.     I have reviewed the nursing notes.    I have reviewed the findings, diagnosis, plan and need for follow up with the patient.           Discharge Medication List as of 6/28/2023  5:45 AM      START taking these medications    Details   acetaminophen (TYLENOL) 500 MG tablet Take 2 tablets (1,000 mg) by mouth every 8 hours as needed for fever or pain, Disp-30 tablet, R-0, OTC      ibuprofen (ADVIL/MOTRIN) 200 MG tablet Take 3 tablets (600 mg) by mouth every 8 hours as needed for pain, Disp-60 tablet, R-0, OTC             Final diagnoses:   Arthralgia of right temporomandibular joint       6/28/2023   [facility] EMERGENCY DEPT     Roblero, Harmeet David MD  06/28/23 0617

## 2023-06-28 NOTE — DISCHARGE INSTRUCTIONS
Follow-up with your dentist and asked them about a potential mouthguard to help with your jaw pain.

## 2023-06-28 NOTE — ED TRIAGE NOTES
Pt presents for eval of jaw pain and vomiting. Had a back right lower molar removed one week ago and pain has been persistent since. Vomiting started last night and has continued. No fevers.     Triage Assessment     Row Name 06/28/23 0502       Triage Assessment (Adult)    Airway WDL WDL       Respiratory WDL    Respiratory WDL WDL       Skin Circulation/Temperature WDL    Skin Circulation/Temperature WDL WDL       Cardiac WDL    Cardiac WDL WDL       Peripheral/Neurovascular WDL    Peripheral Neurovascular WDL WDL       Cognitive/Neuro/Behavioral WDL    Cognitive/Neuro/Behavioral WDL WDL

## 2023-06-29 ENCOUNTER — HOSPITAL ENCOUNTER (EMERGENCY)
Facility: CLINIC | Age: 23
Discharge: HOME | End: 2023-06-29
Attending: EMERGENCY MEDICINE | Admitting: EMERGENCY MEDICINE
Payer: COMMERCIAL

## 2023-06-29 ENCOUNTER — TELEPHONE (OUTPATIENT)
Dept: NURSING | Facility: CLINIC | Age: 23
End: 2023-06-29
Payer: COMMERCIAL

## 2023-06-29 VITALS
BODY MASS INDEX: 23.92 KG/M2 | HEIGHT: 63 IN | RESPIRATION RATE: 14 BRPM | SYSTOLIC BLOOD PRESSURE: 135 MMHG | TEMPERATURE: 98 F | HEART RATE: 86 BPM | DIASTOLIC BLOOD PRESSURE: 83 MMHG | OXYGEN SATURATION: 97 % | WEIGHT: 135 LBS

## 2023-06-29 DIAGNOSIS — M26.629 TMJ SYNDROME: ICD-10-CM

## 2023-06-29 DIAGNOSIS — R68.84 JAW PAIN: ICD-10-CM

## 2023-06-29 PROCEDURE — 99283 EMERGENCY DEPT VISIT LOW MDM: CPT | Performed by: EMERGENCY MEDICINE

## 2023-06-29 PROCEDURE — 250N000013 HC RX MED GY IP 250 OP 250 PS 637: Performed by: EMERGENCY MEDICINE

## 2023-06-29 RX ORDER — ACETAMINOPHEN 325 MG/1
650 TABLET ORAL ONCE
Status: COMPLETED | OUTPATIENT
Start: 2023-06-29 | End: 2023-06-29

## 2023-06-29 RX ORDER — OXYCODONE HYDROCHLORIDE 5 MG/1
5 TABLET ORAL EVERY 6 HOURS PRN
Qty: 6 TABLET | Refills: 0 | Status: SHIPPED | OUTPATIENT
Start: 2023-06-29 | End: 2023-07-21

## 2023-06-29 RX ADMIN — ACETAMINOPHEN 650 MG: 325 TABLET ORAL at 01:51

## 2023-06-29 NOTE — DISCHARGE INSTRUCTIONS
Return for swelling, rash, fevers, difficulty breathing, or other concerns.  Otherwise follow-up with your dentist. Apply ice for 10 to 15 minutes at a time every 2-3 hours while awake.    Use ibuprofen 400 mg and acetaminophen 650 mg every 6 hours for your symptoms.  Use oxycodone as needed for pain that is not controlled by the prior two medications.    Oxycodone is an addictive opioid medication, please only take it when the pain is more than can be controlled by acetaminophen or ibuprofen alone. It will also make you lightheaded, nauseated, and constipated.  Do not drive, operate heavy machinery, or take care of young children while taking this medication. Do not mix it with other medications or drugs that will make you sleepy, such as alcohol.     Repeated studies have shown that the longer you use opioid pain medications, the longer it is until you return to normal function. It is our recommendation that you taper off opioids as quickly as possible with the goal of returning to normal function or near normal function. Long term use of opioids quickly results in growing tolerance to the medication (less of the benefits) and increased dependence (more of the bad side effects).     Pain is very difficult to treat and we can very rarely take away pain completely. If you are having difficulty with pain over several weeks after an injury, you may need to start different medications and therapies (such as physical therapy, graded exercise, massage, and acupuncture). Please talk about this with your regular doctor.     If you are interested in seeking free, confidential treatment referral and information service for individuals and families facing mental health and/or substance use disorders please call [phone]-[email or web address] ([phone])

## 2023-06-29 NOTE — ED PROVIDER NOTES
History     Chief Complaint   Patient presents with     Jaw Pain     Right side     HPI  Jeanine Hernandez is a 23 year old female who presents for jaw pain.  Symptoms worse over the past several days since she had a tooth extraction in the back molar of the right lower jaw.  She had an infection in this area prior and had significant swelling per the patient but says this is improved dramatically.  No difficulty breathing but is having a lot of pain with chewing, pain with talking, pain with moving the jaw on the right side.  No swelling of the face.  No fevers, vomiting, or rash.  She has been using acetaminophen and ibuprofen without improvement.  The patient reports a history of TMJ syndrome and says this feels like that but much worse.    Reviewed the patient's emergency department visit yesterday, 6/28/2023 for similar complaints.  I reviewed the patient's prescription history on the [place] PMD, no prior opioid prescriptions in the past year.    Allergies:  Allergies   Allergen Reactions     Contrast Dye Anaphylaxis     Iodine Anaphylaxis     Latex Hives       Problem List:    Patient Active Problem List    Diagnosis Date Noted     Bipolar disorder with depression (H) 08/02/2021     Priority: Medium     Seizures (H) 06/18/2021     Priority: Medium     Tobacco use 06/18/2021     Priority: Medium     High risk medication use 02/01/2021     Priority: Medium     Moderate episode of recurrent major depressive disorder (H) 11/23/2020     Priority: Medium     Postpartum onset.  Monitoring for a bipolar trajectory.         Insomnia due to psychological stress 11/23/2020     Priority: Medium     Vaginal delivery 05/17/2020     Priority: Medium     Elevated BP without diagnosis of hypertension 05/14/2020     Priority: Medium        Past Medical History:    Past Medical History:   Diagnosis Date     Anxiety      Asthma      Depression      History of urinary tract infection      OCD (obsessive compulsive disorder)   "      Past Surgical History:    Past Surgical History:   Procedure Laterality Date     FOOT SURGERY      right foot - ganglion cyst     MYRINGOTOMY, INSERT TUBE BILATERAL, COMBINED       ORTHOPEDIC SURGERY       TONSILLECTOMY       TYMPANOPLASTY         Family History:    Family History   Problem Relation Age of Onset     Diabetes Father      Neurologic Disorder Father         TBI     Hyperlipidemia Father      Chronic Obstructive Pulmonary Disease Paternal Grandmother      Cerebrovascular Disease Paternal Grandmother      Heart Surgery Paternal Grandmother      Cerebrovascular Disease Paternal Grandfather      Diabetes Paternal Grandfather        Social History:  Marital Status:   [2]  Social History     Tobacco Use     Smoking status: Former     Packs/day: 0.00     Years: 0.00     Pack years: 0.00     Types: Cigarettes, Other     Smokeless tobacco: Never   Vaping Use     Vaping Use: Every day     Substances: Nicotine   Substance Use Topics     Alcohol use: Never     Drug use: Never        Medications:    oxyCODONE (ROXICODONE) 5 MG tablet  acetaminophen (TYLENOL) 500 MG tablet  ARIPiprazole (ABILIFY) 5 MG tablet  ciclopirox (LOPROX) 0.77 % cream  ibuprofen (ADVIL/MOTRIN) 200 MG tablet  lamoTRIgine (LAMICTAL) 100 MG tablet  traZODone (DESYREL) 50 MG tablet          Review of Systems    Physical Exam   BP: 135/83  Pulse: 86  Temp: 98  F (36.7  C)  Resp: 14  Height: 160 cm (5' 3\")  Weight: 61.2 kg (135 lb)  SpO2: 97 %      Physical Exam  Constitutional:       General: She is not in acute distress.     Appearance: Normal appearance. She is well-developed.   HENT:      Head: Normocephalic and atraumatic.      Jaw: Pain on movement present. No trismus, tenderness or swelling.      Right Ear: Tympanic membrane and ear canal normal.      Mouth/Throat:      Dentition: No dental tenderness or dental abscesses.      Comments: No dry socket within the right lower molar.  Eyes:      General: No scleral icterus.     " Conjunctiva/sclera: Conjunctivae normal.   Cardiovascular:      Rate and Rhythm: Normal rate.   Pulmonary:      Effort: Pulmonary effort is normal. No respiratory distress.   Musculoskeletal:      Cervical back: Normal range of motion and neck supple.   Skin:     General: Skin is warm and dry.      Findings: No rash.   Neurological:      Mental Status: She is alert and oriented to person, place, and time.         ED Course                 Procedures              Critical Care time:  none               No results found for this or any previous visit (from the past 24 hour(s)).    Medications   acetaminophen (TYLENOL) tablet 650 mg (has no administration in time range)       Assessments & Plan (with Medical Decision Making)   23-year-old female with recent tooth extraction presents for pain in the right jaw.  No signs of dry socket on exam.  No signs of abscess or infection.  No signs of cellulitis.  Symptoms consistent with TMJ syndrome.  She is safe to discharge home with instructions to use ice for 10 to 15 minutes at a time, acetaminophen and ibuprofen scheduled, oxycodone as needed.  I have prescribed a short course of oxycodone for the pain.  She is told return if worse, otherwise follow-up with her dentist.  The patient is in agreement with this plan.    I have reviewed the nursing notes.    I have reviewed the findings, diagnosis, plan and need for follow up with the patient.           Medical Decision Making  The patient's presentation was of high complexity (a chronic illness severe exacerbation, progression, or side effect of treatment).    The patient's evaluation involved:  review of external note(s) from 2 sources (see separate area of note for details)    The patient's management necessitated moderate risk (prescription drug management including medications given in the ED).        New Prescriptions    OXYCODONE (ROXICODONE) 5 MG TABLET    Take 1 tablet (5 mg) by mouth every 6 hours as needed for severe  pain       Final diagnoses:   Jaw pain   TMJ syndrome       6/29/2023   [facility] EMERGENCY DEPT     Vincent Escobar MD  06/29/23 015

## 2023-06-29 NOTE — LETTER
June 29, 2023      To Whom It May Concern:      Jeanine Hernandez was seen in our Emergency Department today, 06/29/23.  I expect her condition to improve over the next 1-2 days.  She may return to work/school when improved.    Sincerely,        Vincent Escobar MD

## 2023-06-29 NOTE — ED TRIAGE NOTES
Comes to the ED for continued right sided TMJ pain. Had a tooth extracted ~ 1 week aog. Seen in the ED yesterday. Pain continues.

## 2023-07-21 ENCOUNTER — OFFICE VISIT (OUTPATIENT)
Dept: FAMILY MEDICINE | Facility: CLINIC | Age: 23
End: 2023-07-21
Payer: COMMERCIAL

## 2023-07-21 VITALS
BODY MASS INDEX: 26.89 KG/M2 | OXYGEN SATURATION: 98 % | TEMPERATURE: 97.7 F | RESPIRATION RATE: 18 BRPM | HEIGHT: 65 IN | SYSTOLIC BLOOD PRESSURE: 119 MMHG | WEIGHT: 161.4 LBS | HEART RATE: 89 BPM | DIASTOLIC BLOOD PRESSURE: 78 MMHG

## 2023-07-21 DIAGNOSIS — Z32.01 PREGNANCY TEST POSITIVE: Primary | ICD-10-CM

## 2023-07-21 DIAGNOSIS — F33.1 MODERATE EPISODE OF RECURRENT MAJOR DEPRESSIVE DISORDER (H): ICD-10-CM

## 2023-07-21 LAB — HCG UR QL: POSITIVE

## 2023-07-21 PROCEDURE — 81025 URINE PREGNANCY TEST: CPT

## 2023-07-21 PROCEDURE — 96127 BRIEF EMOTIONAL/BEHAV ASSMT: CPT

## 2023-07-21 PROCEDURE — 99213 OFFICE O/P EST LOW 20 MIN: CPT

## 2023-07-21 RX ORDER — PRENATAL VIT/IRON FUM/FOLIC AC 27MG-0.8MG
1 TABLET ORAL DAILY
Qty: 90 TABLET | Refills: 3 | Status: SHIPPED | OUTPATIENT
Start: 2023-07-21 | End: 2024-01-18

## 2023-07-21 RX ORDER — ARIPIPRAZOLE 20 MG/1
30 TABLET ORAL DAILY
COMMUNITY
Start: 2023-01-31 | End: 2024-08-06

## 2023-07-21 RX ORDER — LAMOTRIGINE 25 MG/1
25 TABLET ORAL DAILY
Status: ON HOLD | COMMUNITY
Start: 2023-01-23 | End: 2024-02-25

## 2023-07-21 ASSESSMENT — PATIENT HEALTH QUESTIONNAIRE - PHQ9
SUM OF ALL RESPONSES TO PHQ QUESTIONS 1-9: 5
SUM OF ALL RESPONSES TO PHQ QUESTIONS 1-9: 5
10. IF YOU CHECKED OFF ANY PROBLEMS, HOW DIFFICULT HAVE THESE PROBLEMS MADE IT FOR YOU TO DO YOUR WORK, TAKE CARE OF THINGS AT HOME, OR GET ALONG WITH OTHER PEOPLE: NOT DIFFICULT AT ALL

## 2023-07-21 NOTE — PROGRESS NOTES
Assessment & Plan     Pregnancy test positive  LMP 6/5. Pregnancy test positive KASSIE 3/11/2024. Counseled on starting prenatal vitamin. She would like to see the same OBGYN that delivered her daughter. Placed referral.   - HCG qualitative urine  - Prenatal Vit-Fe Fumarate-FA (PRENATAL MULTIVITAMIN W/IRON) 27-0.8 MG tablet  Dispense: 90 tablet; Refill: 3  - Ob/Gyn Referral    Moderate episode of recurrent major depressive disorder (H)  Stable on lamotrigine 125mg and aripiprazole 20mg. Based on pregnancy category continue for now. Counseled that she will need to discuss with her ob regarding dose adjustments or medication changes.      WILMER Britton CNP  [facility]    Rachael Saeed is a 23 year old, presenting for the following health issues:  office visit  and Recheck Medication (Pt is here for office visit  )        7/21/2023     8:54 AM   Additional Questions   Roomed by evaristo         7/21/2023     8:54 AM   Patient Reported Additional Medications   Patient reports taking the following new medications no     History of Present Illness       Reason for visit:  Pregnacy test    She eats 0-1 servings of fruits and vegetables daily.She consumes 0 sweetened beverage(s) daily.She exercises with enough effort to increase her heart rate 10 to 19 minutes per day.  She exercises with enough effort to increase her heart rate 5 days per week. She is missing 1 dose(s) of medications per week.  She is not taking prescribed medications regularly due to remembering to take.    Today's PHQ-9         PHQ-9 Total Score: 5    PHQ-9 Q9 Thoughts of better off dead/self-harm past 2 weeks :   Not at all    How difficult have these problems made it for you to do your work, take care of things at home, or get along with other people: Not difficult at all       Pt is here for office visit     Last period June 5. Has been nauseated. Positive pregnancy test at home, here for confirmation. Has a 3  "year old daughter at home. Has been taking a multivitamin.     History of depression  Lamictal 125  Aripiprazole 20mg    Review of Systems   Constitutional, HEENT, cardiovascular, pulmonary, gi and gu systems are negative, except as otherwise noted.      Objective    /78 (BP Location: Left arm, Patient Position: Sitting, Cuff Size: Adult Regular)   Pulse 89   Temp 97.7  F (36.5  C) (Oral)   Resp 18   Ht 1.641 m (5' 4.61\")   Wt 73.2 kg (161 lb 6.4 oz)   LMP 06/05/2023 (Approximate)   SpO2 98%   BMI 27.19 kg/m    Body mass index is 27.19 kg/m .     Physical Exam   GENERAL: healthy, alert and no distress  MS: no gross musculoskeletal defects noted, no edema  PSYCH: mentation appears normal, affect normal/bright    Results for orders placed or performed in visit on 07/21/23 (from the past 24 hour(s))   HCG qualitative urine   Result Value Ref Range    hCG Urine Qualitative Positive (A) Negative                 "

## 2023-07-30 ENCOUNTER — NURSE TRIAGE (OUTPATIENT)
Dept: NURSING | Facility: CLINIC | Age: 23
End: 2023-07-30
Payer: COMMERCIAL

## 2023-07-30 NOTE — TELEPHONE ENCOUNTER
OB Triage Call      Is patient's OB/Midwife with the [facility] or [facility]? [facility]- Proceed with triage     Reason for call: Almost 8 weeks pregnant - patient experienced   Mild to moderate cramping and pink spotting off and on last night. Noticed a pink tinge when wiping after using the toilet throughout the night. No need to wear a panty liner. Both bleeding and cramping resolved early this morning. No recent sexual activity, but Patient states she has been under abnormal stress recently    Assessment:   No pain or cramping now  No spotting or bleeding  No fever  No lightheadedness  Staying well hydrated    Plan: Home care. Call back with any questions or if symptoms return.     Patient plans to deliver at  unknown    Patient's primary OB Provider is [facility].      Per protocol recommendations Patient to follow home care recommendations.     Is patient's delivering hospital on divert? Does not apply due to Patient given home care instructions      Unknown    Estimated Date of Delivery: Data Unavailable        OB History    Para Term  AB Living   1 1 1 0 0 1   SAB IAB Ectopic Multiple Live Births   0 0 0 0 1      # Outcome Date GA Lbr Macho/2nd Weight Sex Delivery Anes PTL Lv   1 Term 20 39w1d / 02:27 3.459 kg (7 lb 10 oz) F Vag-Spont EPI N TONI      Complications: Chorioamnionitis      Name: Jessie      Apgar1: 8  Apgar5: 9       Lab Results   Component Value Date    GBS Negative 2020          Ruby Guillermo RN 23 3:10 PM  [facility] Nurse Advisor  Reason for Disposition   SPOTTING (single or brief episode)    Additional Information   Negative: Shock suspected (e.g., cold/pale/clammy skin, too weak to stand, low BP, rapid pulse)   Negative: Difficult to awaken or acting confused (e.g., disoriented, slurred speech)   Negative: Passed out (i.e., lost consciousness, collapsed and was not responding)   Negative: Sounds like a life-threatening emergency to the  "triager   Negative: [1] Vaginal bleeding AND [2] pregnant 20 or more weeks   Negative: Not pregnant or pregnancy status unknown   Negative: SEVERE abdominal pain   Negative: [1] SEVERE vaginal bleeding (e.g., soaking 2 pads / hour, large blood clots) AND [2] present 2 or more hours   Negative: SEVERE dizziness (e.g., unable to stand, requires support to walk, feels like passing out)   Negative: [1] MODERATE vaginal bleeding (e.g., soaking 1 pad per hour; clots) AND [2] present > 6 hours   Negative: [1] MODERATE vaginal bleeding (e.g., soaking 1 pad per hour; clots) AND [2] pregnant > 12 weeks   Negative: Passed tissue (e.g., gray-white)   Negative: Shoulder pain   Negative: Pale skin (pallor) of new-onset or worsening   Negative: Patient sounds very sick or weak to the triager   Negative: [1] Constant abdominal pain AND [2] present > 2 hours   Negative: Fever > 100.4 F (38.0 C)   Negative: [1] Intermittent lower abdominal pain (e.g., cramping) AND [2] present > 24 hours   Negative: Prior history of \"ectopic pregnancy\" or previous tubal surgery (e.g., tubal ligation)   Negative: Pain or burning with passing urine (urination)   Negative: MODERATE vaginal bleeding (e.g., soaking 1 pad per hour; clots)   Negative: Has IUD   Negative: MILD vaginal bleeding (i.e., less than 1 pad / hour; less than patient's usual menstrual bleeding; not just spotting)   Negative: SPOTTING lasts > 48 hours or spotting happens more than once in a week   Negative: Unusual vaginal discharge (e.g., bad smelling, yellow, green, or foamy-white)   Negative: Not feeling pregnant any longer (e.g., breast tenderness or nausea has disappeared)   Negative: SPOTTING after a pelvic examination (single or brief episode)   Negative: SPOTTING after sexual intercourse (single or brief episode)    Protocols used: Pregnancy - Vaginal Bleeding Less Than 20 Weeks EGA-A-AH    "

## 2023-08-01 ENCOUNTER — VIRTUAL VISIT (OUTPATIENT)
Dept: OBGYN | Facility: CLINIC | Age: 23
End: 2023-08-01
Payer: COMMERCIAL

## 2023-08-01 DIAGNOSIS — Z34.80 PRENATAL CARE OF MULTIGRAVIDA, ANTEPARTUM: Primary | ICD-10-CM

## 2023-08-01 PROCEDURE — 99207 PR NO CHARGE NURSE ONLY: CPT
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
## 2023-08-01 NOTE — PATIENT INSTRUCTIONS
Learning About Pregnancy  Your Care Instructions     Your health in the early weeks of your pregnancy is particularly important for your baby's health. Take good care of yourself. Anything you do that harms your body can also harm your baby.  Make sure to go to all of your doctor appointments. Regular checkups will help keep you and your baby healthy.  How can you care for yourself at home?  Diet    Eat a balanced diet. Make sure your diet includes plenty of beans, peas, and leafy green vegetables.     Do not skip meals or go for many hours without eating. If you are nauseated, try to eat a small, healthy snack every 2 to 3 hours.     Do not eat fish that has a high level of mercury, such as shark, swordfish, or mackerel. Do not eat more than one can of tuna each week.     Drink plenty of fluids. If you have kidney, heart, or liver disease and have to limit fluids, talk with your doctor before you increase the amount of fluids you drink.     Cut down on caffeine, such as coffee, tea, and cola.     Do not drink alcohol, such as beer, wine, or hard liquor.     Take a multivitamin that contains at least 400 micrograms (mcg) of folic acid to help prevent birth defects. Fortified cereal and whole wheat bread are good additional sources of folic acid.     Increase the calcium in your diet. Try to drink a quart of skim milk each day. You may also take calcium supplements and choose foods such as cheese and yogurt.   Lifestyle    Make sure you go to your follow-up appointments.     Get plenty of rest. You may be unusually tired while you are pregnant.     Get at least 30 minutes of exercise on most days of the week. Walking is a good choice. If you have not exercised in the past, start out slowly. Take several short walks each day.     Do not smoke. If you need help quitting, talk to your doctor about stop-smoking programs. These can increase your chances of quitting for good.     Do not touch cat feces or litter boxes.  Also, wash your hands after you handle raw meat, and fully cook all meat before you eat it. Wear gloves when you work in the yard or garden, and wash your hands well when you are done. Cat feces, raw or undercooked meat, and contaminated dirt can cause an infection that may harm your baby or lead to a miscarriage.     Avoid things that can make your body too hot and may be harmful to your baby, such as a hot tub or sauna. Or talk with your doctor before doing anything that raises your body temperature. Your doctor can tell you if it's safe.     Avoid chemical fumes, paint fumes, or poisons.     Do not use illegal drugs, marijuana, or alcohol.   Medicines    Review all of your medicines with your doctor. Some of your routine medicines may need to be changed to protect your baby.     Use acetaminophen (Tylenol) to relieve minor problems, such as a mild headache or backache or a mild fever with cold symptoms. Do not use nonsteroidal anti-inflammatory drugs (NSAIDs), such as ibuprofen (Advil, Motrin) or naproxen (Aleve), unless your doctor says it is okay.     Do not take two or more pain medicines at the same time unless the doctor told you to. Many pain medicines have acetaminophen, which is Tylenol. Too much acetaminophen (Tylenol) can be harmful.     Take your medicines exactly as prescribed. Call your doctor if you think you are having a problem with your medicine.   To manage morning sickness    If you feel sick when you first wake up, try eating a small snack (such as crackers) before you get out of bed. Allow some time to digest the snack, and then get out of bed slowly.     Do not skip meals or go for long periods without eating. An empty stomach can make nausea worse.     Eat small, frequent meals instead of three large meals each day.     Drink plenty of fluids.     Eat foods that are high in protein but low in fat.     If you are taking iron supplements, ask your doctor if they are necessary. Iron can make  "nausea worse.     Avoid any smells, such as coffee, that make you feel sick.     Get lots of rest. Morning sickness may be worse when you are tired.   Follow-up care is a key part of your treatment and safety. Be sure to make and go to all appointments, and call your doctor if you are having problems. It's also a good idea to know your test results and keep a list of the medicines you take.  Where can you learn more?  Go to https://www.[employer].net/patiented  Enter E868 in the search box to learn more about \"Learning About Pregnancy.\"  Current as of: November 9, 2022               Content Version: 13.7    1992-7705 [employer].   Care instructions adapted under license by your healthcare professional. If you have questions about a medical condition or this instruction, always ask your healthcare professional. [employer] disclaims any warranty or liability for your use of this information.      Weeks 6 to 10 of Your Pregnancy: Care Instructions  During these weeks of pregnancy, your body goes through many changes. You may start to feel different, both in your body and your emotions. Each pregnancy is different, so there's no \"right\" way to feel. These early weeks are a time to make healthy choices for you and your pregnancy.    Take a daily prenatal vitamin. Choose one with folic acid in it.   Avoid alcohol, tobacco, and drugs (including marijuana). If you need help quitting, talk to your doctor.     Drink plenty of liquids.  Be sure to drink enough water. And limit sodas, other sweetened drinks, and caffeine.     Choose foods that are good sources of calcium, iron, and folate.  You can try dairy products, dark leafy greens, fortified orange juice and cereals, almonds, broccoli, dried fruit, and beans.     Avoid foods that may be harmful.  Don't eat raw meat, deli meat, raw seafood, or raw eggs. Avoid soft cheese and unpasteurized dairy, like Brie and blue cheese. And don't eat fish that " "contains a lot of mercury, like shark and swordfish.     Don't touch preethi litter or cat poop.  They can cause an infection that could be harmful during pregnancy.     Avoid things that can make your body too hot.  For example, avoid hot tubs and saunas.     Soothe morning sickness.  Try eating 5 or 6 small meals a day, getting some fresh air, or using avinash to control symptoms.     Ask your doctor about flu and COVID-19 shots.  Getting them can help protect against infection.   Follow-up care is a key part of your treatment and safety. Be sure to make and go to all appointments, and call your doctor if you are having problems. It's also a good idea to know your test results and keep a list of the medicines you take.  Where can you learn more?  Go to https://www.[email or web address]/patiented  Enter G112 in the search box to learn more about \"Weeks 6 to 10 of Your Pregnancy: Care Instructions.\"  Current as of: November 9, 2022               Content Version: 13.7 2006-2023 [employer].   Care instructions adapted under license by your healthcare professional. If you have questions about a medical condition or this instruction, always ask your healthcare professional. [employer] disclaims any warranty or liability for your use of this information.         Managing Morning Sickness (01:55)  Your health professional recommends that you watch this short online health video.  Learn tips for dealing with morning sickness, no matter what time of day you have it.  Purpose:  Gives tips for managing morning sickness, including eating small low-fat meals and avoiding caffeine and spicy food.  Goal:  The user will learn tips for dealing with morning sickness during pregnancy.     How to watch the video    Scan the QR code   OR Visit the website    https://hwi.[email or web address]   Current as of: November 9, 2022               Content Version: 13.7 2006-2023 [employer].   Care instructions " adapted under license by your healthcare professional. If you have questions about a medical condition or this instruction, always ask your healthcare professional. Healthwise, [employer] disclaims any warranty or liability for your use of this information.      Pregnancy and Heartburn: Care Instructions  Overview     Heartburn is a common problem during pregnancy.  Heartburn happens when stomach acid backs up into the tube that carries food to the stomach. This tube is called the esophagus. Early in pregnancy, heartburn is caused by hormone changes that slow down digestion. Later on, it's also caused by the large uterus pushing up on the stomach.  Even though you can't fix the cause, there are things you can do to get relief. Treating heartburn during pregnancy focuses first on making lifestyle changes, like changing what and how you eat, and on taking medicines.  Heartburn usually improves or goes away after childbirth.  Follow-up care is a key part of your treatment and safety. Be sure to make and go to all appointments, and call your doctor if you are having problems. It's also a good idea to know your test results and keep a list of the medicines you take.  How can you care for yourself at home?  Eat small, frequent meals.  Avoid foods that make your symptoms worse, such as chocolate, peppermint, and spicy foods. Avoid drinks with caffeine, such as coffee, tea, and sodas.  Avoid bending over or lying down after meals.  Take a short walk after you eat.  If heartburn is a problem at night, do not eat for 2 hours before bedtime.  Take antacids like Mylanta, Maalox, Rolaids, or Tums. Do not take antacids that have sodium bicarbonate, magnesium trisilicate, or aspirin. Be careful when you take over-the-counter antacid medicines. Many of these medicines have aspirin in them. While you are pregnant, do not take aspirin or medicines that contain aspirin unless your doctor says it is okay.  If you're not getting  "relief, talk to your doctor. You may be able to take a stronger acid-reducing medicine.  When should you call for help?   Call your doctor now or seek immediate medical care if:    You have new or worse belly pain.     You are vomiting.   Watch closely for changes in your health, and be sure to contact your doctor if:    You have new or worse symptoms of reflux.     You are losing weight.     You have trouble or pain swallowing.     You do not get better as expected.   Where can you learn more?  Go to https://www.[employer].net/patiented  Enter U946 in the search box to learn more about \"Pregnancy and Heartburn: Care Instructions.\"  Current as of: November 9, 2022               Content Version: 13.7 2006-2023 [employer].   Care instructions adapted under license by your healthcare professional. If you have questions about a medical condition or this instruction, always ask your healthcare professional. [employer] disclaims any warranty or liability for your use of this information.      Constipation: Care Instructions  Overview     Constipation means that you have a hard time passing stools (bowel movements). People pass stools from 3 times a day to once every 3 days. What is normal for you may be different. Constipation may occur with pain in the rectum and cramping. The pain may get worse when you try to pass stools. Sometimes there are small amounts of bright red blood on toilet paper or the surface of stools. This is because of enlarged veins near the rectum (hemorrhoids).  A few changes in your diet and lifestyle may help you avoid ongoing constipation. Your doctor may also prescribe medicine to help loosen your stool.  Some medicines can cause constipation. These include pain medicines and antidepressants. Tell your doctor about all the medicines you take. Your doctor may want to make a medicine change to ease your symptoms.  Follow-up care is a key part of your treatment and " "safety. Be sure to make and go to all appointments, and call your doctor if you are having problems. It's also a good idea to know your test results and keep a list of the medicines you take.  How can you care for yourself at home?  Drink plenty of fluids. If you have kidney, heart, or liver disease and have to limit fluids, talk with your doctor before you increase the amount of fluids you drink.  Include high-fiber foods in your diet each day. These include fruits, vegetables, beans, and whole grains.  Get at least 30 minutes of exercise on most days of the week. Walking is a good choice. You also may want to do other activities, such as running, swimming, cycling, or playing tennis or team sports.  Take a fiber supplement, such as Citrucel or Metamucil, every day. Read and follow all instructions on the label.  Schedule time each day for a bowel movement. A daily routine may help. Take your time having a bowel movement, but don't sit for more than 10 minutes at a time. And don't strain too much.  Support your feet with a small step stool when you sit on the toilet. This helps flex your hips and places your pelvis in a squatting position.  Your doctor may recommend an over-the-counter laxative to relieve your constipation. Examples are Milk of Magnesia and MiraLax. Read and follow all instructions on the label. Do not use laxatives on a long-term basis.  When should you call for help?   Call your doctor now or seek immediate medical care if:    You have new or worse belly pain.     You have new or worse nausea or vomiting.     You have blood in your stools.   Watch closely for changes in your health, and be sure to contact your doctor if:    Your constipation is getting worse.     You do not get better as expected.   Where can you learn more?  Go to https://www.healthwise.net/patiented  Enter P343 in the search box to learn more about \"Constipation: Care Instructions.\"  Current as of: March 22, " 2023               Content Version: 13.7    6448-6246 [employer].   Care instructions adapted under license by your healthcare professional. If you have questions about a medical condition or this instruction, always ask your healthcare professional. [employer] disclaims any warranty or liability for your use of this information.      Learning About Prenatal Visits  Your Care Instructions     Regular prenatal visits are very important during any pregnancy. These quick office visits may seem simple and routine. But they can help you and your baby stay healthy. Your doctor is watching for problems that can only be found by regularly checking you and your baby. The visits also give you and your doctor time to build a good relationship.  Many women have prenatal visits every 4 to 6 weeks until week 28 of pregnancy. Then the visits become more frequent. This is often every 2 to 3 weeks through week 36 of pregnancy. In the final month of pregnancy, you likely will see your doctor every week. You may have a different schedule if you have a medical problem or are a teen.  At different times in your pregnancy, you will have exams and tests. Some are routine. Others are done only when there is a chance of a problem. Everything healthy you do for your body helps your growing baby. Rest when you need it. Eat well, drink plenty of water, and exercise regularly.  What happens during a prenatal visit?  You will have blood pressure checks, along with urine tests. You also may have blood tests. If you need to go to the bathroom while waiting for the doctor, tell the nurse. He or she will give you a sample cup so your urine can be tested.  You will be weighed and have your belly measured.  Your doctor may listen to your baby's heartbeat with a special stethoscope.  In your second trimester, your doctor will check your blood sugar (glucose tolerance test) for diabetes that can occur during pregnancy. This  "is gestational diabetes, which can harm your baby.  You will have tests to check for infections that could harm your . These include group B streptococcus and hepatitis B.  Your doctor may do ultrasounds to check for problems. This also checks your baby's position. An ultrasound uses sound waves to produce a picture of your baby.  You may have other tests at any time during your pregnancy.  Use your visits to discuss with your doctor any concerns you have.  How can you care for yourself at home?  Get plenty of rest.  Exercise every day, if your doctor says it is okay. If you have not exercised in the past, start out slowly. Take many short walks each day.  Eat a balanced diet. Make sure your diet includes plenty of beans, peas, and leafy green vegetables.  Drink plenty of fluids. Cut down on drinks with caffeine, such as coffee, tea, and cola. If you have kidney, heart, or liver disease and have to limit fluids, talk with your doctor before you increase the amount of fluids you drink.  Avoid chemical fumes, paint fumes, and poisons. Do not use alcohol, marijuana, or illegal drugs. Do not smoke, vape, or use tobacco. If you need help quitting, talk to your doctor about stop-smoking programs and medicines. These can increase your chances of quitting for good.  Review all of your medicines with your doctor. Some of your routine medicines may need to be changed to protect your baby. Do not stop or start taking any medicines without talking to your doctor first.  Follow-up care is a key part of your treatment and safety. Be sure to make and go to all appointments, and call your doctor if you are having problems. It's also a good idea to know your test results and keep a list of the medicines you take.  Where can you learn more?  Go to https://www.healthwise.net/patiented  Enter J502 in the search box to learn more about \"Learning About Prenatal Visits.\"  Current as of: 2022               Content " Version: 13.7    7373-9871 [employer].   Care instructions adapted under license by your healthcare professional. If you have questions about a medical condition or this instruction, always ask your healthcare professional. [employer] disclaims any warranty or liability for your use of this information.      Vaginal Bleeding During Pregnancy: Care Instructions  Overview     It's common to have some vaginal spotting when you are pregnant. In some cases, the bleeding isn't serious. And there aren't any more problems with the pregnancy.  But sometimes bleeding is a sign of a more serious problem. This is more common if the bleeding is heavy or painful. Examples of more serious problems include miscarriage, an ectopic pregnancy, and a problem with the placenta.  You may have to see your doctor again to be sure everything is okay. You may also need more tests to find the cause of the bleeding.  Home treatment may be all you need. But it depends on what is causing the bleeding. Be sure to tell your doctor if you have any new symptoms or if your symptoms get worse.  The doctor has checked you carefully, but problems can develop later. If you notice any problems or new symptoms, get medical treatment right away.  Follow-up care is a key part of your treatment and safety. Be sure to make and go to all appointments, and call your doctor if you are having problems. It's also a good idea to know your test results and keep a list of the medicines you take.  How can you care for yourself at home?  If your doctor prescribed medicines, take them exactly as directed. Call your doctor if you think you are having a problem with your medicine.  Do not have vaginal sex until your doctor says it's okay.  Do not put anything in your vagina until your doctor says it's okay.  Ask your doctor about other activities you can or can't do.  Get a lot of rest. Being pregnant can make you tired.  Do not use nonsteroidal  "anti-inflammatory drugs (NSAIDs), such as ibuprofen (Advil, Motrin), naproxen (Aleve), or aspirin, unless your doctor says it is okay.  When should you call for help?   Call 911 anytime you think you may need emergency care. For example, call if:    You passed out (lost consciousness).     You have severe vaginal bleeding. This means you are soaking through a pad each hour for 2 or more hours.     You have sudden, severe pain in your belly or pelvis.   Call your doctor now or seek immediate medical care if:    You have new or worse vaginal bleeding.     You are dizzy or lightheaded, or you feel like you may faint.     You have pain in your belly, pelvis, or lower back.     You think that you are in labor.     You have a sudden release of fluid from your vagina.     You've been having regular contractions for an hour. This means that you've had at least 8 contractions within 1 hour or at least 4 contractions within 20 minutes, even after you change your position and drink fluids.     You notice that your baby has stopped moving or is moving much less than normal.   Watch closely for changes in your health, and be sure to contact your doctor if you have any problems.  Where can you learn more?  Go to https://www.[employer].net/patiented  Enter N829 in the search box to learn more about \"Vaginal Bleeding During Pregnancy: Care Instructions.\"  Current as of: November 9, 2022               Content Version: 13.7    1439-3316 [employer].   Care instructions adapted under license by your healthcare professional. If you have questions about a medical condition or this instruction, always ask your healthcare professional. [employer] disclaims any warranty or liability for your use of this information.      "

## 2023-08-13 LAB
ABO/RH(D): NORMAL
ANTIBODY SCREEN: NEGATIVE
SPECIMEN EXPIRATION DATE: NORMAL

## 2023-08-14 ENCOUNTER — PRENATAL OFFICE VISIT (OUTPATIENT)
Dept: OBGYN | Facility: CLINIC | Age: 23
End: 2023-08-14
Payer: COMMERCIAL

## 2023-08-14 VITALS
TEMPERATURE: 97.6 F | HEART RATE: 85 BPM | DIASTOLIC BLOOD PRESSURE: 73 MMHG | SYSTOLIC BLOOD PRESSURE: 121 MMHG | BODY MASS INDEX: 27.61 KG/M2 | HEIGHT: 64 IN | WEIGHT: 161.7 LBS | RESPIRATION RATE: 16 BRPM

## 2023-08-14 DIAGNOSIS — Z34.80 PRENATAL CARE OF MULTIGRAVIDA, ANTEPARTUM: Primary | ICD-10-CM

## 2023-08-14 DIAGNOSIS — Z87.59 HISTORY OF GESTATIONAL HYPERTENSION: ICD-10-CM

## 2023-08-14 LAB
ALBUMIN MFR UR ELPH: <4 MG/DL
ALBUMIN UR-MCNC: NEGATIVE MG/DL
ALT SERPL W P-5'-P-CCNC: 6 U/L (ref 0–50)
APPEARANCE UR: CLEAR
AST SERPL W P-5'-P-CCNC: 17 U/L (ref 0–45)
BILIRUB UR QL STRIP: NEGATIVE
COLOR UR AUTO: YELLOW
CREAT SERPL-MCNC: 0.61 MG/DL (ref 0.51–0.95)
CREAT UR-MCNC: 24 MG/DL
ERYTHROCYTE [DISTWIDTH] IN BLOOD BY AUTOMATED COUNT: 12.1 % (ref 10–15)
GFR SERPL CREATININE-BSD FRML MDRD: >90 ML/MIN/1.73M2
GLUCOSE UR STRIP-MCNC: NEGATIVE MG/DL
HCT VFR BLD AUTO: 40.7 % (ref 35–47)
HGB BLD-MCNC: 13.4 G/DL (ref 11.7–15.7)
HGB UR QL STRIP: NEGATIVE
KETONES UR STRIP-MCNC: NEGATIVE MG/DL
LEUKOCYTE ESTERASE UR QL STRIP: NEGATIVE
MCH RBC QN AUTO: 28.2 PG (ref 26.5–33)
MCHC RBC AUTO-ENTMCNC: 32.9 G/DL (ref 31.5–36.5)
MCV RBC AUTO: 86 FL (ref 78–100)
NITRATE UR QL: NEGATIVE
PH UR STRIP: 5.5 [PH] (ref 5–7)
PLATELET # BLD AUTO: 284 10E3/UL (ref 150–450)
PROT/CREAT 24H UR: NORMAL MG/G{CREAT}
RBC # BLD AUTO: 4.75 10E6/UL (ref 3.8–5.2)
RBC #/AREA URNS AUTO: ABNORMAL /HPF
SP GR UR STRIP: <=1.005 (ref 1–1.03)
SQUAMOUS #/AREA URNS AUTO: ABNORMAL /LPF
UROBILINOGEN UR STRIP-ACNC: 0.2 E.U./DL
WBC # BLD AUTO: 15.4 10E3/UL (ref 4–11)
WBC #/AREA URNS AUTO: ABNORMAL /HPF

## 2023-08-14 PROCEDURE — 84450 TRANSFERASE (AST) (SGOT): CPT | Performed by: OBSTETRICS & GYNECOLOGY

## 2023-08-14 PROCEDURE — 87340 HEPATITIS B SURFACE AG IA: CPT | Performed by: OBSTETRICS & GYNECOLOGY

## 2023-08-14 PROCEDURE — 99203 OFFICE O/P NEW LOW 30 MIN: CPT | Mod: 25 | Performed by: OBSTETRICS & GYNECOLOGY

## 2023-08-14 PROCEDURE — 87086 URINE CULTURE/COLONY COUNT: CPT | Performed by: OBSTETRICS & GYNECOLOGY

## 2023-08-14 PROCEDURE — 86901 BLOOD TYPING SEROLOGIC RH(D): CPT | Performed by: OBSTETRICS & GYNECOLOGY

## 2023-08-14 PROCEDURE — 84460 ALANINE AMINO (ALT) (SGPT): CPT | Performed by: OBSTETRICS & GYNECOLOGY

## 2023-08-14 PROCEDURE — 86780 TREPONEMA PALLIDUM: CPT | Performed by: OBSTETRICS & GYNECOLOGY

## 2023-08-14 PROCEDURE — 86850 RBC ANTIBODY SCREEN: CPT | Performed by: OBSTETRICS & GYNECOLOGY

## 2023-08-14 PROCEDURE — 87491 CHLMYD TRACH DNA AMP PROBE: CPT | Performed by: OBSTETRICS & GYNECOLOGY

## 2023-08-14 PROCEDURE — 36415 COLL VENOUS BLD VENIPUNCTURE: CPT | Performed by: OBSTETRICS & GYNECOLOGY

## 2023-08-14 PROCEDURE — 86762 RUBELLA ANTIBODY: CPT | Performed by: OBSTETRICS & GYNECOLOGY

## 2023-08-14 PROCEDURE — 85027 COMPLETE CBC AUTOMATED: CPT | Performed by: OBSTETRICS & GYNECOLOGY

## 2023-08-14 PROCEDURE — 81001 URINALYSIS AUTO W/SCOPE: CPT | Performed by: OBSTETRICS & GYNECOLOGY

## 2023-08-14 PROCEDURE — 87389 HIV-1 AG W/HIV-1&-2 AB AG IA: CPT | Performed by: OBSTETRICS & GYNECOLOGY

## 2023-08-14 PROCEDURE — 86900 BLOOD TYPING SEROLOGIC ABO: CPT | Performed by: OBSTETRICS & GYNECOLOGY

## 2023-08-14 PROCEDURE — 87591 N.GONORRHOEAE DNA AMP PROB: CPT | Performed by: OBSTETRICS & GYNECOLOGY

## 2023-08-14 PROCEDURE — 76817 TRANSVAGINAL US OBSTETRIC: CPT | Performed by: OBSTETRICS & GYNECOLOGY

## 2023-08-14 PROCEDURE — 82565 ASSAY OF CREATININE: CPT | Performed by: OBSTETRICS & GYNECOLOGY

## 2023-08-14 PROCEDURE — G0145 SCR C/V CYTO,THINLAYER,RESCR: HCPCS | Performed by: OBSTETRICS & GYNECOLOGY

## 2023-08-14 PROCEDURE — 86803 HEPATITIS C AB TEST: CPT | Performed by: OBSTETRICS & GYNECOLOGY

## 2023-08-14 PROCEDURE — 84156 ASSAY OF PROTEIN URINE: CPT | Performed by: OBSTETRICS & GYNECOLOGY

## 2023-08-14 RX ORDER — ONDANSETRON 4 MG/1
4 TABLET, FILM COATED ORAL EVERY 8 HOURS PRN
Qty: 30 TABLET | Refills: 4 | Status: SHIPPED | OUTPATIENT
Start: 2023-08-14 | End: 2023-09-05

## 2023-08-14 RX ORDER — SERTRALINE HYDROCHLORIDE 25 MG/1
25 TABLET, FILM COATED ORAL DAILY
Status: ON HOLD | COMMUNITY
Start: 2023-08-08 | End: 2024-02-25

## 2023-08-14 NOTE — PROGRESS NOTES
[facility]   OB/GYN Clinic    CC: New OB     Subjective:    Jeanine is a 23 year old  at 10wks by Patient's last menstrual period was 2023 (approximate). who presents for her initial OB visit. This is a planned pregnancy and her and her partner Cristian are excited. She reports feeling well. Denies any uterine cramping, abdominal pain or vaginal bleeding. Some spotting. Nausea and vomiting, has failed unisom. Requests medication.     OB History    Para Term  AB Living   2 1 1 0 0 1   SAB IAB Ectopic Multiple Live Births   0 0 0 0 1      # Outcome Date GA Lbr Mc[place]/2nd Weight Sex Delivery Anes PTL Lv   2 Current            1 Term 20 39w1d / 02:27 3.459 kg (7 lb 10 oz) F Vag-Spont EPI N TONI      Complications: Chorioamnionitis      Name: Jessie      Apgar1: 8  Apgar5: 9       Past Medical History:   Diagnosis Date    Anxiety     in high school    Asthma     as child    Depression     in high school    History of urinary tract infection     as a child    OCD (obsessive compulsive disorder)     in past    Postpartum depression 2020       Past Surgical History:   Procedure Laterality Date    FOOT SURGERY      right foot - ganglion cyst    MYRINGOTOMY, INSERT TUBE BILATERAL, COMBINED      ORTHOPEDIC SURGERY      planter warts foot    TONSILLECTOMY      TYMPANOPLASTY         Current Outpatient Medications   Medication Sig Dispense Refill    ARIPiprazole (ABILIFY) 20 MG tablet Take 20 mg by mouth daily      lamoTRIgine (LAMICTAL) 100 MG tablet Take 1 tablet (100 mg) by mouth daily 90 tablet 0    lamoTRIgine (LAMICTAL) 25 MG tablet Take 25 mg by mouth daily      ondansetron (ZOFRAN) 4 MG tablet Take 1 tablet (4 mg) by mouth every 8 hours as needed for nausea 30 tablet 4    Prenatal Vit-Fe Fumarate-FA (PRENATAL MULTIVITAMIN W/IRON) 27-0.8 MG tablet Take 1 tablet by mouth daily 90 tablet 3    sertraline (ZOLOFT) 25 MG tablet Take 25 mg by mouth daily         Family History  "  Problem Relation Age of Onset    Diabetes Father         type II    Neurologic Disorder Father         TBI    Hyperlipidemia Father     Coronary Artery Disease Father         MI    Melanoma Maternal Grandmother     Other - See Comments Maternal Grandfather         murdered    Chronic Obstructive Pulmonary Disease Paternal Grandmother     Cerebrovascular Disease Paternal Grandmother     Heart Surgery Paternal Grandmother     Cerebrovascular Disease Paternal Grandfather         x3    Diabetes Paternal Grandfather         type II       Social History     Tobacco Use    Smoking status: Former     Packs/day: 0.00     Years: 0.00     Pack years: 0.00     Types: Cigarettes, Other     Passive exposure: Current    Smokeless tobacco: Never   Substance Use Topics    Alcohol use: Never       ROS: A 10 pt ROS was completed and found to be negative unless mentioned in the HPI.     Objective:  /73 (BP Location: Left arm, Patient Position: Chair, Cuff Size: Adult Regular)   Pulse 85   Temp 97.6  F (36.4  C) (Tympanic)   Resp 16   Ht 1.613 m (5' 3.5\")   Wt 73.3 kg (161 lb 11.2 oz)   LMP 06/05/2023 (Approximate)   Breastfeeding No   BMI 28.19 kg/m      Estimated body mass index is 28.19 kg/m  as calculated from the following:    Height as of this encounter: 1.613 m (5' 3.5\").    Weight as of this encounter: 73.3 kg (161 lb 11.2 oz).    Physical Exam:  Gen: Pleasant, talkative female in no apparent distress   Respiratory: Lungs clear, breathing comfortably on room air   Cardiac: Regular rate and rhythm with no murmurs, gallops or rubs. Warm and well-perfused.   GI: Abd soft and non-tender  : External genitalia is free of lesion. Urethra and bartholin glands normal.  Vaginal mucosa is moist and pink without unusual discharge.  Cervix is without lesions or discharge.  Pap smear and endocervical sampling obtained without incident.  Bimanual exam reveals mobile erted uterus without cervical motion tenderness.  Adenexa " are mobile and non-tender bilaterally. No appreciable adnexal enlargement. Uterus is consistent with a ante week gestation.   MSK: Grossly normal movement of all four extremities  Psych: mood and affect bright   Lower extremity: edema not present     Transvaginal US: crain IUP measuring 7w4d, +cardiac activity at 158 bpm, normal adnexa      Assessment/Plan:   23 year old  at 7w4d by LMP of Patient's last menstrual period was 2023 (approximate). Not c/w LMP who presents for her initial OB visit.   1) Plan to draw new OB lab today (T&S, CBC, HIV, RPR, HepBsAg, Hep B antibody, rubella, GC/Chlam, UC)  2) Discussed routine prenatal care, group practice model at [facility], tertiary support and frequency of visits. Options for  testing for chromosomal and birth defects were discussed with the patient including nuchal translucency/blood marker testing in the first trimester, progenity and quad screening. She would like NIPT with sex, ok over [facility]  3) Plan for dating/viability scan now - [facility] 3/28/24  4) Concerns: n/v - zofran sent   5) PMH/OBHx problems:    - hx of gHTN; plan baseline HELLP labs, will need ASA   - bipolar: on lamictal, abilify and zoloft; r/b/a discussed, info sent to patient   6) Medication review: no changes, continue prenatal vitamin   7) Reviewed ectopic and miscarriage precautions (present to ED or call clinic with abdominal pain, vaginal bleeding or fever)   8) Weight gain: discussed weight gain expectations (BMI 25-30: 15-25lbs)   9) PAP smear: collected   10) Delivery plan: continue to discuss, breastfeeding, pp contraception, pain management in labor - continue to discuss  11) Immunizations: flu shot in the fall, Tdap at 28wks, COVID in the fall   12) No increased risk for gestational diabetes for plan for screen at 28wks   13) Discussed risk of COVID in pregnancy including a doubled risk of needing ICU levels care, intubation or death. Recommended social  distancing, mask-wearing and avoiding unnecessary contacts. I also recommended the COVID in pregnancy per CDCs recommendations.      Return to clinic in 3 weeks for repeat US given dating discrepancy and NIPT planned     Deja Stone MD  OB/GYN  8/14/2023

## 2023-08-14 NOTE — PROGRESS NOTES
"Initial /73 (BP Location: Left arm, Patient Position: Chair, Cuff Size: Adult Regular)   Pulse 85   Temp 97.6  F (36.4  C) (Tympanic)   Resp 16   Ht 1.613 m (5' 3.5\")   Wt 73.3 kg (161 lb 11.2 oz)   LMP 06/05/2023 (Approximate)   Breastfeeding No   BMI 28.19 kg/m   Estimated body mass index is 28.19 kg/m  as calculated from the following:    Height as of this encounter: 1.613 m (5' 3.5\").    Weight as of this encounter: 73.3 kg (161 lb 11.2 oz). .    "

## 2023-08-14 NOTE — PATIENT INSTRUCTIONS
"Learning About High-Iron Foods  What foods are high in iron?     The foods you eat contain nutrients, such as vitamins and minerals. Iron is a nutrient. Your body needs the right amount to stay healthy and work as it should. You can use the list below to help you make choices about which foods to eat.  Here are some foods that contain iron. They have 1 to 2 milligrams of iron per serving.  Fruits  Figs (dried), 5 figs  Vegetables  Asparagus (canned), 6 prakash  Tr, beet, Swiss chard, or turnip greens, 1 cup  Dried peas, cooked,   cup  Seaweed, spirulina (dried),   cup  Spinach, (cooked)   cup or (raw) 1 cup  Grains  Cereals, fortified with iron, 1 cup  Grits (instant, cooked), fortified with iron,   cup  Meats and other protein foods  Beans (kidney, lima, navy, white), canned or cooked,   cup  Beef or lamb, 3 oz  Chicken giblets, 3 oz  Chickpeas (garbanzo beans),   cup  Liver of beef, lamb, or pork, 3 oz  Oysters (cooked), 3 oz  Sardines (canned), 3 oz  Soybeans (boiled),   cup  Tofu (firm),   cup  Work with your doctor to find out how much of this nutrient you need. Depending on your health, you may need more or less of it in your diet.  Where can you learn more?  Go to https://www.[employer].net/patiented  Enter R005 in the search box to learn more about \"Learning About High-Iron Foods.\"  Current as of: March 1, 2023               Content Version: 13.7    6969-0618 [employer].   Care instructions adapted under license by your healthcare professional. If you have questions about a medical condition or this instruction, always ask your healthcare professional. [employer] disclaims any warranty or liability for your use of this information.      Rh Antibodies Screening During Pregnancy: About This Test  What is it?     The Rh antibodies screening test is a blood test. It checks your blood for Rh antibodies. If you have Rh-negative blood and have been exposed to Rh-positive blood, your " "immune system may make antibodies to attack the Rh-positive blood. When a pregnant woman has these antibodies, it is called Rh sensitization.  Why is this test done?  The Rh antibodies screening test is done during pregnancy to find out if your baby is at risk for Rh disease. This can happen if you have Rh-negative blood and your baby has Rh-positive blood. If your Rh-negative blood mixes with Rh-positive blood, your immune system will make antibodies to attack the Rh-positive blood.  During pregnancy, these antibodies could attach to the baby's red blood cells. This can cause your baby to have serious health problems. The results of this test will help your doctor know how to best care for you and your baby during your pregnancy.  How do you prepare for the test?  In general, there's nothing you have to do before this test, unless your doctor tells you to.  How is the test done?  A health professional uses a needle to take a blood sample, usually from the arm.  What happens after the test?  You will probably be able to go home right away. It depends on the reason for the test.  You can go back to your usual activities right away.  Follow-up care is a key part of your treatment and safety. Be sure to make and go to all appointments, and call your doctor if you are having problems. It's also a good idea to keep a list of the medicines you take. Ask your doctor when you can expect to have your test results.  Where can you learn more?  Go to https://www.[employer].net/patiented  Enter P722 in the search box to learn more about \"Rh Antibodies Screening During Pregnancy: About This Test.\"  Current as of: November 9, 2022               Content Version: 13.7    9132-1162 [employer].   Care instructions adapted under license by your healthcare professional. If you have questions about a medical condition or this instruction, always ask your healthcare professional. [employer] disclaims any " warranty or liability for your use of this information.      Learning About Preventing Rh Disease  What is Rh disease?     Rh disease can be a serious problem in pregnancy. It happens when substances called antibodies in the mother's blood cause red blood cells in her baby's blood to be destroyed. This can occur when the blood types of a mother and her baby do not match.  All blood has an Rh factor. This is what makes a blood type positive or negative. When you are Rh-negative, your baby may be Rh-negative or Rh-positive. If your baby has Rh-positive blood and it mixes with yours, your body will make antibodies. This is called Rh sensitization.  Most of the time, this is not a problem in a first pregnancy. But in future pregnancies, it could cause Rh disease.  A  with Rh disease has mild anemia and may have jaundice. In severe cases, anemia, jaundice, and swelling can be very dangerous or fatal. Some babies need to be delivered early. Some need special care in the NICU. A very sick baby will need a blood transfusion before or after birth.  Fortunately, Rh sensitization is usually easy to prevent.  That's why it's important to get your Rh status checked in your first trimester. It doesn't cause any warning signs. A blood test is the only way to know if you are Rh-sensitive or are at risk for it.  How can you prevent Rh disease?  If you are Rh-negative, your doctor gives you an Rh immune globulin shot (such as RhoGAM). It helps prevent your body from making the antibodies that attack your baby's red blood cells.  Timing is important. You need the shot at certain times during your pregnancy. And you need one anytime there is a chance that your baby's blood might mix with yours. That can happen with certain prenatal tests or when you have pregnancy bleeding, such as:  Right after any pregnancy loss, amniocentesis, or CVS testing.  After turning of a breech baby.  Before and maybe after childbirth. Your doctor  "gives you a shot around week 28. If your  is Rh-positive, you will have another shot.  Follow-up care is a key part of your treatment and safety. Be sure to make and go to all appointments, and call your doctor if you are having problems. It's also a good idea to know your test results and keep a list of the medicines you take.  Where can you learn more?  Go to [email or web address]/patiented  Enter W177 in the search box to learn more about \"Learning About Preventing Rh Disease.\"  Current as of: 2022               Content Version: 13.7    7247-2560 [employer].   Care instructions adapted under license by your healthcare professional. If you have questions about a medical condition or this instruction, always ask your healthcare professional. [employer] disclaims any warranty or liability for your use of this information.      Learning About Rh Immunoglobulin Shots  Introduction     An Rh immunoglobulin shot is given to pregnant women who have Rh-negative blood.  You may have Rh-negative blood, and your baby may have Rh-positive blood. If the two types of blood mix, your body will make antibodies. This is called Rh sensitization. Most of the time, this is not a problem the first time you're pregnant. But it could cause problems in future pregnancies.  This shot keeps your body from making the antibodies. You get the shot around 28 weeks of pregnancy. After the birth, your baby's blood is tested. If the blood is Rh positive, you will get another shot. You may also get the shot if you have vaginal bleeding while you are pregnant or if you have a miscarriage. These shots protect future pregnancies.  Women with Rh negative blood will need this shot each time they get pregnant.  Example  Rh immunoglobulin (HypRho-D, MICRhoGAM, and RhoGAM)  Possible side effects  Rare side effects may include:  Some mild pain where you got the shot.  A slight fever.  An allergic " "reaction.  You may have other side effects not listed here. Check the information that comes with your medicine.  What to know about taking this medicine  You may need more than one shot. You may need the shot again:  After amniocentesis, fetal blood sampling, or chorionic villus sampling tests.  If you have bleeding in your second or third trimester.  After turning of a breech baby.  After an injury to the belly while you are pregnant.  After a miscarriage or an .  Before or right after treatment for an ectopic or a partial molar pregnancy.  Tell your doctor if you have any allergies or have had a bad response to medicines in the past.  If you get this shot within 3 months of getting a live-virus vaccine, the vaccine may not work. Your doctor will tell you if you need more vaccine.  Check with your doctor or pharmacist before you use any other medicines. This includes over-the-counter medicines. Make sure your doctor knows all of the medicines, vitamins, herbs, and supplements you take. Taking some medicines at the same time can cause problems.  Where can you learn more?  Go to https://www.[employer].net/patiented  Enter V615 in the search box to learn more about \"Learning About Rh Immunoglobulin Shots.\"  Current as of: 2022               Content Version: 13. [employer].   Care instructions adapted under license by your healthcare professional. If you have questions about a medical condition or this instruction, always ask your healthcare professional. [employer] disclaims any warranty or liability for your use of this information.      Rubella (New Zealander Measles): Care Instructions  Overview  Rubella, also called New Zealander measles or 3-day measles, is a disease caused by a virus. It spreads by coughs, sneezes, and close contact. Rubella usually is mild and does not cause long-term problems. But if you are pregnant and get it, you can give the disease to your " unborn baby. This can cause serious birth defects.  While you have rubella, you may get a rash and a mild fever, and the lymph glands in your neck may swell. Older children often have a fever, eye pain, a sore throat, and body aches. You can relieve most symptoms with care at home. Avoid being around others, especially pregnant people, until your rash has been gone for at least 4 days. People who have not had this disease before or have not had the vaccine have the greatest chance of getting the virus.  Follow-up care is a key part of your treatment and safety. Be sure to make and go to all appointments, and call your doctor if you are having problems. It's also a good idea to know your test results and keep a list of the medicines you take.  How can you care for yourself at home?  Drink plenty of fluids. If you have kidney, heart, or liver disease and have to limit fluids, talk with your doctor before you increase the amount of fluids you drink.  Get plenty of rest to help your body heal.  Take an over-the-counter pain medicine, such as acetaminophen (Tylenol), ibuprofen (Advil, Motrin), or naproxen (Aleve), to reduce fever and discomfort. Read and follow all instructions on the label. Do not give aspirin to anyone younger than 20. It has been linked to Reye syndrome, a serious illness.  Do not take two or more pain medicines at the same time unless the doctor told you to. Many pain medicines have acetaminophen, which is Tylenol. Too much acetaminophen (Tylenol) can be harmful.  Try not to scratch the rash. Put cold, wet cloths on the rash to reduce itching.  Do not smoke. Smoking can make your symptoms worse. If you need help quitting, talk to your doctor about stop-smoking programs and medicines. These can increase your chances of quitting for good.  Avoid contact with people who have never had rubella and who have not been immunized.  When should you call for help?   Call your doctor now or seek immediate  "medical care if:    You have a fever with a stiff neck or a severe headache.     You are sensitive to light or feel very sleepy or confused.   Watch closely for changes in your health, and be sure to contact your doctor if:    You do not get better as expected.   Where can you learn more?  Go to https://www.[employer].net/patiented  Enter B812 in the search box to learn more about \"Rubella (Tajik Measles): Care Instructions.\"  Current as of: 2022               Content Version: 13.7    4815-7305 [employer].   Care instructions adapted under license by your healthcare professional. If you have questions about a medical condition or this instruction, always ask your healthcare professional. [employer] disclaims any warranty or liability for your use of this information.      Gonorrhea and Chlamydia: About These Tests  What is it?  These tests use a sample of urine or other body fluid to look for the bacteria that cause these sexually transmitted infections (STIs). The fluid sample can come from the cervix, vagina, rectum, throat, or eyes.  Why is this test done?  These tests may be done to:  Find out if symptoms are caused by gonorrhea or chlamydia.  Check people who are at high risk of being infected with gonorrhea or chlamydia.  Retest people several months after they have been treated for gonorrhea or chlamydia.  Check for infection in your  if you had a gonorrhea or chlamydia infection at the time of delivery.  How can you prepare for the test?  If you are going to have a urine test, do not urinate for at least 1 hour before the test.  If you think you may have chlamydia or gonorrhea, don't have sexual intercourse until you get your test results. And you may want to have tests for other STIs, such as HIV.  How is the test done?  For a direct sample, a swab is used to collect body fluid from the cervix, vagina, rectum, throat, or eyes. Your doctor may collect the " "sample. Or you may be given instructions on how to collect your own sample.  For a urine sample, you will collect the urine that comes out when you first start to urinate. Don't wipe the genital area clean before you urinate.  How long does the test take?  The test will take a few minutes.  What happens after the test?  You will be able to go home right away.  You can go back to your usual activities right away.  If you do have an infection, don't have sexual intercourse for 7 days after you start treatment. And your sex partner(s) should also be treated.  Follow-up care is a key part of your treatment and safety. Be sure to make and go to all appointments, and call your doctor if you are having problems. It's also a good idea to keep a list of the medicines you take. Ask your doctor when you can expect to have your test results.  Where can you learn more?  Go to https://www.[employer].net/patiented  Enter K976 in the search box to learn more about \"Gonorrhea and Chlamydia: About These Tests.\"  Current as of: August 2, 2022               Content Version: 13.7    4217-2186 [employer].   Care instructions adapted under license by your healthcare professional. If you have questions about a medical condition or this instruction, always ask your healthcare professional. [employer] disclaims any warranty or liability for your use of this information.      Trichomoniasis: About This Test  What is it?     This test uses a sample of urine or other body fluid to look for the tiny parasite that causes trichomoniasis (also called trich). The fluid sample can come from the vagina, cervix, or urethra. Your doctor may choose to use one or more of many available tests.  Why is it done?  A trich test may be done to:  Find out if symptoms are caused by trich.  Check people who are at high risk for being infected with trich.  Check after treatment to make sure that the infection is gone.  How do you prepare " for the test?  If you are going to have a urine test, do not urinate for at least 1 hour before the test.  How is the test done?  For a direct sample, a swab is used to collect body fluid from the cervix, vagina, or urethra. Your doctor may collect the sample. Or you may be given instructions on how to collect your own sample.  For a urine sample, you will collect the urine that comes out when you first start to urinate. Don't wipe the area clean before you urinate.  How long does the test take?  It will take a few minutes to collect a sample.  What happens after the test?  You can go home right away.  You can go back to your usual activities right away.  You may get the test results the same day or several days later. It depends on the test used.  If you do have an infection, don't have sexual intercourse for 7 days after you start treatment. Your sex partner(s) should also be treated.  Follow-up care is a key part of your treatment and safety. Be sure to make and go to all appointments, and call your doctor if you are having problems. Ask your doctor when you can expect to have your test results.  Current as of: August 2, 2022               Content Version: 13.7    0090-1751 [employer].   Care instructions adapted under license by your healthcare professional. If you have questions about a medical condition or this instruction, always ask your healthcare professional. [employer] disclaims any warranty or liability for your use of this information.      HIV Testing: Care Instructions  Overview     You can get tested for the human immunodeficiency virus (HIV). This test checks for HIV antibodies and antigens in your blood. If they are found, the test is positive.  If HIV antibodies or antigens are not found, you may need a repeat test to be sure the results are correct. Or your doctor may want to do a different test.  Follow-up care is a key part of your treatment and safety. Be sure to  "make and go to all appointments, and call your doctor if you are having problems. It's also a good idea to know your test results and keep a list of the medicines you take.  What do the results mean?  Normal result  A normal result means that no HIV antibodies or antigens were found in your blood. And if you had a test that checked for HIV RNA or DNA, none was found. Normal results are called negative.  You may need more testing to be sure the test results are correct.  Uncertain result  Test results don't clearly show whether you have an HIV infection. This is usually called an indeterminate result. This may happen before HIV antibodies or antigens develop. Or it may happen when some other type of antibody or antigen interferes with the results. If this occurs, you will probably have another test right away.  Abnormal result  An abnormal result means that you have HIV antibodies or antigens in your blood. These results are called positive.  A positive test will be confirmed by another type of test. This is because some tests can cause false-positive results. No one is considered HIV-positive until the result is confirmed by a test that shows HIV RNA or DNA in the person's blood.  If your test result is positive, your doctor will talk to you about starting treatment.  Where can you learn more?  Go to https://www.[employer].net/patiented  Enter T792 in the search box to learn more about \"HIV Testing: Care Instructions.\"  Current as of: October 31, 2022               Content Version: 13.7 2006-2023 [employer].   Care instructions adapted under license by your healthcare professional. If you have questions about a medical condition or this instruction, always ask your healthcare professional. [employer] disclaims any warranty or liability for your use of this information.      Hepatitis C Virus Tests: About These Tests  What are they?     Hepatitis C virus tests are blood tests that check " "for substances in the blood that show whether you have hepatitis C now or had it in the past. The tests can also tell you what type of hepatitis C you have and how severe the disease is. This can help your doctor with treatment.  If the tests show that you have long-term hepatitis C, you need to take steps to prevent spreading the disease.  Why are these tests done?  You may need these tests if:  You have symptoms of hepatitis.  You may have been exposed to the virus. You are more likely to have been exposed to the virus if you inject drugs or are exposed to body fluids (such as if you are a health care worker).  You've had other tests that show you have liver problems.  You are 18 to 79 years old.  You have an HIV infection.  The tests also are done to help your doctor decide about your treatment and see how well it works.  How do you prepare for the test?  In general, there's nothing you have to do before this test, unless your doctor tells you to.  How is the test done?  A health professional uses a needle to take a blood sample, usually from the arm.  What happens after these tests?  You will probably be able to go home right away.  You can go back to your usual activities right away.  Follow-up care is a key part of your treatment and safety. Be sure to make and go to all appointments, and call your doctor if you are having problems. It's also a good idea to keep a list of the medicines you take. Ask your doctor when you can expect to have your test results.  Where can you learn more?  Go to https://www.[employer].net/patiented  Enter W551 in the search box to learn more about \"Hepatitis C Virus Tests: About These Tests.\"  Current as of: October 31, 2022               Content Version: 13.7    4749-8857 [employer].   Care instructions adapted under license by your healthcare professional. If you have questions about a medical condition or this instruction, always ask your healthcare professional. " [facility] disclaims any warranty or liability for your use of this information.      Learning About Fetal Ultrasound Results  What is a fetal ultrasound?     Fetal ultrasound is a test that lets your doctor see an image of your baby. Your doctor learns information about your baby from this picture. You may find out, for example, if you are having a boy or a girl. But the main reason you have this test is to get information about your baby's health.  (You may hear your baby called a fetus. This is a common medical term for a baby that's growing in the mother's uterus.)  What kind of information can you learn from this test?  The findings of an ultrasound fall into two categories, normal and abnormal.  Normal  The fetus is the right size for its age.  The placenta is the expected size and does not cover the cervix.  There is enough amniotic fluid in the uterus.  No birth defects can be seen.  Abnormal  The fetus is small or large for its age.  The placenta covers the cervix.  There is too much or too little amniotic fluid in the uterus.  The fetus may have a birth defect.  What does an abnormal result mean?  Abnormal seems to imply that something is wrong with your baby. But what it means is that the test has shown something the doctor wants to take a closer look at.  And that's what happens next. Your doctor will talk to you about what further test or tests you may need.  What do the results mean?  Some of the things your doctor may see on an abnormal ultrasound include:  Echogenic bowel.  The bowel looks very bright on the screen. This could mean that there's blood in the bowel. Or it could mean that something is blocking the small bowel.  Increased nuchal translucency.  The ultrasound measures the thickness at the back of the baby's neck. An increase in thickness is sometimes an early sign of Down syndrome.  Increased or decreased amniotic fluid.  The doctor will look for a reason for the level of  "amniotic fluid and will watch the pregnancy closely as it progresses.  Large ventricles.  Ventricles in the brain look larger than they should. Your doctor may take a closer look at the brain.  Renal pyelectasis/hydronephrosis.  The ultrasound measures the fluid around the kidney. If there is more fluid than expected, there is a chance of urinary tract or kidney problems.  Short long bones.  The ultrasound measures certain arm and leg bones. A long bone (humerus or femur) that is shorter than average could be a sign of Down syndrome.  Subchorionic hemorrhage.  An ultrasound can show bleeding under one of the membranes that surrounds the fetus. Some women don't have symptoms of bleeding. The ultrasound can find this problem when women are not bleeding from their vagina. Women who have this condition have a slightly higher chance of miscarriage.  What do you do now?  Take a deep breath, and let it out. Keep in mind that an abnormal finding on an ultrasound, after it's coupled with more information, may:  Turn out to be nothing.  Turn out to be something mild that won't affect the baby.  Turn out to be something more serious. But if this happens, early diagnosis helps you and your doctor plan treatment options sooner rather than later.  Your medical team is there for you. So are your family and friends. Ask questions, and get the help and support you need.  Follow-up care is a key part of your treatment and safety. Be sure to make and go to all appointments, and call your doctor if you are having problems. It's also a good idea to know your test results and keep a list of the medicines you take.  Where can you learn more?  Go to https://www.[employer].net/patiented  Enter K451 in the search box to learn more about \"Learning About Fetal Ultrasound Results.\"  Current as of: November 9, 2022               Content Version: 13.7    6243-6909 [employer].   Care instructions adapted under license by your " healthcare professional. If you have questions about a medical condition or this instruction, always ask your healthcare professional. [employer], [facility] disclaims any warranty or liability for your use of this information.      Domestic Abuse: Care Instructions  Overview     If you want to save this information but don't think it is safe to take it home, see if a trusted friend can keep it for you. Plan ahead. Know who you can call for help, and memorize the phone number.   Be careful online too. Your online activity may be seen by others. Do not use your personal computer or device to read about this topic. Use a safe computer such as one at work, a friend's house, or a library.    Domestic abuse is different from an argument now and then. It is a pattern of abuse that one person uses to control another person's behavior. It may start with threats and name-calling. Then, it may lead to more serious acts, like pushing and slapping. The abuse also may occur in other areas. For example, the abuser may withhold money or spend a partner's money without their knowledge.  Abuse can cause serious harm. You are more likely to have a long-term health problem from the injuries and stress of living in a violent relationship. People who are sexually abused by their partners have more sexually transmitted infections and unwanted pregnancies. Anyone can be abused in relationships. Anyone who is abused also faces emotional pain.  If you are pregnant, abuse can cause problems such as poor weight gain, infections, and bleeding. Abuse during this time may increase your baby's risk of low birth weight, premature birth, and death.  Follow-up care is a key part of your treatment and safety. Be sure to make and go to all appointments, and call your doctor if you are having problems. It's also a good idea to know your test results and keep a list of the medicines you take.  How can you care for yourself at home?  If you do not have  "a safe place to stay, discuss this with your doctor before you leave.  Have a plan for where to go, how to leave your house, and where to stay in case of an emergency. Do not tell your partner about your plan. Contact:  The National Domestic Violence Hotline toll-free at 1-191.982.1578. They can help you find resources in your area.  Your local police department, hospital, or clinic for information about shelters and safe homes near you.  Talk to a trusted friend or neighbor or a [employer] counselor. Do not feel that you have to hide what happened.  Teach your children how to call for help in an emergency.  Be alert to warning signs, such as threats, heavy alcohol use, or drug use. This can help you avoid danger.  If you can, make sure that there are no guns or other weapons in the house.  When should you call for help?   Call 911 anytime you think you may need emergency care. For example, call if:    You or someone else has just been abused.     You think you or someone else is in danger of being abused.   Watch closely for changes in your health, and be sure to contact your doctor if you have any problems.  Where can you learn more?  Go to https://www.[email or web address].net/patiented  Enter G282 in the search box to learn more about \"Domestic Abuse: Care Instructions.\"  Current as of: February 27, 2023               Content Version: 13.7    6332-4324 [employer].   Care instructions adapted under license by your healthcare professional. If you have questions about a medical condition or this instruction, always ask your healthcare professional. [employer] disclaims any warranty or liability for your use of this information.      Domestic Violence Safety Instructions: Care Instructions  Overview     If you want to save this information but don't think it is safe to take it home, see if a trusted friend can keep it for you. Plan ahead. Know who you can call for help, and memorize the phone number. "   Be careful online too. Your online activity may be seen by others. Do not use your personal computer or device to read about this topic. Use a safe computer such as one at work, a friend's house, or a library.    When you are abused by a spouse or partner, you can take actions to protect yourself and your children.  You can increase your safety whether you decide to stay with your spouse or partner or you decide to leave. You can also prepare an action plan and kit ahead of time. This will allow you to leave quickly when you decide to. Remember, you cannot stop your partner's abuse, but you can find help for you and your children. No one deserves to be abused.  Follow-up care is a key part of your treatment and safety. Be sure to make and go to all appointments, and call your doctor if you are having problems. It's also a good idea to know your test results and keep a list of the medicines you take.  How can you care for yourself at home?  Make a plan for your safety   If you decide to stay with your abusive spouse or partner, you can do the following to increase your safety:  Decide what works best to keep you safe in an emergency.  Decide who you can call to help you in an emergency.  Decide if you will call the police if you get hurt again. If you can, agree on a signal with your children or neighbor to call the police for you if you need help. You can flash lights or hang something out of a window.  Choose a place to go for a short time if you need to leave home. Memorize the address and phone number.  Learn escape routes out of your home in case you need to leave in a hurry. Teach your children different ways to get out of the house quickly if they need to.  Take objects that can be used as weapons (guns, knives, hammers) and hide them or lock them up.  Learn the number of a domestic violence shelter. Talk to the people there about how they can help.  Find out about other community resources that can help  you.  Take pictures of bruises or other injuries if you can. You can also take pictures of things your abuser has broken.  Teach your children that violence is never okay. Tell them that they do not deserve to be hurt.  Pack a bag   Prepare a kit with things you will need if you leave the house suddenly. You can try to hide this in your house, or you can leave it with a friend or relative you can trust. You should include the following items in the kit:  A set of keys to your house and car.  Emergency phone numbers and addresses. You might also want to have a map and a small flashlight in case you need to leave in the night.  Money such as cash or checks. You can also ask a trusted friend or family member to hold money for you.  Copies of legal documents such as house and car titles or rent receipts, birth certificates, Social Security card, voter registration, marriage and 's licenses, and your children's health records.  Personal items you would need for a few days, such as clothes, a toothbrush, toothpaste, and any medicines you or your children need.  A favorite toy or book for your child or children.  Diapers and bottles, if you have very young children.  Pictures that show signs of abuse and violence. You may also add pictures of your abuser.  If you leave   If you decide to leave, you can take the following steps:  Go to the emergency room at a hospital if you have been hurt.  Ask the police to be with you as you leave. They can protect you as you leave the house.  If you decide to leave secretly, remember that activities can be tracked. Your abuser may still have access to your cell phone, email, and credit cards. It may be possible for these to be traced. Always be aware of your surroundings.  Take the kit you have prepared. If this is an emergency, do not worry about gathering up anything. Just leave--your safety is most important.  If your abuser moves out, change the locks on the doors. If you have  "a security system, change the access code.  When should you call for help?   Call 911 anytime you think you may need emergency care. For example, call if:    You or someone else has just been abused.     You think you or someone else is in danger of being abused.   Watch closely for changes in your health, and be sure to contact your doctor if you have any problems.  Where can you learn more?  Go to https://www.[employer].net/patiented  Enter A752 in the search box to learn more about \"Domestic Violence Safety Instructions: Care Instructions.\"  Current as of: October 20, 2022               Content Version: 13.7    8895-4873 [employer].   Care instructions adapted under license by your healthcare professional. If you have questions about a medical condition or this instruction, always ask your healthcare professional. [employer] disclaims any warranty or liability for your use of this information.      Learning About Domestic Abuse  What is domestic abuse?  Domestic abuse is threats or violent behavior in a personal relationship. It can happen between past or current partners or spouses. It's also called domestic violence or intimate partner violence.  Domestic abuse can affect people of any ethnic group, race, or Taoist. It can affect teens, adults, or the elderly. And it can happen to people of any sexual identity or social status. But most abuse victims are women.  Abusers use fear, bullying, and threats to control their partners. They control what their partners do, where they go, or who they see. They may act jealous, controlling, or possessive. These early signs of abuse may happen soon after the start of the relationship. Sometimes it can be hard to notice abuse at first. But after the relationship becomes more serious, the abuse may get worse.  If you are being abused in your relationship, it's important to get help. The abuse is not your fault, and you don't have to face it " alone.  Be careful  It may not be safe to take home domestic abuse information like this handout. Some people ask a trusted friend to keep it for them. It's also important to plan ahead and to memorize the phone number of places you can go for help. If you are concerned about your safety, do not use your computer, smartphone, or tablet to read about domestic abuse.   What are the types of domestic abuse?  Abuse can be emotional, physical, or sexual.  Emotional abuse  Emotional abuse is a pattern of threats, insults, or controlling behavior. It includes verbal abuse. It goes beyond healthy disagreements in a relationship. It's a sign of an unhealthy relationship, and it may be against the law.  Do you feel threatened, intimidated, or controlled? Does your partner threaten your children, other family members, or pets?  Does your partner:  Use jokes meant to embarrass or shame you?  Call you names?  Tell you that you are a bad parent or threaten to take away your children?  Threaten to have you or your family members deported?  Control your access to money or other basic needs?  Control what you do, who you see or talk to, or where you go?  Another form of emotional abuse is denying that it is happening. Or the abuser may act like the abuse is no big deal or is your fault.  Sexual abuse  With sexual abuse, abusers may try to convince or force you to have sex. They may force you into sex acts you're not comfortable with. Or they may sexually assault you. Sexual abuse can happen even if you are in a committed relationship.  Physical abuse  Physical abuse means that a partner hits, kicks, or physically hurts you. Physical abuse that starts with a slap might lead to kicking, shoving, and choking over time. The abuser may also threaten to hurt or kill you.  What problems can domestic abuse lead to?  Domestic abuse can be very dangerous. It can cause serious, repeated injury. It can even lead to death.  All forms of abuse  "can cause long-term health problems from the stress of a violent relationship. Verbal abuse can lead to sexual and physical abuse.  Abuse causes emotional pain, depression, anxiety, and post-traumatic stress. Sexual abuse can lead to sexually transmitted infections (including HIV/AIDS) and unplanned pregnancy.  Pregnancy can be a very dangerous time for people in abusive relationships. Pregnant people who are abused may have anemia, infections, bleeding, or poor weight gain. Abuse during this time may also increase your baby's risk of low birth weight, premature birth, and death.  It can be hard for some victims of domestic abuse to ask for help or to leave their relationship. You may feel scared, stuck, or not sure what steps to take. But it's important not to ignore abuse. Talking to someone could be the first step to ending the abuse and taking care of your own health and happiness again.  Where can you get help?  Talk to a trusted friend. Find a local advocacy group, or talk to your doctor about the abuse.  Contact the National Domestic Violence Hotline at 1-305-036-PFIB (1-325.106.1685) for more safety tips. They can guide you to groups in your area that can help. Or go to the National Coalition Against Domestic Violence website at www.thehotline.org to learn more.  Domestic violence groups or a counselor in your area can help you make a safety plan for yourself and your children.  When to call for help  Call 911 anytime you think you may need emergency care. For example, call if:  You think that you or someone you know is in danger of being abused.  You have been hurt and can't have someone safely take you to emergency care.  You have just been abused.  A family member has just been abused.  Where can you learn more?  Go to https://www.healthwise.net/patiented  Enter S665 in the search box to learn more about \"Learning About Domestic Abuse.\"  Current as of: February 27, 2023               Content Version: " 13.7    0089-5384 [employer].   Care instructions adapted under license by your healthcare professional. If you have questions about a medical condition or this instruction, always ask your healthcare professional. [employer] disclaims any warranty or liability for your use of this information.

## 2023-08-15 LAB
C TRACH DNA SPEC QL PROBE+SIG AMP: NEGATIVE
HBV SURFACE AG SERPL QL IA: NONREACTIVE
HCV AB SERPL QL IA: NONREACTIVE
HIV 1+2 AB+HIV1 P24 AG SERPL QL IA: NONREACTIVE
N GONORRHOEA DNA SPEC QL NAA+PROBE: NEGATIVE
RUBV IGG SERPL QL IA: 2.12 INDEX
RUBV IGG SERPL QL IA: POSITIVE
T PALLIDUM AB SER QL: NONREACTIVE

## 2023-08-16 LAB — BACTERIA UR CULT: NORMAL

## 2023-08-17 LAB
BKR LAB AP GYN ADEQUACY: NORMAL
BKR LAB AP GYN INTERPRETATION: NORMAL
BKR LAB AP HPV REFLEX: NORMAL
BKR LAB AP PREVIOUS ABNORMAL: NORMAL
PATH REPORT.COMMENTS IMP SPEC: NORMAL
PATH REPORT.COMMENTS IMP SPEC: NORMAL
PATH REPORT.RELEVANT HX SPEC: NORMAL

## 2023-09-05 ENCOUNTER — PRENATAL OFFICE VISIT (OUTPATIENT)
Dept: OBGYN | Facility: CLINIC | Age: 23
End: 2023-09-05
Payer: COMMERCIAL

## 2023-09-05 VITALS
BODY MASS INDEX: 27.66 KG/M2 | WEIGHT: 162 LBS | SYSTOLIC BLOOD PRESSURE: 114 MMHG | DIASTOLIC BLOOD PRESSURE: 67 MMHG | TEMPERATURE: 97.9 F | HEART RATE: 98 BPM | HEIGHT: 64 IN | RESPIRATION RATE: 16 BRPM

## 2023-09-05 DIAGNOSIS — Z34.81 ENCOUNTER FOR SUPERVISION OF OTHER NORMAL PREGNANCY IN FIRST TRIMESTER: Primary | ICD-10-CM

## 2023-09-05 DIAGNOSIS — Z34.80 PRENATAL CARE OF MULTIGRAVIDA, ANTEPARTUM: ICD-10-CM

## 2023-09-05 PROCEDURE — 36415 COLL VENOUS BLD VENIPUNCTURE: CPT | Performed by: OBSTETRICS & GYNECOLOGY

## 2023-09-05 PROCEDURE — 99207 PR PRENATAL VISIT: CPT | Performed by: OBSTETRICS & GYNECOLOGY

## 2023-09-05 RX ORDER — ONDANSETRON 4 MG/1
4 TABLET, FILM COATED ORAL EVERY 8 HOURS PRN
Qty: 30 TABLET | Refills: 4 | Status: SHIPPED | OUTPATIENT
Start: 2023-09-05 | End: 2024-03-14

## 2023-09-05 NOTE — PATIENT INSTRUCTIONS
Weeks 10 to 14 of Your Pregnancy: Care Instructions  It's now possible to hear the fetus's heartbeat with a special ultrasound device. And the fetus's organs are developing.    Decide about tests to check for birth defects. Think about your age, your chance of passing on a family disease, your need to know about any problems, and what you might do after you have the test results.   It's okay to exercise. Try activities such as walking or swimming. Check with your doctor before starting a new program.     You may feel more tired than usual.  Taking naps during the day may help.     You may feel emotional.  It might help to talk to someone.     You may have headaches.  Try lying down and putting a cool cloth over your forehead.     You can use acetaminophen (Tylenol) for pain relief.  Don't take any anti-inflammatory medicines (such as Advil, Motrin, Aleve), unless your doctor says it's okay.     You may feel a fullness or aching in your lower belly.  This can feel like the kind of cramps you might get before a period. A back rub may help.     You may need to urinate more.  Your growing uterus and changing hormones can affect your bladder.     You may feel sick to your stomach (morning sickness).  Try avoiding food and smells that make you feel sick.     Your breasts may feel different.  They may feel tender or get bigger. Your nipples may get darker. Try a bra that gives you good support.     Avoid alcohol, tobacco, and drugs (including marijuana).  If you need help quitting, talk to your doctor.     Take a daily prenatal vitamin.  Choose one with folic acid.   Follow-up care is a key part of your treatment and safety. Be sure to make and go to all appointments, and call your doctor if you are having problems. It's also a good idea to know your test results and keep a list of the medicines you take.  Where can you learn more?  Go to https://www.healthwise.net/patiented  Enter E090 in the search box to learn more  "about \"Weeks 10 to 14 of Your Pregnancy: Care Instructions.\"  Current as of: November 9, 2022               Content Version: 13.7 2006-2023 [employer].   Care instructions adapted under license by your healthcare professional. If you have questions about a medical condition or this instruction, always ask your healthcare professional. [employer] disclaims any warranty or liability for your use of this information.      Understanding Alpha and Beta Thalassemia  What is thalassemia?  Thalassemia [[facility]-ramin-SEE-susu-] is a lifelong blood disorder. It is caused by mutations (changes) in the genes that make hemoglobin.   Hemoglobin is a protein in red blood cells that carries oxygen. Hemoglobin is made of 4 smaller protein chains: 2 blocks of alpha chains and 2 blocks of beta chains (see Figure 1). Each block has heme (iron) in the center. Oxygen sticks to the heme, allowing your body to carry it through the bloodstream. The oxygen is delivered to your whole body.  When you have alpha thalassemia, your alpha blocks are smaller or are missing one or both alpha chains. (See Figure 2 for an example.) For beta thalassemia, the beta blocks are smaller or fewer in number.   With either disorder, your body makes smaller hemoglobin and possibly fewer red blood cells to hold hemoglobin (anemia). You may feel very tired or have other problems as a result.  How do you get thalassemia?  There are 4 genes for alpha thalassemia and 2 genes for beta thalassemia. For you to have either alpha or beta thalassemia, both of your parents must carry a gene mutation for the disease and pass it down to you.   It's possible to have more severe or less severe symptoms than your parents. If you get a mutation from only one parent, for example, you won't have symptoms of thalassemia (thalassemia trait)--but you could still pass the mutation to your children.  Types of thalassemias  Some types of thalassemia have fewer " symptoms than others. How severe the thalassemia is depends on how many mutated genes you have inherited and how many alpha or beta blocks are affected.   Alpha Thalassemia  Silent carrier (1 alpha mutation): People with this type have no symptoms and often do not know they have thalassemia.  Alpha thalassemia trait (2 alpha mutations): Some people with this trait may have mild anemia, but they often feel well.  Hemoglobin H disease (3 alpha mutations): People with this disorder have anemia more often. They sometimes need blood transfusions, but can have a normal life span.  Hemoglobin Barts (4 alpha mutations):  With this type, no alpha blocks are made. Severe problems occur during the mother's pregnancy and newborns rarely survive.  Beta Thalassemia  Beta thalassemia trait (1 beta mutation): People with this trait (also called beta thalassemia minor) have red blood cells that are small. Mild anemia can occur, but it is rare.  Beta thalassemia intermedia (2 beta mutations): In this type, both beta genes are abnormal, but these mutations may be mild. More severe types sometimes need blood transfusions to treat anemia and medicine to treat iron buildup. Patients have normal life spans.  Beta thalassemia major (2 severe beta mutations): This is the most severe form of beta thalassemia. Both beta genes are abnormal, causing little to no beta blocks to be made. Patients often need medicine to reduce iron buildup, as well as monthly blood transfusions to stay healthy. The only cure at this time is a bone marrow transplant. More options are still being studied.  Treatment and outcomes  Mild types: People with a mild type of alpha or beta thalassemia rarely need treatment. Some need folic acid to help make more red blood cells. Most have normal life spans.  Moderate to severe types: Patients with these types have a higher risk for reduced growth, early bone thinning and pregnancy problems.  Most severe types: These  patients often need regular blood transfusions, even if not sick. It is common to have iron build up in your body, so medicine may be needed to reduce the buildup. If left untreated, too much iron, especially in the liver and heart, can lead to premature death.  Outcomes for patients with alpha or beta thalassemia are usually very good in developed countries like the [place]. Survival rates higher than 90% have been reported for children.   For informational purposes only. Not to replace the advice of your health care provider. Copyright   2021 [place] [employer] [facility]. All rights reserved. Clinically reviewed by William Chery MD, Hematology. [employer] [id number] - REV 08/21.    Nutrition During Pregnancy: Care Instructions  Overview     Healthy eating when you are pregnant is important for you and your baby. It can help you feel well and have a successful pregnancy and delivery. During pregnancy your nutrition needs increase. Even if you have excellent eating habits, your doctor may recommend a multivitamin to make sure you get enough iron and folic acid.  You may wonder how much weight you should gain. In general, if you were at a healthy weight before you became pregnant, then you should gain between 25 and 35 pounds. If you were overweight before pregnancy, then you'll likely be advised to gain 15 to 25 pounds. If you were underweight before pregnancy, then you'll probably be advised to gain 28 to 40 pounds. Your doctor will work with you to set a weight goal that is right for you. Gaining a healthy amount of weight helps you have a healthy baby.  Follow-up care is a key part of your treatment and safety. Be sure to make and go to all appointments, and call your doctor if you are having problems. It's also a good idea to know your test results and keep a list of the medicines you take.  How can you care for yourself at home?  Eat plenty of fruits and vegetables. Include a variety of orange, yellow,  and leafy dark-green vegetables every day.  Choose whole-grain bread, cereal, and pasta. Good choices include whole wheat bread, whole wheat pasta, brown rice, and oatmeal.  Get 4 or more servings of milk and milk products each day. Good choices include nonfat or low-fat milk, yogurt, and cheese. If you cannot eat milk products, you can get calcium from calcium-fortified products such as orange juice, soy milk, and tofu. Other non-milk sources of calcium include leafy green vegetables, such as broccoli, kale, mustard greens, turnip greens, bok carlos a, and brussels sprouts.  If you eat meat, pick lower-fat types. Good choices include lean cuts of meat and chicken or turkey without the skin.  Do not eat shark, swordfish, stas mackerel, or tilefish. They have high levels of mercury, which is dangerous to your baby. You can eat up to 12 ounces a week of fish or shellfish that have low mercury levels. Good choices include shrimp, wild salmon, pollock, and catfish. Limit some other types of fish, such as white (albacore) tuna, to 4 oz (0.1 kg) a week.  Heat lunch meats (such as turkey, ham, or bologna) to 165 F before you eat them. This reduces your risk of getting sick from a kind of bacteria that can be found in lunch meats.  Do not eat unpasteurized soft cheeses, such as brie, feta, fresh mozzarella, and blue cheese. They have a bacteria that could harm your baby.  Limit caffeine. If you drink coffee or tea, have no more than 1 cup a day. Caffeine is also found in ronda.  Do not drink any alcohol. No amount of alcohol has been found to be safe during pregnancy.  Do not diet or try to lose weight. For example, do not follow a low-carbohydrate diet. If you are overweight at the start of your pregnancy, your doctor will work with you to manage your weight gain.  Tell your doctor about all vitamins and supplements you take.  When should you call for help?  Watch closely for changes in your health, and be sure to contact your  "doctor if you have any problems.  Where can you learn more?  Go to https://www.[employer].net/patiented  Enter Y785 in the search box to learn more about \"Nutrition During Pregnancy: Care Instructions.\"  Current as of: March 1, 2023               Content Version: 13.7    7370-6138 [employer].   Care instructions adapted under license by your healthcare professional. If you have questions about a medical condition or this instruction, always ask your healthcare professional. [employer] disclaims any warranty or liability for your use of this information.      Exercise During Pregnancy: Care Instructions  Your Care Instructions     Exercise is good for healthy pregnant women. It can relieve back pain, swelling, and other discomforts of pregnancy. It also prepares your muscles for childbirth. And exercise can improve your energy level and help you sleep better.  If your doctor recommends it, get more exercise. Walking is a good choice. Bit by bit, increase the amount you walk every day. Try for at least 30 minutes on most days of the week. But if you do not already exercise, be sure to talk with your doctor before you start a new exercise program. Try exercise classes for pregnant women. Doctors do not recommend contact sports during pregnancy.  Follow-up care is a key part of your treatment and safety. Be sure to make and go to all appointments, and call your doctor if you are having problems. It's also a good idea to know your test results and keep a list of the medicines you take.  How can you care for yourself at home?  Talk with your doctor about the right kind of exercise for each stage of pregnancy.  Listen to your body to know if your exercise is at a safe level.  Do not become overheated while you exercise. High body temperature can be harmful to your baby. Avoid activities that can make your body too hot.  If you feel tired, take it easy. You might walk instead of run.  If you are " "used to strenuous exercise, pay attention to changes in your body that mean it is time to slow down.  If you exercised before getting pregnant, you should be able to keep up your routine early in your pregnancy. That might include running and aerobics. Later, you may want to switch to swimming or walking.  Eat a small snack or drink juice 15 to 30 minutes before you exercise.  Eat a healthy diet. Make sure it includes plenty of beans, peas, and leafy green vegetables. You may need to increase how much you eat to get extra energy for exercise.  Drink plenty of fluids before, during, and after exercise.  Avoid contact sports, such as soccer and basketball. Also avoid scuba diving, exercise in high altitude (above 6,000 feet), and horseback riding.  Do not get overtired while you exercise. You should be able to talk while you work out.  After your fourth month of pregnancy, avoid exercises (such as sit-ups and some yoga poses) that require you to lie flat on your back on a hard surface.  Try swimming and brisk walking during all your pregnancy.  Get plenty of rest. You may be very tired while you are pregnant.  Where can you learn more?  Go to https://www.[employer].net/patiented  Enter S801 in the search box to learn more about \"Exercise During Pregnancy: Care Instructions.\"  Current as of: November 9, 2022               Content Version: 13.7    6790-3353 [employer].   Care instructions adapted under license by your healthcare professional. If you have questions about a medical condition or this instruction, always ask your healthcare professional. [employer] disclaims any warranty or liability for your use of this information.      Learning About Pregnancy and Obesity  How does your weight affect your pregnancy?     The basics of prenatal care are the same for everyone, regardless of size. You'll get what you need to have a healthy baby.  But your size can make a difference in a few " "things. You and your doctor will have to watch your pregnancy weight. Your weight may affect your labor and delivery.  You may have some extra doctor visits and tests. And you may have some tests earlier in your pregnancy. You'll need to pay close attention to things like blood pressure and the chance of getting gestational diabetes. (This is a type of diabetes that sometimes happens during pregnancy.) And close attention will be given to your developing baby.  Work with your doctor to get the care you need. Go to all your doctor visits, and follow your doctor's advice about what to do and what to avoid during pregnancy.  How much weight gain is healthy?  If you are very overweight (obese), experts recommend that you gain between 11 and 20 pounds. Your doctor will work with you to set a weight goal that's right for you. In some cases, your doctor may recommend that you not gain any weight.  How much extra food do you need to eat?  Although you may joke that you're \"eating for two\" during pregnancy, you don't need to eat twice as much food. How much you can eat depends on:  Your height.  How much you weigh when you get pregnant.  How active you are.  How many babies you're carrying.  In the first trimester, you'll probably need the same amount of calories as you did before you were pregnant. In general, in your second trimester, you need to eat about 340 extra calories a day. In your third trimester, you need to eat about 450 extra calories a day.  You can get about 340 calories in a peanut butter sandwich. Having a cup of 1% milk with a peanut butter sandwich is about 450 calories.  What can you do to have a healthy pregnancy?  The best things you can do for you and your baby are to eat healthy foods, get regular exercise, avoid alcohol and smoking, and go to your doctor visits.  Eat a variety of foods from all the food groups. Make sure to get enough calcium and folic acid.  You may want to work with a dietitian to " "help you plan healthy meals to get the right amount of calories for you.  If you didn't exercise much before you got pregnant, talk to your doctor about how you can slowly get more active. Your doctor may want to set up an exercise program with you.  Where can you learn more?  Go to https://www.[employer].net/patiented  Enter B644 in the search box to learn more about \"Learning About Pregnancy and Obesity.\"  Current as of: November 9, 2022               Content Version: 13.7    8616-7718 [employer].   Care instructions adapted under license by your healthcare professional. If you have questions about a medical condition or this instruction, always ask your healthcare professional. [employer] disclaims any warranty or liability for your use of this information.      You have been provided the CDC Warning Signs in Pregnancy document.    Additional copies can be found here: www.mSeller.com/497203.pdf  "

## 2023-09-09 ENCOUNTER — ANCILLARY PROCEDURE (OUTPATIENT)
Dept: ULTRASOUND IMAGING | Facility: CLINIC | Age: 23
End: 2023-09-09
Attending: OBSTETRICS & GYNECOLOGY
Payer: COMMERCIAL

## 2023-09-09 DIAGNOSIS — Z34.80 PRENATAL CARE OF MULTIGRAVIDA, ANTEPARTUM: ICD-10-CM

## 2023-09-09 DIAGNOSIS — Z34.81 ENCOUNTER FOR SUPERVISION OF OTHER NORMAL PREGNANCY IN FIRST TRIMESTER: ICD-10-CM

## 2023-09-09 NOTE — PROGRESS NOTES
Concerns: She presents for a follow up ULTRASOUND   Doing well.  No concerns today.  Reportable signs and symptoms discussed.  Will schedule anatomy ultrasound.  RTC 4 weeks.      Kermit Zepeda MD

## 2023-09-11 LAB — SCANNED LAB RESULT: NORMAL

## 2023-10-23 ENCOUNTER — TRANSCRIBE ORDERS (OUTPATIENT)
Dept: MATERNAL FETAL MEDICINE | Facility: CLINIC | Age: 23
End: 2023-10-23

## 2023-10-23 ENCOUNTER — PRENATAL OFFICE VISIT (OUTPATIENT)
Dept: OBGYN | Facility: CLINIC | Age: 23
End: 2023-10-23
Payer: COMMERCIAL

## 2023-10-23 VITALS
TEMPERATURE: 98.6 F | WEIGHT: 168 LBS | SYSTOLIC BLOOD PRESSURE: 114 MMHG | HEART RATE: 89 BPM | DIASTOLIC BLOOD PRESSURE: 74 MMHG | BODY MASS INDEX: 29.29 KG/M2

## 2023-10-23 DIAGNOSIS — Z23 NEED FOR PROPHYLACTIC VACCINATION AND INOCULATION AGAINST INFLUENZA: Primary | ICD-10-CM

## 2023-10-23 DIAGNOSIS — F31.9 BIPOLAR DISORDER WITH DEPRESSION (H): ICD-10-CM

## 2023-10-23 DIAGNOSIS — Z87.59 HISTORY OF GESTATIONAL HYPERTENSION: ICD-10-CM

## 2023-10-23 DIAGNOSIS — R56.9 SEIZURES (H): ICD-10-CM

## 2023-10-23 DIAGNOSIS — O26.90 PREGNANCY RELATED CONDITION, ANTEPARTUM: Primary | ICD-10-CM

## 2023-10-23 DIAGNOSIS — Z34.92 PRENATAL CARE IN SECOND TRIMESTER: ICD-10-CM

## 2023-10-23 PROCEDURE — 90471 IMMUNIZATION ADMIN: CPT | Performed by: ADVANCED PRACTICE MIDWIFE

## 2023-10-23 PROCEDURE — 90686 IIV4 VACC NO PRSV 0.5 ML IM: CPT | Performed by: ADVANCED PRACTICE MIDWIFE

## 2023-10-23 PROCEDURE — 99207 PR PRENATAL VISIT: CPT | Performed by: ADVANCED PRACTICE MIDWIFE

## 2023-10-23 NOTE — NURSING NOTE
"Initial /74 (BP Location: Right arm, Patient Position: Chair, Cuff Size: Adult Large)   Pulse 89   Temp 98.6  F (37  C) (Tympanic)   Wt 76.2 kg (168 lb)   LMP 06/05/2023 (Approximate)   BMI 29.29 kg/m   Estimated body mass index is 29.29 kg/m  as calculated from the following:    Height as of 9/5/23: 1.613 m (5' 3.5\").    Weight as of this encounter: 76.2 kg (168 lb). .      Haven Huang MA    "

## 2023-10-23 NOTE — PATIENT INSTRUCTIONS

## 2023-10-24 ENCOUNTER — IMMUNIZATION (OUTPATIENT)
Dept: FAMILY MEDICINE | Facility: CLINIC | Age: 23
End: 2023-10-24
Payer: COMMERCIAL

## 2023-10-24 PROCEDURE — 90480 ADMN SARSCOV2 VAC 1/ONLY CMP: CPT

## 2023-10-24 PROCEDURE — 91320 SARSCV2 VAC 30MCG TRS-SUC IM: CPT

## 2023-10-27 ENCOUNTER — PRE VISIT (OUTPATIENT)
Dept: MATERNAL FETAL MEDICINE | Facility: HOSPITAL | Age: 23
End: 2023-10-27
Payer: COMMERCIAL

## 2023-11-01 ENCOUNTER — ANCILLARY PROCEDURE (OUTPATIENT)
Dept: ULTRASOUND IMAGING | Facility: HOSPITAL | Age: 23
End: 2023-11-01
Attending: OBSTETRICS & GYNECOLOGY
Payer: COMMERCIAL

## 2023-11-01 ENCOUNTER — OFFICE VISIT (OUTPATIENT)
Dept: MATERNAL FETAL MEDICINE | Facility: HOSPITAL | Age: 23
End: 2023-11-01
Attending: OBSTETRICS & GYNECOLOGY
Payer: COMMERCIAL

## 2023-11-01 DIAGNOSIS — O99.352 SEIZURE DISORDER DURING PREGNANCY IN SECOND TRIMESTER (H): Primary | ICD-10-CM

## 2023-11-01 DIAGNOSIS — G40.909 SEIZURE DISORDER DURING PREGNANCY IN SECOND TRIMESTER (H): Primary | ICD-10-CM

## 2023-11-01 DIAGNOSIS — O26.90 PREGNANCY RELATED CONDITION, ANTEPARTUM: ICD-10-CM

## 2023-11-01 PROCEDURE — 76811 OB US DETAILED SNGL FETUS: CPT

## 2023-11-01 PROCEDURE — 76811 OB US DETAILED SNGL FETUS: CPT | Mod: 26 | Performed by: OBSTETRICS & GYNECOLOGY

## 2023-11-01 PROCEDURE — 99203 OFFICE O/P NEW LOW 30 MIN: CPT | Mod: 25 | Performed by: OBSTETRICS & GYNECOLOGY

## 2023-11-01 NOTE — PROGRESS NOTES
Please see the imaging tab for details of the ultrasound performed today.    Antonina Tsang MD  Specialist in Maternal-Fetal Medicine

## 2023-11-06 ENCOUNTER — HOSPITAL ENCOUNTER (EMERGENCY)
Facility: CLINIC | Age: 23
Discharge: HOME | End: 2023-11-06
Attending: FAMILY MEDICINE | Admitting: FAMILY MEDICINE
Payer: COMMERCIAL

## 2023-11-06 ENCOUNTER — APPOINTMENT (OUTPATIENT)
Dept: ULTRASOUND IMAGING | Facility: CLINIC | Age: 23
End: 2023-11-06
Attending: FAMILY MEDICINE
Payer: COMMERCIAL

## 2023-11-06 ENCOUNTER — NURSE TRIAGE (OUTPATIENT)
Dept: NURSING | Facility: CLINIC | Age: 23
End: 2023-11-06
Payer: COMMERCIAL

## 2023-11-06 VITALS
OXYGEN SATURATION: 99 % | HEART RATE: 90 BPM | RESPIRATION RATE: 16 BRPM | TEMPERATURE: 98.5 F | WEIGHT: 171 LBS | SYSTOLIC BLOOD PRESSURE: 115 MMHG | DIASTOLIC BLOOD PRESSURE: 70 MMHG | BODY MASS INDEX: 29.19 KG/M2 | HEIGHT: 64 IN
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
## 2023-11-06 PROCEDURE — 99284 EMERGENCY DEPT VISIT MOD MDM: CPT | Mod: 25

## 2023-11-06 PROCEDURE — 76805 OB US >/= 14 WKS SNGL FETUS: CPT

## 2023-11-06 PROCEDURE — 99284 EMERGENCY DEPT VISIT MOD MDM: CPT | Performed by: FAMILY MEDICINE

## 2023-11-06 ASSESSMENT — ACTIVITIES OF DAILY LIVING (ADL): ADLS_ACUITY_SCORE: 33

## 2023-11-06 NOTE — DISCHARGE INSTRUCTIONS
Push fluids, as we discussed good hydration is very important.  Continue to monitor for fetal movements.  Please contact your OB/GYN provider if any additional or further concerns.  Return to the emergency department if worse or changes
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
## 2023-11-20 NOTE — PATIENT INSTRUCTIONS
"Weeks 18 to 22 of Your Pregnancy: Care Instructions  At this stage you may find that your nausea and fatigue are gone. You may feel better overall and have more energy. But you might now also have some new discomforts, like sleep problems or leg cramps.    You may start to feel your baby move. These movements can feel like butterflies or bubbles.   Babies at this stage can now suck their thumbs.     Get some exercise every day.  And avoid caffeine late in the day.     Take a warm shower or bath before bed.  Try relaxation exercises to calm your mind and body.     Use extra pillows.  They can help you get comfortable.     Don't use sleeping pills or alcohol.  They could harm your baby.     For leg cramps, stretch and apply heat.  A warm bath, leg warmers, a heating pad, or a hot water bottle can help with muscle aches.   Stretches for leg cramps    Straighten your leg and bend your foot (flex your ankle) slowly upward, toward your knee. Bend your toes up and down.   Stand on a flat surface. Stretch your toes upward. For balance, hold on to the wall or something stable. If it feels okay, take small steps walking on your heels.   Follow-up care is a key part of your treatment and safety. Be sure to make and go to all appointments, and call your doctor if you are having problems. It's also a good idea to know your test results and keep a list of the medicines you take.  Where can you learn more?  Go to https://www.[employer].net/patiented  Enter W603 in the search box to learn more about \"Weeks 18 to 22 of Your Pregnancy: Care Instructions.\"  Current as of: July 11, 2023               Content Version: 13.8    8609-4682 [employer].   Care instructions adapted under license by your healthcare professional. If you have questions about a medical condition or this instruction, always ask your healthcare professional. [employer] disclaims any warranty or liability for your use of this " "information.      Weeks 22 to 26 of Your Pregnancy: Care Instructions  Your baby's lungs are getting ready for breathing. Your baby may respond to your voice. Your baby likely turns less, and kicks or jerks more. Jerking may mean that your baby has hiccups.    Think about taking childbirth classes. And start to think about whether you want to have pain medicine during labor.   At your next doctor visit, you may be tested for anemia and for high blood sugar that first occurs during pregnancy (gestational diabetes). These conditions can cause problems for you and your baby.     To ease discomfort, such as back pain    Change your position often. Try not to sit or stand for too long.  Get some exercise. Things like walking or stretching may help.  Try using a heating pad or cold pack.    To ease or reduce swelling in your feet, ankles, hands, and fingers    Take off your rings.  Avoid high-sodium foods, such as potato chips.  Prop up your feet, and sleep with pillows under your feet.  Try to avoid standing for long periods of time.  Do not wear tight shoes.  Wear support stockings.  Kegel exercises to prevent urine from leaking    Squeeze your muscles as if you were trying not to pass gas. Your belly, legs, and buttocks shouldn't move. Hold the squeeze for 3 seconds, then relax for 5 to 10 seconds.    Add 1 second each week until you can squeeze for 10 seconds. Repeat the exercise 10 times a session. Do 3 to 8 sessions a day. If these exercises cause you pain, stop doing them and talk with your doctor.  Follow-up care is a key part of your treatment and safety. Be sure to make and go to all appointments, and call your doctor if you are having problems. It's also a good idea to know your test results and keep a list of the medicines you take.  Where can you learn more?  Go to https://www.healthwise.net/patiented  Enter G264 in the search box to learn more about \"Weeks 22 to 26 of Your Pregnancy: Care " "Instructions.\"  Current as of: July 11, 2023               Content Version: 13.8    9976-8960 [employer].   Care instructions adapted under license by your healthcare professional. If you have questions about a medical condition or this instruction, always ask your healthcare professional. [employer] disclaims any warranty or liability for your use of this information.      Round Ligament Pain: Care Instructions  Your Care Instructions     Round ligament pain is a common pain during pregnancy. You may feel a sharp brief pain on one or both sides of your belly. It may go down into your groin. It's usually felt for the first time during the second trimester.  This pain is a normal part of pregnancy. It will go away as your pregnancy continues or after your baby is born.  Your uterus is supported by two ligaments that go from the top and sides of the uterus to the bones of the pelvis. These are the round ligaments. As your uterus grows, these ligaments stretch and tighten with your movements. This may be the cause of the pain. You may find that certain activities seem to cause pain. If you can, avoid those activities.  Your doctor can usually diagnose round ligament pain from your symptoms and an exam. If you have bleeding or other symptoms, your doctor may also do an imaging test, such as an ultrasound. Your doctor may suggest that you take an over-the-counter pain medicine, such as acetaminophen.  Follow-up care is a key part of your treatment and safety. Be sure to make and go to all appointments, and call your doctor if you are having problems. It's also a good idea to know your test results and keep a list of the medicines you take.  How can you care for yourself at home?  If certain movements seem to trigger belly pain, see if you can avoid them while you are pregnant.  Stay active. If your doctor says it's okay, try moderate exercise. Water exercise may be a good choice if you have " "belly pain. Examples are swimming and water aerobics.  Ask your doctor about taking acetaminophen for pain. Be safe with medicines. Read and follow all instructions on the label.  When should you call for help?   Call your doctor now or seek immediate medical care if:    You think you might be in labor.     You have new or worse pain.   Watch closely for changes in your health, and be sure to contact your doctor if you have any problems.  Where can you learn more?  Go to https://www.[employer].net/patiented  Enter R110 in the search box to learn more about \"Round Ligament Pain: Care Instructions.\"  Current as of: July 11, 2023               Content Version: 13.8    7251-1458 [employer].   Care instructions adapted under license by your healthcare professional. If you have questions about a medical condition or this instruction, always ask your healthcare professional. [employer] disclaims any warranty or liability for your use of this information.      Leg and Ankle Edema: Care Instructions  Your Care Instructions  Swelling in the legs, ankles, and feet is called edema. It is common after you sit or stand for a while. Long plane flights or car rides often cause swelling in the legs and feet. You may also have swelling if you have to stand for long periods of time at your job. Problems with the veins in the legs (varicose veins) and changes in hormones can also cause swelling. Sometimes the swelling in the ankles and feet is caused by a more serious problem, such as heart failure, infection, blood clots, or liver or kidney disease.  Follow-up care is a key part of your treatment and safety. Be sure to make and go to all appointments, and call your doctor if you are having problems. It's also a good idea to know your test results and keep a list of the medicines you take.  How can you care for yourself at home?  If your doctor gave you medicine, take it as prescribed. Call your doctor if " "you think you are having a problem with your medicine.  Whenever you are resting, raise your legs up. Try to keep the swollen area higher than the level of your heart.  Take breaks from standing or sitting in one position.  Walk around to increase the blood flow in your lower legs.  Move your feet and ankles often while you stand, or tighten and relax your leg muscles.  Wear support stockings. Put them on in the morning, before swelling gets worse.  Eat a balanced diet. Lose weight if you need to.  Limit the amount of salt (sodium) in your diet. Salt holds fluid in the body and may increase swelling.  When should you call for help?   Call 911 anytime you think you may need emergency care. For example, call if:    You have symptoms of a blood clot in your lung (called a pulmonary embolism). These may include:  Sudden chest pain.  Trouble breathing.  Coughing up blood.   Call your doctor now or seek immediate medical care if:    You have signs of a blood clot, such as:  Pain in your calf, back of the knee, thigh, or groin.  Redness and swelling in your leg or groin.     You have symptoms of infection, such as:  Increased pain, swelling, warmth, or redness.  Red streaks or pus.  A fever.   Watch closely for changes in your health, and be sure to contact your doctor if:    Your swelling is getting worse.     You have new or worsening pain in your legs.     You do not get better as expected.   Where can you learn more?  Go to https://www.[employer].net/patiented  Enter N696 in the search box to learn more about \"Leg and Ankle Edema: Care Instructions.\"  Current as of: November 13, 2022               Content Version: 13.8    5879-5459 [employer].   Care instructions adapted under license by your healthcare professional. If you have questions about a medical condition or this instruction, always ask your healthcare professional. [employer] disclaims any warranty or liability for your use of this " information.      Back Pain During Pregnancy: Care Instructions  Overview     Back pain has many possible causes. It is often caused by problems with muscles and ligaments in your back. The extra weight during pregnancy can put stress on your back. Moving, lifting, standing, sitting, or sleeping in an awkward way also can strain your back. Back pain can also be a sign of labor. Although it may hurt a lot, back pain often improves on its own. Use good home treatment, and take care not to stress your back.  Follow-up care is a key part of your treatment and safety. Be sure to make and go to all appointments, and call your doctor if you are having problems. It's also a good idea to know your test results and keep a list of the medicines you take.  How can you care for yourself at home?  Ask your doctor about taking acetaminophen (Tylenol) for pain. Do not take aspirin, ibuprofen (Advil, Motrin), or naproxen (Aleve).  Do not take two or more pain medicines at the same time unless the doctor told you to. Many pain medicines have acetaminophen, which is Tylenol. Too much acetaminophen (Tylenol) can be harmful.  Lie on your side with your knees and hips bent and a pillow between your legs. This reduces stress on your back.  Put ice or cold packs on your back for 10 to 20 minutes at a time, several times a day. Put a thin cloth between the ice and your skin.  Warm baths may also help reduce pain.  Change positions every 30 minutes. Take breaks if you must sit for a long time. Get up and walk around.  Ask your doctor about how much exercise you can do. You may feel better taking short walks or doing gentle movements and stretching in a swimming pool.  Ask your doctor about exercises to stretch and strengthen your back.  When should you call for help?   Call your doctor now or seek immediate medical care if:    You think you are in labor.     You have new numbness in your buttocks, genital or rectal areas, or legs.     You  "have a new loss of bowel or bladder control.   Watch closely for changes in your health, and be sure to contact your doctor if:    You do not get better as expected.   Where can you learn more?  Go to https://www.[email or web address].net/patiented  Enter C696 in the search box to learn more about \"Back Pain During Pregnancy: Care Instructions.\"  Current as of: 2023               Content Version: 13.8    2681-0914 [employer].   Care instructions adapted under license by your healthcare professional. If you have questions about a medical condition or this instruction, always ask your healthcare professional. [employer] disclaims any warranty or liability for your use of this information.       Labor: Care Instructions  Overview      labor is the start of labor between 20 and 36 weeks of pregnancy. Most babies are born at 37 to 42 weeks of pregnancy. In labor, the uterus contracts to open the cervix. This is the first stage of childbirth.  labor can be caused by a problem with the baby, the mother, or both. Often the cause is not known.  In some cases, doctors use medicines to try to delay labor until 34 or more weeks of pregnancy. By this time, a baby has grown enough so that problems are not likely. In some cases--such as with a serious infection--it is healthier for the baby to be born early. Your treatment will depend on how far along you are in your pregnancy and on your health and your baby's health.  Follow-up care is a key part of your treatment and safety. Be sure to make and go to all appointments, and call your doctor if you are having problems. It's also a good idea to know your test results and keep a list of the medicines you take.  How can you care for yourself at home?  If your doctor prescribed medicines, take them exactly as directed. Call your doctor if you think you are having a problem with your medicine.  Rest until your doctor advises you about " "activity.  Do not have sexual intercourse unless your doctor says it is safe.  Use sanitary pads if you have vaginal bleeding. Using pads makes it easier to monitor your bleeding.  Do not smoke or allow others to smoke around you. If you need help quitting, talk to your doctor about stop-smoking programs and medicines. These can increase your chances of quitting for good.  When should you call for help?   Call 911  anytime you think you may need emergency care. For example, call if:    You passed out (lost consciousness).     You have a seizure.     You have severe vaginal bleeding.     You have severe pain in your belly or pelvis that doesn't get better between contractions.     You have had fluid gushing or leaking from your vagina and you know or think the umbilical cord is bulging into your vagina. If this happens, immediately get down on your knees so your rear end (buttocks) is higher than your head. This will decrease the pressure on the cord until help arrives.   Call your doctor now or seek immediate medical care if:    You have signs of preeclampsia, such as:  Sudden swelling of your face, hands, or feet.  New vision problems (such as dimness, blurring, or seeing spots).  A severe headache.     You have any vaginal bleeding.     You have belly pain or cramping.     You have a fever.     You have had regular contractions (with or without pain) for an hour. This means that you have 6 or more within 1 hour after you change your position and drink fluids.     You have a sudden release of fluid from the vagina.     You have low back pain or pelvic pressure that does not go away.     You notice that your baby has stopped moving or is moving much less than normal.   Watch closely for changes in your health, and be sure to contact your doctor if you have any problems.  Where can you learn more?  Go to https://www.healthwise.net/patiented  Enter Q400 in the search box to learn more about \" Labor: Care " "Instructions.\"  Current as of: July 11, 2023               Content Version: 13.8    3476-3365 [employer].   Care instructions adapted under license by your healthcare professional. If you have questions about a medical condition or this instruction, always ask your healthcare professional. [employer] disclaims any warranty or liability for your use of this information.      "

## 2023-11-21 ENCOUNTER — PRENATAL OFFICE VISIT (OUTPATIENT)
Dept: OBGYN | Facility: CLINIC | Age: 23
End: 2023-11-21
Payer: COMMERCIAL

## 2023-11-21 VITALS
RESPIRATION RATE: 18 BRPM | BODY MASS INDEX: 29.53 KG/M2 | HEIGHT: 64 IN | SYSTOLIC BLOOD PRESSURE: 107 MMHG | TEMPERATURE: 98 F | HEART RATE: 90 BPM | DIASTOLIC BLOOD PRESSURE: 59 MMHG | WEIGHT: 173 LBS

## 2023-11-21 DIAGNOSIS — R56.9 SEIZURES (H): ICD-10-CM

## 2023-11-21 DIAGNOSIS — Z34.92 PRENATAL CARE IN SECOND TRIMESTER: Primary | ICD-10-CM

## 2023-11-21 DIAGNOSIS — F31.9 BIPOLAR DISORDER WITH DEPRESSION (H): ICD-10-CM

## 2023-11-21 PROCEDURE — 99207 PR PRENATAL VISIT: CPT | Performed by: PHYSICIAN ASSISTANT

## 2023-11-21 PROCEDURE — 99000 SPECIMEN HANDLING OFFICE-LAB: CPT | Performed by: PHYSICIAN ASSISTANT

## 2023-11-21 PROCEDURE — 80175 DRUG SCREEN QUAN LAMOTRIGINE: CPT | Mod: 90 | Performed by: PHYSICIAN ASSISTANT

## 2023-11-21 PROCEDURE — 36415 COLL VENOUS BLD VENIPUNCTURE: CPT | Performed by: PHYSICIAN ASSISTANT

## 2023-11-21 NOTE — NURSING NOTE
"Initial /59 (BP Location: Left arm, Patient Position: Chair, Cuff Size: Adult Regular)   Pulse 90   Temp 98  F (36.7  C) (Tympanic)   Resp 18   Ht 1.626 m (5' 4\")   Wt 78.5 kg (173 lb)   LMP 06/05/2023 (Approximate)   BMI 29.70 kg/m   Estimated body mass index is 29.7 kg/m  as calculated from the following:    Height as of this encounter: 1.626 m (5' 4\").    Weight as of this encounter: 78.5 kg (173 lb). .    "

## 2023-11-21 NOTE — PROGRESS NOTES
"[facility]   OB/GYN Clinic    CC: Return OB     Subjective:    Jeanine is a 23 year old  at 21w5d by Patient's last menstrual period was 2023 (approximate). who presents for return OB visit. She reports feeling well. Denies uterine cramping, vaginal bleeding or leaking, dysuria. +fetal movement. She is here with her mother, father, and daughter today.     Pt reports she feels safe at home and mood is good.     Concerns: none    Objective:  /59 (BP Location: Left arm, Patient Position: Chair, Cuff Size: Adult Regular)   Pulse 90   Temp 98  F (36.7  C) (Tympanic)   Resp 18   Ht 1.626 m (5' 4\")   Wt 78.5 kg (173 lb)   LMP 2023 (Approximate)   BMI 29.70 kg/m      Estimated body mass index is 29.7 kg/m  as calculated from the following:    Height as of this encounter: 1.626 m (5' 4\").    Weight as of this encounter: 78.5 kg (173 lb).      Physical Exam:  Gen: Pleasant, talkative female in no apparent distress   Respiratory: breathing comfortably on room air   Cardiac: Warm and well-perfused.   MSK: Grossly normal movement of all four extremities  Psych: mood and affect bright   Lower extremity: edema not present     Fetal dop tones: 155 bpm  Fundal height: 22 cm    Assessment/Plan:   23 year old  at 21w5d by LMP of Patient's last menstrual period was 2023 (approximate). Not c/w LMP who presents for her initial OB visit.     -New OB labs normal  -NIPT test normal   -Nausea improved taking zofran as needed.   -PMH/OBHx problems:                - hx of gHTN; plan baseline HELLP labs, patient taking ASA 81 mg daily                - bipolar: on lamictal, abilify and zoloft; r/b/a discussed, info sent to patient: [facility] recommends lamictal levels checked throughout pregnancy. Will order level to be checked today.   -MFM consult and anatomy US done on 23: recommended lamictal levels throughout pregnancy, Neurology referral (this was placed today), continue ASA " 81mg  - Medication review: lamictal recently increased from 125mg to 150mg once daily (will check lamictal level), continue prenatal vitamin   -Reviewed ectopic and miscarriage precautions (present to ED or call clinic with abdominal pain, vaginal bleeding or fever)   -Weight gain: discussed weight gain expectations (BMI 25-30: 15-25lbs)   -PAP smear: NILM  -Delivery plan: continue to discuss, breastfeeding, pp contraception, pain management in labor - continue to discuss  -Immunizations: flu shot on 10/23/23. Covid vaccine on 10/22/23; Tdap at 28wks, RSV btw 32-36 weeks.   - No increased risk for gestational diabetes for plan for screen at 28wks   - Discussed risk of COVID in pregnancy including a doubled risk of needing ICU levels care, intubation or death. Recommended social distancing, mask-wearing and avoiding unnecessary contacts. I also recommended the COVID in pregnancy per CDCs recommendations.    Return to clinic in 4 weeks.     Elisa Simmons PA-C

## 2023-11-24 LAB — LAMOTRIGINE SERPL-MCNC: <0.9 UG/ML

## 2023-12-07 ENCOUNTER — MYC MEDICAL ADVICE (OUTPATIENT)
Dept: PHARMACY | Facility: OTHER | Age: 23
End: 2023-12-07
Payer: COMMERCIAL

## 2023-12-07 NOTE — TELEPHONE ENCOUNTER
Patient was referred by her [employer] insurance plan.  Called patient to schedule appointment. Left voicemail and sent MyChart.    Raleigh Rios, PharmD, BCACP  Medication Therapy Management Pharmacist  Pager: [phone]

## 2023-12-12 ENCOUNTER — PRENATAL OFFICE VISIT (OUTPATIENT)
Dept: OBGYN | Facility: CLINIC | Age: 23
End: 2023-12-12
Payer: COMMERCIAL

## 2023-12-12 VITALS
SYSTOLIC BLOOD PRESSURE: 123 MMHG | DIASTOLIC BLOOD PRESSURE: 79 MMHG | TEMPERATURE: 97.2 F | WEIGHT: 182.4 LBS | HEIGHT: 64 IN | BODY MASS INDEX: 31.14 KG/M2 | RESPIRATION RATE: 16 BRPM | HEART RATE: 90 BPM

## 2023-12-12 DIAGNOSIS — K21.9 GASTROESOPHAGEAL REFLUX IN PREGNANCY IN SECOND TRIMESTER: ICD-10-CM

## 2023-12-12 DIAGNOSIS — Z34.92 PRENATAL CARE IN SECOND TRIMESTER: Primary | ICD-10-CM

## 2023-12-12 DIAGNOSIS — O99.612 GASTROESOPHAGEAL REFLUX IN PREGNANCY IN SECOND TRIMESTER: ICD-10-CM

## 2023-12-12 LAB
ERYTHROCYTE [DISTWIDTH] IN BLOOD BY AUTOMATED COUNT: 12.8 % (ref 10–15)
GLUCOSE 1H P 50 G GLC PO SERPL-MCNC: 170 MG/DL (ref 70–129)
HCT VFR BLD AUTO: 38.7 % (ref 35–47)
HGB BLD-MCNC: 12.6 G/DL (ref 11.7–15.7)
MCH RBC QN AUTO: 28.6 PG (ref 26.5–33)
MCHC RBC AUTO-ENTMCNC: 32.6 G/DL (ref 31.5–36.5)
MCV RBC AUTO: 88 FL (ref 78–100)
PLATELET # BLD AUTO: 227 10E3/UL (ref 150–450)
RBC # BLD AUTO: 4.4 10E6/UL (ref 3.8–5.2)
WBC # BLD AUTO: 16.5 10E3/UL (ref 4–11)

## 2023-12-12 PROCEDURE — 99207 PR PRENATAL VISIT: CPT | Performed by: PHYSICIAN ASSISTANT

## 2023-12-12 PROCEDURE — 86780 TREPONEMA PALLIDUM: CPT | Performed by: PHYSICIAN ASSISTANT

## 2023-12-12 PROCEDURE — 99213 OFFICE O/P EST LOW 20 MIN: CPT | Mod: 25 | Performed by: PHYSICIAN ASSISTANT

## 2023-12-12 PROCEDURE — 36415 COLL VENOUS BLD VENIPUNCTURE: CPT | Performed by: PHYSICIAN ASSISTANT

## 2023-12-12 PROCEDURE — 82950 GLUCOSE TEST: CPT | Performed by: PHYSICIAN ASSISTANT

## 2023-12-12 PROCEDURE — 85027 COMPLETE CBC AUTOMATED: CPT | Performed by: PHYSICIAN ASSISTANT

## 2023-12-12 RX ORDER — ASPIRIN 81 MG/1
81 TABLET ORAL DAILY
Status: ON HOLD | COMMUNITY
End: 2024-03-17

## 2023-12-12 NOTE — NURSING NOTE
"Initial /79 (BP Location: Right arm, Patient Position: Chair, Cuff Size: Adult Regular)   Pulse 90   Temp 97.2  F (36.2  C) (Tympanic)   Resp 16   Ht 1.626 m (5' 4\")   Wt 82.7 kg (182 lb 6.4 oz)   LMP 06/05/2023 (Approximate)   BMI 31.31 kg/m   Estimated body mass index is 31.31 kg/m  as calculated from the following:    Height as of this encounter: 1.626 m (5' 4\").    Weight as of this encounter: 82.7 kg (182 lb 6.4 oz). .    Mee Sidhu, LIVIA    "

## 2023-12-12 NOTE — PROGRESS NOTES
"[facility]   OB/GYN Clinic     CC: Return OB      Subjective:     Jeanine is a 23 year old  at 24w5d by Patient's last menstrual period was 2023 (approximate). who presents for return OB visit. She reports feeling well. Denies uterine cramping, vaginal bleeding or leaking, dysuria. +fetal movement.      Pt reports she feels safe at home and mood is good.      Concerns: reflux; started the past couple of weeks with no improvement after changing her diet. She has not tried anything over the counter, but states it is so bad that she has almost had emesis due to it.      Objective:  /79 (BP Location: Right arm, Patient Position: Chair, Cuff Size: Adult Regular)   Pulse 90   Temp 97.2  F (36.2  C) (Tympanic)   Resp 16   Ht 1.626 m (5' 4\")   Wt 82.7 kg (182 lb 6.4 oz)   LMP 2023 (Approximate)   BMI 31.31 kg/m       Estimated body mass index is 29.7 kg/m  as calculated from the following:    Height as of this encounter: 1.626 m (5' 4\").    Weight as of this encounter: 78.5 kg (173 lb).        Physical Exam:  Gen: Pleasant, talkative female in no apparent distress   Respiratory: breathing comfortably on room air   Cardiac: Warm and well-perfused.   MSK: Grossly normal movement of all four extremities  Psych: mood and affect bright   Lower extremity: edema not present      Fetal dop tones: 165 bpm    Fundal height: 24 cm       Assessment/Plan:       ICD-10-CM    1. Prenatal care in second trimester  Z34.92 Treponema Abs w Reflex to RPR and Titer     CBC with platelets     Glucose tolerance gest screen 1 hour     omeprazole (PRILOSEC) 20 MG DR capsule      2. Gastroesophageal reflux in pregnancy in second trimester  O99.612 omeprazole (PRILOSEC) 20 MG DR capsule    K21.9           23 year old  at 24w5d by LMP of Patient's last menstrual period was 2023 (approximate). Not c/w LMP who presents for her initial OB visit.      -New OB labs normal  -NIPT test normal "   -Nausea improved taking zofran as needed.   -PMH/OBHx problems:                - hx of gHTN; plan baseline HELLP labs, patient taking ASA 81 mg daily                - bipolar: on lamictal, abilify and zoloft; r/b/a discussed, info sent to patient: [facility] recommends lamictal levels checked throughout pregnancy. Level drawn on 11/21/23 that was slightly low.   -MFM consult and anatomy US done on 11/1/23: recommended lamictal levels throughout pregnancy, Neurology referral (this was placed today), continue ASA 81mg  - Medication review: lamictal recently increased from 125mg to 150mg once daily (will check lamictal level), continue prenatal vitamin   -Reviewed ectopic and miscarriage precautions (present to ED or call clinic with abdominal pain, vaginal bleeding or fever)   -Weight gain: discussed weight gain expectations (BMI 25-30: 15-25lbs)   -PAP smear: NILM  -Delivery plan: continue to discuss, breastfeeding, pp contraception, pain management in labor - continue to discuss  -Immunizations: flu shot on 10/23/23. Covid vaccine on 10/22/23; Tdap at 28wks, RSV btw 32-36 weeks.   - 1 hour glucose today with 3rd trimester labs today  - increased esophageal reflux in pregnancy; discussed that Tums is safe over the counter. Patient would like prescription for omeprazole. Omeprazole 20mg once daily sent to pharmacy.      Return to clinic in 4 weeks.      Elisa Simmons PA-C

## 2023-12-13 LAB — T PALLIDUM AB SER QL: NONREACTIVE

## 2023-12-14 DIAGNOSIS — Z34.92 PRENATAL CARE IN SECOND TRIMESTER: Primary | ICD-10-CM

## 2023-12-19 ENCOUNTER — LAB (OUTPATIENT)
Dept: LAB | Facility: CLINIC | Age: 23
End: 2023-12-19
Payer: COMMERCIAL

## 2023-12-19 DIAGNOSIS — Z34.92 PRENATAL CARE IN SECOND TRIMESTER: ICD-10-CM

## 2023-12-19 LAB
GESTATIONAL GTT 1 HR POST DOSE: 220 MG/DL (ref 60–179)
GESTATIONAL GTT 2 HR POST DOSE: 158 MG/DL (ref 60–154)
GESTATIONAL GTT 3 HR POST DOSE: 55 MG/DL (ref 60–139)
GLUCOSE P FAST SERPL-MCNC: 81 MG/DL (ref 60–94)

## 2023-12-19 PROCEDURE — 82951 GLUCOSE TOLERANCE TEST (GTT): CPT

## 2023-12-19 PROCEDURE — 82952 GTT-ADDED SAMPLES: CPT

## 2023-12-19 PROCEDURE — 36415 COLL VENOUS BLD VENIPUNCTURE: CPT

## 2023-12-20 DIAGNOSIS — O24.419 GESTATIONAL DIABETES: Primary | ICD-10-CM

## 2023-12-24 ENCOUNTER — NURSE TRIAGE (OUTPATIENT)
Dept: NURSING | Facility: CLINIC | Age: 23
End: 2023-12-24
Payer: COMMERCIAL

## 2023-12-24 NOTE — TELEPHONE ENCOUNTER
OB Triage Call      Is patient's OB/Midwife with the [facility]? [facility]- Proceed with triage     Reason for call: COVID positive 26 weeks pregnant      26w3d    Estimated Date of Delivery: Mar 28, 2024        OB History    Para Term  AB Living   2 1 1 0 0 1   SAB IAB Ectopic Multiple Live Births   0 0 0 0 1      # Outcome Date GA Lbr Macho/2nd Weight Sex Delivery Anes PTL Lv   2 Current            1 Term 20 39w1d / 02:27 3.459 kg (7 lb 10 oz) F Vag-Spont EPI N TONI      Complications: Chorioamnionitis      Name: Jessie      Apgar1: 8  Apgar5: 9       Lab Results   Component Value Date    GBS Negative 2020          COVID Positive/Requesting COVID treatment    Patient is positive for COVID and requesting treatment options.    Date of positive COVID test (PCR or at home)? 23  Current COVID symptoms: fever or chills, cough, headache, sore throat, and congestion or runny nose  Date COVID symptoms began: 23    Message should be routed to clinic RN pool. Best phone number to use for call back: [phone]      Reason for Disposition   [1] HIGH RISK patient (e.g., weak immune system, age > 64 years, obesity with BMI 30 or higher, pregnant, chronic lung disease or other chronic medical condition) AND [2] COVID symptoms (e.g., cough, fever)  (Exceptions: Already seen by PCP and no new or worsening symptoms.)    Additional Information   Negative: SEVERE difficulty breathing (e.g., struggling for each breath, speaks in single words)   Negative: Difficult to awaken or acting confused (e.g., disoriented, slurred speech)   Negative: Bluish (or gray) lips or face now   Negative: Sounds like a life-threatening emergency to the triager   Negative: Shock suspected (e.g., cold/pale/clammy skin, too weak to stand, low BP, rapid pulse)   Negative: [1] Diagnosed or suspected COVID-19 AND [2] symptoms lasting 3 or more weeks   Negative: [1] COVID-19 exposure AND [2] no symptoms    Negative: COVID-19 vaccine reaction suspected (e.g., fever, headache, muscle aches) occurring 1 to 3 days after getting vaccine   Negative: COVID-19 vaccine, questions about   Negative: [1] Lives with someone known to have influenza (flu test positive) AND [2] flu-like symptoms (e.g., cough, runny nose, sore throat, SOB; with or without fever)   Negative: [1] Possible COVID-19 symptoms AND [2] triager concerned about severity of symptoms or other causes   Negative: COVID-19 and breastfeeding, questions about   Negative: SEVERE or constant chest pain or pressure  (Exception: Mild central chest pain, present only when coughing.)   Negative: MODERATE difficulty breathing (e.g., speaks in phrases, SOB even at rest, pulse 100-120)   Negative: [1] Headache AND [2] stiff neck (can't touch chin to chest)   Negative: Oxygen level (e.g., pulse oximetry) 90 percent or lower   Negative: Chest pain or pressure  (Exception: MILD central chest pain, present only when coughing.)   Negative: [1] Drinking very little AND [2] dehydration suspected (e.g., no urine > 12 hours, very dry mouth, very lightheaded)   Negative: Patient sounds very sick or weak to the triager   Negative: MILD difficulty breathing (e.g., minimal/no SOB at rest, SOB with walking, pulse <100)   Negative: Fever > 103 F (39.4 C)   Negative: [1] Fever > 101 F (38.3 C) AND [2] age > 60 years   Negative: [1] Fever > 100.0 F (37.8 C) AND [2] bedridden (e.g., CVA, chronic illness, recovering from surgery)   Negative: Oxygen level (e.g., pulse oximetry) 91 to 94 percent    Protocols used: Coronavirus (COVID-19) Diagnosed or [id number]-SC

## 2023-12-26 ENCOUNTER — VIRTUAL VISIT (OUTPATIENT)
Dept: URGENT CARE | Facility: CLINIC | Age: 23
End: 2023-12-26
Payer: COMMERCIAL

## 2023-12-26 DIAGNOSIS — U07.1 INFECTION DUE TO 2019 NOVEL CORONAVIRUS: Primary | ICD-10-CM

## 2023-12-26 PROCEDURE — 99213 OFFICE O/P EST LOW 20 MIN: CPT | Mod: VID

## 2023-12-26 NOTE — PROGRESS NOTES
"Christophe is a 23 year old who is being evaluated via a billable video visit.      How would you like to obtain your AVS? MyChart  If the video visit is dropped, the invitation should be resent by:   Will anyone else be joining your video visit? No          Assessment & Plan     Infection due to 2019 novel coronavirus  Is 26 weeks pregnant    - nirmatrelvir and ritonavir (PAXLOVID) 300 mg/100 mg therapy pack; Take 3 tablets by mouth 2 times daily for 5 days (Take 2 Nirmatrelvir tablets and 1 Ritonavir tablet twice daily for 5 days)       BMI:   Estimated body mass index is 31.31 kg/m  as calculated from the following:    Height as of 12/12/23: 1.626 m (5' 4\").    Weight as of 12/12/23: 82.7 kg (182 lb 6.4 oz).       COVID-19 positive patient.  Encounter for consideration of medication intervention. Patient does qualify for a prescription. Full discussion with patient including medication options, risks and benefits. Potential drug interactions reviewed with patient.     Treatment Planned  Paxlovid sent to [facility] pharmacy    Temporary change to home medications: will take half dose of Abilify while on Paxlovid    Estimated body mass index is 31.31 kg/m  as calculated from the following:    Height as of 12/12/23: 1.626 m (5' 4\").    Weight as of 12/12/23: 82.7 kg (182 lb 6.4 oz).  GFR Estimate   Date Value Ref Range Status   08/14/2023 >90 >60 mL/min/1.73m2 Final   05/15/2020 >90 >60 mL/min/[1.73_m2] Final     Comment:     Non  GFR Calc  Starting 12/18/2018, serum creatinine based estimated GFR (eGFR) will be   calculated using the Chronic Kidney Disease Epidemiology Collaboration   (CKD-EPI) equation.       Lab Results   Component Value Date    [id number] Negative 01/23/2022       Return in about 1 week (around 1/2/2024), or if symptoms worsen or fail to improve.    Virtual Urgent Care  [facility]    Subjective   Christophe is a 23 year old, presenting for the following health " issues:  No chief complaint on file.      HPI       COVID-19 Symptom Review  How many days ago did these symptoms start? 3 days ago    Are any of the following symptoms significant for you?  New or worsening difficulty breathing? No  Worsening cough? Yes, it's a dry cough.   Fever or chills? Yes, I felt feverish or had chills.  Headache: YES  Sore throat: YES  Chest pain: No  Diarrhea: No  Body aches? YES    What treatments has patient tried? none   Does patient live in a nursing home, group home, or shelter? No  Does patient have a way to get food/medications during quarantined? Yes, I have a friend or family member who can help me.                Review of Systems   Constitutional, HEENT, cardiovascular, pulmonary, gi and gu systems are negative, except as otherwise noted.      Objective           Vitals:  No vitals were obtained today due to virtual visit.    Physical Exam   GENERAL: Healthy, alert and no distress  RESP: No audible wheeze, cough, or visible cyanosis.  No visible retractions or increased work of breathing.    PSYCH: Mentation appears normal, affect normal/bright, judgement and insight intact, normal speech and appearance well-groomed.              Video-Visit Details    Type of service:  Video Visit   Video Start Time:  1802  Video End Time: 1808    Originating Location (pt. Location): Home    Distant Location (provider location):  Off-site  Platform used for Video Visit: [employer]

## 2023-12-26 NOTE — TELEPHONE ENCOUNTER
Spoke with patient regarding positive COVID test.       Patient denies any emergent symptoms such as difficulty breathing or shortness of breath.       Patient taking medications that require provider visit to discuss treatment options.       Patient scheduled for virtual visit 12/26. Reviewed care advise and isolation precautions.      No further questions at time of call.

## 2023-12-27 ENCOUNTER — TELEPHONE (OUTPATIENT)
Dept: OBGYN | Facility: CLINIC | Age: 23
End: 2023-12-27
Payer: COMMERCIAL

## 2023-12-27 NOTE — TELEPHONE ENCOUNTER
Return call to patient  Spoke with patient on the phone.    S-(situation): COVID symptoms since 12/23/23, tested positive on 12/26/2023    B-(background): positive covid test yesterday, had virtual visit with FP. Starting Paxlovid today.     A-(assessment): Symptoms started 12/23/2023    Cough   Sneezing  Body aches  Headache  Sore throat  Looser stools   No fever  No SOB or difficulty breathing     On omperazole  Starting Paxlovid today    + fetal movement  No uterine contractions or cramping  No abnormal vaginal discharge or bleeding  No leaking of fluid of like water broke       R-(recommendations): Reassurance provided. Continue to monitor symptoms. Reviewed red flag symptoms. Shortness of breath or difficulty breathing needs further evaluation in the ER. Pulse oximeter recommended to monitor for oxygen levels at 95 or greater. Discussed OTC medications that are safe in pregnancy for managing symptoms. Rest and fluids. Call with questions or concerns. Next appointment should be fine as 17 days since symptoms onset if patient is fever free and symptoms improved.    Pt in agreement and reports understanding.    Leia GUILLERMO   Ob/Gyn Clinic

## 2023-12-27 NOTE — PATIENT INSTRUCTIONS
For informational purposes only. Not to replace the advice of your health care provider. Copyright   2022 [facility]. All rights reserved. Clinically reviewed by Viviana Olivares, PharmD, BCACP. [employer] 355760 - REV 06/23.  COVID-19 Outpatient Treatments  Your care team can help you find the best treatments for COVID-19. Talk to a health care provider or refer to the FDA medicine fact sheets below.  Paxlovid (nirmatrelvir and ritonavir): https://www.paxlovid.Lumetrics/resources  Molnupiravir (Lagevrio): https://www.fda.gov/media/404684/download  Important: We can only prescribe Paxlovid or Molnupiravir when it can be started within 5 days of first having symptoms.  Paxlovid (nimatrelvir and ritonavir)  How it works  Two medicines (nirmatrelvir and ritonavir) are taken together. They stop the virus from growing. Less amount of virus is easier for your body to fight.  Benefits  Lowers risk of a hospital stay or death from COVID-19.  How to take  Medicine comes in a daily container with both medicine tablets. Take by mouth twice daily (once in the morning, once at night) for 5 days.  The number of tablets to take varies by patient.  Don't chew or break capsules. Swallow whole.  When to take  Take it as soon as possible and within 5 days of your first symptoms.  Who can take it  Patients must be 12 years or older weigh at least 88 pounds. Paxlovid is the preferred treatment for pregnant patients.  Possible side effects  Can cause altered sense of taste, diarrhea (loose, watery stools), high blood pressure, muscle aches.  Medicine conflicts  Some medicines may conflict with Paxlovid and may cause serious side effects.  Tell your care team about all the medicines you take, including prescription and over-the-counter medicines, vitamins, and herbal supplements.  Your care team will review your medicines to make sure that you can safely take Paxlovid.  Cautions  Paxlovid is not advised for patients with severe  kidney or liver disease. If you have kidney or liver problems, the dose may need to be changed.  If you're pregnant or breastfeeding, talk to your care team about your options.  If you take hormonal birth control (such as the Pill), then you or your partner should also use a non-hormonal form of birth control (such as a condom). Keep doing this for 1 menstrual cycle after your last dose of Paxlovid.  Molnupiravir (lagevrio)  How it works  Stops the virus from growing. Less amount of virus is easier for your body to fight.  Benefits  Lowers risk of a hospital stay or death from COVID-19.  How to take  Take 4 capsules by mouth every 12 hours (4 in the morning and 4 at night) for 5 days. Don't chew or break capsules. Swallow whole.  When to take  Take as soon as possible and within 5 days of your first symptoms.  Who can take it  Patients must be 18 years or older.   Possible side effects  Diarrhea (loose, watery stools), nausea (feeling sick to your stomach), dizziness, headaches.  Medicine conflicts  Right now, there are no known conflicts with other drugs. But tell your care team about all medicines you take.  Cautions  This medicine is not advised for patients who are pregnant.  If you are someone who could become pregnant, use trusted birth control until 4 days after your last dose of molnupiravir.  If your partner could become pregnant, you should use trusted birth control until 3 months after your last dose of molnupiravir.      Coping with Life After COVID-19  Being in the hospital because of COVID-19 is scary. Going home can be scary, too. You may face changes to your life, the way you work or what you can eat. It s hard to adjust to change, and it s normal to feel afraid, frustrated or even angry. These feelings usually go away over time. If your feelings don t start to get better, it s called  adjustment disorder.      What signs should I look out for?  Adjustment disorder can happen to anyone in a time of  stress. It makes it hard to cope with daily life. You may feel lonely or fight with loved ones, even if you re glad to be home. Watch for these signs:  Fear or worry  Hard time focusing  Sadness or anger  Trouble talking to family or friends  Feeling like you don t fit in or isolating yourself  Problems with sleep   Drinking alcohol or taking drugs to cope    What can I do?  You can help yourself get better. Feeling you have control helps you move forward. You may wonder if you ll be able to do things you did before. Be patient. Do your best to make the most of every day. Try to build relationships, be as active as you can, eat right and keep a sense of humor. Avoid smoking and drinking too much alcohol. Call your family doctor or clinic if you re not sure what to do. They can guide you to care or other services.    When should I get help?  Think about getting counseling if your sadness or frustration gets worse. Together with a trained counselor, you can talk about your problems adjusting to life after your hospital stay. You can come up with new ways to handle changes that give you more control. Your family doctor or care team can help you find a counselor.     Get help if you re thinking about hurting yourself. If you need help right away, call 911 or go to the nearest emergency room. You can also try the Crisis Text Line.    Crisis Text Line: 202-619 (http://www.crisistextline.org)  The Crisis Text Line serves anyone, in any crisis. It gives free, 24/7 support. Here's how it works:  Text HOME to 094924 from anywhere in the [place], anytime, about any type of crisis.  A live, trained Crisis Counselor will text you back quickly.  The volunteer Crisis Counselor can help you move from a  hot moment  to a  cool moment.  They can also help you work out a safety plan.

## 2023-12-27 NOTE — TELEPHONE ENCOUNTER
[facility] Call Center    Phone Message    May a detailed message be left on voicemail: yes     Reason for Call: Symptoms or Concerns     If patient has red-flag symptoms, warm transfer to triage line    Current symptom or concern: Pt calling stating she tested positive for covid. Pt has every basic symptom except shortness of breath.     Symptoms have been present for:  1 day(s)    Has patient previously been seen for this? No    Are there any new or worsening symptoms? No    Action Taken:  MILLICENT VÁSQUEZ    Travel Screening: Not Applicable

## 2024-01-11 ENCOUNTER — HOSPITAL ENCOUNTER (OUTPATIENT)
Dept: ULTRASOUND IMAGING | Facility: CLINIC | Age: 24
Discharge: HOME | End: 2024-01-11
Attending: PHYSICIAN ASSISTANT | Admitting: PHYSICIAN ASSISTANT
Payer: COMMERCIAL

## 2024-01-11 ENCOUNTER — PRENATAL OFFICE VISIT (OUTPATIENT)
Dept: OBGYN | Facility: CLINIC | Age: 24
End: 2024-01-11
Payer: COMMERCIAL

## 2024-01-11 VITALS
HEART RATE: 108 BPM | RESPIRATION RATE: 18 BRPM | BODY MASS INDEX: 33.12 KG/M2 | WEIGHT: 194 LBS | HEIGHT: 64 IN | TEMPERATURE: 98.5 F | DIASTOLIC BLOOD PRESSURE: 69 MMHG | SYSTOLIC BLOOD PRESSURE: 115 MMHG

## 2024-01-11 DIAGNOSIS — Z34.83 PRENATAL CARE, SUBSEQUENT PREGNANCY IN THIRD TRIMESTER: ICD-10-CM

## 2024-01-11 DIAGNOSIS — U07.1 COVID-19 AFFECTING PREGNANCY IN SECOND TRIMESTER: ICD-10-CM

## 2024-01-11 DIAGNOSIS — M79.661 PAIN IN BOTH LOWER LEGS: ICD-10-CM

## 2024-01-11 DIAGNOSIS — M79.662 PAIN IN BOTH LOWER LEGS: ICD-10-CM

## 2024-01-11 DIAGNOSIS — O98.512 COVID-19 AFFECTING PREGNANCY IN SECOND TRIMESTER: ICD-10-CM

## 2024-01-11 DIAGNOSIS — Z34.83 PRENATAL CARE, SUBSEQUENT PREGNANCY IN THIRD TRIMESTER: Primary | ICD-10-CM

## 2024-01-11 DIAGNOSIS — O24.419 GESTATIONAL DIABETES MELLITUS (GDM) IN THIRD TRIMESTER, GESTATIONAL DIABETES METHOD OF CONTROL UNSPECIFIED: ICD-10-CM

## 2024-01-11 LAB
ALBUMIN SERPL BCG-MCNC: 3.4 G/DL (ref 3.5–5.2)
ALP SERPL-CCNC: 111 U/L (ref 40–150)
ALT SERPL W P-5'-P-CCNC: 7 U/L (ref 0–50)
ANION GAP SERPL CALCULATED.3IONS-SCNC: 11 MMOL/L (ref 7–15)
AST SERPL W P-5'-P-CCNC: 17 U/L (ref 0–45)
BILIRUB SERPL-MCNC: <0.2 MG/DL
BUN SERPL-MCNC: 10 MG/DL (ref 6–20)
CALCIUM SERPL-MCNC: 9.7 MG/DL (ref 8.6–10)
CHLORIDE SERPL-SCNC: 107 MMOL/L (ref 98–107)
CREAT SERPL-MCNC: 0.53 MG/DL (ref 0.51–0.95)
DEPRECATED HCO3 PLAS-SCNC: 20 MMOL/L (ref 22–29)
EGFRCR SERPLBLD CKD-EPI 2021: >90 ML/MIN/1.73M2
GLUCOSE SERPL-MCNC: 86 MG/DL (ref 70–99)
MAGNESIUM SERPL-MCNC: 1.7 MG/DL (ref 1.7–2.3)
POTASSIUM SERPL-SCNC: 4.2 MMOL/L (ref 3.4–5.3)
PROT SERPL-MCNC: 6.4 G/DL (ref 6.4–8.3)
SODIUM SERPL-SCNC: 138 MMOL/L (ref 135–145)

## 2024-01-11 PROCEDURE — 99207 PR PRENATAL VISIT: CPT | Performed by: PHYSICIAN ASSISTANT

## 2024-01-11 PROCEDURE — 83735 ASSAY OF MAGNESIUM: CPT | Performed by: PHYSICIAN ASSISTANT

## 2024-01-11 PROCEDURE — 36415 COLL VENOUS BLD VENIPUNCTURE: CPT | Performed by: PHYSICIAN ASSISTANT

## 2024-01-11 PROCEDURE — 93970 EXTREMITY STUDY: CPT

## 2024-01-11 PROCEDURE — 80053 COMPREHEN METABOLIC PANEL: CPT | Performed by: PHYSICIAN ASSISTANT

## 2024-01-11 PROCEDURE — 99213 OFFICE O/P EST LOW 20 MIN: CPT | Performed by: PHYSICIAN ASSISTANT

## 2024-01-11 NOTE — PROGRESS NOTES
"[facility]   OB/GYN Clinic     CC: Return OB      Subjective:     Jeanine is a 23 year old  at 29w0d by Patient's last menstrual period was 2023 (approximate). who presents for return OB visit. She reports feeling well. Denies uterine cramping, vaginal bleeding or leaking, dysuria. +fetal movement.      Pt reports she feels safe at home and mood is good.      Concerns: for past week she has had leg pain that is constant and cramping to the posterior calves. She was diagnosed with covid 19 on 23 and took paxlovid. She states she has been increasing her water intake, taking magnesium and vit B12 with no improvement of symptoms. No calf redness or swelling, but pain present in both legs and worse in left leg. No shortness of breath, chest pain, heart racing.      Objective:  /69 (BP Location: Right arm, Patient Position: Chair, Cuff Size: Adult Regular)   Pulse 108   Temp 98.5  F (36.9  C) (Tympanic)   Resp 18   Ht 1.626 m (5' 4\")   Wt 88 kg (194 lb)   LMP 2023 (Approximate)   BMI 33.30 kg/m         Estimated body mass index is 29.7 kg/m  as calculated from the following:    Height as of this encounter: 1.626 m (5' 4\").    Weight as of this encounter: 78.5 kg (173 lb).        Physical Exam:  Gen: Pleasant, talkative female in no apparent distress   Respiratory: breathing comfortably on room air   Cardiac: Warm and well-perfused.   MSK: Grossly normal movement of all four extremities  Psych: mood and affect bright   Lower extremity: edema not present; positive calf tenderness bilaterally with left worse than right.      Fetal dop tones: 155 bpm    Fundal height: 30 cm       Assessment/Plan:        ICD-10-CM    1. Prenatal care, subsequent pregnancy in third trimester  Z34.83 US OB Follow Up >14 Weeks     US Lower Extremity Venous Duplex Bilateral      2. Pain in both lower legs  M79.661 Comprehensive metabolic panel (BMP + Alb, Alk Phos, ALT, AST, Total. Bili, " TP)    M79.662 Magnesium      3. COVID-19 affecting pregnancy in second trimester  O98.512 US OB Follow Up >14 Weeks    U07.1              23 year old  at 24w5d by LMP of Patient's last menstrual period was 2023 (approximate). Not c/w LMP who presents for her initial OB visit.      -New OB labs normal  -NIPT test normal   -Nausea improved taking zofran as needed.   -PMH/OBHx problems:                - hx of gHTN; plan baseline HELLP labs, patient taking ASA 81 mg daily                - bipolar: on lamictal, abilify and zoloft; r/b/a discussed, info sent to patient: [facility] recommends lamictal levels checked throughout pregnancy. Level drawn on 23 that was slightly low.   -MFM consult and anatomy US done on 23: recommended lamictal levels throughout pregnancy, Neurology referral (this was placed today), continue ASA 81mg  - Medication review: lamictal recently increased from 125mg to 150mg once daily (will check lamictal level), continue prenatal vitamin   -Reviewed ectopic and miscarriage precautions (present to ED or call clinic with abdominal pain, vaginal bleeding or fever)   -Weight gain: discussed weight gain expectations (BMI 25-30: 15-25lbs)   -PAP smear: NILM  -Delivery plan: continue to discuss, breastfeeding, pp contraception, pain management in labor - continue to discuss  -Immunizations: flu shot on 10/23/23. Covid vaccine on 10/22/23; Tdap at 28wks, RSV btw 32-36 weeks.   - 1 hour glucose today with 3rd trimester labs today  - increased esophageal reflux in pregnancy; discussed that Tums is safe over the counter. Patient would like prescription for omeprazole. Omeprazole 20mg once daily taking  -recent covid 19 32 week growth US ordered  -bilateral leg pain for past week with left worse than right patient sent for stat bilateral venous dopplers today. If positive patient to go to Emergency Room for treatment if negative can go home.   -will check electrolytes today. Patient sent to  lab.   -patient failed 3 hour GTT referral placed for diabetic management since patient has not had a virtual visit for this yet.       Return to clinic in 2 weeks.      Elisa Simmons PA-C

## 2024-01-11 NOTE — NURSING NOTE
"Initial /69 (BP Location: Right arm, Patient Position: Chair, Cuff Size: Adult Regular)   Pulse 108   Temp 98.5  F (36.9  C) (Tympanic)   Resp 18   Ht 1.626 m (5' 4\")   Wt 88 kg (194 lb)   LMP 06/05/2023 (Approximate)   BMI 33.30 kg/m   Estimated body mass index is 33.3 kg/m  as calculated from the following:    Height as of this encounter: 1.626 m (5' 4\").    Weight as of this encounter: 88 kg (194 lb). .    "

## 2024-01-18 ENCOUNTER — MYC REFILL (OUTPATIENT)
Dept: FAMILY MEDICINE | Facility: CLINIC | Age: 24
End: 2024-01-18

## 2024-01-18 ENCOUNTER — VIRTUAL VISIT (OUTPATIENT)
Dept: EDUCATION SERVICES | Facility: CLINIC | Age: 24
End: 2024-01-18
Attending: OBSTETRICS & GYNECOLOGY
Payer: COMMERCIAL

## 2024-01-18 DIAGNOSIS — Z32.01 PREGNANCY TEST POSITIVE: ICD-10-CM

## 2024-01-18 DIAGNOSIS — O24.419 GESTATIONAL DIABETES: ICD-10-CM

## 2024-01-18 PROCEDURE — G0109 DIAB MANAGE TRN IND/GROUP: HCPCS | Mod: 95

## 2024-01-18 RX ORDER — LANCETS
EACH MISCELLANEOUS
Qty: 100 EACH | Refills: 6 | Status: SHIPPED | OUTPATIENT
Start: 2024-01-18 | End: 2024-05-16

## 2024-01-18 NOTE — LETTER
1/18/2024         RE: Jeanine Hernandez  [address]  [place] [address]        Dear Colleague,    Thank you for referring your patient, Jeanine Hernandez, to the [facility]. Please see a copy of my visit note below.    Diabetes Self-Management Education & Support  Type of service:  Video Visit    If the video visit is dropped, the video visit invitation should be resent by: Send to e-mail at: [email or web address]    Originating Location (pt. Location): Home  Distant Location (provider location): [facility]  Mode of Communication:  Video Conference via "[employer]    Video Start Time:  8:05 AM  Video End Time (time video stopped): 9:14 AM    How would patient like to obtain AVS? MyChart    SUBJECTIVE/OBJECTIVE:  Presents for education related to gestational diabetes.         Cultural Influences/Ethnic Background:  Not  or       Estimated Date of Delivery: Mar 28, 2024    1 hour OGTT  Lab Results   Component Value Date    GLU1 170 (H) 12/12/2023         3 hour OGTT    Fasting  Lab Results   Component Value Date    GTTGF 81 12/19/2023       1 hour  Lab Results   Component Value Date    GTTG1 220 (H) 12/19/2023       2 hour  Lab Results   Component Value Date    GTTG2 158 (H) 12/19/2023       3 hour  Lab Results   Component Value Date    GTTG3 55 (L) 12/19/2023       Lifestyle and Health Behaviors:       Healthy Coping:       Current Management:       ASSESSMENT:  Met with Christophe via group video for her initial education for Gestational Diabetes.  This is her first time with GDM.  Stated she works various shifts and we discussed how to navigate this with checking glucose and meal planning.    [facility]  3.28.24-30 weeks 0 days  #2  81/220/158/155  No previous GDM    INTERVENTION:      Educational topics covered today:  GDM diagnosis, pathophysiology, Risks and Complications of GDM, Means of controlling GDM, Using  a Blood Glucose Monitor, Blood Glucose Goals, Logging and Interpreting Glucose Results, Ketone Testing, When to Call a Diabetes Educator or OB Provider, Healthy Eating During Pregnancy, Counting Carbohydrates, Meal Planning for GDM, and Physical Activity    Educational materials provided today:   Kashif Understanding Gestational Diabetes  GDM Log Book  Sharps Disposal  Care After Delivery      Pt verbalized understanding of concepts discussed and recommendations provided today.     PLAN:  Check glucose 4 times daily, before breakfast and 1 hour after each meal.     Check Ketones daily for one week, if negative, reduce testing to once a week.     Physical activity recommended: Yes.    Meal plan: 15-30 carbs at breakfast, 45-60 carbs at lunch, 45-60 carbs at supper, 15-30 carbs at 3 snacks a day.  Follow consistent CHO meal plan, eat CHO and protein/fat at all meals/snacks.    Call/e-mail/MyChart message diabetes educator if 3 or more blood sugars are above the goal in 1 week, if ketones are positive, or with questions/concerns.     The service provided today was under the supervising provider, Enid Falk, who was available if needed.     Time Spent: 60 minutes  Encounter Type: Group class    Any diabetes medication dose changes were made via the CDE Protocol and Collaborative Practice Agreement with the patient's referring provider. A copy of this encounter was shared with the provider.

## 2024-01-18 NOTE — PROGRESS NOTES
Diabetes Self-Management Education & Support  Type of service:  Video Visit    If the video visit is dropped, the video visit invitation should be resent by: Send to e-mail at: [email or web address]    Originating Location (pt. Location): Home  Distant Location (provider location): [facility]  Mode of Communication:  Video Conference via BeOn[employer]k    Video Start Time:  8:05 AM  Video End Time (time video stopped): 9:14 AM    How would patient like to obtain AVS? MyChart    SUBJECTIVE/OBJECTIVE:  Presents for education related to gestational diabetes.         Cultural Influences/Ethnic Background:  Not  or       Estimated Date of Delivery: Mar 28, 2024    1 hour OGTT  Lab Results   Component Value Date    GLU1 170 (H) 12/12/2023         3 hour OGTT    Fasting  Lab Results   Component Value Date    GTTGF 81 12/19/2023       1 hour  Lab Results   Component Value Date    GTTG1 220 (H) 12/19/2023       2 hour  Lab Results   Component Value Date    GTTG2 158 (H) 12/19/2023       3 hour  Lab Results   Component Value Date    GTTG3 55 (L) 12/19/2023       Lifestyle and Health Behaviors:       Healthy Coping:       Current Management:       ASSESSMENT:  Met with Christophe via group video for her initial education for Gestational Diabetes.  This is her first time with GDM.  Stated she works various shifts and we discussed how to navigate this with checking glucose and meal planning.    Yaniv  3.28.24-30 weeks 0 days  #2  81/220/158/155  No previous GDM    INTERVENTION:      Educational topics covered today:  GDM diagnosis, pathophysiology, Risks and Complications of GDM, Means of controlling GDM, Using a Blood Glucose Monitor, Blood Glucose Goals, Logging and Interpreting Glucose Results, Ketone Testing, When to Call a Diabetes Educator or OB Provider, Healthy Eating During Pregnancy, Counting Carbohydrates, Meal Planning for GDM, and Physical Activity    Educational  materials provided today:   Kashif Understanding Gestational Diabetes  GDM Log Book  Sharps Disposal  Care After Delivery      Pt verbalized understanding of concepts discussed and recommendations provided today.     PLAN:  Check glucose 4 times daily, before breakfast and 1 hour after each meal.     Check Ketones daily for one week, if negative, reduce testing to once a week.     Physical activity recommended: Yes.    Meal plan: 15-30 carbs at breakfast, 45-60 carbs at lunch, 45-60 carbs at supper, 15-30 carbs at 3 snacks a day.  Follow consistent CHO meal plan, eat CHO and protein/fat at all meals/snacks.    Call/e-mail/[employer] message diabetes educator if 3 or more blood sugars are above the goal in 1 week, if ketones are positive, or with questions/concerns.     The service provided today was under the supervising provider, Enid Falk, who was available if needed.     Time Spent: 60 minutes  Encounter Type: Group class    Any diabetes medication dose changes were made via the CDE Protocol and Collaborative Practice Agreement with the patient's referring provider. A copy of this encounter was shared with the provider.

## 2024-01-19 RX ORDER — PRENATAL VIT/IRON FUM/FOLIC AC 27MG-0.8MG
1 TABLET ORAL DAILY
Qty: 90 TABLET | Refills: 3 | Status: SHIPPED | OUTPATIENT
Start: 2024-01-19 | End: 2024-05-16

## 2024-01-29 ENCOUNTER — HOSPITAL ENCOUNTER (OUTPATIENT)
Facility: CLINIC | Age: 24
Discharge: HOME | End: 2024-01-29
Attending: OBSTETRICS & GYNECOLOGY | Admitting: OBSTETRICS & GYNECOLOGY
Payer: COMMERCIAL

## 2024-01-29 VITALS
SYSTOLIC BLOOD PRESSURE: 124 MMHG | HEART RATE: 90 BPM | RESPIRATION RATE: 18 BRPM | DIASTOLIC BLOOD PRESSURE: 80 MMHG | TEMPERATURE: 97.5 F

## 2024-01-29 PROBLEM — N89.8 DISCHARGE OF VAGINA: Status: ACTIVE | Noted: 2024-01-29

## 2024-01-29 LAB
CLUE CELLS: NORMAL
CRYSTALS AMN MICRO: NORMAL
RUPTURE OF FETAL MEMBRANES BY ROM PLUS: NEGATIVE
TRICHOMONAS, WET PREP: NORMAL
WBC'S/HIGH POWER FIELD, WET PREP: NORMAL
YEAST, WET PREP: NORMAL

## 2024-01-29 PROCEDURE — 87210 SMEAR WET MOUNT SALINE/INK: CPT | Performed by: OBSTETRICS & GYNECOLOGY

## 2024-01-29 PROCEDURE — 84112 EVAL AMNIOTIC FLUID PROTEIN: CPT | Performed by: OBSTETRICS & GYNECOLOGY

## 2024-01-29 PROCEDURE — G0463 HOSPITAL OUTPT CLINIC VISIT: HCPCS

## 2024-01-29 RX ORDER — PROCHLORPERAZINE MALEATE 10 MG
10 TABLET ORAL EVERY 6 HOURS PRN
Status: DISCONTINUED | OUTPATIENT
Start: 2024-01-29 | End: 2024-01-30 | Stop reason: HOSPADM

## 2024-01-29 RX ORDER — PROCHLORPERAZINE 25 MG
25 SUPPOSITORY, RECTAL RECTAL EVERY 12 HOURS PRN
Status: DISCONTINUED | OUTPATIENT
Start: 2024-01-29 | End: 2024-01-30 | Stop reason: HOSPADM

## 2024-01-29 RX ORDER — ONDANSETRON 2 MG/ML
4 INJECTION INTRAMUSCULAR; INTRAVENOUS EVERY 6 HOURS PRN
Status: DISCONTINUED | OUTPATIENT
Start: 2024-01-29 | End: 2024-01-30 | Stop reason: HOSPADM

## 2024-01-29 RX ORDER — METOCLOPRAMIDE HYDROCHLORIDE 5 MG/ML
10 INJECTION INTRAMUSCULAR; INTRAVENOUS EVERY 6 HOURS PRN
Status: DISCONTINUED | OUTPATIENT
Start: 2024-01-29 | End: 2024-01-30 | Stop reason: HOSPADM

## 2024-01-29 RX ORDER — ONDANSETRON 4 MG/1
4 TABLET, ORALLY DISINTEGRATING ORAL EVERY 6 HOURS PRN
Status: DISCONTINUED | OUTPATIENT
Start: 2024-01-29 | End: 2024-01-30 | Stop reason: HOSPADM

## 2024-01-29 RX ORDER — METOCLOPRAMIDE 10 MG/1
10 TABLET ORAL EVERY 6 HOURS PRN
Status: DISCONTINUED | OUTPATIENT
Start: 2024-01-29 | End: 2024-01-30 | Stop reason: HOSPADM

## 2024-01-29 ASSESSMENT — ACTIVITIES OF DAILY LIVING (ADL): ADLS_ACUITY_SCORE: 35

## 2024-01-30 NOTE — DISCHARGE INSTRUCTIONS
Discharge Instructions for Undelivered Patients  [facility] Phone # [phone]    Diet:  * Drink 8 to 12 glasses of liquids (milk, juice, water) every day  * You may eat meals and snacks.    Activity:  * Count fetal kicks every day (see handout).  * Call your doctor or nurse midwife if your baby is moving less than usual.    Call your provider if you notice:  * Swelling in your face or increased swelling in your hands or legs.  * Headaches that are not relieved by Tylenol (acetaminophen).  * Changes in your vision (blurring; seeing spots or stars).  * Nausea (sick to your stomach) and vomiting (throwing up).  * Weight gain of 5 pounds per week.  * Heartburn that doesn't go away.  * Signs of bladder infection: Pain when you urinate (use the toilet), needing to go more often or more urgently.  * The bag of gallegos (membrane) breaks, or you notice leaking in your underwear.  * Bright red blood in your underwear.  * Abdominal (lower belly) or stomach pain.  * Second (plus) baby: Contractions (tightenings) less than 10 minutes apart and getting stronger.  * Increase or change in vaginal discharge (note the color and amount).    Schedule follow up appointment with [facility] by calling [phone]

## 2024-01-30 NOTE — PROGRESS NOTES
S: Discharge from triage  A: A:moderate variablility, + accels, no decels, Category I  no uterine activity  Strip reviewed by Sarah WONG RN.    Admission on 2024   Component Date Value    Trichomonas 2024 Absent     Yeast 2024 Absent     Clue Cells 2024 Absent     WBCs/high power field 2024 None     Fern Crystallization 2024 No ferning present     Rupture of Fetal Membran* 2024 Negative      Dr. AVEL Flor informed of above and discharge order received.   R: Plan includes:  labor instuctions given Fetal kick count instructions given. Follow up clinic appointment: as scheduled. Patient instructed to report any recurrence of above concerns to her primary care provider during clinic hours or [facility] at any other time. Patient verbalized understanding of After Visit Summary, education and agreement with plan. Agrees to call for any problems, questions or concerns.  Discharged undelivered via ambulatory  in stable condition with all belongings. Accompanied by SO .

## 2024-01-30 NOTE — PROGRESS NOTES
S:Patient presents due to  vaginal discharge and fluid leaking .  B:31w4d   Allergies: Iodine, Ivp dye [contrast dye], and Latex  A:A:moderate variablility, + accels, no decels, Category I  no uterine activity    Dr. AVEL Flor in department, and was informed to patient status. and orders received.  Plan includes; Labs . Reviewed with patient and she agrees with plan.      Oriented to room and call light.

## 2024-02-09 ENCOUNTER — OFFICE VISIT (OUTPATIENT)
Dept: OBGYN | Facility: CLINIC | Age: 24
End: 2024-02-09
Payer: COMMERCIAL

## 2024-02-09 VITALS
HEIGHT: 64 IN | SYSTOLIC BLOOD PRESSURE: 129 MMHG | HEART RATE: 109 BPM | TEMPERATURE: 98.9 F | DIASTOLIC BLOOD PRESSURE: 87 MMHG | BODY MASS INDEX: 35.85 KG/M2 | WEIGHT: 210 LBS | RESPIRATION RATE: 18 BRPM

## 2024-02-09 DIAGNOSIS — O24.419 GESTATIONAL DIABETES MELLITUS (GDM) IN THIRD TRIMESTER, GESTATIONAL DIABETES METHOD OF CONTROL UNSPECIFIED: ICD-10-CM

## 2024-02-09 DIAGNOSIS — O99.612 GASTROESOPHAGEAL REFLUX IN PREGNANCY IN SECOND TRIMESTER: ICD-10-CM

## 2024-02-09 DIAGNOSIS — Z34.83 PRENATAL CARE, SUBSEQUENT PREGNANCY IN THIRD TRIMESTER: Primary | ICD-10-CM

## 2024-02-09 DIAGNOSIS — F31.9 BIPOLAR DISORDER WITH DEPRESSION (H): ICD-10-CM

## 2024-02-09 DIAGNOSIS — K21.9 GASTROESOPHAGEAL REFLUX IN PREGNANCY IN SECOND TRIMESTER: ICD-10-CM

## 2024-02-09 DIAGNOSIS — Z23 NEED FOR TDAP VACCINATION: ICD-10-CM

## 2024-02-09 DIAGNOSIS — G47.9 SLEEPING DIFFICULTIES: ICD-10-CM

## 2024-02-09 PROCEDURE — 90715 TDAP VACCINE 7 YRS/> IM: CPT | Performed by: PHYSICIAN ASSISTANT

## 2024-02-09 PROCEDURE — 99207 PR PRENATAL VISIT: CPT | Performed by: PHYSICIAN ASSISTANT

## 2024-02-09 PROCEDURE — 90471 IMMUNIZATION ADMIN: CPT | Performed by: PHYSICIAN ASSISTANT

## 2024-02-09 PROCEDURE — 36415 COLL VENOUS BLD VENIPUNCTURE: CPT | Performed by: PHYSICIAN ASSISTANT

## 2024-02-09 PROCEDURE — 99000 SPECIMEN HANDLING OFFICE-LAB: CPT | Performed by: PHYSICIAN ASSISTANT

## 2024-02-09 PROCEDURE — 80175 DRUG SCREEN QUAN LAMOTRIGINE: CPT | Mod: 90 | Performed by: PHYSICIAN ASSISTANT

## 2024-02-09 RX ORDER — SERTRALINE HYDROCHLORIDE 25 MG/1
75 TABLET, FILM COATED ORAL DAILY
Qty: 90 TABLET | Refills: 1 | Status: SHIPPED | OUTPATIENT
Start: 2024-02-09 | End: 2024-09-25

## 2024-02-09 NOTE — PROGRESS NOTES
Prior to immunization administration, verified patients identity using patient s name and date of birth. Please see Immunization Activity for additional information.     Screening Questionnaire for Adult Immunization    Are you sick today?   No   Do you have allergies to medications, food, a vaccine component or latex?   yes   Have you ever had a serious reaction after receiving a vaccination?   No   Do you have a long-term health problem with heart, lung, kidney, or metabolic disease (e.g., diabetes), asthma, a blood disorder, no spleen, complement component deficiency, a cochlear implant, or a spinal fluid leak?  Are you on long-term aspirin therapy?   No   Do you have cancer, leukemia, HIV/AIDS, or any other immune system problem?   No   Do you have a parent, brother, or sister with an immune system problem?   No   In the past 3 months, have you taken medications that affect  your immune system, such as prednisone, other steroids, or anticancer drugs; drugs for the treatment of rheumatoid arthritis, Crohn s disease, or psoriasis; or have you had radiation treatments?   No   Have you had a seizure, or a brain or other nervous system problem?   No   During the past year, have you received a transfusion of blood or blood    products, or been given immune (gamma) globulin or antiviral drug?   No   For women: Are you pregnant or is there a chance you could become       pregnant during the next month?   yes   Have you received any vaccinations in the past 4 weeks?   No     Immunization questionnaire answers were all negative.      Patient instructed to remain in clinic for 15 minutes afterwards, and to report any adverse reactions.     Screening performed by Radha Mills CMA on 2/9/2024 at 11:52 AM.

## 2024-02-09 NOTE — NURSING NOTE
"Initial /87 (BP Location: Right arm, Patient Position: Chair, Cuff Size: Adult Regular)   Pulse 109   Temp 98.9  F (37.2  C) (Tympanic)   Resp 18   Ht 1.626 m (5' 4\")   Wt 95.3 kg (210 lb)   LMP 06/05/2023 (Approximate)   BMI 36.05 kg/m   Estimated body mass index is 36.05 kg/m  as calculated from the following:    Height as of this encounter: 1.626 m (5' 4\").    Weight as of this encounter: 95.3 kg (210 lb). .    "

## 2024-02-09 NOTE — PROGRESS NOTES
"[facility]   OB/GYN Clinic     CC: Return OB      Subjective:     Jeanine is a 23 year old  at 33w1d by Patient's last menstrual period was 2023 (approximate). who presents for return OB visit. She reports feeling well. Denies uterine cramping, vaginal bleeding or leaking, dysuria. +fetal movement.      Pt reports she feels safe at home and mood is ok, but increased depression. Patient is at 50mg of zoloft daily and doesn't think this is helping. No thoughts of hurting herself or others.      Concerns: not sleeping well. Stopped unisom because she did not know if it was safe in third trimester. Sleeping only a couple hours a night.      Objective:  /87 (BP Location: Right arm, Patient Position: Chair, Cuff Size: Adult Regular)   Pulse 109   Temp 98.9  F (37.2  C) (Tympanic)   Resp 18   Ht 1.626 m (5' 4\")   Wt 95.3 kg (210 lb)   LMP 2023 (Approximate)   BMI 36.05 kg/m       Estimated body mass index is 29.7 kg/m  as calculated from the following:    Height as of this encounter: 1.626 m (5' 4\").    Weight as of this encounter: 78.5 kg (173 lb).      Physical Exam:  Gen: Pleasant, talkative female in no apparent distress   Respiratory: breathing comfortably on room air   Cardiac: Warm and well-perfused.   MSK: Grossly normal movement of all four extremities  Psych: mood and affect bright   Lower extremity: +1 edema present     Fetal dop tones: 140 bpm    Fundal height: 33 cm       Assessment/Plan:      23 year old  at 33w1d by LMP of Patient's last menstrual period was 2023 (approximate). Not c/w LMP who presents for her initial OB visit.       ICD-10-CM    1. Prenatal care, subsequent pregnancy in third trimester  Z34.83       2. Gestational diabetes mellitus (GDM) in third trimester, gestational diabetes method of control unspecified  O24.419       3. Gastroesophageal reflux in pregnancy in second trimester  O99.612     K21.9       4. Bipolar disorder " with depression (H)  F31.9       5. Need for Tdap vaccination  Z23       6. Sleeping difficulties  G47.9           -New OB labs normal  -NIPT test normal   -Nausea improved taking zofran as needed. Can continue B6 and unisom for nausea  -PMH/OBHx problems:                - hx of gHTN; plan baseline HELLP labs, patient taking ASA 81 mg daily                - bipolar: on lamictal, abilify and zoloft; r/b/a discussed, info sent to patient: [facility] recommends lamictal levels checked throughout pregnancy. Level drawn on 11/21/23 that was slightly low. recheck Lamictal level today.   -MFM consult and anatomy US done on 11/1/23: recommended lamictal levels throughout pregnancy, Neurology referral (this was placed today), continue ASA 81mg  - Medication review: lamictal recently increased from 125mg to 150mg once daily (will check lamictal level), continue prenatal vitamin   -Reviewed ectopic and miscarriage precautions (present to ED or call clinic with abdominal pain, vaginal bleeding or fever)   -Weight gain: discussed weight gain expectations (BMI 25-30: 15-25lbs)   -PAP smear: NILM  -Delivery plan: continue to discuss, breastfeeding, pp contraception, pain management in labor - continue to discuss  -Immunizations: flu shot on 10/23/23. Covid vaccine on 10/22/23; Tdap at 28wks, RSV btw 32-36 weeks.   - increased esophageal reflux in pregnancy; discussed that Tums is safe over the counter. Patient would like prescription for omeprazole. Omeprazole 20mg once daily taking and helping  -recent covid 19 (32 week growth US ordered) patient has not had this done yet, but will get it scheduled for as soon as possible.   -patient failed 3 hour GTT: saw diabetic management and sugars are good. Continue to monitor sugars. Patient states when she checks them they are always below 130.   -Tdap given today  -difficulty sleeping will start unisom again which helped in the past.   -increased depression currently on 50mg zoloft daily, but  does not think this is helping. Will increase dose to 75mg daily. Recheck at next visit. Patient has medication management appointment for tomorrow.       Return to clinic in 2 weeks.      Elisa Simmons PA-C

## 2024-02-09 NOTE — PATIENT INSTRUCTIONS
"Weeks 32 to 34 of Your Pregnancy: Care Instructions    Decide whether you want to bank or donate your baby's umbilical cord blood. If you want to save this blood, you have to arrange for it ahead of time.   Decide about circumcision. Personal, Buddhist, or cultural beliefs may play a role in your decision. You get to decide what you want for your baby.     Learn how to ease hemorrhoids.    Get more liquids, fruits, vegetables, and fiber in your diet.  Avoid sitting for too long.  Clean yourself with moist toilet paper. Or try witch hazel pads.  Try ice packs or warm sitz baths for discomfort.  Use hydrocortisone cream for pain or itching.  Ask your doctor about stool softeners.    Consider the benefits of breastfeeding.    It reduces your baby's risk of sudden infant death syndrome (SIDS).   babies are less likely to get certain infections. And they're less likely to be obese or get diabetes later in life.  It can lower your risk of breast and ovarian cancers and osteoporosis.  It saves you money.  Follow-up care is a key part of your treatment and safety. Be sure to make and go to all appointments, and call your doctor if you are having problems. It's also a good idea to know your test results and keep a list of the medicines you take.  Where can you learn more?  Go to https://www.[employer].net/patiented  Enter X711 in the search box to learn more about \"Weeks 32 to 34 of Your Pregnancy: Care Instructions.\"  Current as of: July 11, 2023               Content Version: 13.8    [id number] [employer].   Care instructions adapted under license by your healthcare professional. If you have questions about a medical condition or this instruction, always ask your healthcare professional. [employer] disclaims any warranty or liability for your use of this information.      You have been provided the Any Day Now: Early Labor at Home document.    Additional copies can be found here:  " [email or web address]  You have been provided the What I'd Wish I'd Known About Giving Birth document.    Additional copies can be found here:  [email or web address]

## 2024-02-11 LAB — LAMOTRIGINE SERPL-MCNC: <0.9 UG/ML

## 2024-02-14 ENCOUNTER — HOSPITAL ENCOUNTER (OUTPATIENT)
Dept: ULTRASOUND IMAGING | Facility: CLINIC | Age: 24
Discharge: HOME | End: 2024-02-14
Attending: PHYSICIAN ASSISTANT | Admitting: PHYSICIAN ASSISTANT
Payer: COMMERCIAL

## 2024-02-14 DIAGNOSIS — U07.1 COVID-19 AFFECTING PREGNANCY IN SECOND TRIMESTER: ICD-10-CM

## 2024-02-14 DIAGNOSIS — Z34.83 PRENATAL CARE, SUBSEQUENT PREGNANCY IN THIRD TRIMESTER: ICD-10-CM

## 2024-02-14 DIAGNOSIS — O98.512 COVID-19 AFFECTING PREGNANCY IN SECOND TRIMESTER: ICD-10-CM

## 2024-02-14 PROCEDURE — 76816 OB US FOLLOW-UP PER FETUS: CPT

## 2024-02-25 ENCOUNTER — NURSE TRIAGE (OUTPATIENT)
Dept: NURSING | Facility: CLINIC | Age: 24
End: 2024-02-25
Payer: COMMERCIAL

## 2024-02-25 ENCOUNTER — E-VISIT (OUTPATIENT)
Dept: FAMILY MEDICINE | Facility: CLINIC | Age: 24
End: 2024-02-25
Payer: COMMERCIAL

## 2024-02-25 ENCOUNTER — HOSPITAL ENCOUNTER (OUTPATIENT)
Facility: CLINIC | Age: 24
Discharge: HOME | End: 2024-02-25
Attending: OBSTETRICS & GYNECOLOGY | Admitting: OBSTETRICS & GYNECOLOGY
Payer: COMMERCIAL

## 2024-02-25 VITALS
DIASTOLIC BLOOD PRESSURE: 74 MMHG | HEART RATE: 71 BPM | RESPIRATION RATE: 18 BRPM | SYSTOLIC BLOOD PRESSURE: 125 MMHG | TEMPERATURE: 97.8 F

## 2024-02-25 DIAGNOSIS — R10.2 PELVIC PAIN IN FEMALE: Primary | ICD-10-CM

## 2024-02-25 PROBLEM — Z36.89 ENCOUNTER FOR TRIAGE IN PREGNANT PATIENT: Status: ACTIVE | Noted: 2024-02-25

## 2024-02-25 LAB
ALBUMIN UR-MCNC: NEGATIVE MG/DL
APPEARANCE UR: ABNORMAL
BILIRUB UR QL STRIP: NEGATIVE
COLOR UR AUTO: YELLOW
GLUCOSE UR STRIP-MCNC: NEGATIVE MG/DL
HGB UR QL STRIP: NEGATIVE
KETONES UR STRIP-MCNC: NEGATIVE MG/DL
LEUKOCYTE ESTERASE UR QL STRIP: ABNORMAL
MUCOUS THREADS #/AREA URNS LPF: PRESENT /LPF
NITRATE UR QL: NEGATIVE
PH UR STRIP: 6 [PH] (ref 5–7)
RBC URINE: <1 /HPF
SP GR UR STRIP: 1.02 (ref 1–1.03)
SQUAMOUS EPITHELIAL: 5 /HPF
UROBILINOGEN UR STRIP-MCNC: NORMAL MG/DL
WBC URINE: 2 /HPF

## 2024-02-25 PROCEDURE — 81001 URINALYSIS AUTO W/SCOPE: CPT | Performed by: OBSTETRICS & GYNECOLOGY

## 2024-02-25 PROCEDURE — 59025 FETAL NON-STRESS TEST: CPT

## 2024-02-25 PROCEDURE — 99207 PR NON-BILLABLE SERV PER CHARTING: CPT

## 2024-02-25 PROCEDURE — G0463 HOSPITAL OUTPT CLINIC VISIT: HCPCS | Mod: 25

## 2024-02-25 ASSESSMENT — ACTIVITIES OF DAILY LIVING (ADL)
ADLS_ACUITY_SCORE: 35
ADLS_ACUITY_SCORE: 20

## 2024-02-25 NOTE — TELEPHONE ENCOUNTER
OB Triage Call      Is patient's OB/Midwife with the formerly [facility] or [facility]? [facility]- Proceed with triage     Reason for call: Left    Assessment: Pubic pain in front of pelvis where hairline would begin.  Patient states that the pain began yesterday.  Patient describes the pain as stabbing stabbing, and constant.  Rating the pain at 4-5/10. Sitting or laying down relieves the pain      Plan: Patient will go [facility] delivery [facility]    [place] [facility]    Patient's primary OB Provider is Elisa Simmons PA-C.      Per protocol recommendations Patient to be evaluated in L&D. Patient's primary OB is [facility].  [facility] [place] [facility] ([phone]) notified of patient's pending arrival.  Report given to Kat.      Is patient's delivering hospital on divert? No      35w3d    Estimated Date of Delivery: Mar 28, 2024        OB History    Para Term  AB Living   2 1 1 0 0 1   SAB IAB Ectopic Multiple Live Births   0 0 0 0 1      # Outcome Date GA Lbr Macho/2nd Weight Sex Delivery Anes PTL Lv   2 Current            1 Term 20 39w1d / 02:27 3.459 kg (7 lb 10 oz) F Vag-Spont EPI N TONI      Complications: Chorioamnionitis      Name: Jessie      Apgar1: 8  Apgar5: 9       Lab Results   Component Value Date    GBS Negative 2020          Haven Otto RN 24 5:18 PM  [facility] Nurse Advisor  Reason for Disposition   [1] Pelvic pain AND [2] pregnant 20 or more weeks   [1] Baby moving less today (e.g., kick count < 5 in 1 hour or < 10 in 2 hours) AND [2] pregnant 23 or more weeks    Additional Information   Negative: Passed out (i.e., lost consciousness, collapsed and was not responding)   Negative: Shock suspected (e.g., cold/pale/clammy skin, too weak to stand, low BP, rapid pulse)   Negative: Difficult to awaken or acting confused (e.g., disoriented, slurred speech)   Negative: [1] SEVERE abdominal pain  "(e.g., excruciating) AND [2] constant AND [3] present > 1 hour   Negative: SEVERE vaginal bleeding (e.g., continuous red blood from vagina, large blood clots)   Negative: Sounds like a life-threatening emergency to the triager   Negative: [1] Vomiting AND [2] contains red blood or black (\"coffee ground\") material  (Exception: Few red streaks in vomit that only happened once.)   Negative: [1] SEVERE headache AND [2] not relieved with acetaminophen (e.g., Tylenol)   Negative: MODERATE-SEVERE abdominal pain (e.g., interferes with normal activities, awakens from sleep)   Negative: Vaginal bleeding or spotting    Protocols used: Pelvic Pain - Female-A-AH, Pregnancy - Abdominal Pain Greater Than 20 Weeks EGA-A-AH    "

## 2024-02-26 ENCOUNTER — PRENATAL OFFICE VISIT (OUTPATIENT)
Dept: OBGYN | Facility: CLINIC | Age: 24
End: 2024-02-26
Payer: COMMERCIAL

## 2024-02-26 ENCOUNTER — HOSPITAL ENCOUNTER (OUTPATIENT)
Facility: CLINIC | Age: 24
End: 2024-02-26
Admitting: OBSTETRICS & GYNECOLOGY
Payer: COMMERCIAL

## 2024-02-26 ENCOUNTER — HOSPITAL ENCOUNTER (OUTPATIENT)
Facility: CLINIC | Age: 24
Discharge: HOME | End: 2024-02-26
Attending: OBSTETRICS & GYNECOLOGY | Admitting: OBSTETRICS & GYNECOLOGY
Payer: COMMERCIAL

## 2024-02-26 VITALS
HEART RATE: 85 BPM | HEIGHT: 64 IN | TEMPERATURE: 98.1 F | RESPIRATION RATE: 18 BRPM | BODY MASS INDEX: 36.37 KG/M2 | WEIGHT: 213 LBS | SYSTOLIC BLOOD PRESSURE: 150 MMHG | DIASTOLIC BLOOD PRESSURE: 81 MMHG

## 2024-02-26 VITALS — SYSTOLIC BLOOD PRESSURE: 135 MMHG | TEMPERATURE: 97 F | DIASTOLIC BLOOD PRESSURE: 87 MMHG | RESPIRATION RATE: 18 BRPM

## 2024-02-26 DIAGNOSIS — R03.0 ELEVATED BLOOD PRESSURE READING WITHOUT DIAGNOSIS OF HYPERTENSION: ICD-10-CM

## 2024-02-26 DIAGNOSIS — O24.419 GESTATIONAL DIABETES MELLITUS (GDM) IN THIRD TRIMESTER, GESTATIONAL DIABETES METHOD OF CONTROL UNSPECIFIED: ICD-10-CM

## 2024-02-26 DIAGNOSIS — Z34.83 PRENATAL CARE, SUBSEQUENT PREGNANCY IN THIRD TRIMESTER: Primary | ICD-10-CM

## 2024-02-26 DIAGNOSIS — F31.9 BIPOLAR DISORDER WITH DEPRESSION (H): ICD-10-CM

## 2024-02-26 PROBLEM — O14.90 PRE-ECLAMPSIA: Status: ACTIVE | Noted: 2024-02-26

## 2024-02-26 LAB
ALBUMIN MFR UR ELPH: 12.2 MG/DL
ALT SERPL W P-5'-P-CCNC: 6 U/L (ref 0–50)
AST SERPL W P-5'-P-CCNC: 17 U/L (ref 0–45)
CREAT SERPL-MCNC: 0.59 MG/DL (ref 0.51–0.95)
CREAT UR-MCNC: 109.9 MG/DL
EGFRCR SERPLBLD CKD-EPI 2021: >90 ML/MIN/1.73M2
ERYTHROCYTE [DISTWIDTH] IN BLOOD BY AUTOMATED COUNT: 12.8 % (ref 10–15)
HCT VFR BLD AUTO: 36.4 % (ref 35–47)
HGB BLD-MCNC: 12.1 G/DL (ref 11.7–15.7)
MCH RBC QN AUTO: 28.8 PG (ref 26.5–33)
MCHC RBC AUTO-ENTMCNC: 33.2 G/DL (ref 31.5–36.5)
MCV RBC AUTO: 87 FL (ref 78–100)
PLATELET # BLD AUTO: 180 10E3/UL (ref 150–450)
PROT/CREAT 24H UR: 0.11 MG/MG CR (ref 0–0.2)
RBC # BLD AUTO: 4.2 10E6/UL (ref 3.8–5.2)
WBC # BLD AUTO: 14 10E3/UL (ref 4–11)

## 2024-02-26 PROCEDURE — 84156 ASSAY OF PROTEIN URINE: CPT | Performed by: OBSTETRICS & GYNECOLOGY

## 2024-02-26 PROCEDURE — 84460 ALANINE AMINO (ALT) (SGPT): CPT | Performed by: OBSTETRICS & GYNECOLOGY

## 2024-02-26 PROCEDURE — 59025 FETAL NON-STRESS TEST: CPT

## 2024-02-26 PROCEDURE — 59025 FETAL NON-STRESS TEST: CPT | Mod: 26 | Performed by: OBSTETRICS & GYNECOLOGY

## 2024-02-26 PROCEDURE — 99207 PR PRENATAL VISIT: CPT | Performed by: ADVANCED PRACTICE MIDWIFE

## 2024-02-26 PROCEDURE — 85027 COMPLETE CBC AUTOMATED: CPT | Performed by: OBSTETRICS & GYNECOLOGY

## 2024-02-26 PROCEDURE — 82565 ASSAY OF CREATININE: CPT | Performed by: OBSTETRICS & GYNECOLOGY

## 2024-02-26 PROCEDURE — 36415 COLL VENOUS BLD VENIPUNCTURE: CPT | Performed by: OBSTETRICS & GYNECOLOGY

## 2024-02-26 PROCEDURE — 84450 TRANSFERASE (AST) (SGOT): CPT | Performed by: OBSTETRICS & GYNECOLOGY

## 2024-02-26 RX ORDER — METOCLOPRAMIDE 10 MG/1
10 TABLET ORAL EVERY 6 HOURS PRN
Status: DISCONTINUED | OUTPATIENT
Start: 2024-02-26 | End: 2024-02-26 | Stop reason: HOSPADM

## 2024-02-26 RX ORDER — ONDANSETRON 2 MG/ML
4 INJECTION INTRAMUSCULAR; INTRAVENOUS EVERY 6 HOURS PRN
Status: DISCONTINUED | OUTPATIENT
Start: 2024-02-26 | End: 2024-02-26 | Stop reason: HOSPADM

## 2024-02-26 RX ORDER — PROCHLORPERAZINE MALEATE 10 MG
10 TABLET ORAL EVERY 6 HOURS PRN
Status: DISCONTINUED | OUTPATIENT
Start: 2024-02-26 | End: 2024-02-26 | Stop reason: HOSPADM

## 2024-02-26 RX ORDER — PROCHLORPERAZINE 25 MG
25 SUPPOSITORY, RECTAL RECTAL EVERY 12 HOURS PRN
Status: DISCONTINUED | OUTPATIENT
Start: 2024-02-26 | End: 2024-02-26 | Stop reason: HOSPADM

## 2024-02-26 RX ORDER — METOCLOPRAMIDE HYDROCHLORIDE 5 MG/ML
10 INJECTION INTRAMUSCULAR; INTRAVENOUS EVERY 6 HOURS PRN
Status: DISCONTINUED | OUTPATIENT
Start: 2024-02-26 | End: 2024-02-26 | Stop reason: HOSPADM

## 2024-02-26 RX ORDER — ONDANSETRON 4 MG/1
4 TABLET, ORALLY DISINTEGRATING ORAL EVERY 6 HOURS PRN
Status: DISCONTINUED | OUTPATIENT
Start: 2024-02-26 | End: 2024-02-26 | Stop reason: HOSPADM

## 2024-02-26 NOTE — PROGRESS NOTES
"Was seen yesterday for pelvic pain and decreased fetal movement.  Baby is \"moving just fine\" now.   Denies any leaking of fluid, vaginal bleeding, regular uterine contractions, or headaches.    BP is increased.  She agrees to go to L&D unit for monitoring, BPs and labs.    We did not discuss GDM - it is unclear if she is monitoring her blood sugars.  Her last regular appt was 1/11.    Bipolar on medication.    Reviewed to call for contractions, loss of fluid, vaginal bleeding, decreased fetal movement or any other questions or concerns.    RTC in 1 week if alternative plan is not made in the hospital today.  Meghan Wright, DNP, APRN, CNM               "
[[id number]]

## 2024-02-26 NOTE — PLAN OF CARE
Goal Outcome Evaluation:    S:Patient presents due to  pre eclampsia.  B:35w4d   Allergies: Iodine, Ivp dye [contrast dye], and Latex  A:A:moderate variablility, + accels, no decels, Category I  normal uterine activity      Dr. ASIA Gomez in department and orders received.  Plan includes; Encourage po fluids. Reviewed with patient and she agrees with plan.        Prenatal Breastfeeding Education Toolkit provided for patient to review,helping her to make an informed decision on a feeding choice for her baby. Patient accepted. Questions answered.    Oriented to room and call light.

## 2024-02-26 NOTE — NURSING NOTE
"Initial BP (!) 150/81 (BP Location: Right arm, Patient Position: Chair, Cuff Size: Adult Regular)   Pulse 85   Temp 98.1  F (36.7  C) (Tympanic)   Resp 18   Ht 1.626 m (5' 4\")   Wt 96.6 kg (213 lb)   LMP 06/05/2023 (Approximate)   BMI 36.56 kg/m   Estimated body mass index is 36.56 kg/m  as calculated from the following:    Height as of this encounter: 1.626 m (5' 4\").    Weight as of this encounter: 96.6 kg (213 lb). .    "

## 2024-02-26 NOTE — PROGRESS NOTES
Medication:   Requested Prescriptions     Pending Prescriptions Disp Refills    cyclobenzaprine (FLEXERIL) 5 MG tablet 60 tablet 5        Last Filled:  05/09/22    Patient Phone Number: [phone] (home)     Last appt: 3/8/2023   Next appt: Visit date not found    Last OARRS: No flowsheet data found. S:Patient presents due to  decreased fetal movement and pubic pain.  B:35w3d   Allergies: Iodine, Ivp dye [contrast dye], and Latex  A:moderate variablility, + accels, no decels, Category I  no uterine activity  No SVE     Dr. ASIA Stone called and orders received.  Plan includes; Encourage po fluids and Labs . Reviewed with patient and she agrees with plan.      Prenatal Breastfeeding Education Toolkit provided for patient to review,helping her to make an informed decision on a feeding choice for her baby. Patient accepted. Questions answered.    Oriented to room and call light.

## 2024-02-26 NOTE — LETTER
[facility]  [address] [facility] [address]  Phone: [phone]  Fax: [phone]    February 26, 2024        Jeanine Porterz  [address] [place] [place] [facility] [address]          To whom it may concern:    RE: Jeanine Hernandez    Patient was seen and treated yesterday and today (2/25/24 and 2/26/24) at our hospital and clinic and missed work. Please excuse from all work related activities.    Please contact me for questions or concerns.      Sincerely,      Alona Gomez, DO

## 2024-02-26 NOTE — PROGRESS NOTES
NST:   Indication: elevated blood pressure  Baseline: 115 bpm  Variability: moderate  Accelerations: present  Decelerations: absent  TOCO: q2-6min  Impression: Reactive NST    Sent from office for elevated BP. HELLP labs WNL. NST reactive. Discharged home with close follow up.    Alona Gomez DO

## 2024-02-26 NOTE — DISCHARGE INSTRUCTIONS
Discharge Instructions for Undelivered Patients  [facility] [phone]    Diet:  Drink 8 to 12 glasses of water every day.  You may eat meals and snacks as before    Activity:  If you are experiencing  labor, rest the pelvic area. No sex. Do not stimulate breasts or nipples.  Rest often as needed.  Count fetal kicks every day. (See handout.)  Call your doctor if your baby is moving less than usual.    Medicines:  My care team has reviewed my medicines with me.  My care team has given me a list of my medicines.  My care team has prescribed a new medicine. They have either sent it home with me or ordered it from the pharmacy.    Call your provider if you notice:  Swelling in your face or increased swelling in your hands or legs.  Headaches that are not relieved by Tylenol (acetaminophen).  Changes in your vision (blurring; seeing spots or stars).  Nausea (sick to your stomach) and vomiting (throwing up).  Weight gain of 5 pounds per week.  Heartburn that doesn't go away.  Signs of bladder infection: pain when you urinate (use the toilet), needing to go more often or more urgently.  The bag of gallegos (membranes) breaks, or you notice leaking in your underwear.  Bright red blood in your underwear.  Abdominal (lower belly) or stomach pain.  For first baby: Contractions (tightenings) less than 5 minutes apart for one hour or more.  For Second (plus) baby: Contractions (tightenings) less than 10 minutes apart and getting stronger.  Increase or change in vaginal discharge (note the color and amount).    Follow up with your provider as scheduled.

## 2024-02-26 NOTE — PLAN OF CARE
Goal Outcome Evaluation:         Cat 1 tracing, discharge home. Written and verbal instructions given. Questions anwered.

## 2024-02-26 NOTE — PATIENT INSTRUCTIONS
Thank you for choosing us for your care. I think an in-clinic visit would be best next steps based on your symptoms. Please schedule a clinic appointment; you won t be charged for this eVisit.      You can schedule an appointment right here in [facility], or call [phone]

## 2024-02-26 NOTE — PROGRESS NOTES
S: Discharge from triage  A: A:moderate variablility, + accels, no decels, Category I  irritability  Strip reviewed by Maude Zamarripa RN.  not examined  Admission on 02/25/2024   Component Date Value    Color Urine 02/25/2024 Yellow     Appearance Urine 02/25/2024 Slightly Cloudy (A)     Glucose Urine 02/25/2024 Negative     Bilirubin Urine 02/25/2024 Negative     Ketones Urine 02/25/2024 Negative     Specific Gravity Urine 02/25/2024 1.017     Blood Urine 02/25/2024 Negative     pH Urine 02/25/2024 6.0     Protein Albumin Urine 02/25/2024 Negative     Urobilinogen Urine 02/25/2024 Normal     Nitrite Urine 02/25/2024 Negative     Leukocyte Esterase Urine 02/25/2024 Trace (A)     Mucus Urine 02/25/2024 Present (A)     RBC Urine 02/25/2024 <1     WBC Urine 02/25/2024 2     Squamous Epithelials Uri* 02/25/2024 5 (H)      Dr. ASIA Stone informed of above and discharge order received.   R: Plan includes: Fetal kick count instructions given. Follow up clinic appointment: pt reports follow up appointment on 2/29.  Patient instructed to report any recurrence of above concerns to her primary care provider during clinic hours or [facility] at any other time. Patient verbalized understanding of After Visit Summary, education and agreement with plan. Agrees to call for any problems, questions or concerns.  Discharged undelivered via ambulatory  in stable condition with all belongings. Accompanied by family .

## 2024-02-26 NOTE — DISCHARGE INSTRUCTIONS
Pregnancy Precautions: Care Instructions  Overview     There is no sure way to prevent labor before your due date ( labor) or to prevent most other pregnancy problems. But there are things you can do to increase your chances of a healthy pregnancy. Go to your appointments, follow your doctor's advice, and take good care of yourself. Eat healthy foods, and exercise (if your doctor agrees). And make sure to drink plenty of water.  Follow-up care is a key part of your treatment and safety. Be sure to make and go to all appointments, and call your doctor if you are having problems. It's also a good idea to know your test results and keep a list of the medicines you take.  How can you care for yourself at home?  Make sure you go to your prenatal appointments. At each visit, your doctor will check your blood pressure and weight. Your doctor will also listen for a fetal heartbeat and measure the size of the uterus.  Drink plenty of fluids. Dehydration can cause contractions. If you have kidney, heart, or liver disease and have to limit fluids, talk with your doctor before you increase the amount of fluids you drink.  Tell your doctor right away if you notice any symptoms of an infection, such as:  Burning when you urinate.  A frequent need to urinate without being able to pass much urine.  A foul-smelling discharge from your vagina.  Vaginal itching.  Unexplained fever.  Unusual pain or soreness in your uterus or lower belly.  Avoid foods that may be harmful.  Don't eat raw meat, deli meat, raw seafood, or raw eggs.  Avoid soft cheese and unpasteurized dairy, like Brie and blue cheese.  Don't eat fish that contains a lot of mercury, like shark and swordfish.  If you smoke or vape, quit or cut back as much as you can. Talk to your doctor if you need help quitting.  If you use alcohol, marijuana, or other drugs, quit or cut back as much as you can. It's safest not to use them at all. Talk to your doctor if you need  help quitting.  Follow your doctor's directions about activity. Your doctor will let you know how much exercise you can do.  Ask your doctor if you can have sex. If you are at risk for early labor, your doctor may ask you to not have sex.  Take care to avoid falling. Changes in your body during pregnancy, such as a growing belly, can make you more likely to fall. Sports such as bicycling, skiing, or in-line skating can increase your risk.  Avoid risky activities like horseback or motorcycle riding, water-skiing, scuba diving, and exercising at a high altitude (above 6,000 feet). If you live in a place with a high altitude, talk to your doctor about how you can exercise safely.  Avoid things that can make your body too hot and may be harmful to your pregnancy, such as a hot tub or sauna. Or talk with your doctor before doing anything that raises your body temperature. Your doctor can tell you if it's safe.  Do not take any over-the-counter or herbal medicines or supplements without talking to your doctor or pharmacist first.  When should you call for help?   Call 911  anytime you think you may need emergency care. For example, call if:    You passed out (lost consciousness).     You have a seizure.     You have severe vaginal bleeding. This means that you are soaking through your usual pads or tampons every hour for 2 or more hours.     You have severe pain in your belly or pelvis.     You have had fluid gushing or leaking from your vagina and you know or think the umbilical cord is bulging into your vagina. If this happens, immediately get down on your knees so your rear end (buttocks) is higher than your head. This will decrease the pressure on the cord until help arrives.   Call your doctor now or seek immediate medical care if:    You have signs of preeclampsia, such as:  Sudden swelling of your face, hands, or feet.  New vision problems (such as dimness, blurring, or seeing spots).  A severe headache.     You  "have symptoms of a blood clot in your leg (called a deep vein thrombosis), such as:  Pain in the calf, back of the knee, thigh, or groin.  Swelling in the leg or groin.  A color change on the leg or groin. The skin may be reddish or purplish, depending on your usual skin color.     You have any vaginal bleeding.     You have belly pain or cramping.     You have a fever.     You've been having regular contractions for an hour. This means that you've had at least 6 contractions within 1 hour, even after you change your position and drink fluids.     You have symptoms of a urinary tract infection. These may include:  Pain or burning when you urinate.  A frequent need to urinate without being able to pass much urine.  Pain in your low back (below the rib cage and above the waist).  Blood in your urine.     You have a sudden release of fluid from your vagina.     You have low back pain or pelvic pressure that does not go away.     You notice that your baby has stopped moving or is moving less than normal.   Watch closely for changes in your health, and be sure to contact your doctor if:    You have vaginal discharge that smells bad.     You feel sad, anxious, or hopeless for more than a few days.     You have other concerns about your pregnancy.   Where can you learn more?  Go to https://www.[employer].net/patiented  Enter Y951 in the search box to learn more about \"Pregnancy Precautions: Care Instructions.\"  Current as of: July 11, 2023               Content Version: 13.8    5715-9270 [employer].   Care instructions adapted under license by your healthcare professional. If you have questions about a medical condition or this instruction, always ask your healthcare professional. [employer] disclaims any warranty or liability for your use of this information.      Warning Signs after Having a Baby    Keep this paper on your fridge or somewhere else where you can see it.    Call your provider if " you have any of these symptoms up to 12 weeks after having your baby.    Thoughts of hurting yourself or your baby  Pain in your chest or trouble breathing  Severe headache not helped by pain medicine  Eyesight concerns (blurry vision, seeing spots or flashes of light, other changes to eyesight)  Fainting, shaking or other signs of a seizure    Call 9-1-1 if you feel that it is an emergency.     The symptoms below can happen to anyone after giving birth. They can be very serious. Call your provider if you have any of these warning signs.    My provider s phone number: _______________________    Losing too much blood (hemorrhage)    Call your provider if you soak through a pad in less than an hour or pass blood clots bigger than a golf ball. These may be signs that you are bleeding too much.    Blood clots in the legs or lungs    After you give birth, your body naturally clots its blood to help prevent blood loss. Sometimes this increased clotting can happen in other areas of the body, like the legs or lungs. This can block your blood flow and be very dangerous.     Call your provider if you:  Have a red, swollen spot on the back of your leg that is warm or painful when you touch it.   Are coughing up blood.     Infection    Call your provider if you have any of these symptoms:  Fever of 100.4 F (38 C) or higher.  Pain or redness around your stitches if you had an incision.   Any yellow, white, or green fluid coming from places where you had stitches or surgery.    Mood Problems (postpartum depression)    Many people feel sad or have mood changes after having a baby. But for some people, these mood swings are worse.     Call your provider right away if you feel so anxious or nervous that you can't care for yourself or your baby.    Preeclampsia (high blood pressure)    Even if you didn't have high blood pressure when you were pregnant, you are at risk for the high blood pressure disease called preeclampsia. This risk  can last up to 12 weeks after giving birth.     Call your provider if you have:   Pain on your right side under your rib cage  Sudden swelling in the hands and face    Remember: You know your body. If something doesn't feel right, get medical help.     For informational purposes only. Not to replace the advice of your health care provider. Copyright 2020 [employer]. All rights reserved. Clinically reviewed by Amarilis Leon RNC-OB, MSN. [email or web address] [id number] - Rev 02/23.

## 2024-02-29 ENCOUNTER — PRENATAL OFFICE VISIT (OUTPATIENT)
Dept: OBGYN | Facility: CLINIC | Age: 24
End: 2024-02-29
Payer: COMMERCIAL

## 2024-02-29 VITALS
HEIGHT: 64 IN | HEART RATE: 90 BPM | RESPIRATION RATE: 18 BRPM | BODY MASS INDEX: 36.37 KG/M2 | SYSTOLIC BLOOD PRESSURE: 137 MMHG | WEIGHT: 213 LBS | TEMPERATURE: 97.9 F | DIASTOLIC BLOOD PRESSURE: 88 MMHG

## 2024-02-29 DIAGNOSIS — F31.9 BIPOLAR DISORDER WITH DEPRESSION (H): ICD-10-CM

## 2024-02-29 DIAGNOSIS — Z86.69 HISTORY OF SEIZURE DISORDER: ICD-10-CM

## 2024-02-29 DIAGNOSIS — O24.410 DIET CONTROLLED GESTATIONAL DIABETES MELLITUS (GDM) IN THIRD TRIMESTER: ICD-10-CM

## 2024-02-29 DIAGNOSIS — Z34.83 PRENATAL CARE, SUBSEQUENT PREGNANCY IN THIRD TRIMESTER: Primary | ICD-10-CM

## 2024-02-29 DIAGNOSIS — H53.9 VISION CHANGES: ICD-10-CM

## 2024-02-29 PROBLEM — Z36.89 ENCOUNTER FOR TRIAGE IN PREGNANT PATIENT: Status: RESOLVED | Noted: 2024-02-25 | Resolved: 2024-02-29

## 2024-02-29 PROBLEM — N89.8 DISCHARGE OF VAGINA: Status: RESOLVED | Noted: 2024-01-29 | Resolved: 2024-02-29

## 2024-02-29 LAB
ALBUMIN MFR UR ELPH: 10 MG/DL
ALT SERPL W P-5'-P-CCNC: 7 U/L (ref 0–50)
AST SERPL W P-5'-P-CCNC: 26 U/L (ref 0–45)
CREAT SERPL-MCNC: 0.6 MG/DL (ref 0.51–0.95)
CREAT UR-MCNC: 86.5 MG/DL
EGFRCR SERPLBLD CKD-EPI 2021: >90 ML/MIN/1.73M2
ERYTHROCYTE [DISTWIDTH] IN BLOOD BY AUTOMATED COUNT: 12.7 % (ref 10–15)
HCT VFR BLD AUTO: 38.7 % (ref 35–47)
HGB BLD-MCNC: 12.4 G/DL (ref 11.7–15.7)
MCH RBC QN AUTO: 27.9 PG (ref 26.5–33)
MCHC RBC AUTO-ENTMCNC: 32 G/DL (ref 31.5–36.5)
MCV RBC AUTO: 87 FL (ref 78–100)
PLATELET # BLD AUTO: 183 10E3/UL (ref 150–450)
PROT/CREAT 24H UR: 0.12 MG/MG CR (ref 0–0.2)
RBC # BLD AUTO: 4.45 10E6/UL (ref 3.8–5.2)
WBC # BLD AUTO: 13.9 10E3/UL (ref 4–11)

## 2024-02-29 PROCEDURE — 36415 COLL VENOUS BLD VENIPUNCTURE: CPT | Performed by: OBSTETRICS & GYNECOLOGY

## 2024-02-29 PROCEDURE — 82565 ASSAY OF CREATININE: CPT | Performed by: OBSTETRICS & GYNECOLOGY

## 2024-02-29 PROCEDURE — 84156 ASSAY OF PROTEIN URINE: CPT | Performed by: OBSTETRICS & GYNECOLOGY

## 2024-02-29 PROCEDURE — 87653 STREP B DNA AMP PROBE: CPT | Performed by: OBSTETRICS & GYNECOLOGY

## 2024-02-29 PROCEDURE — 84460 ALANINE AMINO (ALT) (SGPT): CPT | Performed by: OBSTETRICS & GYNECOLOGY

## 2024-02-29 PROCEDURE — 84450 TRANSFERASE (AST) (SGOT): CPT | Performed by: OBSTETRICS & GYNECOLOGY

## 2024-02-29 PROCEDURE — 99207 PR PRENATAL VISIT: CPT | Performed by: OBSTETRICS & GYNECOLOGY

## 2024-02-29 PROCEDURE — 85027 COMPLETE CBC AUTOMATED: CPT | Performed by: OBSTETRICS & GYNECOLOGY

## 2024-02-29 NOTE — PROGRESS NOTES
"[facility]    Return OB Note    CC: Return OB     Subjective:  Jeanine is a 23 year old  at 36w0d   Denies vaginal bleeding, loss of fluid, or regular contractions. Good fetal movement.  Complaints today:   Has been monitoring BP at home, 130s/80-90s.   Had an episode when in shower where she had vision changes, sat down and symptoms improved.  More LE edema, thinks right is worse, symmetrical on exam.    Objective:  /88 (BP Location: Left arm, Patient Position: Sitting, Cuff Size: Adult Regular)   Pulse 90   Temp 97.9  F (36.6  C) (Tympanic)   Resp 18   Ht 1.626 m (5' 4\")   Wt 96.6 kg (213 lb)   LMP 2023 (Approximate)   BMI 36.56 kg/m      Fundal height: 36cm  FHT: 130bpm  SVE: 1/thick/high    Assessment/Plan:   Encounter Diagnoses   Name Primary?    Prenatal care, subsequent pregnancy in third trimester Yes    Bipolar disorder with depression (H)     Diet controlled gestational diabetes mellitus (GDM) in third trimester     History of seizure disorder     Vision changes        IUP at 36w0d  -NOB labs WNL  -Prenatal screening: low risk  -Anatomy US: L2 WNL  -Mid trimester labs: GDM  -S/p Tdap  -GBS today     Co-Morbidities/Complications/Concerns:   -Gestational diabetes: completed initial intake appointment with diabetic team but has not follow up since. Reports sugars at home are <88 fasting and 120-131 at 1 hour post prandial. Growth US normal  EFW 61%, AC 78%  - hx of gHTN: baseline HELLP labs, patient taking ASA 81 mg daily. Elevated BP at last appointment, HELLP labs were normal. Now with normal BP (some high at home but cuff not calibrated) but other symptoms, HELLP labs reordered today. Plan IOL at 37 weeks if BP elevated again.  - bipolar: on lamictal, abilify and zoloft; r/b/a discussed, info sent to patient: [facility] recommends lamictal levels checked throughout pregnancy. Lamictal levels have consistently been low, reports medication compliance. " Mainly adjust dose based on symptoms for bipolar but concern as she also takes for seizure disorder  -Seizures: History of seizure disorder, last seizure was 3-1/2 years ago.  She does not currently have a neurologist.  Has had subtherapeutic Lamictal levels throughout pregnancy, despite dose increase.  Reports she sees another clinic who manages her Lamictal.  Recommend reaching out to this clinic to evaluate need for further increase.  -COVID in pregnancy: growth US 2/14 EFW 61%, AC 78%  -Strict return precautions given    RTC 1 weeks    Alona Gomez DO

## 2024-02-29 NOTE — NURSING NOTE
"Initial /88 (BP Location: Left arm, Patient Position: Sitting, Cuff Size: Adult Regular)   Pulse 90   Temp 97.9  F (36.6  C) (Tympanic)   Resp 18   Ht 1.626 m (5' 4\")   Wt 96.6 kg (213 lb)   LMP 06/05/2023 (Approximate)   BMI 36.56 kg/m   Estimated body mass index is 36.56 kg/m  as calculated from the following:    Height as of this encounter: 1.626 m (5' 4\").    Weight as of this encounter: 96.6 kg (213 lb). .    "

## 2024-03-01 LAB — GP B STREP DNA SPEC QL NAA+PROBE: POSITIVE

## 2024-03-04 NOTE — PATIENT INSTRUCTIONS
"Week 37 of Your Pregnancy: Care Instructions    Most babies are born between 37 and 40 weeks.   This is a good time to pack a bag to take with you to the birth. Then it will be ready to go when you are.     Learn about breastfeeding.  For example, find out about ways to hold your baby to make breastfeeding easier. And think about learning how to pump and store milk.     Know that crying is normal.  It's common for babies to cry 1 to 3 hours a day. Some cry more, and some cry less.     Learn why babies cry.  They may be hungry; have gas; need a diaper change; or feel cold, warm, tired, lonely, or tense. Sometimes they cry for unknown reasons.     Think about what will help you stay calm when your baby cries.  Taking slow, deep breaths can help. So can taking a break. It's okay to put your baby somewhere safe (like their crib) and walk away for a few minutes.     Learn about safe sleep for your baby.  Always put your baby to sleep on their back. Place them alone in a crib or bassinet with a firm, flat surface. Keep soft items like stuffed animals out of the crib.     Learn what to expect with  poop.  Your baby will have their own bowel patterns. Some babies have several bowel movements a day. Some have fewer.     Know that  babies will often have loose, yellow bowel movements.  Formula-fed babies have more formed stools. If your baby's poop looks like pellets, your baby is constipated.   Follow-up care is a key part of your treatment and safety. Be sure to make and go to all appointments, and call your doctor if you are having problems. It's also a good idea to know your test results and keep a list of the medicines you take.  Where can you learn more?  Go to https://www.[employer].net/patiented  Enter N257 in the search box to learn more about \"Week 37 of Your Pregnancy: Care Instructions.\"  Current as of: 2023               Content Version: 13.8    2976-7007 [employer].   Care " instructions adapted under license by your healthcare professional. If you have questions about a medical condition or this instruction, always ask your healthcare professional. [employer] disclaims any warranty or liability for your use of this information.

## 2024-03-06 ENCOUNTER — PRENATAL OFFICE VISIT (OUTPATIENT)
Dept: OBGYN | Facility: CLINIC | Age: 24
End: 2024-03-06

## 2024-03-06 VITALS
DIASTOLIC BLOOD PRESSURE: 84 MMHG | HEART RATE: 103 BPM | RESPIRATION RATE: 16 BRPM | SYSTOLIC BLOOD PRESSURE: 131 MMHG | HEIGHT: 64 IN | TEMPERATURE: 98.3 F | BODY MASS INDEX: 36.7 KG/M2 | WEIGHT: 215 LBS

## 2024-03-06 DIAGNOSIS — O24.410 DIET CONTROLLED GESTATIONAL DIABETES MELLITUS (GDM) IN THIRD TRIMESTER: ICD-10-CM

## 2024-03-06 DIAGNOSIS — Z34.83 PRENATAL CARE, SUBSEQUENT PREGNANCY IN THIRD TRIMESTER: Primary | ICD-10-CM

## 2024-03-06 DIAGNOSIS — Z87.59 HISTORY OF GESTATIONAL HYPERTENSION: ICD-10-CM

## 2024-03-06 PROCEDURE — 99207 PR PRENATAL VISIT: CPT | Performed by: ADVANCED PRACTICE MIDWIFE

## 2024-03-06 NOTE — NURSING NOTE
"Initial /84 (BP Location: Right arm, Patient Position: Chair, Cuff Size: Adult Regular)   Pulse 103   Temp 98.3  F (36.8  C) (Tympanic)   Resp 16   Ht 1.626 m (5' 4\")   Wt 97.5 kg (215 lb)   LMP 06/05/2023 (Approximate)   BMI 36.90 kg/m   Estimated body mass index is 36.9 kg/m  as calculated from the following:    Height as of this encounter: 1.626 m (5' 4\").    Weight as of this encounter: 97.5 kg (215 lb). .    "

## 2024-03-06 NOTE — PROGRESS NOTES
Here with her parents.  Feeling well.  Baby is active. Denies any leaking of fluid, vaginal bleeding, regular uterine contractions, or headaches or other concerns.  GDM - well controlled with diet and working with Diabetic Ed.  BP WNL.  Discussed GBS positive.   History of gestational HTN - WPs WNL.    Reviewed to call for contractions, loss of fluid, vaginal bleeding, decreased fetal movement or any other questions or concerns.    RTC in 1 weeks.  Meghan Wright, DNP, APRN, CNM

## 2024-03-12 ENCOUNTER — HOSPITAL ENCOUNTER (OUTPATIENT)
Facility: CLINIC | Age: 24
Discharge: HOME | End: 2024-03-12
Attending: OBSTETRICS & GYNECOLOGY | Admitting: OBSTETRICS & GYNECOLOGY
Payer: COMMERCIAL

## 2024-03-12 ENCOUNTER — NURSE TRIAGE (OUTPATIENT)
Dept: FAMILY MEDICINE | Facility: CLINIC | Age: 24
End: 2024-03-12
Payer: COMMERCIAL

## 2024-03-12 VITALS — RESPIRATION RATE: 16 BRPM | DIASTOLIC BLOOD PRESSURE: 77 MMHG | TEMPERATURE: 97.6 F | SYSTOLIC BLOOD PRESSURE: 123 MMHG

## 2024-03-12 PROBLEM — Z36.89 ENCOUNTER FOR TRIAGE IN PREGNANT PATIENT: Status: ACTIVE | Noted: 2024-03-12

## 2024-03-12 LAB
ALBUMIN MFR UR ELPH: 8 MG/DL
ALBUMIN SERPL BCG-MCNC: 3.3 G/DL (ref 3.5–5.2)
ALP SERPL-CCNC: 199 U/L (ref 40–150)
ALT SERPL W P-5'-P-CCNC: 8 U/L (ref 0–50)
ANION GAP SERPL CALCULATED.3IONS-SCNC: 14 MMOL/L (ref 7–15)
AST SERPL W P-5'-P-CCNC: 28 U/L (ref 0–45)
BILIRUB SERPL-MCNC: 0.2 MG/DL
BUN SERPL-MCNC: 12.8 MG/DL (ref 6–20)
CALCIUM SERPL-MCNC: 9 MG/DL (ref 8.6–10)
CHLORIDE SERPL-SCNC: 101 MMOL/L (ref 98–107)
CREAT SERPL-MCNC: 0.64 MG/DL (ref 0.51–0.95)
CREAT UR-MCNC: 67.2 MG/DL
DEPRECATED HCO3 PLAS-SCNC: 19 MMOL/L (ref 22–29)
EGFRCR SERPLBLD CKD-EPI 2021: >90 ML/MIN/1.73M2
ERYTHROCYTE [DISTWIDTH] IN BLOOD BY AUTOMATED COUNT: 12.5 % (ref 10–15)
GLUCOSE SERPL-MCNC: 71 MG/DL (ref 70–99)
HCT VFR BLD AUTO: 37.4 % (ref 35–47)
HGB BLD-MCNC: 12.4 G/DL (ref 11.7–15.7)
HOLD SPECIMEN: NORMAL
MCH RBC QN AUTO: 28.6 PG (ref 26.5–33)
MCHC RBC AUTO-ENTMCNC: 33.2 G/DL (ref 31.5–36.5)
MCV RBC AUTO: 86 FL (ref 78–100)
PLATELET # BLD AUTO: 193 10E3/UL (ref 150–450)
POTASSIUM SERPL-SCNC: 4.1 MMOL/L (ref 3.4–5.3)
PROT SERPL-MCNC: 6.5 G/DL (ref 6.4–8.3)
PROT/CREAT 24H UR: 0.12 MG/MG CR (ref 0–0.2)
RBC # BLD AUTO: 4.33 10E6/UL (ref 3.8–5.2)
SODIUM SERPL-SCNC: 134 MMOL/L (ref 135–145)
WBC # BLD AUTO: 14.2 10E3/UL (ref 4–11)

## 2024-03-12 PROCEDURE — G0463 HOSPITAL OUTPT CLINIC VISIT: HCPCS | Mod: 25

## 2024-03-12 PROCEDURE — 84156 ASSAY OF PROTEIN URINE: CPT | Performed by: OBSTETRICS & GYNECOLOGY

## 2024-03-12 PROCEDURE — 36415 COLL VENOUS BLD VENIPUNCTURE: CPT | Performed by: OBSTETRICS & GYNECOLOGY

## 2024-03-12 PROCEDURE — 85027 COMPLETE CBC AUTOMATED: CPT | Performed by: OBSTETRICS & GYNECOLOGY

## 2024-03-12 PROCEDURE — 59025 FETAL NON-STRESS TEST: CPT

## 2024-03-12 PROCEDURE — 80053 COMPREHEN METABOLIC PANEL: CPT | Performed by: OBSTETRICS & GYNECOLOGY

## 2024-03-12 RX ORDER — LIDOCAINE 40 MG/G
CREAM TOPICAL
Status: DISCONTINUED | OUTPATIENT
Start: 2024-03-12 | End: 2024-03-12 | Stop reason: HOSPADM

## 2024-03-12 ASSESSMENT — ACTIVITIES OF DAILY LIVING (ADL)
ADLS_ACUITY_SCORE: 20
ADLS_ACUITY_SCORE: 20

## 2024-03-12 NOTE — PLAN OF CARE
"S:Patient presents due to Increased Edema in her lower legs and an increased BP of 140/91 at home. Pt contacted triage line and they stated to be evaluated.   B:37w5d   Allergies: Iodine, Ivp dye [contrast dye], and Latex  A:A:+ accels, no decels, Category I  normal uterine activity; pt states that she \"doesn't feel her contractions\"      Dr. ASIA Stone called and orders received.  Plan includes; Monitor, observe and reevaluate and Labs . Reviewed with patient and she agrees with plan.   Bill of Rights given & questions discussed?: Yes  [facility] Your Rights handout : Informed and given    Prenatal Breastfeeding Education Toolkit provided for patient to review,helping her to make an informed decision on a feeding choice for her baby. Patient accepted. Questions answered.    Oriented to room and call light.         "

## 2024-03-12 NOTE — TELEPHONE ENCOUNTER
Nurse Triage SBAR    Is this a 2nd Level Triage? NO    Situation:   Patient calling  Experiencing R leg swelling over the past two days    Background:   Is currently 38 weeks pregnant  Has been previously assessed for pre-eclampsia    Assessment:   Took BP while on the phone - currently 147/94   HR 91  BP is typically 130s/80s  Reports pitting edema to R leg   That extends to the knee  Denies edema to hands or face, headache, abdominal pain, chest pain, difficulty breathing at this time    Protocol Recommended Disposition:   Go To Office Now, See HCP Within 4 Hours (Or PCP Triage)    Recommendation:   Attempted to reach [facility] [place] office, currently closed  Advised to go to L&D where she has previously been assessed for pre-eclampsia  Patient will have  drive her to [facility] now      Does the patient meet one of the following criteria for ADS visit consideration? 16+ years old, with an [facility] PCP     TIP  Providers, please consider if this condition is appropriate for management at one of our Acute and Diagnostic Services sites.     If patient is a good candidate, please use dotphrase <dot>triageresponse and select Refer to ADS to document.        Reason for Disposition   [1] Pregnant 20 or more weeks AND [2] Systolic BP >= 140 OR Diastolic >= 90   Pregnant 20 or more weeks and Systolic BP >= 140 OR Diastolic BP >= 90    Additional Information   Negative: Difficult to awaken or acting confused (e.g., disoriented, slurred speech)   Negative: SEVERE difficulty breathing (e.g., struggling for each breath, speaks in single words)   Negative: [1] Weakness of the face, arm or leg on one side of the body AND [2] new-onset   Negative: [1] Numbness (i.e., loss of sensation) of the face, arm or leg on one side of the body AND [2] new- onset   Negative: Seizure occurred and has stopped   Negative: Sounds like a life-threatening emergency to the triager   Negative: Other significant medical  symptom is present, see that guideline (e.g., abdominal pain, chest pain, headache, leg swelling, signs of labor, vision changes)   Negative: [1] High blood pressure is main concern AND [2] postpartum   Negative: [1] High blood pressure is main concern AND [2] NOT postpartum or pregnant   Negative: [1] Pregnant 20 or more weeks AND [2] new hand or face swelling   Negative: [1] Pregnant 23 or more weeks AND [2] baby is moving less today (e.g., kick count < 5 in 1 hour or < 10 in 2 hours)   Negative: [1] Pregnant 20 or more weeks AND [2] Systolic BP >= 160 OR Diastolic >= 110   Negative: Chest pain   Negative: Followed an ankle injury   Negative: Followed a bee sting and has localized swelling (e.g., small area of puffy or swollen skin)   Negative: Followed an insect bite and has localized swelling (e.g., small area of puffy or swollen skin)   Negative: Ankle pain is main symptom   Negative: Swelling of both ankles (i.e., pedal edema)   Negative: Swelling of calf or leg is main symptom   Negative: Difficulty breathing   Negative: Entire foot is cool or blue in comparison to other side   Negative: Ankle pain and fever   Negative: Ankle redness and fever   Negative: Patient sounds very sick or weak to the triager   Negative: SEVERE difficulty breathing (e.g., struggling for each breath, speaks in single words)   Negative: Sounds like a life-threatening emergency to the triager   Negative: Ankle swelling from an ankle injury   Negative: Followed an insect bite and has localized swelling (e.g., small area of puffy or swollen skin)   Negative: SEVERE leg swelling (e.g., swelling extends above knee, entire leg is swollen)   Negative: Chest pain   Negative: Difficulty breathing of new-onset or worsening   Negative: Pregnant 20 or more weeks AND blurred vision or visual changes   Negative: Pregnant 20 or more weeks and upper abdominal pain lasting > 1 hour   Negative: Pregnant 20 or more weeks AND severe headache and not  relieved with acetaminophen (e.g., Tylenol)   Negative: Pregnant 23 or more weeks and baby is moving less today (e.g., kick count < 5 in 1 hour or < 10 in 2 hours)   Negative: Pregnant 20 or more weeks AND swelling of face, arm or hands  (Exception: Slight puffiness of fingers.)   Negative: Pregnant 20 or more weeks and sudden weight gain (i.e., more than 3 lbs or 1.4 kg in one week)   Negative: Red area or streak > 2 inches (or 5 cm)   Negative: Red area or streak and fever   Negative: Swelling is painful to touch AND fever   Negative: Cast on leg or ankle AND now increased pain   Negative: Patient sounds very sick or weak to the triager    Protocols used: Ankle Swelling-A-OH, Pregnancy - High Blood Pressure-A-AH, Pregnancy - Leg Swelling and Edema-A-OH

## 2024-03-13 NOTE — PATIENT INSTRUCTIONS
"Week 37 of Your Pregnancy: Care Instructions    Most babies are born between 37 and 40 weeks.   This is a good time to pack a bag to take with you to the birth. Then it will be ready to go when you are.     Learn about breastfeeding.  For example, find out about ways to hold your baby to make breastfeeding easier. And think about learning how to pump and store milk.     Know that crying is normal.  It's common for babies to cry 1 to 3 hours a day. Some cry more, and some cry less.     Learn why babies cry.  They may be hungry; have gas; need a diaper change; or feel cold, warm, tired, lonely, or tense. Sometimes they cry for unknown reasons.     Think about what will help you stay calm when your baby cries.  Taking slow, deep breaths can help. So can taking a break. It's okay to put your baby somewhere safe (like their crib) and walk away for a few minutes.     Learn about safe sleep for your baby.  Always put your baby to sleep on their back. Place them alone in a crib or bassinet with a firm, flat surface. Keep soft items like stuffed animals out of the crib.     Learn what to expect with  poop.  Your baby will have their own bowel patterns. Some babies have several bowel movements a day. Some have fewer.     Know that  babies will often have loose, yellow bowel movements.  Formula-fed babies have more formed stools. If your baby's poop looks like pellets, your baby is constipated.   Follow-up care is a key part of your treatment and safety. Be sure to make and go to all appointments, and call your doctor if you are having problems. It's also a good idea to know your test results and keep a list of the medicines you take.  Where can you learn more?  Go to https://www.[employer].net/patiented  Enter N257 in the search box to learn more about \"Week 37 of Your Pregnancy: Care Instructions.\"  Current as of: 2023               Content Version: 13.8    4971-3465 [employer].   Care " instructions adapted under license by your healthcare professional. If you have questions about a medical condition or this instruction, always ask your healthcare professional. [employer] disclaims any warranty or liability for your use of this information.

## 2024-03-13 NOTE — PROGRESS NOTES
S: Discharge from triage  A: A:moderate variablility, + accels, no decels  normal uterine activity  not examined  Admission on 03/12/2024   Component Date Value    Total Protein Urine mg/dL 03/12/2024 8.0     Total Protein Urine mg/m* 03/12/2024 0.12     Creatinine Urine mg/dL 03/12/2024 67.2     WBC Count 03/12/2024 14.2 (H)     RBC Count 03/12/2024 4.33     Hemoglobin 03/12/2024 12.4     Hematocrit 03/12/2024 37.4     MCV 03/12/2024 86     MCH 03/12/2024 28.6     MCHC 03/12/2024 33.2     RDW 03/12/2024 12.5     Platelet Count 03/12/2024 193     Sodium 03/12/2024 134 (L)     Potassium 03/12/2024 4.1     Carbon Dioxide (CO2) 03/12/2024 19 (L)     Anion Gap 03/12/2024 14     Urea Nitrogen 03/12/2024 12.8     Creatinine 03/12/2024 0.64     GFR Estimate 03/12/2024 >90     Calcium 03/12/2024 9.0     Chloride 03/12/2024 101     Glucose 03/12/2024 71     Alkaline Phosphatase 03/12/2024 199 (H)     AST 03/12/2024 28     ALT 03/12/2024 8     Protein Total 03/12/2024 6.5     Albumin 03/12/2024 3.3 (L)     Bilirubin Total 03/12/2024 0.2      Dr. ASIA Stone informed of above and discharge order received.   R: Plan includes: Labor instuctions given Fetal kick count instructions given. Patient instructed to report any recurrence of above concerns to her primary care provider during clinic hours or [facility] at any other time. Patient verbalized understanding of After Visit Summary, education and agreement with plan. Agrees to call for any problems, questions or concerns.  Discharged undelivered via ambulatory  in stable condition with all belongings. Accompanied by significant other.

## 2024-03-14 ENCOUNTER — PRENATAL OFFICE VISIT (OUTPATIENT)
Dept: OBGYN | Facility: CLINIC | Age: 24
End: 2024-03-14

## 2024-03-14 ENCOUNTER — HOSPITAL ENCOUNTER (INPATIENT)
Facility: CLINIC | Age: 24
LOS: 3 days | Discharge: HOME | End: 2024-03-17
Attending: OBSTETRICS & GYNECOLOGY | Admitting: OBSTETRICS & GYNECOLOGY

## 2024-03-14 ENCOUNTER — NURSE TRIAGE (OUTPATIENT)
Dept: NURSING | Facility: CLINIC | Age: 24
End: 2024-03-14

## 2024-03-14 ENCOUNTER — ANESTHESIA (OUTPATIENT)
Dept: OBGYN | Facility: CLINIC | Age: 24
End: 2024-03-14

## 2024-03-14 VITALS
HEIGHT: 64 IN | DIASTOLIC BLOOD PRESSURE: 84 MMHG | TEMPERATURE: 97.9 F | SYSTOLIC BLOOD PRESSURE: 132 MMHG | BODY MASS INDEX: 37.94 KG/M2 | WEIGHT: 222.2 LBS | HEART RATE: 83 BPM | RESPIRATION RATE: 16 BRPM

## 2024-03-14 DIAGNOSIS — F31.9 BIPOLAR DISORDER WITH DEPRESSION (H): ICD-10-CM

## 2024-03-14 DIAGNOSIS — F33.1 MODERATE EPISODE OF RECURRENT MAJOR DEPRESSIVE DISORDER (H): ICD-10-CM

## 2024-03-14 DIAGNOSIS — O99.612 GASTROESOPHAGEAL REFLUX IN PREGNANCY IN SECOND TRIMESTER: ICD-10-CM

## 2024-03-14 DIAGNOSIS — Z87.59 HISTORY OF GESTATIONAL HYPERTENSION: ICD-10-CM

## 2024-03-14 DIAGNOSIS — O14.90 PRE-ECLAMPSIA, ANTEPARTUM: ICD-10-CM

## 2024-03-14 DIAGNOSIS — K21.9 GASTROESOPHAGEAL REFLUX IN PREGNANCY IN SECOND TRIMESTER: ICD-10-CM

## 2024-03-14 DIAGNOSIS — O24.419 GESTATIONAL DIABETES MELLITUS (GDM) IN THIRD TRIMESTER, GESTATIONAL DIABETES METHOD OF CONTROL UNSPECIFIED: ICD-10-CM

## 2024-03-14 DIAGNOSIS — Z34.83 PRENATAL CARE, SUBSEQUENT PREGNANCY IN THIRD TRIMESTER: Primary | ICD-10-CM

## 2024-03-14 PROBLEM — O13.3 GESTATIONAL HYPERTENSION, THIRD TRIMESTER: Status: ACTIVE | Noted: 2024-03-14

## 2024-03-14 PROBLEM — Z34.80 PRENATAL CARE, SUBSEQUENT PREGNANCY: Status: RESOLVED | Noted: 2023-08-01 | Resolved: 2024-03-14

## 2024-03-14 PROBLEM — Z36.89 ENCOUNTER FOR TRIAGE IN PREGNANT PATIENT: Status: RESOLVED | Noted: 2024-03-12 | Resolved: 2024-03-14

## 2024-03-14 PROBLEM — R03.0 ELEVATED BP WITHOUT DIAGNOSIS OF HYPERTENSION: Status: RESOLVED | Noted: 2020-05-14 | Resolved: 2024-03-14

## 2024-03-14 PROBLEM — R56.9 SEIZURES (H): Status: ACTIVE | Noted: 2021-06-18

## 2024-03-14 LAB
ABO/RH(D): NORMAL
ALBUMIN MFR UR ELPH: 21.8 MG/DL
ALBUMIN SERPL BCG-MCNC: 3.4 G/DL (ref 3.5–5.2)
ALP SERPL-CCNC: 196 U/L (ref 40–150)
ALT SERPL W P-5'-P-CCNC: 6 U/L (ref 0–50)
ANION GAP SERPL CALCULATED.3IONS-SCNC: 13 MMOL/L (ref 7–15)
ANTIBODY SCREEN: NEGATIVE
AST SERPL W P-5'-P-CCNC: 20 U/L (ref 0–45)
BASOPHILS # BLD MANUAL: 0 10E3/UL (ref 0–0.2)
BASOPHILS NFR BLD MANUAL: 0 %
BILIRUB SERPL-MCNC: <0.2 MG/DL
BUN SERPL-MCNC: 15.2 MG/DL (ref 6–20)
CALCIUM SERPL-MCNC: 8.9 MG/DL (ref 8.6–10)
CHLORIDE SERPL-SCNC: 105 MMOL/L (ref 98–107)
CREAT SERPL-MCNC: 0.72 MG/DL (ref 0.51–0.95)
CREAT UR-MCNC: 110.7 MG/DL
DEPRECATED HCO3 PLAS-SCNC: 19 MMOL/L (ref 22–29)
EGFRCR SERPLBLD CKD-EPI 2021: >90 ML/MIN/1.73M2
EOSINOPHIL # BLD MANUAL: 0 10E3/UL (ref 0–0.7)
EOSINOPHIL NFR BLD MANUAL: 0 %
ERYTHROCYTE [DISTWIDTH] IN BLOOD BY AUTOMATED COUNT: 12.4 % (ref 10–15)
GLUCOSE BLDC GLUCOMTR-MCNC: 74 MG/DL (ref 70–99)
GLUCOSE SERPL-MCNC: 75 MG/DL (ref 70–99)
HBA1C MFR BLD: 5.3 %
HCT VFR BLD AUTO: 36 % (ref 35–47)
HGB BLD-MCNC: 12.1 G/DL (ref 11.7–15.7)
LYMPHOCYTES # BLD MANUAL: 2.6 10E3/UL (ref 0.8–5.3)
LYMPHOCYTES NFR BLD MANUAL: 15 %
MCH RBC QN AUTO: 29.1 PG (ref 26.5–33)
MCHC RBC AUTO-ENTMCNC: 33.6 G/DL (ref 31.5–36.5)
MCV RBC AUTO: 87 FL (ref 78–100)
MONOCYTES # BLD MANUAL: 1.1 10E3/UL (ref 0–1.3)
MONOCYTES NFR BLD MANUAL: 6 %
NEUTROPHILS # BLD MANUAL: 13.9 10E3/UL (ref 1.6–8.3)
NEUTROPHILS NFR BLD MANUAL: 79 %
NRBC # BLD AUTO: 0 10E3/UL
NRBC BLD AUTO-RTO: 0 /100
PLAT MORPH BLD: ABNORMAL
PLATELET # BLD AUTO: 184 10E3/UL (ref 150–450)
POTASSIUM SERPL-SCNC: 3.8 MMOL/L (ref 3.4–5.3)
PROT SERPL-MCNC: 6.3 G/DL (ref 6.4–8.3)
PROT/CREAT 24H UR: 0.2 MG/MG CR (ref 0–0.2)
RBC # BLD AUTO: 4.16 10E6/UL (ref 3.8–5.2)
RBC MORPH BLD: ABNORMAL
SODIUM SERPL-SCNC: 137 MMOL/L (ref 135–145)
SPECIMEN EXPIRATION DATE: NORMAL
WBC # BLD AUTO: 17.6 10E3/UL (ref 4–11)

## 2024-03-14 PROCEDURE — 80053 COMPREHEN METABOLIC PANEL: CPT | Performed by: OBSTETRICS & GYNECOLOGY

## 2024-03-14 PROCEDURE — 86900 BLOOD TYPING SEROLOGIC ABO: CPT | Performed by: OBSTETRICS & GYNECOLOGY

## 2024-03-14 PROCEDURE — 250N000009 HC RX 250: Performed by: OBSTETRICS & GYNECOLOGY

## 2024-03-14 PROCEDURE — 85007 BL SMEAR W/DIFF WBC COUNT: CPT | Performed by: OBSTETRICS & GYNECOLOGY

## 2024-03-14 PROCEDURE — 85025 COMPLETE CBC W/AUTO DIFF WBC: CPT | Performed by: OBSTETRICS & GYNECOLOGY

## 2024-03-14 PROCEDURE — 36415 COLL VENOUS BLD VENIPUNCTURE: CPT | Performed by: OBSTETRICS & GYNECOLOGY

## 2024-03-14 PROCEDURE — 99207 PR PRENATAL VISIT: CPT | Performed by: PHYSICIAN ASSISTANT

## 2024-03-14 PROCEDURE — 250N000013 HC RX MED GY IP 250 OP 250 PS 637: Performed by: OBSTETRICS & GYNECOLOGY

## 2024-03-14 PROCEDURE — 120N000001 HC R&B MED SURG/OB

## 2024-03-14 PROCEDURE — 84156 ASSAY OF PROTEIN URINE: CPT | Performed by: OBSTETRICS & GYNECOLOGY

## 2024-03-14 PROCEDURE — 59410 OBSTETRICAL CARE: CPT | Performed by: OBSTETRICS & GYNECOLOGY

## 2024-03-14 PROCEDURE — 722N000001 HC LABOR CARE VAGINAL DELIVERY SINGLE

## 2024-03-14 PROCEDURE — 258N000003 HC RX IP 258 OP 636: Performed by: OBSTETRICS & GYNECOLOGY

## 2024-03-14 PROCEDURE — 86780 TREPONEMA PALLIDUM: CPT | Performed by: OBSTETRICS & GYNECOLOGY

## 2024-03-14 PROCEDURE — 83036 HEMOGLOBIN GLYCOSYLATED A1C: CPT | Performed by: OBSTETRICS & GYNECOLOGY

## 2024-03-14 PROCEDURE — 250N000011 HC RX IP 250 OP 636: Performed by: OBSTETRICS & GYNECOLOGY

## 2024-03-14 RX ORDER — NALBUPHINE HYDROCHLORIDE 10 MG/ML
2.5-5 INJECTION, SOLUTION INTRAMUSCULAR; INTRAVENOUS; SUBCUTANEOUS EVERY 6 HOURS PRN
Status: DISCONTINUED | OUTPATIENT
Start: 2024-03-14 | End: 2024-03-14

## 2024-03-14 RX ORDER — HYDROCORTISONE 25 MG/G
CREAM TOPICAL 3 TIMES DAILY PRN
Status: DISCONTINUED | OUTPATIENT
Start: 2024-03-14 | End: 2024-03-17 | Stop reason: HOSPADM

## 2024-03-14 RX ORDER — PROCHLORPERAZINE MALEATE 10 MG
10 TABLET ORAL EVERY 6 HOURS PRN
Status: DISCONTINUED | OUTPATIENT
Start: 2024-03-14 | End: 2024-03-14

## 2024-03-14 RX ORDER — ACETAMINOPHEN 325 MG/1
650 TABLET ORAL EVERY 4 HOURS PRN
Status: DISCONTINUED | OUTPATIENT
Start: 2024-03-14 | End: 2024-03-17 | Stop reason: HOSPADM

## 2024-03-14 RX ORDER — OXYTOCIN 10 [USP'U]/ML
10 INJECTION, SOLUTION INTRAMUSCULAR; INTRAVENOUS
Status: DISCONTINUED | OUTPATIENT
Start: 2024-03-14 | End: 2024-03-14

## 2024-03-14 RX ORDER — ENOXAPARIN SODIUM 100 MG/ML
40 INJECTION SUBCUTANEOUS EVERY 24 HOURS
Status: DISCONTINUED | OUTPATIENT
Start: 2024-03-15 | End: 2024-03-17 | Stop reason: HOSPADM

## 2024-03-14 RX ORDER — SODIUM CHLORIDE, SODIUM LACTATE, POTASSIUM CHLORIDE, CALCIUM CHLORIDE 600; 310; 30; 20 MG/100ML; MG/100ML; MG/100ML; MG/100ML
INJECTION, SOLUTION INTRAVENOUS CONTINUOUS
Status: DISCONTINUED | OUTPATIENT
Start: 2024-03-14 | End: 2024-03-14

## 2024-03-14 RX ORDER — SODIUM CHLORIDE, SODIUM LACTATE, POTASSIUM CHLORIDE, CALCIUM CHLORIDE 600; 310; 30; 20 MG/100ML; MG/100ML; MG/100ML; MG/100ML
10-125 INJECTION, SOLUTION INTRAVENOUS CONTINUOUS
Status: DISCONTINUED | OUTPATIENT
Start: 2024-03-14 | End: 2024-03-17 | Stop reason: HOSPADM

## 2024-03-14 RX ORDER — MISOPROSTOL 200 UG/1
400 TABLET ORAL
Status: DISCONTINUED | OUTPATIENT
Start: 2024-03-14 | End: 2024-03-17 | Stop reason: HOSPADM

## 2024-03-14 RX ORDER — TRANEXAMIC ACID 10 MG/ML
1 INJECTION, SOLUTION INTRAVENOUS EVERY 30 MIN PRN
Status: DISCONTINUED | OUTPATIENT
Start: 2024-03-14 | End: 2024-03-14

## 2024-03-14 RX ORDER — OXYTOCIN 10 [USP'U]/ML
10 INJECTION, SOLUTION INTRAMUSCULAR; INTRAVENOUS
Status: DISCONTINUED | OUTPATIENT
Start: 2024-03-14 | End: 2024-03-17 | Stop reason: HOSPADM

## 2024-03-14 RX ORDER — NALOXONE HYDROCHLORIDE 0.4 MG/ML
0.4 INJECTION, SOLUTION INTRAMUSCULAR; INTRAVENOUS; SUBCUTANEOUS
Status: DISCONTINUED | OUTPATIENT
Start: 2024-03-14 | End: 2024-03-14

## 2024-03-14 RX ORDER — DOCUSATE SODIUM 100 MG/1
100 CAPSULE, LIQUID FILLED ORAL DAILY
Status: DISCONTINUED | OUTPATIENT
Start: 2024-03-15 | End: 2024-03-17 | Stop reason: HOSPADM

## 2024-03-14 RX ORDER — METOCLOPRAMIDE HYDROCHLORIDE 5 MG/ML
10 INJECTION INTRAMUSCULAR; INTRAVENOUS EVERY 6 HOURS PRN
Status: DISCONTINUED | OUTPATIENT
Start: 2024-03-14 | End: 2024-03-14

## 2024-03-14 RX ORDER — MODIFIED LANOLIN
OINTMENT (GRAM) TOPICAL
Status: DISCONTINUED | OUTPATIENT
Start: 2024-03-14 | End: 2024-03-17 | Stop reason: HOSPADM

## 2024-03-14 RX ORDER — HYDRALAZINE HYDROCHLORIDE 20 MG/ML
10 INJECTION INTRAMUSCULAR; INTRAVENOUS
Status: DISCONTINUED | OUTPATIENT
Start: 2024-03-14 | End: 2024-03-17 | Stop reason: HOSPADM

## 2024-03-14 RX ORDER — OXYTOCIN/0.9 % SODIUM CHLORIDE 30/500 ML
340 PLASTIC BAG, INJECTION (ML) INTRAVENOUS CONTINUOUS PRN
Status: COMPLETED | OUTPATIENT
Start: 2024-03-14 | End: 2024-03-14

## 2024-03-14 RX ORDER — SODIUM CHLORIDE 9 MG/ML
INJECTION, SOLUTION INTRAVENOUS CONTINUOUS
Status: DISCONTINUED | OUTPATIENT
Start: 2024-03-14 | End: 2024-03-14

## 2024-03-14 RX ORDER — PROCHLORPERAZINE 25 MG
25 SUPPOSITORY, RECTAL RECTAL EVERY 12 HOURS PRN
Status: DISCONTINUED | OUTPATIENT
Start: 2024-03-14 | End: 2024-03-14

## 2024-03-14 RX ORDER — DEXTROSE MONOHYDRATE 25 G/50ML
25-50 INJECTION, SOLUTION INTRAVENOUS
Status: DISCONTINUED | OUTPATIENT
Start: 2024-03-14 | End: 2024-03-17 | Stop reason: HOSPADM

## 2024-03-14 RX ORDER — LOPERAMIDE HCL 2 MG
4 CAPSULE ORAL
Status: DISCONTINUED | OUTPATIENT
Start: 2024-03-14 | End: 2024-03-17 | Stop reason: HOSPADM

## 2024-03-14 RX ORDER — ONDANSETRON 4 MG/1
4 TABLET, ORALLY DISINTEGRATING ORAL EVERY 6 HOURS PRN
Status: DISCONTINUED | OUTPATIENT
Start: 2024-03-14 | End: 2024-03-14

## 2024-03-14 RX ORDER — LIDOCAINE 40 MG/G
CREAM TOPICAL
Status: DISCONTINUED | OUTPATIENT
Start: 2024-03-14 | End: 2024-03-17 | Stop reason: HOSPADM

## 2024-03-14 RX ORDER — IBUPROFEN 800 MG/1
800 TABLET, FILM COATED ORAL
Status: DISCONTINUED | OUTPATIENT
Start: 2024-03-14 | End: 2024-03-14

## 2024-03-14 RX ORDER — MISOPROSTOL 200 UG/1
800 TABLET ORAL
Status: DISCONTINUED | OUTPATIENT
Start: 2024-03-14 | End: 2024-03-14

## 2024-03-14 RX ORDER — PRENATAL VIT/IRON FUM/FOLIC AC 27MG-0.8MG
1 TABLET ORAL DAILY
Status: DISCONTINUED | OUTPATIENT
Start: 2024-03-15 | End: 2024-03-17 | Stop reason: HOSPADM

## 2024-03-14 RX ORDER — CARBOPROST TROMETHAMINE 250 UG/ML
250 INJECTION, SOLUTION INTRAMUSCULAR
Status: DISCONTINUED | OUTPATIENT
Start: 2024-03-14 | End: 2024-03-14

## 2024-03-14 RX ORDER — CITRIC ACID/SODIUM CITRATE 334-500MG
30 SOLUTION, ORAL ORAL
Status: DISCONTINUED | OUTPATIENT
Start: 2024-03-14 | End: 2024-03-14

## 2024-03-14 RX ORDER — KETOROLAC TROMETHAMINE 30 MG/ML
30 INJECTION, SOLUTION INTRAMUSCULAR; INTRAVENOUS
Status: DISCONTINUED | OUTPATIENT
Start: 2024-03-14 | End: 2024-03-14

## 2024-03-14 RX ORDER — ONDANSETRON 2 MG/ML
4 INJECTION INTRAMUSCULAR; INTRAVENOUS EVERY 6 HOURS PRN
Status: DISCONTINUED | OUTPATIENT
Start: 2024-03-14 | End: 2024-03-14

## 2024-03-14 RX ORDER — BISACODYL 10 MG
10 SUPPOSITORY, RECTAL RECTAL DAILY PRN
Status: DISCONTINUED | OUTPATIENT
Start: 2024-03-14 | End: 2024-03-17 | Stop reason: HOSPADM

## 2024-03-14 RX ORDER — NALOXONE HYDROCHLORIDE 0.4 MG/ML
0.2 INJECTION, SOLUTION INTRAMUSCULAR; INTRAVENOUS; SUBCUTANEOUS
Status: DISCONTINUED | OUTPATIENT
Start: 2024-03-14 | End: 2024-03-14

## 2024-03-14 RX ORDER — LOPERAMIDE HCL 2 MG
4 CAPSULE ORAL
Status: DISCONTINUED | OUTPATIENT
Start: 2024-03-14 | End: 2024-03-14

## 2024-03-14 RX ORDER — CARBOPROST TROMETHAMINE 250 UG/ML
250 INJECTION, SOLUTION INTRAMUSCULAR
Status: DISCONTINUED | OUTPATIENT
Start: 2024-03-14 | End: 2024-03-17 | Stop reason: HOSPADM

## 2024-03-14 RX ORDER — DEXTROSE MONOHYDRATE 25 G/50ML
25-50 INJECTION, SOLUTION INTRAVENOUS
Status: DISCONTINUED | OUTPATIENT
Start: 2024-03-14 | End: 2024-03-14

## 2024-03-14 RX ORDER — METHYLERGONOVINE MALEATE 0.2 MG/ML
200 INJECTION INTRAVENOUS
Status: DISCONTINUED | OUTPATIENT
Start: 2024-03-14 | End: 2024-03-17 | Stop reason: HOSPADM

## 2024-03-14 RX ORDER — LABETALOL HYDROCHLORIDE 5 MG/ML
20-80 INJECTION, SOLUTION INTRAVENOUS EVERY 10 MIN PRN
Status: DISCONTINUED | OUTPATIENT
Start: 2024-03-14 | End: 2024-03-17 | Stop reason: HOSPADM

## 2024-03-14 RX ORDER — METOCLOPRAMIDE 10 MG/1
10 TABLET ORAL EVERY 6 HOURS PRN
Status: DISCONTINUED | OUTPATIENT
Start: 2024-03-14 | End: 2024-03-14

## 2024-03-14 RX ORDER — NICOTINE POLACRILEX 4 MG
15-30 LOZENGE BUCCAL
Status: DISCONTINUED | OUTPATIENT
Start: 2024-03-14 | End: 2024-03-14

## 2024-03-14 RX ORDER — METHYLERGONOVINE MALEATE 0.2 MG/ML
200 INJECTION INTRAVENOUS
Status: DISCONTINUED | OUTPATIENT
Start: 2024-03-14 | End: 2024-03-14

## 2024-03-14 RX ORDER — MISOPROSTOL 200 UG/1
800 TABLET ORAL
Status: DISCONTINUED | OUTPATIENT
Start: 2024-03-14 | End: 2024-03-17 | Stop reason: HOSPADM

## 2024-03-14 RX ORDER — MISOPROSTOL 200 UG/1
400 TABLET ORAL
Status: DISCONTINUED | OUTPATIENT
Start: 2024-03-14 | End: 2024-03-14

## 2024-03-14 RX ORDER — LOPERAMIDE HCL 2 MG
2 CAPSULE ORAL
Status: DISCONTINUED | OUTPATIENT
Start: 2024-03-14 | End: 2024-03-14

## 2024-03-14 RX ORDER — IBUPROFEN 800 MG/1
800 TABLET, FILM COATED ORAL EVERY 6 HOURS PRN
Status: DISCONTINUED | OUTPATIENT
Start: 2024-03-14 | End: 2024-03-17 | Stop reason: HOSPADM

## 2024-03-14 RX ORDER — OXYTOCIN/0.9 % SODIUM CHLORIDE 30/500 ML
340 PLASTIC BAG, INJECTION (ML) INTRAVENOUS CONTINUOUS PRN
Status: DISCONTINUED | OUTPATIENT
Start: 2024-03-14 | End: 2024-03-14

## 2024-03-14 RX ORDER — PENICILLIN G POTASSIUM 5000000 [IU]/1
5 INJECTION, POWDER, FOR SOLUTION INTRAMUSCULAR; INTRAVENOUS ONCE
Status: COMPLETED | OUTPATIENT
Start: 2024-03-14 | End: 2024-03-14

## 2024-03-14 RX ORDER — ARIPIPRAZOLE 15 MG/1
30 TABLET ORAL AT BEDTIME
Status: DISCONTINUED | OUTPATIENT
Start: 2024-03-14 | End: 2024-03-17 | Stop reason: HOSPADM

## 2024-03-14 RX ORDER — LOPERAMIDE HCL 2 MG
2 CAPSULE ORAL
Status: DISCONTINUED | OUTPATIENT
Start: 2024-03-14 | End: 2024-03-17 | Stop reason: HOSPADM

## 2024-03-14 RX ORDER — PENICILLIN G 3000000 [IU]/50ML
3 INJECTION, SOLUTION INTRAVENOUS EVERY 4 HOURS
Status: DISCONTINUED | OUTPATIENT
Start: 2024-03-15 | End: 2024-03-14

## 2024-03-14 RX ORDER — EPHEDRINE SULFATE 50 MG/ML
5 INJECTION, SOLUTION INTRAMUSCULAR; INTRAVENOUS; SUBCUTANEOUS
Status: DISCONTINUED | OUTPATIENT
Start: 2024-03-14 | End: 2024-03-14

## 2024-03-14 RX ORDER — TRANEXAMIC ACID 10 MG/ML
1 INJECTION, SOLUTION INTRAVENOUS EVERY 30 MIN PRN
Status: DISCONTINUED | OUTPATIENT
Start: 2024-03-14 | End: 2024-03-17 | Stop reason: HOSPADM

## 2024-03-14 RX ORDER — OXYTOCIN/0.9 % SODIUM CHLORIDE 30/500 ML
100-340 PLASTIC BAG, INJECTION (ML) INTRAVENOUS CONTINUOUS PRN
Status: DISCONTINUED | OUTPATIENT
Start: 2024-03-14 | End: 2024-03-14

## 2024-03-14 RX ORDER — NICOTINE POLACRILEX 4 MG
15-30 LOZENGE BUCCAL
Status: DISCONTINUED | OUTPATIENT
Start: 2024-03-14 | End: 2024-03-17 | Stop reason: HOSPADM

## 2024-03-14 RX ADMIN — PENICILLIN G POTASSIUM 5 MILLION UNITS: 5000000 POWDER, FOR SOLUTION INTRAMUSCULAR; INTRAPLEURAL; INTRATHECAL; INTRAVENOUS at 22:26

## 2024-03-14 RX ADMIN — Medication 340 ML/HR: at 22:48

## 2024-03-14 RX ADMIN — ARIPIPRAZOLE 30 MG: 15 TABLET ORAL at 23:59

## 2024-03-14 RX ADMIN — SODIUM CHLORIDE, POTASSIUM CHLORIDE, SODIUM LACTATE AND CALCIUM CHLORIDE 500 ML: 600; 310; 30; 20 INJECTION, SOLUTION INTRAVENOUS at 22:02

## 2024-03-14 ASSESSMENT — ACTIVITIES OF DAILY LIVING (ADL)
ADLS_ACUITY_SCORE: 20
ADLS_ACUITY_SCORE: 20

## 2024-03-14 NOTE — PROGRESS NOTES
"[facility]     Return OB Note     CC: Return OB      Subjective:  Jeanine is a 23 year old  at 38w0d   Denies vaginal bleeding, loss of fluid, or regular contractions. Good fetal movement.  Complaints today:   Has been monitoring BP at home, 130s/80-90s.   Had an episode when in shower where she had vision changes, sat down and symptoms improved.  More LE edema, thinks right is worse, symmetrical on exam.     Objective:  /84 (BP Location: Left arm, Patient Position: Sitting, Cuff Size: Adult Large)   Pulse 83   Temp 97.9  F (36.6  C)   Resp 16   Ht 1.626 m (5' 4\")   Wt 100.8 kg (222 lb 3.2 oz)   LMP 2023 (Approximate)   BMI 38.14 kg/m         Fundal height: 41cm  FHT: 140bpm  SVE: 3-4 cm dilated, anterior position, 70% effacement and soft: membrane sweep done today.      Assessment/Plan:        IUP at 36w0d  -NOB labs WNL  -Prenatal screening: low risk  -Anatomy US: L2 WNL  -Mid trimester labs: GDM  -S/p Tdap  -GBS positive      Co-Morbidities/Complications/Concerns:   -Gestational diabetes: completed initial intake appointment with diabetic team but has not follow up since. Reports sugars at home are <88 fasting and 120-131 at 1 hour post prandial. Growth US normal  EFW 61%, AC 78%  - hx of gHTN: baseline HELLP labs, patient taking ASA 81 mg daily. Elevated BP at last appointment, HELLP labs were normal. Now with normal BP (some high at home but cuff not calibrated) but other symptoms, HELLP labs done previously. Plan IOL at 37 weeks if BP elevated again.  - bipolar: on lamictal, abilify and zoloft; r/b/a discussed, info sent to patient: [facility] recommends lamictal levels checked throughout pregnancy. Lamictal levels have consistently been low, reports medication compliance. Mainly adjust dose based on symptoms for bipolar but concern as she also takes for seizure disorder  -Seizures: History of seizure disorder, last seizure was 3-1/2 years ago.  She does not " currently have a neurologist.  Has had subtherapeutic Lamictal levels throughout pregnancy, despite dose increase.  Reports she sees another clinic who manages her Lamictal.  Recommend reaching out to this clinic to evaluate need for further increase.    -COVID in pregnancy: growth US 2/14 EFW 61%, AC 78%  -Strict return precautions given  -elective IOL scheduled for 3/21/24  -S>D today: recommend that patient gets one more growth US if possible prior to IOL next week.   RTC 1 week    Elisa Simmons PA-C

## 2024-03-15 LAB
ERYTHROCYTE [DISTWIDTH] IN BLOOD BY AUTOMATED COUNT: 12.4 % (ref 10–15)
HCT VFR BLD AUTO: 33 % (ref 35–47)
HGB BLD-MCNC: 10.6 G/DL (ref 11.7–15.7)
HOLD SPECIMEN: NORMAL
MCH RBC QN AUTO: 28.3 PG (ref 26.5–33)
MCHC RBC AUTO-ENTMCNC: 32.1 G/DL (ref 31.5–36.5)
MCV RBC AUTO: 88 FL (ref 78–100)
PLATELET # BLD AUTO: 143 10E3/UL (ref 150–450)
RBC # BLD AUTO: 3.74 10E6/UL (ref 3.8–5.2)
T PALLIDUM AB SER QL: NONREACTIVE
WBC # BLD AUTO: 17.8 10E3/UL (ref 4–11)

## 2024-03-15 PROCEDURE — 85014 HEMATOCRIT: CPT | Performed by: OBSTETRICS & GYNECOLOGY

## 2024-03-15 PROCEDURE — 250N000011 HC RX IP 250 OP 636: Performed by: OBSTETRICS & GYNECOLOGY

## 2024-03-15 PROCEDURE — 250N000013 HC RX MED GY IP 250 OP 250 PS 637: Performed by: OBSTETRICS & GYNECOLOGY

## 2024-03-15 PROCEDURE — 120N000001 HC R&B MED SURG/OB

## 2024-03-15 PROCEDURE — 250N000009 HC RX 250: Performed by: OBSTETRICS & GYNECOLOGY

## 2024-03-15 PROCEDURE — 0UC97ZZ EXTIRPATION OF MATTER FROM UTERUS, VIA NATURAL OR ARTIFICIAL OPENING: ICD-10-PCS | Performed by: OBSTETRICS & GYNECOLOGY

## 2024-03-15 PROCEDURE — 36415 COLL VENOUS BLD VENIPUNCTURE: CPT | Performed by: OBSTETRICS & GYNECOLOGY

## 2024-03-15 RX ORDER — OXYMETAZOLINE HYDROCHLORIDE 0.05 G/100ML
2 SPRAY NASAL 2 TIMES DAILY
Status: DISCONTINUED | OUTPATIENT
Start: 2024-03-15 | End: 2024-03-17 | Stop reason: HOSPADM

## 2024-03-15 RX ORDER — MAGNESIUM SULFATE IN WATER 40 MG/ML
2 INJECTION, SOLUTION INTRAVENOUS CONTINUOUS
Status: DISCONTINUED | OUTPATIENT
Start: 2024-03-15 | End: 2024-03-17 | Stop reason: HOSPADM

## 2024-03-15 RX ORDER — CALCIUM GLUCONATE 94 MG/ML
1 INJECTION, SOLUTION INTRAVENOUS
Status: DISCONTINUED | OUTPATIENT
Start: 2024-03-15 | End: 2024-03-17 | Stop reason: HOSPADM

## 2024-03-15 RX ORDER — NIFEDIPINE 30 MG/1
60 TABLET, EXTENDED RELEASE ORAL AT BEDTIME
Status: DISCONTINUED | OUTPATIENT
Start: 2024-03-15 | End: 2024-03-16

## 2024-03-15 RX ORDER — MAGNESIUM SULFATE HEPTAHYDRATE 40 MG/ML
4 INJECTION, SOLUTION INTRAVENOUS
Status: DISCONTINUED | OUTPATIENT
Start: 2024-03-15 | End: 2024-03-17 | Stop reason: HOSPADM

## 2024-03-15 RX ORDER — NIFEDIPINE 30 MG/1
30 TABLET, EXTENDED RELEASE ORAL DAILY
Status: DISCONTINUED | OUTPATIENT
Start: 2024-03-15 | End: 2024-03-15

## 2024-03-15 RX ORDER — MAGNESIUM SULFATE HEPTAHYDRATE 40 MG/ML
2 INJECTION, SOLUTION INTRAVENOUS
Status: DISCONTINUED | OUTPATIENT
Start: 2024-03-15 | End: 2024-03-17 | Stop reason: HOSPADM

## 2024-03-15 RX ORDER — MAGNESIUM SULFATE HEPTAHYDRATE 40 MG/ML
4 INJECTION, SOLUTION INTRAVENOUS ONCE
Status: COMPLETED | OUTPATIENT
Start: 2024-03-15 | End: 2024-03-16

## 2024-03-15 RX ADMIN — PRENATAL VITAMINS-IRON FUMARATE 27 MG IRON-FOLIC ACID 0.8 MG TABLET 1 TABLET: at 08:18

## 2024-03-15 RX ADMIN — NIFEDIPINE 30 MG: 30 TABLET, FILM COATED, EXTENDED RELEASE ORAL at 01:37

## 2024-03-15 RX ADMIN — OXYMETAZOLINE HYDROCHLORIDE 2 SPRAY: 0.05 SPRAY NASAL at 09:15

## 2024-03-15 RX ADMIN — LAMOTRIGINE 150 MG: 100 TABLET ORAL at 00:00

## 2024-03-15 RX ADMIN — SERTRALINE HYDROCHLORIDE 75 MG: 50 TABLET ORAL at 21:50

## 2024-03-15 RX ADMIN — IBUPROFEN 800 MG: 800 TABLET ORAL at 05:57

## 2024-03-15 RX ADMIN — ENOXAPARIN SODIUM 40 MG: 100 INJECTION SUBCUTANEOUS at 11:48

## 2024-03-15 RX ADMIN — NIFEDIPINE 60 MG: 30 TABLET, FILM COATED, EXTENDED RELEASE ORAL at 20:38

## 2024-03-15 RX ADMIN — LAMOTRIGINE 150 MG: 100 TABLET ORAL at 21:50

## 2024-03-15 RX ADMIN — DOCUSATE SODIUM 100 MG: 100 CAPSULE, LIQUID FILLED ORAL at 08:18

## 2024-03-15 RX ADMIN — LABETALOL HYDROCHLORIDE 20 MG: 5 INJECTION, SOLUTION INTRAVENOUS at 00:32

## 2024-03-15 RX ADMIN — ACETAMINOPHEN 650 MG: 325 TABLET, FILM COATED ORAL at 00:32

## 2024-03-15 RX ADMIN — ARIPIPRAZOLE 30 MG: 15 TABLET ORAL at 21:51

## 2024-03-15 RX ADMIN — SERTRALINE HYDROCHLORIDE 75 MG: 50 TABLET ORAL at 00:00

## 2024-03-15 ASSESSMENT — ACTIVITIES OF DAILY LIVING (ADL)
ADLS_ACUITY_SCORE: 23
ADLS_ACUITY_SCORE: 23
ADLS_ACUITY_SCORE: 20
ADLS_ACUITY_SCORE: 23
ADLS_ACUITY_SCORE: 20

## 2024-03-15 NOTE — L&D DELIVERY NOTE
Delivery Summary    Jeanine Hernandez MRN# [id number]   Age: 24 year old YOB: 2000     ASSESSMENT & PLAN:       Jeanine Hernandez is a 24 year old  presented to L&D for labor. Gestational age at delivery was 38w0d. Her pregnancy was complicated by GDMA1, bipolar disorder, history of seizure disorder, GBS positive. She progressed spontaneously through labor until complete dilation. Penicillin was started for GBS prophylaxis but inadequate treatment due to rapid spontaneous labor. She received nothing for labor analgesia. Patient did not labor down. She pushed effectively and delivered a viable male infant with Apgars 8/9 over an intact perineum.  Weight is 7#6oz and skin-to-skin was performed. Delayed cord clamping was performed. Cord was clamped and cut. Placenta delivered via active management and was noted to be intact with a three vessel cord.   QBL 50ml. Sponge and needle counts correct. Mom and baby were transported to postpartum in stable condition.          Malika Hernandez-Jeanine [[id number]]      Labor Event Times      Latent labor onset date/time: 3/14/2024 1800    Active labor onset date: 3/14/24 Onset time:  6:00 PM   Dilation complete date: 3/14/24 Complete time: 10:35 PM   Start pushing date/time: 3/14/2024 2241          Labor Events     labor?: No   steroids: None  Labor Type: Spontaneous  Predominate monitoring during 1st stage: continuous electronic fetal monitoring     Antibiotics received during labor?: Yes  Reason for Antibiotics: GBS  Antibiotics received for GBS: Penicillin  Antibiotics Given (GBS): Less than or equal to 4 hours prior to delivery       Rupture date/time: 3/14/24    Rupture type: Spontaneous Rupture of Membranes  Fluid color: Clear  Fluid odor: Normal     Augmentation: None       Delivery/Placenta Date and Time      Delivery Date: 3/14/24 Delivery Time: 10:46 PM   Placenta Date/Time: 3/14/2024 10:53 PM  Oxytocin given at the time of  delivery: after delivery of baby  Delivering clinician: Alona Gomez DO   Other personnel present at delivery:  Provider Role   Lina Goldman RN Delivery Nurse   Maude Zamarripa RN Charge Nurse             Vaginal Counts       Initial count performed by 2 team members:  Two Team Members   EAGLE Bolaños NST         Needles Suture Needles Sponges (RETIRED) Instruments   Initial counts 2  5    Added to count       Relief counts 2  5    Final counts 2  5            Placed during labor Accounted for at the end of labor   FSE NA NA   IUPC NA NA   Cervidil NA NA           Relief count performed by 2 team members:  Two Team Members   Dr. Jason Zamarripa RN         Final count performed by 2 team members:  Two Team Members   Dr. Jason Zamarripa RN      Final count correct?: Yes  Pre-Birth Team Brief: Complete  Post-Birth Team Debrief: Complete       Apgars    Living status: Living   1 Minute 5 Minute 10 Minute 15 Minute 20 Minute   Skin color: 1  1       Heart rate: 2  2       Reflex irritability: 2  2       Muscle tone: 2  2       Respiratory effort: 1  2       Total: 8  9       Apgars assigned by: LINA GOLDMAN RN       Cord      Vessels: 3 Vessels    Cord Complications: None               Cord Blood Disposition: Lab    Gases Sent?: No    Delayed cord clamping?: Yes    Cord Clamping Delay (seconds):  seconds    Stem cell collection?: No            Resuscitation    Methods: None  Output in Delivery Room: Voided, Stool       [place] Measurements      Weight: 7 lb 6.2 oz      Head circumference: 13 cm    Output in delivery room: Voided, Stool       Skin to Skin and Feeding Plan      Skin to skin initiation date/time: 1/3/1841    Skin to skin with: Mother  Skin to skin end date/time: 1/3/1841           Labor Events and Shoulder Dystocia    Fetal Tracing Prior to Delivery: Category 1  Shoulder dystocia present?: Neg       Delivery (Maternal) (Provider to Complete) ([id number])     Episiotomy: None  Perineal lacerations: None    Repair suture: None  Genital tract inspection done: Pos       Blood Loss  Mother: Christophe Hernandez #[id number]     Start of Mother's Information      Delivery Blood Loss  03/14/24 1800 - 03/14/24 2312      Delivery QBL (mL) Hospital Encounter 50 mL    Total  50 mL               End of Mother's Information  Mother: Christophe Hernandez #[id number]                Delivery - Provider to Complete ([id number])    Delivering clinician: Alona Gomez DO  Delivery Type (Choose the 1 that will go to the Birth History): Vaginal, Spontaneous                         Other personnel:  Provider Role   Benjie Goldman, EAGLE Delivery Nurse   Maude Zamarripa RN Charge Nurse                    Placenta    Date/Time: 3/14/2024 10:53 PM  Removal: Spontaneous  Disposition: Hospital disposal             Anesthesia    Method: None                    Presentation and Position    Presentation: Vertex    Position: Middle Occiput Anterior                     Alona Gomez DO

## 2024-03-15 NOTE — PROGRESS NOTES
"[facility] OB/[facility]N Department    Progress Note    Date of Admission: 3/14/2024  Name: Jeanine Hernandez    Subjective:    Called by RN at 01:00 to evaluate patient due to elevated BP and bleeding.     Patient had been up to the bathroom, she had larger golf ball sized clots pass when standing up, then larger baseball sized clot pass in the toilet.     Also had severe range elevation of BP that required treatment with Labetalol x1.     Objective:    Vital signs:  Temp: 98.1  F (36.7  C) Temp src: Oral BP: 125/64 Pulse: 68   Resp: 20 SpO2: 97 % O2 Device: None (Room air)        Estimated body mass index is 38.14 kg/m  as calculated from the following:    Height as of an earlier encounter on 3/14/24: 1.626 m (5' 4\").    Weight as of an earlier encounter on 3/14/24: 100.8 kg (222 lb 3.2 oz).    Bimanual exam performed with 100cc of blood clot removed from the lower uterine segment. Fundus was firm, improved lower uterine segment tone after removal of clot with no significant amount of active bleeding and no re-accumulation of clot.    Assessment and Plan:    Patient had severe range BP which required treatment with IV Labetalol x1. BP responded well. Discussed starting Magnesium sulfate but decision made to hold on this for now and would start if any recurrent severe range blood pressures. Will start Nifedipine 30mg XL for BP control.     Continue to monitor bleeding closely. No uterotonics deemed necessary as uterus was firm and no active bleeding after removal of clots from YOVANNY.       Alona Gomez DO    "

## 2024-03-15 NOTE — H&P
[facility] OB/GYN Department    History and Physical Note    Jeanine Hernandez  2000  [id number]    HPI: Jeanine Hernandez is a 24 year old  at 38w0d, who presents with complaint of contractions. Reports good fetal movement. Contractions starting at 6pm tonight and increasing in intensity. Just had SROM when getting up to go to the bathroom.  No vaginal bleeding.     Pregnancy notable for:  GDMA1  Bipolar disorder  History of seizure disorder  GBS positive   Hx GHTN  COVID in pregnancy     OBHX:   OB History    Para Term  AB Living   2 1 1 0 0 1   SAB IAB Ectopic Multiple Live Births   0 0 0 0 1      # Outcome Date GA Lbr Macho/2nd Weight Sex Delivery Anes PTL Lv   2 Current            1 Term 20 39w1d / 02:27 3.459 kg (7 lb 10 oz) F Vag-Spont EPI N TONI      Complications: Chorioamnionitis      Name: Jessie      Apgar1: 8  Apgar5: 9       MedicalHX:   Past Medical History:   Diagnosis Date    Anxiety     in high school    Asthma     as child    Depression     in high school    History of urinary tract infection     as a child    OCD (obsessive compulsive disorder)     in past    Postpartum depression        SurgicalHX:   Past Surgical History:   Procedure Laterality Date    FOOT SURGERY      right foot - ganglion cyst    MYRINGOTOMY, INSERT TUBE BILATERAL, COMBINED      ORTHOPEDIC SURGERY      planter warts foot    TONSILLECTOMY      TYMPANOPLASTY         Medications:   No current facility-administered medications on file prior to encounter.  ARIPiprazole (ABILIFY) 20 MG tablet, Take 30 mg by mouth daily  aspirin 81 MG EC tablet, Take 81 mg by mouth daily  lamoTRIgine (LAMICTAL) 100 MG tablet, Take 1 tablet (100 mg) by mouth daily (Patient taking differently: Take 150 mg by mouth daily)  Prenatal Vit-Fe Fumarate-FA (PRENATAL MULTIVITAMIN W/IRON) 27-0.8 MG tablet, Take 1 tablet by mouth daily  sertraline (ZOLOFT) 25 MG tablet, Take 3 tablets (75 mg) by mouth  daily  acetone urine (KETOSTIX) test strip, Use to check urine ketones once daily  blood glucose (NO BRAND SPECIFIED) test strip, Use to test blood sugar 4 times daily or as directed. To accompany: Blood Glucose Monitor Brands: per insurance.  blood glucose monitoring (NO BRAND SPECIFIED) meter device kit, Use to test blood sugar 4 times daily or as directed. Preferred blood glucose meter OR supplies to accompany: Blood Glucose Monitor Brands: per insurance.  thin (NO BRAND SPECIFIED) lancets, Use with lanceting device. To accompany: Blood Glucose Monitor Brands: per insurance.        Allergies:  Allergies   Allergen Reactions    Iodine Anaphylaxis    Ivp Dye [Contrast Dye] Anaphylaxis    Latex Hives       FamilyHX:    Family History   Problem Relation Age of Onset    Diabetes Father         type II    Neurologic Disorder Father         TBI    Hyperlipidemia Father     Coronary Artery Disease Father         MI    Melanoma Maternal Grandmother     Other - See Comments Maternal Grandfather         murdered    Chronic Obstructive Pulmonary Disease Paternal Grandmother     Cerebrovascular Disease Paternal Grandmother     Heart Surgery Paternal Grandmother     Cerebrovascular Disease Paternal Grandfather         x3    Diabetes Paternal Grandfather         type II       SocialHX:   Social History     Socioeconomic History    Marital status: Patient Declined   Tobacco Use    Smoking status: Former     Packs/day: 0.00     Years: 0.00     Additional pack years: 0.00     Total pack years: 0.00     Types: Cigarettes, Other     Passive exposure: Current    Smokeless tobacco: Never   Vaping Use    Vaping Use: Every day    Substances: Nicotine    Devices: Pre-filled pod   Substance and Sexual Activity    Alcohol use: Never    Drug use: Never    Sexual activity: Yes     Partners: Male     Birth control/protection: None     Comment: Need to get birth control   Other Topics Concern    Parent/sibling w/ CABG, MI or angioplasty before  65F 55M? No   Social History Narrative    ** Merged History Encounter **            ROS: 10-point ROS negative except as in HPI     Physical Exam:  /92    GEN: AOA x3  CV: No heaves or thrills. No peripheral varicosities  PULM: Equal expansion bilaterally, no accessory muscle use  ABD: soft, gravid, non-tender, non-distended  EXT: + edema, non-tender to palpation  SVE: 10/100/0  Presentation: cephalic by SVE  Membranes: SROM    NST:   Indication: labor  Baseline: 130 bpm  Variability: moderate  Accelerations: present  Decelerations: absent  TOCO: q2-4min  Impression: Reactive NST      Labs:        Lab Results   Component Value Date    ABO O 2020    RH Pos 2020    AS Negative 2023    HEPBANG Nonreactive 2023    CHPCRT Negative 01/10/2022    GCPCRT Negative 12/10/2019    HGB 12.4 2024       GBS Status: positive        A/P: Jeanine Hernandez is a 24 year old female  at 38w0d, here for labor.    Admit to L&D. Place PIV. Admission labs.   Labor: Expectant management   FHT: Category 1 FHT.  Continue EFM and toco.  Pain: Analgesia PRN  GBS:   positive, PCN started but will be inadequately treated as SROM just occurred and she is complete dilation     -GDMA1: reports good control but was not followed by diabetic team in pregnancy. Hba1c ordered for admission. Glucose on admission was 74. Diabetic protocol in labor.  -Bipolar disorder: continue home medications Abilify, Zoloft, Lamictal  -Seizure disorder: continue Lamictal. Last seizure 3.5 years ago.      Alona Gomez DO

## 2024-03-15 NOTE — PLAN OF CARE
Goal Outcome Evaluation:    S:Delivery  B:Spontaneous Labor,38w0d    Lab Results   Component Value Date    GBS Negative 2020    with antibiotic treatment less than 4 hours prior to delivery.  A: Patient delivered   none, intact at 2246 with Dr. ASIA Gomez in attendance and baby placed on mother's abdomen for delayed cord clamping. Baby dried and stimulated. Baby placed  skin to skin @ 2246.. Apgars 8/9.Delivery QBL: 50.  IV infusion of Oxytocin  infused. Placenta removal spontaneous. MD does not want placenta sent to pathology.  See Flowsheet for VS and PP checks. Delivery QBL (mL): 50 mL.  Labor care plan goals met, transition now to postpartum care. Postpartum QBL: TBD  R: Expect routine postpartum care. Anticipate first feeding within the hour or whenever infant displays feeding cues. Continue skin to skin. Prior discussion with mother indicates that feeding plan is Formula feeding. Educated mother on importance of exclusive breastfeeding, expected feeding readiness cues and encouraged her to observe for these cues while rooming in. Informed her that breastfeeding assistance would be provided.

## 2024-03-15 NOTE — PLAN OF CARE
"Patient met HTN protocol and required Labetalol 20mg IV. Provider notified and ordered oral Nifedipine 30mg daily with orders to start magnesium if patient requires further intervention. First dose of Nifedipine given at 0137. Received PRN Tylenol 650mg at 0032 for abdominal cramping; Effective at relieving pain. Received PRN Ibuprofen at 0557 for abdominal cramping. Fundus has been firm, midline, at U (U/1 with most recent check). However, patient noticed frequent gushes of blood and many blood clots when standing. Initial QBL was 50mL, 1st set of products weighed was 265mL (with few large clots in toilet, family flushed toilet before they could be retrieved). Further products weighed were 21mL and 96mL. Provider was notified of patient's bleeding and completed manual removal of clots. Clots totalled 111mL. Per verbal orders from provider, waited to obtain more blood pressures after manual removal to allow patient to rest before resuming vitals.    Problem: Adult Inpatient Plan of Care  Goal: Plan of Care Review  Description: The Plan of Care Review/Shift note should be completed every shift.  The Outcome Evaluation is a brief statement about your assessment that the patient is improving, declining, or no change.  This information will be displayed automatically on your shift  note.  Outcome: Progressing  Goal: Patient-Specific Goal (Individualized)  Description: You can add care plan individualizations to a care plan. Examples of Individualization might be:  \"Parent requests to be called daily at 9am for status\", \"I have a hard time hearing out of my right ear\", or \"Do not touch me to wake me up as it startles  me\".  Outcome: Progressing  Goal: Absence of Hospital-Acquired Illness or Injury  Outcome: Progressing  Intervention: Prevent Skin Injury  Recent Flowsheet Documentation  Taken 3/15/2024 0032 by Benjie Goldman, RN  Body Position: position changed independently  Intervention: Prevent Infection  Recent " Flowsheet Documentation  Taken 3/15/2024 0032 by Benjie Goldman RN  Infection Prevention: rest/sleep promoted  Goal: Optimal Comfort and Wellbeing  Outcome: Progressing  Intervention: Monitor Pain and Promote Comfort  Recent Flowsheet Documentation  Taken 3/15/2024 0032 by Benjie Goldman RN  Pain Management Interventions: medication (see MAR)  Intervention: Provide Person-Centered Care  Recent Flowsheet Documentation  Taken 3/15/2024 0032 by Benjie Goldman RN  Trust Relationship/Rapport:   care explained   choices provided   questions encouraged   questions answered   reassurance provided  Goal: Readiness for Transition of Care  Outcome: Progressing  Intervention: Mutually Develop Transition Plan  Recent Flowsheet Documentation  Taken 3/14/2024 2139 by Benjie Goldman RN  Equipment Currently Used at Home: none     Problem: Suicide Risk  Goal: Absence of Self-Harm  Outcome: Progressing  Intervention: Promote Psychosocial Wellbeing  Recent Flowsheet Documentation  Taken 3/15/2024 0032 by Benjie Goldman RN  Family/Support System Care: self-care encouraged     Problem: Postpartum (Vaginal Delivery)  Goal: Successful Parent Role Transition  Outcome: Progressing  Goal: Hemostasis  Outcome: Progressing  Goal: Absence of Infection Signs and Symptoms  Outcome: Progressing  Goal: Anesthesia/Sedation Recovery  Outcome: Progressing  Goal: Optimal Pain Control and Function  Outcome: Progressing  Intervention: Prevent or Manage Pain  Recent Flowsheet Documentation  Taken 3/15/2024 0032 by Benjie Goldman RN  Pain Management Interventions: medication (see MAR)  Goal: Effective Urinary Elimination  Outcome: Progressing   Goal Outcome Evaluation:                      Benjie Goldman RN on 3/15/2024 at 3:08 AM

## 2024-03-15 NOTE — PROGRESS NOTES
Labor Admission  Jeanine Hernandez  MRN: [id number]  Gestational Age: 38w0d      Jeanine Hernandez is admitted for active labor.  States angela regularly since 1800.  Rates pain at 7/10.  Bloody show  began at 6:00 PM .  Denies LOF.     Dr. Gomez notified of arrival and condition and Intrapartum orders initiated.      FHT: 135  NST: Reactive .  Uterine Assessment: Contractions: frequency q 3-4 minutes of strong quality.     Patient is alert and oriented X 3,Patient oriented to room, unit, hourly rounding, and plan of care.  Call light within reach. Explained admission packet with patient bill of rights brochure. Will continue to monitor and document as needed.     Inpatient nursing criteria listed below was met:    Health care directives status obtained and documented: Yes  Patient identifies a surrogate decision maker: Yes   If yes, who:Sulaiman - Significant other  Contact Information:at bedside  Core Measure diagnosis present::  N/A  Vaccine assessment done and vaccines ordered if appropriate. Yes  Clergy visit ordered if patient requests: N/A  Skin issues/needs documented:No  Isolation needs addressed, if appropriate: N/A  Fall Prevention (Med and High risk): Care plan updated, Education given and documented and signage used: Yes  Care Plan initiated: Yes  Education Documented (Reminder to educate patient if MRSA is present on admission): Yes  Education Assessment documented:Yes  Patient has discharge needs (If yes, please explain): No

## 2024-03-15 NOTE — TELEPHONE ENCOUNTER
OB Triage Call      Is patient's OB/Midwife with the [facility] or [facility]? [facility]- Proceed with triage     Reason for call: Having contractions every 10-15 minutes, lasting 30 seconds for 3 minutes. Patient is 38 weeks pregnant and just had mucous sweep done today.     Assessment: Patient stated she has actually had contractions all day and has had more than 6 in a 1 hour period. Denies abdominal pain, vaginal bleeding or leaking fluid. States baby is moving less but is still having 10 kicks in 1 1/2 hours.     Plan: Go to  now.     [place] [facility]    Patient's primary OB Provider is Meghan Wright is the assigned provided but patient stated she see CNM and MD's.      Per protocol recommendations Patient to be evaluated in L&D. Patient's primary OB is [facility].  Labor and delivery at [facility] ([phone]) notified of patient's pending arrival.  Report given to Maude.      Is patient's delivering hospital on divert? No      38w0d    Estimated Date of Delivery: Mar 28, 2024        OB History    Para Term  AB Living   2 1 1 0 0 1   SAB IAB Ectopic Multiple Live Births   0 0 0 0 1      # Outcome Date GA Lbr Macho/2nd Weight Sex Delivery Anes PTL Lv   2 Current            1 Term 20 39w1d / 02:27 3.459 kg (7 lb 10 oz) F Vag-Spont EPI N TONI      Complications: Chorioamnionitis      Name: Jessie      Apgar1: 8  Apgar5: 9       Lab Results   Component Value Date    GBS Negative 2020          Rosario Mota RN 24 8:31 PM  [facility] Nurse Advisor    Reason for Disposition   [1] History of prior delivery (multipara) AND [2] contractions > 10 minutes OR for < 1 hour   [1] History of prior delivery (multipara) AND [2] contractions < 10 minutes apart AND [3] present 1 hour    Additional Information   Negative: Passed out (i.e., lost consciousness, collapsed and was not responding)   Negative: Shock suspected (e.g., cold/pale/clammy skin,  "too weak to stand, low BP, rapid pulse)   Negative: Difficult to awaken or acting confused (e.g., disoriented, slurred speech)   Negative: [1] SEVERE abdominal pain (e.g., excruciating) AND [2] constant AND [3] present > 1 hour   Negative: SEVERE bleeding (e.g., continuous red blood from vagina, or large blood clots)   Negative: Umbilical cord hanging out of the vagina (shiny, white, curled appearance, \"like telephone cord\")   Negative: Uncontrollable urge to push (i.e., feels like baby is coming out now)   Negative: Can see baby   Negative: Sounds like a life-threatening emergency to the triager   Negative: [1] First baby (primipara) AND [2] contractions < 6 minutes apart  AND [3] present 2 hours   Negative: [1] History of rapid prior delivery AND [2] contractions < 10 minutes apart   Negative: [1] Leakage of fluid from vagina AND [2] green or brown in color   Negative: Leakage of fluid from vagina   Negative: Vaginal bleeding or spotting  (Exception: Brief spotting after intercourse or pelvic exam.)   Negative: Baby moving less today (e.g., kick count < 5 in 1 hour or < 10 in 2 hours)   Negative: Severe headache or headache that won't go away   Negative: New blurred vision or vision changes   Negative: MODERATE-SEVERE abdominal pain   Negative: [1] MILD abdominal pain (e.g., doesn't interfere with normal activities) AND [2] constant AND [3] present > 2 hours   Negative: Fever 100.4 F (38.0 C) or higher   Negative: Patient sounds very sick or weak to the triager   Negative: [1] First baby (primipara) AND [2] contractions < 10 minutes apart AND [3] present 4 hours or longer   Negative: [1] First baby (primipara) AND [2] contractions > 5 minutes apart OR for < 2 hours    Protocols used: Pregnancy - Labor-A-AH    "

## 2024-03-15 NOTE — PLAN OF CARE
Vitals stable. Denies pain. Up independently. Denies pre-eclampsia symptoms. Denies any lightheadedness or dizziness. No blood clots on this shift. Plan of care reviewed with patient, verbalized understanding and agreement.

## 2024-03-15 NOTE — PROGRESS NOTES
[facility] OB/GYN Daily Postpartum Note    S: Ms. Lobo is feeling well this morning. She denies any complaints. Her pain is well-controlled on oral pain medications. She tolerating a regular diet without nausea or vomiting. Ambulating without difficulty. Voiding spontaneously. Lochia is decreasing. No headaches or swelling.     O:   VS: Patient Vitals for the past 24 hrs:   BP Temp Temp src Pulse Resp SpO2   03/15/24 0813 129/78 97.8  F (36.6  C) Oral -- 16 96 %   03/15/24 0713 131/85 97.8  F (36.6  C) Oral 75 16 98 %   03/15/24 0600 125/64 -- -- 68 -- --   03/15/24 0530 (!) 140/73 -- -- 64 -- --   03/15/24 0500 138/74 -- -- 82 -- --   03/15/24 0438 (!) 140/84 -- -- 67 -- --   03/15/24 0415 (!) 143/80 -- -- 66 -- --   03/15/24 0400 136/77 -- -- 66 -- --   03/15/24 0345 130/73 -- -- 69 -- --   03/15/24 0330 132/77 -- -- 69 -- --   03/15/24 0315 137/80 -- -- 69 -- --   03/15/24 0312 135/77 -- -- 67 -- --   03/15/24 0310 (!) 142/77 -- -- 70 -- --   03/15/24 0300 130/68 -- -- 71 -- --   03/15/24 0250 129/66 -- -- 72 -- --   03/15/24 0240 (!) 140/76 -- -- 71 -- --   03/15/24 0230 135/77 -- -- 70 -- --   03/15/24 0220 131/72 -- -- 69 -- --   03/15/24 0210 (!) 141/79 -- -- 77 -- --   03/15/24 0200 (!) 141/77 -- -- 67 -- --   03/15/24 0150 (!) 144/77 -- -- -- -- --   03/15/24 0140 (!) 140/78 -- -- -- -- --   03/15/24 0100 (!) 158/85 -- -- -- -- --   03/15/24 0045 (!) 159/88 -- -- 73 -- --   03/15/24 0032 -- 98.1  F (36.7  C) Oral -- -- 97 %   03/15/24 0030 (!) 169/89 -- -- -- -- --   03/15/24 0015 (!) 174/103 -- -- -- -- --   03/15/24 0003 (!) 187/99 -- -- -- -- --   03/14/24 2332 (!) 150/103 -- -- -- -- --   03/14/24 2317 (!) 152/97 -- -- -- -- --   03/14/24 2302 (!) 158/109 -- -- -- -- --   03/14/24 2217 138/83 -- -- -- -- --   03/14/24 2204 (!) 135/92 -- -- -- -- --   03/14/24 2147 139/89 -- -- -- -- --   03/14/24 2114 (!) 134/90 98  F (36.7  C) Oral 79 20 --     General: resting in bed, in NAD  CV:  Reg rate, warm and well perfused  Resp: breathing comfortably on room air   Abdomen: soft, appropriately tender, nondistended  Fundus firm 1cm below the umbilicus  Extremities: non-tender, non-edematous     Component      Latest Ref Rng 3/15/2024  8:18 AM   WBC      4.0 - 11.0 10e3/uL 17.8 (H)    RBC Count      3.80 - 5.20 10e6/uL 3.74 (L)    Hemoglobin      11.7 - 15.7 g/dL 10.6 (L)    Hematocrit      35.0 - 47.0 % 33.0 (L)    MCV      78 - 100 fL 88    MCH      26.5 - 33.0 pg 28.3    MCHC      31.5 - 36.5 g/dL 32.1    RDW      10.0 - 15.0 % 12.4    Platelet Count      150 - 450 10e3/uL 143 (L)           A/P: 23 yo P2, PPD#1 s/p   gHTN: BPs normal on oral nifedipine, will start magnesium with any further sustained severe range BPs  GDMA1: normal blood sugars  Bipolar: con home meds  Seizure disorder: con home meds    Anticipate discharge PPD#2-3 pending postpartum goals and BP monitoring      Deja Stone MD, MD [facility]   3/15/2024 10:01 AM

## 2024-03-16 LAB
ALBUMIN SERPL BCG-MCNC: 3.8 G/DL (ref 3.5–5.2)
ALP SERPL-CCNC: 201 U/L (ref 40–150)
ALT SERPL W P-5'-P-CCNC: 14 U/L (ref 0–50)
ANION GAP SERPL CALCULATED.3IONS-SCNC: 12 MMOL/L (ref 7–15)
AST SERPL W P-5'-P-CCNC: 38 U/L (ref 0–45)
BILIRUB SERPL-MCNC: <0.2 MG/DL
BUN SERPL-MCNC: 11.9 MG/DL (ref 6–20)
CALCIUM SERPL-MCNC: 8.9 MG/DL (ref 8.6–10)
CHLORIDE SERPL-SCNC: 101 MMOL/L (ref 98–107)
CREAT SERPL-MCNC: 0.68 MG/DL (ref 0.51–0.95)
DEPRECATED HCO3 PLAS-SCNC: 26 MMOL/L (ref 22–29)
EGFRCR SERPLBLD CKD-EPI 2021: >90 ML/MIN/1.73M2
ERYTHROCYTE [DISTWIDTH] IN BLOOD BY AUTOMATED COUNT: 12.8 % (ref 10–15)
GLUCOSE SERPL-MCNC: 78 MG/DL (ref 70–99)
HCT VFR BLD AUTO: 38.8 % (ref 35–47)
HGB BLD-MCNC: 12.5 G/DL (ref 11.7–15.7)
MCH RBC QN AUTO: 28.4 PG (ref 26.5–33)
MCHC RBC AUTO-ENTMCNC: 32.2 G/DL (ref 31.5–36.5)
MCV RBC AUTO: 88 FL (ref 78–100)
PLATELET # BLD AUTO: 195 10E3/UL (ref 150–450)
POTASSIUM SERPL-SCNC: 4.4 MMOL/L (ref 3.4–5.3)
PROT SERPL-MCNC: 7.1 G/DL (ref 6.4–8.3)
RBC # BLD AUTO: 4.4 10E6/UL (ref 3.8–5.2)
SODIUM SERPL-SCNC: 139 MMOL/L (ref 135–145)
WBC # BLD AUTO: 16.3 10E3/UL (ref 4–11)

## 2024-03-16 PROCEDURE — 258N000003 HC RX IP 258 OP 636: Performed by: OBSTETRICS & GYNECOLOGY

## 2024-03-16 PROCEDURE — 250N000011 HC RX IP 250 OP 636: Performed by: OBSTETRICS & GYNECOLOGY

## 2024-03-16 PROCEDURE — 85027 COMPLETE CBC AUTOMATED: CPT | Performed by: OBSTETRICS & GYNECOLOGY

## 2024-03-16 PROCEDURE — 250N000013 HC RX MED GY IP 250 OP 250 PS 637: Performed by: OBSTETRICS & GYNECOLOGY

## 2024-03-16 PROCEDURE — 82040 ASSAY OF SERUM ALBUMIN: CPT | Performed by: OBSTETRICS & GYNECOLOGY

## 2024-03-16 PROCEDURE — 36415 COLL VENOUS BLD VENIPUNCTURE: CPT | Performed by: OBSTETRICS & GYNECOLOGY

## 2024-03-16 PROCEDURE — 120N000001 HC R&B MED SURG/OB

## 2024-03-16 RX ORDER — NIFEDIPINE 30 MG/1
90 TABLET, EXTENDED RELEASE ORAL EVERY 24 HOURS
Status: DISCONTINUED | OUTPATIENT
Start: 2024-03-16 | End: 2024-03-17 | Stop reason: HOSPADM

## 2024-03-16 RX ADMIN — MAGNESIUM SULFATE HEPTAHYDRATE 2 G/HR: 40 INJECTION, SOLUTION INTRAVENOUS at 03:17

## 2024-03-16 RX ADMIN — ACETAMINOPHEN 650 MG: 325 TABLET, FILM COATED ORAL at 18:27

## 2024-03-16 RX ADMIN — OXYMETAZOLINE HYDROCHLORIDE 2 SPRAY: 0.05 SPRAY NASAL at 08:44

## 2024-03-16 RX ADMIN — MAGNESIUM SULFATE IN WATER FOR 4 G: 40 INJECTION INTRAVENOUS at 02:43

## 2024-03-16 RX ADMIN — ENOXAPARIN SODIUM 40 MG: 100 INJECTION SUBCUTANEOUS at 11:51

## 2024-03-16 RX ADMIN — ARIPIPRAZOLE 30 MG: 15 TABLET ORAL at 22:28

## 2024-03-16 RX ADMIN — LAMOTRIGINE 150 MG: 100 TABLET ORAL at 22:28

## 2024-03-16 RX ADMIN — SODIUM CHLORIDE, POTASSIUM CHLORIDE, SODIUM LACTATE AND CALCIUM CHLORIDE 75 ML/HR: 600; 310; 30; 20 INJECTION, SOLUTION INTRAVENOUS at 02:45

## 2024-03-16 RX ADMIN — NIFEDIPINE 90 MG: 30 TABLET, FILM COATED, EXTENDED RELEASE ORAL at 21:41

## 2024-03-16 RX ADMIN — OXYMETAZOLINE HYDROCHLORIDE 2 SPRAY: 0.05 SPRAY NASAL at 22:28

## 2024-03-16 RX ADMIN — OXYMETAZOLINE HYDROCHLORIDE 2 SPRAY: 0.05 SPRAY NASAL at 02:08

## 2024-03-16 RX ADMIN — DOCUSATE SODIUM 100 MG: 100 CAPSULE, LIQUID FILLED ORAL at 08:44

## 2024-03-16 RX ADMIN — LABETALOL HYDROCHLORIDE 20 MG: 5 INJECTION, SOLUTION INTRAVENOUS at 02:35

## 2024-03-16 RX ADMIN — SERTRALINE HYDROCHLORIDE 75 MG: 50 TABLET ORAL at 22:28

## 2024-03-16 RX ADMIN — IBUPROFEN 800 MG: 800 TABLET ORAL at 18:27

## 2024-03-16 RX ADMIN — PRENATAL VITAMINS-IRON FUMARATE 27 MG IRON-FOLIC ACID 0.8 MG TABLET 1 TABLET: at 08:44

## 2024-03-16 RX ADMIN — IBUPROFEN 800 MG: 800 TABLET ORAL at 02:08

## 2024-03-16 RX ADMIN — MAGNESIUM SULFATE HEPTAHYDRATE 2 G/HR: 40 INJECTION, SOLUTION INTRAVENOUS at 21:41

## 2024-03-16 ASSESSMENT — ACTIVITIES OF DAILY LIVING (ADL)
ADLS_ACUITY_SCORE: 24
ADLS_ACUITY_SCORE: 23
ADLS_ACUITY_SCORE: 23
ADLS_ACUITY_SCORE: 24
ADLS_ACUITY_SCORE: 23
ADLS_ACUITY_SCORE: 23
ADLS_ACUITY_SCORE: 24
ADLS_ACUITY_SCORE: 24
ADLS_ACUITY_SCORE: 23
ADLS_ACUITY_SCORE: 23
ADLS_ACUITY_SCORE: 24
ADLS_ACUITY_SCORE: 24
ADLS_ACUITY_SCORE: 23
ADLS_ACUITY_SCORE: 24
ADLS_ACUITY_SCORE: 24

## 2024-03-16 NOTE — PROGRESS NOTES
[facility] OB/GYN Daily Postpartum Note     S: Ms. Lobo is feeling well this morning. She denies any complaints. Her pain is well-controlled on oral pain medications. She tolerating a regular diet without nausea or vomiting. Ambulating without difficulty. Voiding spontaneously. Lochia is decreasing. No headaches or swelling.      O:   VS: BP (!) 153/95   Pulse 85   Temp 97.8  F (36.6  C) (Oral)   Resp 16   LMP 2023 (Approximate)   SpO2 97%   Breastfeeding Unknown      General: resting in bed, in NAD  CV: Reg rate, warm and well perfused  Resp: breathing comfortably on room air   Abdomen: soft, appropriately tender, nondistended  Fundus firm 2cm below the umbilicus  Extremities: non-tender, non-edematous        A/P: 23 yo P2, PPD#2 s/p   gHTN, now with superimposed pre-e with SF: required IV labetalol dose x2 since admission, up-titrating oral nifedipine to 90mg XL. Repeat HELLP labs ordered. Plan for 24hrs magnesium infusion.   GDMA1: normal blood sugars  Bipolar: con home meds  Seizure disorder: con home meds     Anticipate discharge PPD#3 pending postpartum goals and BP monitoring        Deja Stone MD, MD  [facility]   3/16/2024

## 2024-03-16 NOTE — PROGRESS NOTES
SBP > 160 x 2, MD notified. IV Labetalol 20 mg given, Magnesium started at 0243. SBP decreased to 130's. Reflexes normal.    Alissa Tijerina RN on 3/16/2024 at 3:27 AM

## 2024-03-16 NOTE — PLAN OF CARE
Goal Outcome Evaluation:      Plan of Care Reviewed With: patient    Overall Patient Progress: improving    Data: Vital signs within normal limits. BP have been on the higher side. Contacted Dr. Huggins increased dose of Nifedipine from 30 to 60mg. Postpartum checks within normal limits - see flow record. Patient is tolerating po intake. Patient is able to empty bladder independently. Patient ambulating independently.  No apparent signs of infection. perineum healing well. Patient is performing self cares and is able to care for infant. Positive attachment behaviors are observed with infant. Support persons are present.    Action:  Pain plan was discussed. Patient will request pain med when she is ready for it. Patient was not medicated during the shift. See MAR. Patient education done about formula feeding,  cares, postpartum cares, and rest. See flow record.    Response:   Encouraged pt to let nurse in pain medication is needed.     Plan: Anticipate discharge on 3/17/2024.

## 2024-03-16 NOTE — PLAN OF CARE
Blood pressures staying within normal limits. Denies HA, visual disturbances and upper rt quad pain. Reflexes +2. Denies needing pain medication. Bonding with infant.

## 2024-03-17 VITALS
HEART RATE: 85 BPM | OXYGEN SATURATION: 99 % | WEIGHT: 201.5 LBS | SYSTOLIC BLOOD PRESSURE: 139 MMHG | RESPIRATION RATE: 16 BRPM | TEMPERATURE: 97.8 F | BODY MASS INDEX: 34.59 KG/M2 | DIASTOLIC BLOOD PRESSURE: 93 MMHG

## 2024-03-17 PROBLEM — O14.10 PREECLAMPSIA, SEVERE: Status: ACTIVE | Noted: 2024-03-17

## 2024-03-17 PROCEDURE — 250N000013 HC RX MED GY IP 250 OP 250 PS 637: Performed by: OBSTETRICS & GYNECOLOGY

## 2024-03-17 RX ORDER — NIFEDIPINE 90 MG/1
90 TABLET, FILM COATED, EXTENDED RELEASE ORAL EVERY 24 HOURS
Qty: 30 TABLET | Refills: 12 | Status: SHIPPED | OUTPATIENT
Start: 2024-03-17 | End: 2024-05-16

## 2024-03-17 RX ADMIN — IBUPROFEN 800 MG: 800 TABLET ORAL at 02:45

## 2024-03-17 RX ADMIN — ACETAMINOPHEN 650 MG: 325 TABLET, FILM COATED ORAL at 07:22

## 2024-03-17 RX ADMIN — ACETAMINOPHEN 650 MG: 325 TABLET, FILM COATED ORAL at 02:45

## 2024-03-17 RX ADMIN — DOCUSATE SODIUM 100 MG: 100 CAPSULE, LIQUID FILLED ORAL at 07:22

## 2024-03-17 RX ADMIN — PRENATAL VITAMINS-IRON FUMARATE 27 MG IRON-FOLIC ACID 0.8 MG TABLET 1 TABLET: at 07:22

## 2024-03-17 ASSESSMENT — ACTIVITIES OF DAILY LIVING (ADL)
ADLS_ACUITY_SCORE: 22
ADLS_ACUITY_SCORE: 23
ADLS_ACUITY_SCORE: 23
ADLS_ACUITY_SCORE: 22
ADLS_ACUITY_SCORE: 23
ADLS_ACUITY_SCORE: 22
ADLS_ACUITY_SCORE: 23

## 2024-03-17 NOTE — DISCHARGE INSTRUCTIONS
Follow up with OB provider in 6 weeks    Warning Signs after Having a Baby    Keep this paper on your fridge or somewhere else where you can see it.    Call your provider if you have any of these symptoms up to 12 weeks after having your baby.    Thoughts of hurting yourself or your baby  Pain in your chest or trouble breathing  Severe headache not helped by pain medicine  Eyesight concerns (blurry vision, seeing spots or flashes of light, other changes to eyesight)  Fainting, shaking or other signs of a seizure    Call 9-1-1 if you feel that it is an emergency.     The symptoms below can happen to anyone after giving birth. They can be very serious. Call your provider if you have any of these warning signs.    My provider s phone number: _______________________    Losing too much blood (hemorrhage)    Call your provider if you soak through a pad in less than an hour or pass blood clots bigger than a golf ball. These may be signs that you are bleeding too much.    Blood clots in the legs or lungs    After you give birth, your body naturally clots its blood to help prevent blood loss. Sometimes this increased clotting can happen in other areas of the body, like the legs or lungs. This can block your blood flow and be very dangerous.     Call your provider if you:  Have a red, swollen spot on the back of your leg that is warm or painful when you touch it.   Are coughing up blood.     Infection    Call your provider if you have any of these symptoms:  Fever of 100.4 F (38 C) or higher.  Pain or redness around your stitches if you had an incision.   Any yellow, white, or green fluid coming from places where you had stitches or surgery.    Mood Problems (postpartum depression)    Many people feel sad or have mood changes after having a baby. But for some people, these mood swings are worse.     Call your provider right away if you feel so anxious or nervous that you can't care for yourself or your baby.When should you  call for help?   Call 911 if:    You feel you cannot stop from hurting yourself, your baby, or someone else.   Where to get help 24 hours a day, 7 days a week   If you or someone you know talks about suicide, self-harm, a mental health crisis, a substance use crisis, or any other kind of emotional distress, get help right away. You can:    Call the Suicide and Crisis Lifeline at 988.     Call 2-369-135-TALK (1-607.776.2462).     Text HOME to 510625 to access the Crisis Text Line.   Consider saving these numbers in your phone.  Go to [employer] for more information or to chat online.  Call your doctor now or seek immediate medical care if:    You are having trouble caring for yourself or your baby.     You hear voices.   Contact your doctor if:    You have problems with your medicines.     You do not get better as expected.       Remember: You know your body. If something doesn't feel right, get medical help.     For informational purposes only. Not to replace the advice of your health care provider. Copyright 2020 [employer]. All rights reserved. Clinically reviewed by Amarilis Leon, RNC-OB, MSN. [employer] 664000 - Rev 02/23.

## 2024-03-17 NOTE — PLAN OF CARE
Goal Outcome Evaluation:    Pt up in room, lights on, shades open sunlight in room. TV on, visitors, having tacos and soda. Re-educated on the rationale for being on Mag/sulfate. That the recommendations for environment to be quiet and subdued lighting. Pt BP is increasing, nifedipine ordered increase dose at bedtime. Diuresing. Tylenol and ibuprofen for abdominal cramping at 1800.

## 2024-03-17 NOTE — PROGRESS NOTES
Patient discharged per ambulatory with infant in car seat. Mother verified that her band matches her infant's band. Discharge instructions given. Encouraged to call for any problems, questions or concerns.

## 2024-03-17 NOTE — DISCHARGE SUMMARY
[facility] Vaginal Delivery Discharge Summary    Admit date: 3/14/2024  Discharge date: 3/17/2024     Admit Dx:   - 24 year old y/o  at 38w0d   - spontaneous labor   - GDMA-1; well-controlled  - bipolar disorder  - hx of seizure disorder  - GBS+ status     Discharge Dx:  - Same as above    Procedures:  - spontaneous vaginal delivery  - IV magnesium    Admit HPI:  Jeanine Hernandez is a 24 year old  presented to L&D for labor. Gestational age at delivery was 38w0d. Her pregnancy was complicated by GDMA1, bipolar disorder, history of seizure disorder, GBS positive. She progressed spontaneously through labor until complete dilation. Penicillin was started for GBS prophylaxis but inadequate treatment due to rapid spontaneous labor. She received nothing for labor analgesia. Patient did not labor down. She pushed effectively and delivered a viable male infant with Apgars 8/9 over an intact perineum.  Weight is 7#6oz and skin-to-skin was performed. Delayed cord clamping was performed. Cord was clamped and cut. Placenta delivered via active management and was noted to be intact with a three vessel cord.   QBL 50ml. Sponge and needle counts correct. Mom and baby were transported to postpartum in stable condition.   Please see her admit H&P for full details of her PMH, PSH, Meds, Allergies and exam on admit.    Her postpartum course was complicated by severe pre-eclampsia requiring IV labetalol to treat and uptitration of oral procardia. She received 24hrs of pp magnesium for seizure prophylaxis. Her blood sugar testing was normal postpartum. On PPD#3, she was meeting all of her postpartum goals and deemed stable for discharge. She was voiding without difficulty, tolerating a regular diet without nausea and vomiting, her pain was well controlled on oral pain medicines and her lochia was appropriate. Her hemoglobin after delivery was 12.5. Her Rh status was POS and Rhogam was not indicated.      Physical exam on the day of discharge:  Vitals:    03/17/24 0502 03/17/24 0503 03/17/24 0725 03/17/24 0743   BP:  (!) 144/88 136/89    BP Location:  Right arm Right arm    Patient Position:   Sitting    Cuff Size:  Adult Regular Adult Regular    Pulse: 93  95    Resp:  16 16    Temp:   97.8  F (36.6  C)    TempSrc:   Oral    SpO2:   99%    Weight:    91.4 kg (201 lb 8 oz)       General: sitting up, alert and cooperative  Abd: soft, non-distended, non-tender. Fundus firm, nontender, 2 cm below umbilicus.   Extremities: calves nontender, 2+ edema of right foot, 1+ in left lower extremity to the ankle.     Lab Results   Component Value Date    HGB 12.5 03/16/2024    HGB 10.6 03/15/2024    HGB 7.7 05/18/2020    HGB 12.3 05/14/2020     Blood type:   Lab Results   Component Value Date    RH Pos 05/14/2020       Discharge/Disposition:  Jeanine Hernandez was discharged to home in stable condition with the following instructions/medications:  1) Call for temperature > 100.4, foul smelling vaginal discharge, bleeding > 1 pad per hour x 2 hrs, pain not controlled by oral pain meds, severe constipation or severe nausea or vomiting. Pre-e symptoms and severe range BP parameters were reviewed in detail. She was sent with a home BP cuff.   2) She was instructed to follow-up with her primary OB in 6 weeks for a routine postpartum visit and in 48 hrs for a BP check.   3) She was instructed to continue her PNV on discharge if she wished to breast feed her infant.  4) Discharge instructions: reg diet, pelvic rest for 6 weeks   5) She was discharged home with the following medications:      Review of your medicines        START taking        Dose / Directions   NIFEdipine ER 90 MG 24 hr tablet  Commonly known as: ADALAT CC  Used for: Postpartum hypertension      Dose: 90 mg  Take 1 tablet (90 mg) by mouth every 24 hours  Quantity: 30 tablet  Refills: 12            CONTINUE these medicines which may have CHANGED, or have new  prescriptions. If we are uncertain of the size of tablets/capsules you have at home, strength may be listed as something that might have changed.        Dose / Directions   lamoTRIgine 100 MG tablet  Commonly known as: LaMICtal  This may have changed: how much to take  Used for: Moderate episode of recurrent major depressive disorder (H)      Dose: 100 mg  Take 1 tablet (100 mg) by mouth daily  Quantity: 90 tablet  Refills: 0            CONTINUE these medicines which have NOT CHANGED        Dose / Directions   acetone urine test strip  Commonly known as: KETOSTIX  Used for: Gestational diabetes      Use to check urine ketones once daily  Quantity: 50 strip  Refills: 1     ARIPiprazole 20 MG tablet  Commonly known as: ABILIFY      Dose: 30 mg  Take 30 mg by mouth daily  Refills: 0     blood glucose monitoring meter device kit  Commonly known as: NO BRAND SPECIFIED  Used for: Gestational diabetes      Use to test blood sugar 4 times daily or as directed. Preferred blood glucose meter OR supplies to accompany: Blood Glucose Monitor Brands: per insurance.  Quantity: 1 kit  Refills: 0     blood glucose test strip  Commonly known as: NO BRAND SPECIFIED  Used for: Gestational diabetes      Use to test blood sugar 4 times daily or as directed. To accompany: Blood Glucose Monitor Brands: per insurance.  Quantity: 100 strip  Refills: 6     omeprazole 20 MG DR capsule  Commonly known as: PriLOSEC  Used for: Gastroesophageal reflux in pregnancy in second trimester      Dose: 20 mg  Take 1 capsule (20 mg) by mouth daily  Quantity: 30 capsule  Refills: 3     prenatal multivitamin w/iron 27-0.8 MG tablet  Used for: Pregnancy test positive      Dose: 1 tablet  Take 1 tablet by mouth daily  Quantity: 90 tablet  Refills: 3     sertraline 25 MG tablet  Commonly known as: ZOLOFT  Used for: Bipolar disorder with depression (H)      Dose: 75 mg  Take 3 tablets (75 mg) by mouth daily  Quantity: 90 tablet  Refills: 1     thin  lancets  Commonly known as: NO BRAND SPECIFIED  Used for: Gestational diabetes      Use with lanceting device. To accompany: Blood Glucose Monitor Brands: per insurance.  Quantity: 100 each  Refills: 6            STOP taking      aspirin 81 MG EC tablet                  Where to get your medicines        These medications were sent to [facility] [place], [place] - [address]  [address] [address]      Phone: [phone]   NIFEdipine ER 90 MG 24 hr tablet          Deja Stone MD  [facility]

## 2024-03-17 NOTE — PROGRESS NOTES
Postpartum assessment WDL. VSS. Magnesium shut off at 0245. Pain tolerable with Ibuprofen and Tylenol. Patient caring for self and baby independently with assistance from . Will continue to monitor and assist with cares as needed.

## 2024-03-19 ENCOUNTER — ALLIED HEALTH/NURSE VISIT (OUTPATIENT)
Dept: OBGYN | Facility: CLINIC | Age: 24
End: 2024-03-19
Payer: COMMERCIAL

## 2024-03-19 ENCOUNTER — PATIENT OUTREACH (OUTPATIENT)
Dept: CARE COORDINATION | Facility: CLINIC | Age: 24
End: 2024-03-19

## 2024-03-19 ENCOUNTER — MEDICAL CORRESPONDENCE (OUTPATIENT)
Dept: HEALTH INFORMATION MANAGEMENT | Facility: CLINIC | Age: 24
End: 2024-03-19

## 2024-03-19 VITALS
SYSTOLIC BLOOD PRESSURE: 146 MMHG | BODY MASS INDEX: 33.2 KG/M2 | HEART RATE: 87 BPM | DIASTOLIC BLOOD PRESSURE: 91 MMHG | WEIGHT: 193.4 LBS

## 2024-03-19 PROCEDURE — 99207 PR NO CHARGE NURSE ONLY: CPT

## 2024-03-19 ASSESSMENT — PAIN SCALES - GENERAL: PAINLEVEL: NO PAIN (0)

## 2024-03-19 NOTE — NURSING NOTE
Subjective:  Jeanine is being seen in clinic for a blood pressure check due to Preeclampsia with severe features . Patient is Postpartum.    Patient reports taking the following antihypertensive medications: nifedipine    Patient reports the following symptoms: no hypertension symptoms.     Objective:    /88 (BP Location: Right arm, Patient Position: Chair, Cuff Size: Adult Regular)   Pulse 91   Wt 87.7 kg (193 lb 6.4 oz)   LMP 06/05/2023 (Approximate)   BMI 33.20 kg/m   Blood pressure taken on Right arm     Two blood pressures taken, at least one minute apart.     Plan: Huddled with Dr. Stone. Patient to return in one week for repeat BP check. Continue to check at home and call with readings at or greater than 150/100's.    If patient's BP is LOW (Systolic less than 100 OR Diastolic less than 60): RN triage team to be contacted ASAP for next steps.     If patient's BP is NORMAL (Systolic between 100-139 OR Diastolic between 60-89): OK for patient to leave clinic and rooming staff to route chart to ordering provider.     If patient's BP is HIGH (Systolic between 140-159 OR Diastolic between ): RN triage team to be contacted ASAP for next steps.    If patient's BP is VERY HIGH (Systolic 160 and higher OR Diastolic 110 and higher): RN triage team to be contacted ASAP for next steps.       Signs and symptoms of hypertension reviewed with patient.     Pt in agreement and reports understanding of plan.      Leia GUILLERMO   Ob/Gyn Clinic

## 2024-03-19 NOTE — PROGRESS NOTES
Connected Care Resource Center:   [facility] Contact  [facility]/Voicemail     Clinical Data: Post-Discharge Outreach     Outreach attempted x 2.  Left message on patient's voicemail, providing [facility]'s central phone number of [phone] ([phone]) for questions/concerns and/or to schedule an appt with an [facility] provider, if they do not have a PCP.      Plan:  [facility] will do no further outreaches at this time.       PAULA Fung  Connected Care Resource [place], [facility]    *Connected Care Resource Team does NOT follow patient ongoing. Referrals are identified based on internal discharge reports and the outreach is to ensure patient has an understanding of their discharge instructions.

## 2024-03-26 ENCOUNTER — HOSPITAL ENCOUNTER (EMERGENCY)
Facility: CLINIC | Age: 24
Discharge: HOME | End: 2024-03-27
Attending: EMERGENCY MEDICINE | Admitting: EMERGENCY MEDICINE

## 2024-03-26 ENCOUNTER — ALLIED HEALTH/NURSE VISIT (OUTPATIENT)
Dept: OBGYN | Facility: CLINIC | Age: 24
End: 2024-03-26

## 2024-03-26 ENCOUNTER — APPOINTMENT (OUTPATIENT)
Dept: ULTRASOUND IMAGING | Facility: CLINIC | Age: 24
End: 2024-03-26
Attending: EMERGENCY MEDICINE

## 2024-03-26 VITALS
DIASTOLIC BLOOD PRESSURE: 84 MMHG | WEIGHT: 190 LBS | SYSTOLIC BLOOD PRESSURE: 122 MMHG | BODY MASS INDEX: 32.61 KG/M2 | HEART RATE: 80 BPM

## 2024-03-26 DIAGNOSIS — R93.89 ABNORMAL PELVIC ULTRASOUND: ICD-10-CM

## 2024-03-26 DIAGNOSIS — D62 ANEMIA DUE TO BLOOD LOSS, ACUTE: ICD-10-CM

## 2024-03-26 LAB
ABO/RH(D): NORMAL
ANION GAP SERPL CALCULATED.3IONS-SCNC: 14 MMOL/L (ref 7–15)
ANTIBODY SCREEN: NEGATIVE
APTT PPP: 27 SECONDS (ref 22–38)
BASOPHILS # BLD AUTO: 0.1 10E3/UL (ref 0–0.2)
BASOPHILS NFR BLD AUTO: 1 %
BUN SERPL-MCNC: 12.4 MG/DL (ref 6–20)
CALCIUM SERPL-MCNC: 9.2 MG/DL (ref 8.6–10)
CHLORIDE SERPL-SCNC: 101 MMOL/L (ref 98–107)
CREAT SERPL-MCNC: 0.89 MG/DL (ref 0.51–0.95)
DEPRECATED HCO3 PLAS-SCNC: 21 MMOL/L (ref 22–29)
EGFRCR SERPLBLD CKD-EPI 2021: >90 ML/MIN/1.73M2
EOSINOPHIL # BLD AUTO: 0.2 10E3/UL (ref 0–0.7)
EOSINOPHIL NFR BLD AUTO: 1 %
ERYTHROCYTE [DISTWIDTH] IN BLOOD BY AUTOMATED COUNT: 12 % (ref 10–15)
FIBRINOGEN PPP-MCNC: 344 MG/DL (ref 170–490)
GLUCOSE SERPL-MCNC: 100 MG/DL (ref 70–99)
HCT VFR BLD AUTO: 38.1 % (ref 35–47)
HGB BLD-MCNC: 10.7 G/DL (ref 11.7–15.7)
HGB BLD-MCNC: 12.9 G/DL (ref 11.7–15.7)
IMM GRANULOCYTES # BLD: 0.1 10E3/UL
IMM GRANULOCYTES NFR BLD: 1 %
INR PPP: 0.98 (ref 0.85–1.15)
LYMPHOCYTES # BLD AUTO: 3.1 10E3/UL (ref 0.8–5.3)
LYMPHOCYTES NFR BLD AUTO: 19 %
MCH RBC QN AUTO: 28.8 PG (ref 26.5–33)
MCHC RBC AUTO-ENTMCNC: 33.9 G/DL (ref 31.5–36.5)
MCV RBC AUTO: 85 FL (ref 78–100)
MONOCYTES # BLD AUTO: 1 10E3/UL (ref 0–1.3)
MONOCYTES NFR BLD AUTO: 6 %
NEUTROPHILS # BLD AUTO: 12.1 10E3/UL (ref 1.6–8.3)
NEUTROPHILS NFR BLD AUTO: 73 %
NRBC # BLD AUTO: 0 10E3/UL
NRBC BLD AUTO-RTO: 0 /100
PLATELET # BLD AUTO: 390 10E3/UL (ref 150–450)
POTASSIUM SERPL-SCNC: 3.7 MMOL/L (ref 3.4–5.3)
RBC # BLD AUTO: 4.48 10E6/UL (ref 3.8–5.2)
SODIUM SERPL-SCNC: 136 MMOL/L (ref 135–145)
SPECIMEN EXPIRATION DATE: NORMAL
WBC # BLD AUTO: 16.5 10E3/UL (ref 4–11)

## 2024-03-26 PROCEDURE — 85610 PROTHROMBIN TIME: CPT | Performed by: EMERGENCY MEDICINE

## 2024-03-26 PROCEDURE — 96361 HYDRATE IV INFUSION ADD-ON: CPT | Performed by: EMERGENCY MEDICINE

## 2024-03-26 PROCEDURE — 85384 FIBRINOGEN ACTIVITY: CPT | Performed by: EMERGENCY MEDICINE

## 2024-03-26 PROCEDURE — 76856 US EXAM PELVIC COMPLETE: CPT

## 2024-03-26 PROCEDURE — 99207 PR NO CHARGE NURSE ONLY: CPT

## 2024-03-26 PROCEDURE — 36415 COLL VENOUS BLD VENIPUNCTURE: CPT | Performed by: EMERGENCY MEDICINE

## 2024-03-26 PROCEDURE — 250N000009 HC RX 250: Performed by: EMERGENCY MEDICINE

## 2024-03-26 PROCEDURE — 85018 HEMOGLOBIN: CPT | Performed by: EMERGENCY MEDICINE

## 2024-03-26 PROCEDURE — 96360 HYDRATION IV INFUSION INIT: CPT | Mod: 59 | Performed by: EMERGENCY MEDICINE

## 2024-03-26 PROCEDURE — 99285 EMERGENCY DEPT VISIT HI MDM: CPT | Mod: 25 | Performed by: EMERGENCY MEDICINE

## 2024-03-26 PROCEDURE — 99285 EMERGENCY DEPT VISIT HI MDM: CPT | Performed by: EMERGENCY MEDICINE

## 2024-03-26 PROCEDURE — 96374 THER/PROPH/DIAG INJ IV PUSH: CPT | Mod: 59 | Performed by: EMERGENCY MEDICINE

## 2024-03-26 PROCEDURE — 258N000003 HC RX IP 258 OP 636: Performed by: EMERGENCY MEDICINE

## 2024-03-26 PROCEDURE — 250N000013 HC RX MED GY IP 250 OP 250 PS 637: Performed by: EMERGENCY MEDICINE

## 2024-03-26 PROCEDURE — 85027 COMPLETE CBC AUTOMATED: CPT | Performed by: EMERGENCY MEDICINE

## 2024-03-26 PROCEDURE — 85730 THROMBOPLASTIN TIME PARTIAL: CPT | Performed by: EMERGENCY MEDICINE

## 2024-03-26 PROCEDURE — 86900 BLOOD TYPING SEROLOGIC ABO: CPT | Performed by: EMERGENCY MEDICINE

## 2024-03-26 PROCEDURE — 85025 COMPLETE CBC W/AUTO DIFF WBC: CPT | Performed by: EMERGENCY MEDICINE

## 2024-03-26 PROCEDURE — 80048 BASIC METABOLIC PNL TOTAL CA: CPT | Performed by: EMERGENCY MEDICINE

## 2024-03-26 RX ORDER — MISOPROSTOL 200 UG/1
400 TABLET ORAL
Status: DISCONTINUED | OUTPATIENT
Start: 2024-03-26 | End: 2024-03-26

## 2024-03-26 RX ORDER — MISOPROSTOL 200 UG/1
600 TABLET ORAL ONCE
Status: COMPLETED | OUTPATIENT
Start: 2024-03-26 | End: 2024-03-26

## 2024-03-26 RX ADMIN — SODIUM CHLORIDE, POTASSIUM CHLORIDE, SODIUM LACTATE AND CALCIUM CHLORIDE 1000 ML: 600; 310; 30; 20 INJECTION, SOLUTION INTRAVENOUS at 19:10

## 2024-03-26 RX ADMIN — MISOPROSTOL 600 MCG: 200 TABLET ORAL at 21:27

## 2024-03-26 RX ADMIN — TRANEXAMIC ACID 1 G: 1 INJECTION, SOLUTION INTRAVENOUS at 19:11

## 2024-03-26 ASSESSMENT — COLUMBIA-SUICIDE SEVERITY RATING SCALE - C-SSRS
2. HAVE YOU ACTUALLY HAD ANY THOUGHTS OF KILLING YOURSELF IN THE PAST MONTH?: NO
6. HAVE YOU EVER DONE ANYTHING, STARTED TO DO ANYTHING, OR PREPARED TO DO ANYTHING TO END YOUR LIFE?: YES
1. IN THE PAST MONTH, HAVE YOU WISHED YOU WERE DEAD OR WISHED YOU COULD GO TO SLEEP AND NOT WAKE UP?: NO

## 2024-03-26 ASSESSMENT — ENCOUNTER SYMPTOMS
CARDIOVASCULAR NEGATIVE: 1
CONSTITUTIONAL NEGATIVE: 1
NEUROLOGICAL NEGATIVE: 1
HEMATOLOGIC/LYMPHATIC NEGATIVE: 1
MUSCULOSKELETAL NEGATIVE: 1
ALLERGIC/IMMUNOLOGIC NEGATIVE: 1
ENDOCRINE NEGATIVE: 1
PSYCHIATRIC NEGATIVE: 1
GASTROINTESTINAL NEGATIVE: 1
RESPIRATORY NEGATIVE: 1
EYES NEGATIVE: 1

## 2024-03-26 ASSESSMENT — ACTIVITIES OF DAILY LIVING (ADL)
ADLS_ACUITY_SCORE: 35

## 2024-03-26 NOTE — ED PROVIDER NOTES
History     Chief Complaint   Patient presents with    Postpartum Complications     HPI  Jeanine Hernandez is a 24 year old female who presents with concern about post partum vaginal bleeding.  On intake patient reported vaginal delivery 12 days prior.  In the last hour she reports she soaked about 6 pads.  She arrived reporting she felt dizzy.  She is currently on nifedipine for preeclampsia.    Reviewed the medical record and hospital discharge on 3/14/2024 shows a diagnosis of bipolar disorder, gestational diabetes and history of seizure disorder.  Patient was also noted to be GBS positive.  Patient received magnesium for preeclampsia during hospital course.    On rapid assessment on arrival due to concern for postpartum bleeding.  Patient reports she returned home after clinic visit today.  In the kitchen and suddenly felt a gush of blood.  There is no abdominal pain.  She confirmed that she is now  with healthy baby boy 38 weeks 12 days prior.  Since the sudden bleeding prior to arrival she  reports changing only 6 pads in last hour.  She reports feeling lightheaded but reports no chest pain.  Pain large clots.  She has no abdominal pain but reports some cramping.  She confirmed that she has been taking her prenatals and that she is continue to take nifedipine.  She is also on Abilify, lamotrigine and sertraline.  She has had no fever chills.  Records show that she was seen in the clinic earlier today.       Allergies:  Allergies   Allergen Reactions    Iodine Anaphylaxis    Ivp Dye [Contrast Dye] Anaphylaxis    Latex Hives       Problem List:    Patient Active Problem List    Diagnosis Date Noted    Preeclampsia, severe 2024     Priority: Medium     (spontaneous vaginal delivery) 2024     Priority: Medium    Gestational hypertension, third trimester 2024     Priority: Medium    Diet controlled gestational diabetes mellitus (GDM) in third trimester 2024     Priority: Medium     Pre-eclampsia 02/26/2024     Priority: Medium    Bipolar disorder with depression (H) 08/02/2021     Priority: Medium    Seizures (H) 06/18/2021     Priority: Medium    Tobacco use 06/18/2021     Priority: Medium    High risk medication use 02/01/2021     Priority: Medium    Moderate episode of recurrent major depressive disorder (H) 11/23/2020     Priority: Medium     Postpartum onset.  Monitoring for a bipolar trajectory.        Insomnia due to psychological stress 11/23/2020     Priority: Medium        Past Medical History:    Past Medical History:   Diagnosis Date    Anxiety     Asthma     Depression     History of urinary tract infection     OCD (obsessive compulsive disorder)     Postpartum depression 2020       Past Surgical History:    Past Surgical History:   Procedure Laterality Date    FOOT SURGERY      right foot - ganglion cyst    MYRINGOTOMY, INSERT TUBE BILATERAL, COMBINED      ORTHOPEDIC SURGERY      planter warts foot    TONSILLECTOMY      TYMPANOPLASTY         Family History:    Family History   Problem Relation Age of Onset    Diabetes Father         type II    Neurologic Disorder Father         TBI    Hyperlipidemia Father     Coronary Artery Disease Father         MI    Melanoma Maternal Grandmother     Other - See Comments Maternal Grandfather         murdered    Chronic Obstructive Pulmonary Disease Paternal Grandmother     Cerebrovascular Disease Paternal Grandmother     Heart Surgery Paternal Grandmother     Cerebrovascular Disease Paternal Grandfather         x3    Diabetes Paternal Grandfather         type II       Social History:  Marital Status:  Patient Declined [9]  Social History     Tobacco Use    Smoking status: Former     Packs/day: 0.00     Years: 0.00     Additional pack years: 0.00     Total pack years: 0.00     Types: Cigarettes, Other     Passive exposure: Current    Smokeless tobacco: Never   Vaping Use    Vaping Use: Every day    Substances: Nicotine    Devices: Pre-filled  "pod   Substance Use Topics    Alcohol use: Never    Drug use: Never        Medications:    acetone urine (KETOSTIX) test strip  ARIPiprazole (ABILIFY) 20 MG tablet  blood glucose (NO BRAND SPECIFIED) test strip  blood glucose monitoring (NO BRAND SPECIFIED) meter device kit  lamoTRIgine (LAMICTAL) 100 MG tablet  NIFEdipine ER OSMOTIC (ADALAT CC) 90 MG 24 hr tablet  omeprazole (PRILOSEC) 20 MG DR capsule  Prenatal Vit-Fe Fumarate-FA (PRENATAL MULTIVITAMIN W/IRON) 27-0.8 MG tablet  sertraline (ZOLOFT) 25 MG tablet  thin (NO BRAND SPECIFIED) lancets          Review of Systems   Constitutional: Negative.    HENT: Negative.     Eyes: Negative.    Respiratory: Negative.     Cardiovascular: Negative.    Gastrointestinal: Negative.    Endocrine: Negative.    Genitourinary:  Positive for vaginal bleeding.   Musculoskeletal: Negative.    Skin: Negative.    Allergic/Immunologic: Negative.    Neurological: Negative.    Hematological: Negative.    Psychiatric/Behavioral: Negative.         Physical Exam   BP: (!) 135/96  Pulse: 115  Temp: 97.8  F (36.6  C)  Height: 162.6 cm (5' 4\")  Weight: 86.2 kg (190 lb)  SpO2: 97 %      Physical Exam  Exam conducted with a chaperone present.   HENT:      Nose: Nose normal.   Eyes:      Extraocular Movements: Extraocular movements intact.      Pupils: Pupils are equal, round, and reactive to light.   Cardiovascular:      Rate and Rhythm: Normal rate and regular rhythm.   Pulmonary:      Effort: Pulmonary effort is normal. No respiratory distress.      Breath sounds: Normal breath sounds. No stridor. No wheezing, rhonchi or rales.   Chest:      Chest wall: No tenderness.   Musculoskeletal:         General: No swelling, tenderness, deformity or signs of injury.      Cervical back: Normal range of motion and neck supple.      Left lower leg: No edema.   Skin:     Capillary Refill: Capillary refill takes less than 2 seconds.      Coloration: Skin is pale.   Neurological:      General: No focal " deficit present.      Mental Status: She is alert and oriented to person, place, and time.      Cranial Nerves: No cranial nerve deficit.      Sensory: No sensory deficit.      Motor: No weakness.      Coordination: Coordination normal.      Gait: Gait normal.      Deep Tendon Reflexes: Reflexes normal.         ED Course        Procedures              Critical Care time:  was 60 minutes for this patient excluding procedures.             ED medications:  Medications   tranexamic acid (CYKLOKAPRON) bolus 1 g vial attach to NaCl 50 or 100 mL bag ADULT (1 g Intravenous $Given 3/26/24 1911)   lactated ringers BOLUS 1,000 mL (0 mLs Intravenous Stopped 3/26/24 2127)   misoprostol (CYTOTEC) tablet 600 mcg (600 mcg Oral $Given 3/26/24 2127)     ED Vitals:  Vitals:    03/26/24 2114 03/26/24 2200 03/26/24 2215 03/27/24 0000   BP: 104/65 102/73 109/79 119/77   Pulse: 92 112 102 116   Temp:       SpO2: 100% 99% 99% 97%   Weight:       Height:             ED labs and imaging:  Results for orders placed or performed during the hospital encounter of 03/26/24   US Pelvis Complete without Transvaginal     Status: None    Narrative    EXAM: US PELVIC TRANSABDOMINAL  LOCATION: [facility]  DATE: 3/26/2024    INDICATION: Postpartum vaginal bleeding. 12 days post spontaneous vaginal bleeding.  Evaluate for retained products of conception  COMPARISON: None.  TECHNIQUE: Transabdominal scans were performed.    FINDINGS:    UTERUS: The endometrium is markedly heterogeneous and thickened containing both fluid and heterogeneous area measuring 4.7 cm with no areas of increased vascularity.     RIGHT OVARY: 2.3 x 1.5 x 1.1 cm. Normal.     LEFT OVARY: 2.2 x 2.5 x 0.9 cm. Normal.    No significant free fluid.      Impression    IMPRESSION:  1.  Markedly heterogeneous and thickened endometrium measuring 4.7 cm with fluid and large heterogeneous areas. No vascularity. Findings probably represent hemorrhagic products  although avascular retained products of conception remains a possibility   based on appearance. Recommend follow-up as clinically indicated.         Basic metabolic panel     Status: Abnormal   Result Value Ref Range    Sodium 136 135 - 145 mmol/L    Potassium 3.7 3.4 - 5.3 mmol/L    Chloride 101 98 - 107 mmol/L    Carbon Dioxide (CO2) 21 (L) 22 - 29 mmol/L    Anion Gap 14 7 - 15 mmol/L    Urea Nitrogen 12.4 6.0 - 20.0 mg/dL    Creatinine 0.89 0.51 - 0.95 mg/dL    GFR Estimate >90 >60 mL/min/1.73m2    Calcium 9.2 8.6 - 10.0 mg/dL    Glucose 100 (H) 70 - 99 mg/dL   INR     Status: Normal   Result Value Ref Range    INR 0.98 0.85 - 1.15   Partial thromboplastin time     Status: Normal   Result Value Ref Range    aPTT 27 22 - 38 Seconds   Fibrinogen activity     Status: Normal   Result Value Ref Range    Fibrinogen Activity 344 170 - 490 mg/dL   CBC with platelets and differential     Status: Abnormal   Result Value Ref Range    WBC Count 16.5 (H) 4.0 - 11.0 10e3/uL    RBC Count 4.48 3.80 - 5.20 10e6/uL    Hemoglobin 12.9 11.7 - 15.7 g/dL    Hematocrit 38.1 35.0 - 47.0 %    MCV 85 78 - 100 fL    MCH 28.8 26.5 - 33.0 pg    MCHC 33.9 31.5 - 36.5 g/dL    RDW 12.0 10.0 - 15.0 %    Platelet Count 390 150 - 450 10e3/uL    % Neutrophils 73 %    % Lymphocytes 19 %    % Monocytes 6 %    % Eosinophils 1 %    % Basophils 1 %    % Immature Granulocytes 1 %    NRBCs per 100 WBC 0 <1 /100    Absolute Neutrophils 12.1 (H) 1.6 - 8.3 10e3/uL    Absolute Lymphocytes 3.1 0.8 - 5.3 10e3/uL    Absolute Monocytes 1.0 0.0 - 1.3 10e3/uL    Absolute Eosinophils 0.2 0.0 - 0.7 10e3/uL    Absolute Basophils 0.1 0.0 - 0.2 10e3/uL    Absolute Immature Granulocytes 0.1 <=0.4 10e3/uL    Absolute NRBCs 0.0 10e3/uL   Hemoglobin     Status: Abnormal   Result Value Ref Range    Hemoglobin 10.7 (L) 11.7 - 15.7 g/dL   CBC with platelets     Status: Abnormal   Result Value Ref Range    WBC Count 29.0 (H) 4.0 - 11.0 10e3/uL    RBC Count 3.55 (L) 3.80 - 5.20  10e6/uL    Hemoglobin 10.2 (L) 11.7 - 15.7 g/dL    Hematocrit 30.5 (L) 35.0 - 47.0 %    MCV 86 78 - 100 fL    MCH 28.7 26.5 - 33.0 pg    MCHC 33.4 31.5 - 36.5 g/dL    RDW 12.2 10.0 - 15.0 %    Platelet Count 280 150 - 450 10e3/uL   Adult Type and Screen     Status: None   Result Value Ref Range    ABO/RH(D) O POS     Antibody Screen Negative Negative    SPECIMEN EXPIRATION DATE [id number]    CBC with platelets differential     Status: Abnormal    Narrative    The following orders were created for panel order CBC with platelets differential.  Procedure                               Abnormality         Status                     ---------                               -----------         ------                     CBC with platelets and d...[[id number]]  Abnormal            Final result                 Please view results for these tests on the individual orders.   ABO/Rh type and screen     Status: None    Narrative    The following orders were created for panel order ABO/Rh type and screen.  Procedure                               Abnormality         Status                     ---------                               -----------         ------                     Adult Type and Screen[[id number]]                            Final result                 Please view results for these tests on the individual orders.     Assessments & Plan (with Medical Decision Making)   Assessment Summary and clinical Impression: 24-year-old female who is now G2, P2 who presented with concern about postpartum vaginal bleeding after returning home her OB clinic follow-up today.  On rapid assessment there was concern for postpartum hemorrhage with patient reporting that she was soaking 6 hospital pads in the last hour prior to arrival.  She reported the bleeding began suddenly while standing in the kitchen.  She reports some abdominal cramping but no pain.  Patient reports that she has a history of seizure disorder.  Patient is also on  nifedipine for history of preeclampsia.  Patient reports that she has had no fever or chills.  She reports that after her clinic visit she was doing well until arrival.  On exam she has a large amount of clot in the perineum with some brisk bleeding.  She was tachycardic on arrival with a pulse of 115.  Blood pressure was 135/96.   After period of care and observation patient's hemodynamics improved.  Her hemoglobin dropped to 10.9.  Based on pelvic ultrasound findings and discussion with OB/GYN on-call suspect postpartum bleeding without significant hemorrhage to require emergent intervention, however cannot exclude retained products of conception based on pelvic ultrasound interpretation by radiology. After consultation with OB/GYN- it was recommended to trial of care as an outpatient with urgent follow-up with plan for oral TXA after receiving IV TXA and oral Cytotec during ED course of care.  Patient remained normotensive however she had resting tachycardia.  Hemoglobin remained stable at 10.2.  She did develop leukocytosis with serial hemograms. We reviewed symptoms that would be suspicious for endometritis or infected products of conception. Patient was not febrile and wanted to go home.  With urgent follow-up with her treating OB/GYN we agreed to discharge to home with low threshold to return if there was recurrence of heavy bleeding within the next 12 to 24 hours recurrence of abdominal pain or cramping or if she develops a fever pending reevaluation by her treating obstetrician in the next 24 hours.    ED course and plan:  Reviewed the medical record.  Reviewed clinic visit earlier in the day prior to ED arrival.  Reviewed hospital course from 3/14/24. Blood type O+.  Given concern for postpartum hemorrhage with possible retained products of conception with some bleeding comprehensive pelvic ultrasound obtained to assess for retained products of conception.  Chaperoned speculum and pelvic exam was  deferred.   Spoke with Dr. SAMIR Stone (OB-GYN) at 7:03 PM to review my concern for postpartum hemorrhage/ bleeding.  We discussed patient's arrival hemodynamics and pending follow-up labs and pelvic ultrasound.  We agreed to touch base after initial workup. Workup on arrival revealed hemoglobin 12.9.  Normal fibrinogen level.  No anion gap.  White count 16.5. Pelvic ultrasound revealed findings that probably represent hemorrhagic products although avascular retained products of conception remain a possibility and clinical correlation was recommended by radiology  Repeat Hgb- 10.7 after 1L IV fluids.  Based on ultrasound findings reviewed next steps for care with Dr. SAMIR Stone- GYN on-call at 9.07pm who suggested trial of outpatient care as long as patient does not have worsening bleeding or any hemodynamic instability. Dr Stone recommended Cytotec 600 mcg orally and then discharged with  mg TID x 3 days with plan for follow-up in the clinic in the morning. Sent an Epic In-basket message to Dr Stone to help facilitate follow-up care.   After a period of care and observation in the department, IV TXA, and oral Cytotec patient had reduced bleeding did pass a small clot.  She also reported feeling lightheaded.  To ensure that her symptoms are not due to symptomatic anemia from blood loss she was observed for a longer period of time.  Her tachycardia improved but did not resolve  (HR- 102) and her blood pressure remained stable.    Hemoglobin was 10. 2 at the time of discharge.  Patient wanted to go home rather than continued observation.  Patient was given precautions for endometritis given leukocytosis during her visit and the importance of monitoring for fever or increased abdominal pain.  Patient was also advised to contact her OB/GYN care team in the morning to secure an appointment time for reevaluation for follow-up care in the next 24 hours.  States that if she develops rebleeding has a fainting episode  develops a fever she should return to the emergency department to be evaluated for further care      I have reviewed the nursing notes.    I have reviewed the findings, diagnosis, plan and need for follow up with the patient.           Medical Decision Making  The patient's presentation was of high complexity (symptomatic postpartum hemorrhage).    The patient's evaluation involved:  ordering and/or review of 2 test(s) in this encounter (OB/GYN consultation, diagnostic imaging and lab)    The patient's management necessitated high risk (a decision regarding hospitalization).      New Prescriptions    No medications on file       Final diagnoses:   Delayed postpartum hemorrhage - 12 days after continuous vaginal delivery   Abnormal pelvic ultrasound - IMPRESSION: 1.  Markedly heterogeneous and thickened endometrium measuring 4.7 cm with fluid and large heterogeneous areas. No vascularity. Findings probably represent hemorrhagic products although avascular retained products of conception remains a possibility  based on appearance. Recommend follow-up as clinically indicated.         Anemia due to blood loss, acute - Due to postpartum vaginal bleeding       3/26/2024   [facility] EMERGENCY DEPT       Bal Ibarra MD  03/27/24 0034       Bal Ibarra MD  03/27/24 0034

## 2024-03-26 NOTE — PROGRESS NOTES
Subjective:  Jeanine is being seen in clinic for a blood pressure check due to Preeclampsia with severe features . Patient is Postpartum.    Patient reports taking the following antihypertensive medications: nifedipine 90mg daily    Patient reports the following symptoms: no hypertension symptoms.     Objective:  /84 (BP Location: Left arm, Patient Position: Sitting, Cuff Size: Adult Regular)   Pulse 80   Wt 86.2 kg (190 lb)   LMP 06/05/2023 (Approximate)   Breastfeeding No   BMI 32.61 kg/m   Blood pressure taken on Left arm     Two blood pressures taken, at least one minute apart.   125/85  122/84    Plan:    If patient's BP is NORMAL (Systolic between 100-139 OR Diastolic between 60-89): OK for patient to leave clinic. Reviewed BP levels with oncall provider, Dr. Stone. Plan - have pt continue to monitor BPs at home and send weekly - call clinic if consistently 140s/90s and go to ER if 150s/100s or elevated and symptomatic.     Signs and symptoms of hypertension reviewed with patient.     Continue routine postpartum care and Warning signs of hypertension reviewed. Reviewed plan with pt to continue nifedipine and continue BP monitoring. Scheduled patient for 6wk postpartum appointment on 4/25/24.  Pt verbalizes understanding and denies additional questions or concerns    EAGLE Stephens  [facility] Clinic

## 2024-03-26 NOTE — ED TRIAGE NOTES
Patient reports vaginal delivery 12 days ago. ~1 hour ago patient sudden onset of vaginal bleeding. Clots present. 6 pads in last hour. Bleeding through pants inn triage. Dizziness report. Hx of pre-eclampsia. On Nifedipine.      Triage Assessment (Adult)       Row Name 03/26/24 2451          Triage Assessment    Airway WDL WDL        Respiratory WDL    Respiratory WDL WDL        Skin Circulation/Temperature WDL    Skin Circulation/Temperature WDL WDL        Cardiac WDL    Cardiac WDL X;rhythm     Pulse Rate & Regularity tachycardic        Peripheral/Neurovascular WDL    Peripheral Neurovascular WDL WDL        Cognitive/Neuro/Behavioral WDL    Cognitive/Neuro/Behavioral WDL WDL

## 2024-03-27 ENCOUNTER — PRENATAL OFFICE VISIT (OUTPATIENT)
Dept: OBGYN | Facility: CLINIC | Age: 24
End: 2024-03-27

## 2024-03-27 VITALS
HEART RATE: 118 BPM | BODY MASS INDEX: 32.2 KG/M2 | TEMPERATURE: 98 F | SYSTOLIC BLOOD PRESSURE: 136 MMHG | WEIGHT: 188.6 LBS | RESPIRATION RATE: 16 BRPM | DIASTOLIC BLOOD PRESSURE: 83 MMHG | HEIGHT: 64 IN

## 2024-03-27 VITALS
WEIGHT: 190 LBS | BODY MASS INDEX: 32.44 KG/M2 | HEIGHT: 64 IN | OXYGEN SATURATION: 97 % | DIASTOLIC BLOOD PRESSURE: 77 MMHG | SYSTOLIC BLOOD PRESSURE: 119 MMHG | HEART RATE: 116 BPM | TEMPERATURE: 97.8 F

## 2024-03-27 LAB
ERYTHROCYTE [DISTWIDTH] IN BLOOD BY AUTOMATED COUNT: 12.2 % (ref 10–15)
HCT VFR BLD AUTO: 30.5 % (ref 35–47)
HGB BLD-MCNC: 10.2 G/DL (ref 11.7–15.7)
MCH RBC QN AUTO: 28.7 PG (ref 26.5–33)
MCHC RBC AUTO-ENTMCNC: 33.4 G/DL (ref 31.5–36.5)
MCV RBC AUTO: 86 FL (ref 78–100)
PLATELET # BLD AUTO: 280 10E3/UL (ref 150–450)
RBC # BLD AUTO: 3.55 10E6/UL (ref 3.8–5.2)
WBC # BLD AUTO: 29 10E3/UL (ref 4–11)

## 2024-03-27 PROCEDURE — 99024 POSTOP FOLLOW-UP VISIT: CPT | Performed by: OBSTETRICS & GYNECOLOGY

## 2024-03-27 RX ORDER — TRANEXAMIC ACID 650 MG/1
1300 TABLET ORAL 2 TIMES DAILY
Qty: 12 TABLET | Refills: 0 | Status: SHIPPED | OUTPATIENT
Start: 2024-03-27 | End: 2024-03-30

## 2024-03-27 ASSESSMENT — ANXIETY QUESTIONNAIRES
3. WORRYING TOO MUCH ABOUT DIFFERENT THINGS: NOT AT ALL
7. FEELING AFRAID AS IF SOMETHING AWFUL MIGHT HAPPEN: NOT AT ALL
GAD7 TOTAL SCORE: 0
5. BEING SO RESTLESS THAT IT IS HARD TO SIT STILL: NOT AT ALL
2. NOT BEING ABLE TO STOP OR CONTROL WORRYING: NOT AT ALL
1. FEELING NERVOUS, ANXIOUS, OR ON EDGE: NOT AT ALL
GAD7 TOTAL SCORE: 0
6. BECOMING EASILY ANNOYED OR IRRITABLE: NOT AT ALL

## 2024-03-27 ASSESSMENT — PATIENT HEALTH QUESTIONNAIRE - PHQ9
SUM OF ALL RESPONSES TO PHQ QUESTIONS 1-9: 2
5. POOR APPETITE OR OVEREATING: NOT AT ALL

## 2024-03-27 NOTE — DISCHARGE INSTRUCTIONS
1) Your patient today revealed that you have bleeding after vaginal delivery of your son. We have reviewed concern that your ultrasound shows some retained clots although we cannot exclude retained products of conception.  After discussion with your treating obstetrician Dr. Garcia plan is for a follow-up visit in the morning. Dr Stone's team should call you for a follow-up appointment in the morning. If you do not receive a call please call in the morning by noon.    2) Your hemoglobin is 10.2- (after your period of care during your ED visit).  We discussed that although you appear stable for discharge to home with plan to trial care at home with follow-up with your OB care team if you have recurrence of bleeding or develop a fever this is concerning you should return to the emergency department to be evaluated.  During your visit you received IV TXA and Cytotec.  You are discharged home with TXA to take 3 times a day over the next 3 days

## 2024-03-27 NOTE — ED NOTES
"Pt up ambulating about 75 ft with SBA, no \"gushes\" of blood noted on the pad, although baseball size clot was noted, had some dizziness with ambulation that would come and go, provider updated,  @ bedside, call light within reach, will continue to monitor and assist as needed.  "

## 2024-03-27 NOTE — ED NOTES
Pad changed, pt,  and writer felt the bleeding is much less then when she came, provider updated,  @ bedside, will continue to monitor and assist as needed.

## 2024-03-27 NOTE — PROGRESS NOTES
Chief Complaint   Patient presents with    Post Partum Exam     ER follow up         HPI:  Jeanine Hernandez, 24 year old,  presents for follow up from the ER.  Yesterday, she was cooking dinner and then felt a large gush of blood.  She went to the bathroom and was passing clots.  It happened several more times, so her  talked her in to going in to the ER.  She denies fevers or chills.  No pain.  She feels like she has a little muscle strain on the left, but it is not pain or cramping, just annoying.  Her bleeding has since decreased to just a little more than spotting.  She was given medication in the ER, but didn't  or take any medications after her visit.      Past Medical History:   Diagnosis Date    Anxiety     in high school    Asthma     as child    Depression     in high school    History of urinary tract infection     as a child    OCD (obsessive compulsive disorder)     in past    Postpartum depression 2020     Past Surgical History:   Procedure Laterality Date    FOOT SURGERY      right foot - ganglion cyst    MYRINGOTOMY, INSERT TUBE BILATERAL, COMBINED      ORTHOPEDIC SURGERY      planter warts foot    TONSILLECTOMY      TYMPANOPLASTY       Social History     Socioeconomic History    Marital status: Patient Declined     Spouse name: Not on file    Number of children: Not on file    Years of education: Not on file    Highest education level: Not on file   Occupational History    Not on file   Tobacco Use    Smoking status: Former     Packs/day: 0.00     Years: 0.00     Additional pack years: 0.00     Total pack years: 0.00     Types: Cigarettes, Other     Passive exposure: Current    Smokeless tobacco: Never   Vaping Use    Vaping Use: Every day    Substances: Nicotine    Devices: Pre-filled pod   Substance and Sexual Activity    Alcohol use: Never    Drug use: Never    Sexual activity: Yes     Partners: Male     Birth control/protection: None     Comment: Need to get birth  control   Other Topics Concern    Parent/sibling w/ CABG, MI or angioplasty before 65F 55M? No   Social History Narrative    ** Merged History Encounter **          Social Determinants of Health     Financial Resource Strain: Not on file   Food Insecurity: Not on file   Transportation Needs: Not on file   Physical Activity: Not on file   Stress: Not on file   Social Connections: Not on file   Interpersonal Safety: Not on file   Housing Stability: Not on file     Family History   Problem Relation Age of Onset    Diabetes Father         type II    Neurologic Disorder Father         TBI    Hyperlipidemia Father     Coronary Artery Disease Father         MI    Melanoma Maternal Grandmother     Other - See Comments Maternal Grandfather         murdered    Chronic Obstructive Pulmonary Disease Paternal Grandmother     Cerebrovascular Disease Paternal Grandmother     Heart Surgery Paternal Grandmother     Cerebrovascular Disease Paternal Grandfather         x3    Diabetes Paternal Grandfather         type II        Current Outpatient Medications   Medication Sig Dispense Refill    ARIPiprazole (ABILIFY) 20 MG tablet Take 30 mg by mouth daily      lamoTRIgine (LAMICTAL) 100 MG tablet Take 1 tablet (100 mg) by mouth daily 90 tablet 0    NIFEdipine ER OSMOTIC (ADALAT CC) 90 MG 24 hr tablet Take 1 tablet (90 mg) by mouth every 24 hours 30 tablet 12    Prenatal Vit-Fe Fumarate-FA (PRENATAL MULTIVITAMIN W/IRON) 27-0.8 MG tablet Take 1 tablet by mouth daily 90 tablet 3    sertraline (ZOLOFT) 25 MG tablet Take 3 tablets (75 mg) by mouth daily 90 tablet 1    tranexamic acid (LYSTEDA) 650 MG tablet Take 2 tablets (1,300 mg) by mouth 2 times daily for 3 days 12 tablet 0    acetone urine (KETOSTIX) test strip Use to check urine ketones once daily 50 strip 1    blood glucose (NO BRAND SPECIFIED) test strip Use to test blood sugar 4 times daily or as directed. To accompany: Blood Glucose Monitor Brands: per insurance. 100 strip 6     "blood glucose monitoring (NO BRAND SPECIFIED) meter device kit Use to test blood sugar 4 times daily or as directed. Preferred blood glucose meter OR supplies to accompany: Blood Glucose Monitor Brands: per insurance. 1 kit 0    omeprazole (PRILOSEC) 20 MG DR capsule Take 1 capsule (20 mg) by mouth daily (Patient not taking: Reported on 3/27/2024) 30 capsule 3    thin (NO BRAND SPECIFIED) lancets Use with lanceting device. To accompany: Blood Glucose Monitor Brands: per insurance. 100 each 6        Allergies:     Allergies   Allergen Reactions    Iodine Anaphylaxis    Ivp Dye [Contrast Dye] Anaphylaxis    Latex Hives        ROS:  See HPI      Physical Exam:  /83 (BP Location: Right arm, Patient Position: Chair, Cuff Size: Adult Regular)   Pulse 118   Temp 98  F (36.7  C) (Tympanic)   Resp 16   Ht 1.626 m (5' 4\")   Wt 85.5 kg (188 lb 9.6 oz)   LMP 06/05/2023 (Approximate)   Breastfeeding No   BMI 32.37 kg/m       Appearance: well developed, well nourished, no acute distress  Head Exam normocephalic, no lesions or deformities  Abdomen: soft, non-tender, uterus is firm, 3 cm below the umbilicus, non-tender  Skin: no lesions  Extremities: no edema  Psych: orientated x3        EXAM: US PELVIC TRANSABDOMINAL  LOCATION: [facility]  DATE: 3/26/2024     INDICATION: Postpartum vaginal bleeding. 12 days post spontaneous vaginal bleeding.  Evaluate for retained products of conception  COMPARISON: None.  TECHNIQUE: Transabdominal scans were performed.     FINDINGS:     UTERUS: The endometrium is markedly heterogeneous and thickened containing both fluid and heterogeneous area measuring 4.7 cm with no areas of increased vascularity.      RIGHT OVARY: 2.3 x 1.5 x 1.1 cm. Normal.      LEFT OVARY: 2.2 x 2.5 x 0.9 cm. Normal.     No significant free fluid.                                                                      IMPRESSION:  1.  Markedly heterogeneous and thickened endometrium " measuring 4.7 cm with fluid and large heterogeneous areas. No vascularity. Findings probably represent hemorrhagic products although avascular retained products of conception remains a possibility   based on appearance. Recommend follow-up as clinically indicated.       Assessment/Plan:  Encounter Diagnosis   Name Primary?    Hemorrhage, delayed postpartum Yes       She is currently about 2 weeks postpartum from a vaginal delivery.  She was seen in the ER last night for increased bleeding and pain.    Her hemoglobin decreased from 12.2 to 10.9 in the ER yesterday.  Her white count was elevated at 29.  She was given cytotec and TXA for bleeding and her bleeding and it improved.    We discussed the ultrasound, which is likely blood products in the uterus.  As she is non-tender, afebrile and her bleeding improved, I recommended observation with return if symptoms increase.  I did recommend a follow up ultrasound in a couple of weeks.  Keep her postpartum visit as scheduled.          Jaci Spencer MD on 3/27/2024 at 1:32 PM

## 2024-03-27 NOTE — ED NOTES
Pt feels she is still bleeding, TXA finished, VSS although pt remains lightheaded,  @ bedside, call light within reach, will continue to monitor and assist as needed.

## 2024-03-27 NOTE — NURSING NOTE
"Initial /83 (BP Location: Right arm, Patient Position: Chair, Cuff Size: Adult Regular)   Pulse 118   Temp 98  F (36.7  C) (Tympanic)   Resp 16   Ht 1.626 m (5' 4\")   Wt 85.5 kg (188 lb 9.6 oz)   LMP 06/05/2023 (Approximate)   Breastfeeding No   BMI 32.37 kg/m   Estimated body mass index is 32.37 kg/m  as calculated from the following:    Height as of this encounter: 1.626 m (5' 4\").    Weight as of this encounter: 85.5 kg (188 lb 9.6 oz). .    Mee Sidhu, LIVIA    "

## 2024-05-16 ENCOUNTER — OFFICE VISIT (OUTPATIENT)
Dept: OBGYN | Facility: CLINIC | Age: 24
End: 2024-05-16

## 2024-05-16 VITALS
BODY MASS INDEX: 32.1 KG/M2 | SYSTOLIC BLOOD PRESSURE: 112 MMHG | DIASTOLIC BLOOD PRESSURE: 77 MMHG | WEIGHT: 187 LBS | TEMPERATURE: 98.2 F | HEART RATE: 114 BPM

## 2024-05-16 DIAGNOSIS — Z78.9 USES DEPO-PROVERA AS PRIMARY BIRTH CONTROL METHOD: Primary | ICD-10-CM

## 2024-05-16 LAB
HCG UR QL: NEGATIVE
INTERNAL QC OK POCT: NORMAL
POCT KIT EXPIRATION DATE: NORMAL
POCT KIT LOT NUMBER: NORMAL

## 2024-05-16 PROCEDURE — 99213 OFFICE O/P EST LOW 20 MIN: CPT | Mod: 25 | Performed by: OBSTETRICS & GYNECOLOGY

## 2024-05-16 PROCEDURE — 81025 URINE PREGNANCY TEST: CPT | Performed by: OBSTETRICS & GYNECOLOGY

## 2024-05-16 PROCEDURE — 96372 THER/PROPH/DIAG INJ SC/IM: CPT | Performed by: OBSTETRICS & GYNECOLOGY

## 2024-05-16 RX ORDER — MEDROXYPROGESTERONE ACETATE 150 MG/ML
150 INJECTION, SUSPENSION INTRAMUSCULAR
Status: ACTIVE | OUTPATIENT
Start: 2024-05-16

## 2024-05-16 RX ADMIN — MEDROXYPROGESTERONE ACETATE 150 MG: 150 INJECTION, SUSPENSION INTRAMUSCULAR at 10:54

## 2024-05-16 NOTE — PROGRESS NOTES
Clinic Administered Medication Documentation        Patient was given Depo Provera. Prior to medication administration, verified patient's identity using patient s name and date of birth. Please see MAR and medication order for additional information. Patient instructed to remain in clinic for 15 minutes and report any adverse reaction to staff immediately.    Vial/Syringe: Single dose vial. Was entire vial of medication used? Yes    NEXT INJECTION DUE: 8/1/24 - 8/29/24      Given Left ventrogluteal     Haven Huang MA

## 2024-05-16 NOTE — PROGRESS NOTES
Gynecology Consult Note        HPI: Jeanine Hernandez is a 24 year old  for contraceptive counseling.  The patient does continue on Lamictal for seizure control.  She has used Depo-Provera in the past and thinks she may be interested in this.  Considering pregnancy in 2 to 3 years.  Otherwise no new complaints or concerns.    ROS: 10 pt ROS neg other than HPI    PMH:   Past Medical History:   Diagnosis Date    Anxiety     in high school    Asthma     as child    Depression     in high school    History of urinary tract infection     as a child    OCD (obsessive compulsive disorder)     in past    Postpartum depression        PSHx:   Past Surgical History:   Procedure Laterality Date    FOOT SURGERY      right foot - ganglion cyst    MYRINGOTOMY, INSERT TUBE BILATERAL, COMBINED      ORTHOPEDIC SURGERY      planter warts foot    TONSILLECTOMY      TYMPANOPLASTY         Medications:   Current Outpatient Medications   Medication Sig Dispense Refill    ARIPiprazole (ABILIFY) 20 MG tablet Take 30 mg by mouth daily      lamoTRIgine (LAMICTAL) 100 MG tablet Take 1 tablet (100 mg) by mouth daily 90 tablet 0    sertraline (ZOLOFT) 25 MG tablet Take 3 tablets (75 mg) by mouth daily 90 tablet 1     Current Facility-Administered Medications   Medication Dose Route Frequency Provider Last Rate Last Admin    medroxyPROGESTERone (DEPO-PROVERA) injection 150 mg  150 mg Intramuscular Q90 Days Dottie Lee MD   150 mg at 24 1054        Allergies:      Allergies   Allergen Reactions    Iodine Anaphylaxis    Ivp Dye [Contrast Dye] Anaphylaxis    Latex Hives       Social History:   Social History     Socioeconomic History    Marital status: Patient Declined     Spouse name: Not on file    Number of children: Not on file    Years of education: Not on file    Highest education level: Not on file   Occupational History    Not on file   Tobacco Use    Smoking status: Former     Current packs/day: 0.00      Types: Cigarettes, Other     Passive exposure: Current    Smokeless tobacco: Never   Vaping Use    Vaping status: Every Day    Substances: Nicotine    Devices: Pre-filled pod   Substance and Sexual Activity    Alcohol use: Never    Drug use: Never    Sexual activity: Yes     Partners: Male     Birth control/protection: None     Comment: Need to get birth control   Other Topics Concern    Parent/sibling w/ CABG, MI or angioplasty before 65F 55M? No   Social History Narrative    ** Merged History Encounter **          Social Determinants of Health     Financial Resource Strain: Not on file   Food Insecurity: Not on file   Transportation Needs: Not on file   Physical Activity: Not on file   Stress: Not on file   Social Connections: Not on file   Interpersonal Safety: Not on file   Housing Stability: Not on file       Family History:  Family History   Problem Relation Age of Onset    Diabetes Father         type II    Neurologic Disorder Father         TBI    Hyperlipidemia Father     Coronary Artery Disease Father         MI    Melanoma Maternal Grandmother     Other - See Comments Maternal Grandfather         murdered    Chronic Obstructive Pulmonary Disease Paternal Grandmother     Cerebrovascular Disease Paternal Grandmother     Heart Surgery Paternal Grandmother     Cerebrovascular Disease Paternal Grandfather         x3    Diabetes Paternal Grandfather         type II       Physical Exam:   Vitals:    24 1039   BP: 112/77   BP Location: Left arm   Patient Position: Chair   Cuff Size: Adult Large   Pulse: 114   Temp: 98.2  F (36.8  C)   TempSrc: Tympanic   Weight: 84.8 kg (187 lb)      Gen: lying in bed, NAD  CV: Reg rate, well perfused  Pulm: no increased work of breathing  Abd: non-tender, non-distended, no masses   Pelvis: deferred  Extremities: non-tender, no erythema; no edema  Psych: normal mood and affect  Neuro: no focal deficits    Labs:  UPT neg    A&P: Jeanine Hernandez is a 24 year old   who presents to discuss contraception.  The patient has previously used Depo and feels she is most interested in this.  Patient is on Lamictal and therefore discussed with patient consideration to avoid estrogen containing therapy to avoid with effects on seizure medication concentration and doses.  Patient agreeable.  Therefore we will avoid pill, patch, ring.  Discussed progesterone only options including Nexplanon, IUD, Depo-Provera, progesterone only pills.  Reviewed insertion procedures for devices as well as expected bleeding patterns and risks and benefits.  After discussion the patient was most interested in continuing with Depo-Provera as she has tolerated this well previously.  Did discuss that if she is considering pregnancy it may take some time after discontinuing the Depo for her menses to return.  She states understanding of this and is okay with this possibility.  All questions answered and Depo shot given after a negative UPT obtained.      I spent a total of 20 minutes reviewing chart, obtaining history, counseling, examining, coordinating care, documenting this encounter.      Dottie Lee MD   5/16/2024 11:19 AM

## 2024-05-16 NOTE — NURSING NOTE
"Initial /77 (BP Location: Left arm, Patient Position: Chair, Cuff Size: Adult Large)   Pulse 114   Temp 98.2  F (36.8  C) (Tympanic)   Wt 84.8 kg (187 lb)   LMP 06/05/2023 (Approximate)   Breastfeeding No   BMI 32.10 kg/m   Estimated body mass index is 32.1 kg/m  as calculated from the following:    Height as of 3/27/24: 1.626 m (5' 4\").    Weight as of this encounter: 84.8 kg (187 lb). .    Haven Huang MA    "

## 2024-05-18 ENCOUNTER — VIRTUAL VISIT (OUTPATIENT)
Dept: URGENT CARE | Facility: CLINIC | Age: 24
End: 2024-05-18
Payer: MEDICAID

## 2024-05-18 DIAGNOSIS — J06.9 ACUTE UPPER RESPIRATORY INFECTION, UNSPECIFIED: Primary | ICD-10-CM

## 2024-05-18 PROCEDURE — 99213 OFFICE O/P EST LOW 20 MIN: CPT | Mod: 95

## 2024-05-18 NOTE — PATIENT INSTRUCTIONS
I ordered you a strep test, flu and COVID tests.     To schedule: go to your [employer] home page and scroll down to the section that says  You have an appointment that needs to be scheduled  and click the large green button that says  Schedule Now  and follow the steps to find the next available openings.    If you are unable to complete these [employer] scheduling steps, please call [phone] to schedule your testing.    Once results are back, you will be contacted via My Chart if treatment is indicated.

## 2024-05-18 NOTE — LETTER
May 18, 2024      Jeaninemanav Hernandez  [address]  [place] [address]        To Whom It May Concern:    Jeaninemanav Hernandez  was seen on 5/18/24.  Please excuse her from work missed May 18 and 19, 2024 due to illness.        Sincerely,      Evy Reyes, [facility].

## 2024-05-18 NOTE — PROGRESS NOTES
"Christophe is a 24 year old who is being evaluated via a billable video visit.          Assessment & Plan     Acute upper respiratory infection, unspecified    - Streptococcus A Rapid Screen w/Reflex to PCR; Future  - Influenza A & B Antigen; Future  - Symptomatic COVID-19 Virus (Coronavirus) by PCR; Future          BMI  Estimated body mass index is 32.1 kg/m  as calculated from the following:    Height as of 3/27/24: 1.626 m (5' 4\").    Weight as of 5/16/24: 84.8 kg (187 lb).         Return in about 1 week (around 5/25/2024), or if symptoms worsen or fail to improve.    Subjective   Christophe is a 24 year old, presenting for the following health issues:  No chief complaint on file.      Video Start Time: 9:49 AM    HPI     Acute Illness  Acute illness concerns: loss of taste and smell along with URI sx  Onset/Duration: today  Symptoms:  Fever: not documented, but feels feverish  Chills/Sweats: YES  Headache (location?): YES  Sinus Pressure: No  Conjunctivitis:  No  Ear Pain: no  Rhinorrhea: No  Congestion: YES  Sore Throat: YES  Cough: YES-non-productive  Wheeze: No  Decreased Appetite: YES  Nausea: YES  Vomiting: YES  Diarrhea: No  Fatigue/Achiness: YES  Sick/Strep Exposure: No  Therapies tried and outcome: None        Review of Systems  Constitutional, HEENT, cardiovascular, pulmonary, gi and gu systems are negative, except as otherwise noted.      Objective           Vitals:  No vitals were obtained today due to virtual visit.    Physical Exam   GENERAL: alert and no distress  EYES: Eyes grossly normal to inspection.  No discharge or erythema, or obvious scleral/conjunctival abnormalities.  RESP: No audible wheeze, cough, or visible cyanosis.    PSYCH: Appropriate affect, tone, and pace of words          Video-Visit Details    Type of service:  Video Visit   Video End Time:9:55 AM  Originating Location (pt. Location): Home    Distant Location (provider location):  Off-site  Platform used for Video Visit: Sukhdeep  Signed " Electronically by: Virtual Urgent Care

## 2024-05-21 ENCOUNTER — MEDICAL CORRESPONDENCE (OUTPATIENT)
Dept: HEALTH INFORMATION MANAGEMENT | Facility: CLINIC | Age: 24
End: 2024-05-21

## 2024-05-21 ENCOUNTER — LAB (OUTPATIENT)
Dept: FAMILY MEDICINE | Facility: CLINIC | Age: 24
End: 2024-05-21
Attending: NURSE PRACTITIONER
Payer: MEDICAID

## 2024-05-21 DIAGNOSIS — J06.9 ACUTE UPPER RESPIRATORY INFECTION, UNSPECIFIED: ICD-10-CM

## 2024-05-21 LAB
DEPRECATED S PYO AG THROAT QL EIA: NEGATIVE
FLUAV AG SPEC QL IA: NEGATIVE
FLUBV AG SPEC QL IA: NEGATIVE
GROUP A STREP BY PCR: NOT DETECTED

## 2024-05-21 PROCEDURE — 87651 STREP A DNA AMP PROBE: CPT

## 2024-05-21 PROCEDURE — 87635 SARS-COV-2 COVID-19 AMP PRB: CPT

## 2024-05-21 PROCEDURE — 87804 INFLUENZA ASSAY W/OPTIC: CPT

## 2024-05-21 PROCEDURE — 99207 PR NO CHARGE NURSE ONLY: CPT

## 2024-05-21 NOTE — COMMUNITY RESOURCES LIST (ENGLISH)
May 21, 2024           YOUR PERSONALIZED LIST OF SERVICES & PROGRAMS               Medical Transportation, (NEMT)      [facility] Nurse Program - Transportation to medical appointments  [address] [place] [address] (Distance: 23.9 miles)  Phone: [phone]  Website: [email or web address]  Language: English, Tamazight, Monegasque  Fee: Sliding scale  Accessibility: Translation services      [facility] - Transportation to medical appointments  [address] [address] (Distance: 18.8 miles)  Phone: [phone]  Website: https://www.TekLinks/  Language: English  Fee: Self pay, Insurance      Social Service [facility] - Neighbor to Neighbor Program  Phone: [phone]  Email: [email or web address]  Website: https://www.[facility].org/services/older-adults/-services/neighbor-to-neighbor  Language: English  Hours: Mon 8:00 AM - 5:00 PM Tue 8:00 AM - 5:00 PM Wed 8:00 AM - 5:00 PM Thu 8:00 AM - 5:00 PM Fri 8:00 AM - 5:00 PM  Fee: Insurance, Self pay  Accessibility: Deaf or hard of hearing, Blind accommodation, Translation services    Expense Assistance      [place] - Transit Link  [address] [place] [address] (Distance: 22.9 miles)  Phone: [phone]) [phone]  Website: https://Merus/Transportation/Services/Transit-Link.aspx  Language: English  Fee: Self pay      School - [facility] Women  [address] [place] [address] (Distance: 22.1 miles)  Phone: [phone]) [phone]  Website: http://www.[employer].org  Language: English  Fee: Free  Accessibility: Translation services  Transportation Options: Free transportation      - Dislocated Worker/Adult [employer] Employment Program  Phone: (571) 594-5389  Email: [email or web address].[email or web address]  Website: https://Birdimn.org/services/employment-services/dislocated-worker-program/  Language: English, Monegasque  Hours: Mon 8:00 AM - 4:30 PM Tue 8:00 AM - 4:30 PM Wed 8:00 AM - 4:30 PM u  8:00 AM - 4:30 PM Fri 8:00 AM - 4:30 PM  Fee: Free  Accessibility: Ada accessible    Coordination      [facility] [place]  [address] [place] [address] (Distance: 18.8 miles)  Language: English  Fee: Free  Accessibility: Translation services      Transit - [place] - Transit Link  [address] [place] [address] (Distance: 15.4 miles)  Language: English  Fee: Self pay  Accessibility: Translation services      - [place] [facility] [place]  Phone: [phone]) [phone]  Website: http://wwwMayvenn               IMPORTANT NUMBERS & WEBSITES        Emergency Services  911  .   United Way  211 http://211unitedway.org  .   Poison Control  (130) 548-7947 http://[place]poison.org http://wis[place]poison.org  .     Suicide and Crisis Lifeline  988 http://988"[employer]line.org  .   Childhelp National Child Abuse Hotline  924.121.8582 http://Childhelphotline.org   .   [place] Sexual Assault Hotline  (802) 431-6526 (HOPE) http://[employer].MediaHound   .     [place] Runaway Safeline  (813) 446-1736 (RUNAWAY) http://[employer]izzard [employer].org  .   Pregnancy & Postpartum Support  Call/text 488-139-6440  [place]: http://ppsupportmn.org  [place]: http://Accentium Web.com/wi  .   Substance Abuse National Helpline ([facility])  052-138-HELP (3753) http://Findtreatment.gov   .                DISCLAIMER: These resources have been generated via the [employer] Platform. [employer] does not endorse any service providers mentioned in this resource list. [employer] does not guarantee that the services mentioned in this resource list will be available to you or will improve your health or wellness.    [facility]

## 2024-05-22 LAB — SARS-COV-2 RNA RESP QL NAA+PROBE: NEGATIVE

## 2024-06-30 ENCOUNTER — OFFICE VISIT (OUTPATIENT)
Dept: FAMILY MEDICINE | Facility: CLINIC | Age: 24
End: 2024-06-30
Payer: COMMERCIAL

## 2024-06-30 VITALS
BODY MASS INDEX: 29.9 KG/M2 | SYSTOLIC BLOOD PRESSURE: 129 MMHG | HEART RATE: 82 BPM | OXYGEN SATURATION: 100 % | WEIGHT: 174.2 LBS | DIASTOLIC BLOOD PRESSURE: 76 MMHG | RESPIRATION RATE: 18 BRPM | TEMPERATURE: 97.7 F

## 2024-06-30 DIAGNOSIS — R10.2 PELVIC PAIN IN FEMALE: ICD-10-CM

## 2024-06-30 DIAGNOSIS — N93.9 VAGINAL BLEEDING: Primary | ICD-10-CM

## 2024-06-30 LAB
ALBUMIN UR-MCNC: NEGATIVE MG/DL
APPEARANCE UR: CLEAR
BACTERIA #/AREA URNS HPF: ABNORMAL /HPF
BASOPHILS # BLD AUTO: 0 10E3/UL (ref 0–0.2)
BASOPHILS NFR BLD AUTO: 0 %
BILIRUB UR QL STRIP: NEGATIVE
CLUE CELLS: ABNORMAL
COLOR UR AUTO: YELLOW
EOSINOPHIL # BLD AUTO: 0.2 10E3/UL (ref 0–0.7)
EOSINOPHIL NFR BLD AUTO: 2 %
ERYTHROCYTE [DISTWIDTH] IN BLOOD BY AUTOMATED COUNT: 14.3 % (ref 10–15)
GLUCOSE UR STRIP-MCNC: NEGATIVE MG/DL
HCG UR QL: NEGATIVE
HCT VFR BLD AUTO: 37.3 % (ref 35–47)
HGB BLD-MCNC: 11.6 G/DL (ref 11.7–15.7)
HGB UR QL STRIP: ABNORMAL
IMM GRANULOCYTES # BLD: 0 10E3/UL
IMM GRANULOCYTES NFR BLD: 0 %
KETONES UR STRIP-MCNC: ABNORMAL MG/DL
LEUKOCYTE ESTERASE UR QL STRIP: NEGATIVE
LYMPHOCYTES # BLD AUTO: 3 10E3/UL (ref 0.8–5.3)
LYMPHOCYTES NFR BLD AUTO: 28 %
MCH RBC QN AUTO: 23.7 PG (ref 26.5–33)
MCHC RBC AUTO-ENTMCNC: 31.1 G/DL (ref 31.5–36.5)
MCV RBC AUTO: 76 FL (ref 78–100)
MONOCYTES # BLD AUTO: 0.6 10E3/UL (ref 0–1.3)
MONOCYTES NFR BLD AUTO: 6 %
MUCOUS THREADS #/AREA URNS LPF: PRESENT /LPF
NEUTROPHILS # BLD AUTO: 6.8 10E3/UL (ref 1.6–8.3)
NEUTROPHILS NFR BLD AUTO: 64 %
NITRATE UR QL: NEGATIVE
PH UR STRIP: 6 [PH] (ref 5–8)
PLATELET # BLD AUTO: 304 10E3/UL (ref 150–450)
RBC # BLD AUTO: 4.9 10E6/UL (ref 3.8–5.2)
RBC #/AREA URNS AUTO: ABNORMAL /HPF
SP GR UR STRIP: 1.02 (ref 1–1.03)
SQUAMOUS #/AREA URNS AUTO: ABNORMAL /LPF
TRICHOMONAS, WET PREP: ABNORMAL
UROBILINOGEN UR STRIP-ACNC: 0.2 E.U./DL
WBC # BLD AUTO: 10.6 10E3/UL (ref 4–11)
WBC #/AREA URNS AUTO: ABNORMAL /HPF
WBC'S/HIGH POWER FIELD, WET PREP: ABNORMAL
YEAST, WET PREP: ABNORMAL

## 2024-06-30 PROCEDURE — 81001 URINALYSIS AUTO W/SCOPE: CPT | Performed by: NURSE PRACTITIONER

## 2024-06-30 PROCEDURE — 85610 PROTHROMBIN TIME: CPT | Performed by: NURSE PRACTITIONER

## 2024-06-30 PROCEDURE — 85730 THROMBOPLASTIN TIME PARTIAL: CPT | Performed by: NURSE PRACTITIONER

## 2024-06-30 PROCEDURE — 81025 URINE PREGNANCY TEST: CPT | Performed by: NURSE PRACTITIONER

## 2024-06-30 PROCEDURE — 87210 SMEAR WET MOUNT SALINE/INK: CPT | Performed by: NURSE PRACTITIONER

## 2024-06-30 PROCEDURE — 36415 COLL VENOUS BLD VENIPUNCTURE: CPT | Performed by: NURSE PRACTITIONER

## 2024-06-30 PROCEDURE — 99214 OFFICE O/P EST MOD 30 MIN: CPT | Performed by: NURSE PRACTITIONER

## 2024-06-30 PROCEDURE — 85025 COMPLETE CBC W/AUTO DIFF WBC: CPT | Performed by: NURSE PRACTITIONER

## 2024-06-30 NOTE — LETTER
June 30, 2024      Jeanine Hernandez  [address]  [place] [address]        To Whom It May Concern:    Jeanine Hernandez was seen in our clinic. Please excuse medical related absences. Pt has urgent follow-up with care team regarding her condition. May return to work as improved and tolerated.      Sincerely,        Myriam Arzola, NP

## 2024-06-30 NOTE — PATIENT INSTRUCTIONS
Postpartum bleeding with stable CBC, no profound anemic or low platelets  PTT INR pending, will call if concerns  Urine wet prep unremarkable for infection  Urgent OBGYN follow-up request placed, would defer to them regarding med management repeat imaging  ER if severe pelvic pain, shortness of breath, dizziness, fainting, fevers

## 2024-06-30 NOTE — PROGRESS NOTES
Chief Complaint   Patient presents with    Vaginal Bleeding     Since had baby 3 month ago start bleeding non stop and having pain around vaginal area and dizziness.     SUBJECTIVE:  Jeanine Hernandez is a 24 year old female who presents today with postpartum bleeding.  She delivered baby vaginally 3/14/2024 at [facility].  Presented to the ER 3/26/2024 for delayed postpartum hemorrhage.  She was given cytotec tranexamic acid and advised to follow-up with OB/GYN.  She is on Depo-Provera for primary birth control.  Was told the bleeding would subside in time.  What brings her in today is increased bright red bleeding in the last day.  She cannot get in with her OB/GYN until 7/11/2024.  She is currently changing a pad every 2-4 hours, moderately saturated with bright red-brown or maroon blood.  Declines large blood clots.  Passed thick white tissue about a month ago.  Currently in the clinic asymptomatic.  Occasionally feels nauseous lightheaded cramping to her low pelvis and back.  She does endorse bleeding gums more so recently.  No other bruising bleeding.  No known clotting disorders.  She does not overdo NSAIDs or alcohol.  She is monogamous with partner and not concerned about STDs.  She has been sexually active postpartum and it was quite painful due to vaginal dryness.  She is not breast-feeding.    Past Medical History:   Diagnosis Date    Anxiety     in high school    Asthma     as child    Depression     in high school    History of urinary tract infection     as a child    OCD (obsessive compulsive disorder)     in past    Postpartum depression 2020     Current Outpatient Medications   Medication Sig Dispense Refill    ARIPiprazole (ABILIFY) 20 MG tablet Take 30 mg by mouth daily (Patient not taking: Reported on 6/30/2024)      lamoTRIgine (LAMICTAL) 100 MG tablet Take 1 tablet (100 mg) by mouth daily (Patient not taking: Reported on 6/30/2024) 90 tablet 0    sertraline (ZOLOFT) 25 MG tablet Take 3  tablets (75 mg) by mouth daily (Patient not taking: Reported on 6/30/2024) 90 tablet 1     Social History     Tobacco Use    Smoking status: Former     Current packs/day: 0.00     Types: Cigarettes, Other     Passive exposure: Current    Smokeless tobacco: Never   Substance Use Topics    Alcohol use: Never     Allergies   Allergen Reactions    Iodine Anaphylaxis    Ivp Dye [Contrast Dye] Anaphylaxis    Latex Hives       Review of Systems  All systems negative except for those listed above in HPI.    OBJECTIVE:  /76 (BP Location: Right arm, Patient Position: Sitting, Cuff Size: Adult Regular)   Pulse 82   Temp 97.7  F (36.5  C) (Oral)   Resp 18   Wt 79 kg (174 lb 3.2 oz)   SpO2 100%   BMI 29.90 kg/m      Physical Exam  Vitals reviewed.   Constitutional:       General: She is not in acute distress.     Appearance: Normal appearance. She is well-developed. She is not ill-appearing, toxic-appearing or diaphoretic.   Cardiovascular:      Pulses: Normal pulses.   Pulmonary:      Effort: Pulmonary effort is normal.   Abdominal:      General: Bowel sounds are normal. There is no distension.      Palpations: Abdomen is soft.      Tenderness: There is no abdominal tenderness. There is no right CVA tenderness, left CVA tenderness or guarding.   Musculoskeletal:         General: Normal range of motion.   Skin:     General: Skin is warm and dry.      Capillary Refill: Capillary refill takes less than 2 seconds.      Coloration: Skin is not pale.   Neurological:      General: No focal deficit present.      Mental Status: She is alert and oriented to person, place, and time.   Psychiatric:         Mood and Affect: Mood normal.         Behavior: Behavior normal.       Results for orders placed or performed in visit on 06/30/24   UA Macroscopic with reflex to Microscopic and Culture - Lab Collect     Status: Abnormal    Specimen: Urine, Clean Catch   Result Value Ref Range    Color Urine Yellow Colorless, Straw, Light  Yellow, Yellow    Appearance Urine Clear Clear    Glucose Urine Negative Negative mg/dL    Bilirubin Urine Negative Negative    Ketones Urine Trace (A) Negative mg/dL    Specific Gravity Urine 1.020 1.005 - 1.030    Blood Urine Large (A) Negative    pH Urine 6.0 5.0 - 8.0    Protein Albumin Urine Negative Negative mg/dL    Urobilinogen Urine 0.2 0.2, 1.0 E.U./dL    Nitrite Urine Negative Negative    Leukocyte Esterase Urine Negative Negative   UA Microscopic with Reflex to Culture     Status: Abnormal   Result Value Ref Range    Bacteria Urine None Seen None Seen /HPF    RBC Urine 25-50 (A) 0-2 /HPF /HPF    WBC Urine 0-5 0-5 /HPF /HPF    Squamous Epithelials Urine Moderate (A) None Seen /LPF    Mucus Urine Present (A) None Seen /LPF    Narrative    Urine Culture not indicated   CBC with platelets and differential     Status: Abnormal   Result Value Ref Range    WBC Count 10.6 4.0 - 11.0 10e3/uL    RBC Count 4.90 3.80 - 5.20 10e6/uL    Hemoglobin 11.6 (L) 11.7 - 15.7 g/dL    Hematocrit 37.3 35.0 - 47.0 %    MCV 76 (L) 78 - 100 fL    MCH 23.7 (L) 26.5 - 33.0 pg    MCHC 31.1 (L) 31.5 - 36.5 g/dL    RDW 14.3 10.0 - 15.0 %    Platelet Count 304 150 - 450 10e3/uL    % Neutrophils 64 %    % Lymphocytes 28 %    % Monocytes 6 %    % Eosinophils 2 %    % Basophils 0 %    % Immature Granulocytes 0 %    Absolute Neutrophils 6.8 1.6 - 8.3 10e3/uL    Absolute Lymphocytes 3.0 0.8 - 5.3 10e3/uL    Absolute Monocytes 0.6 0.0 - 1.3 10e3/uL    Absolute Eosinophils 0.2 0.0 - 0.7 10e3/uL    Absolute Basophils 0.0 0.0 - 0.2 10e3/uL    Absolute Immature Granulocytes 0.0 <=0.4 10e3/uL   HCG qualitative urine     Status: Normal   Result Value Ref Range    hCG Urine Qualitative Negative Negative   Wet prep - Clinic Collect     Status: Abnormal    Specimen: Vagina; Swab   Result Value Ref Range    Trichomonas Absent Absent    Yeast Absent Absent    Clue Cells Absent Absent    WBCs/high power field 2+ (A) None   CBC with platelets and  differential     Status: Abnormal    Narrative    The following orders were created for panel order CBC with platelets and differential.  Procedure                               Abnormality         Status                     ---------                               -----------         ------                     CBC with platelets and d...[[id number]]  Abnormal            Final result                 Please view results for these tests on the individual orders.     ASSESSMENT:    ICD-10-CM    1. Vaginal bleeding  N93.9 Wet prep - Clinic Collect     CBC with platelets and differential     INR     Partial thromboplastin time     CBC with platelets and differential     INR     Partial thromboplastin time     Ob/Gyn  Referral      2. Pelvic pain in female  R10.2 Wet prep - Clinic Collect     CBC with platelets and differential     INR     Partial thromboplastin time     UA Macroscopic with reflex to Microscopic and Culture - Lab Collect     HCG qualitative urine     UA Microscopic with Reflex to Culture     CBC with platelets and differential     INR     Partial thromboplastin time     HCG qualitative urine     Ob/Gyn  Referral     CANCELED: UA Macroscopic with reflex to Microscopic and Culture - Lab Collect      3. Postpartum hemorrhage, unspecified type  O72.1         PLAN:     Postpartum bleeding with stable CBC, no profound anemia or low platelets  PTT INR pending, will call if concerns  Urine wet prep unremarkable for infection  Urgent OBGYN follow-up request placed, would defer to them regarding medication management repeat imaging  ER if severe pelvic pain, shortness of breath, dizziness, fainting, fevers    Follow up with primary care provider with any problems, questions or concerns or if symptoms worsen or fail to improve. Patient agreed to plan and verbalized understanding.    Myriam Arzola, [facility]

## 2024-07-01 LAB
APTT PPP: 32 SECONDS (ref 22–38)
INR PPP: 1.12 (ref 0.85–1.15)

## 2024-07-02 ENCOUNTER — OFFICE VISIT (OUTPATIENT)
Dept: OBGYN | Facility: CLINIC | Age: 24
End: 2024-07-02
Attending: NURSE PRACTITIONER
Payer: COMMERCIAL

## 2024-07-02 VITALS
HEIGHT: 64 IN | WEIGHT: 171.8 LBS | RESPIRATION RATE: 16 BRPM | BODY MASS INDEX: 29.33 KG/M2 | SYSTOLIC BLOOD PRESSURE: 121 MMHG | DIASTOLIC BLOOD PRESSURE: 79 MMHG | HEART RATE: 95 BPM | TEMPERATURE: 97.8 F

## 2024-07-02 DIAGNOSIS — N93.9 ABNORMAL UTERINE BLEEDING (AUB): Primary | ICD-10-CM

## 2024-07-02 DIAGNOSIS — N92.1 BREAKTHROUGH BLEEDING ON DEPO PROVERA: ICD-10-CM

## 2024-07-02 DIAGNOSIS — N93.9 VAGINAL BLEEDING: ICD-10-CM

## 2024-07-02 DIAGNOSIS — R10.2 PELVIC PAIN IN FEMALE: ICD-10-CM

## 2024-07-02 LAB
HBA1C MFR BLD: 5.7 % (ref 0–5.6)
TSH SERPL DL<=0.005 MIU/L-ACNC: 0.52 UIU/ML (ref 0.3–4.2)

## 2024-07-02 PROCEDURE — 36415 COLL VENOUS BLD VENIPUNCTURE: CPT | Performed by: OBSTETRICS & GYNECOLOGY

## 2024-07-02 PROCEDURE — 83036 HEMOGLOBIN GLYCOSYLATED A1C: CPT | Performed by: OBSTETRICS & GYNECOLOGY

## 2024-07-02 PROCEDURE — 99214 OFFICE O/P EST MOD 30 MIN: CPT | Performed by: OBSTETRICS & GYNECOLOGY

## 2024-07-02 PROCEDURE — 84443 ASSAY THYROID STIM HORMONE: CPT | Performed by: OBSTETRICS & GYNECOLOGY

## 2024-07-02 RX ORDER — NORGESTIMATE AND ETHINYL ESTRADIOL 0.25-0.035
1 KIT ORAL DAILY
Qty: 28 TABLET | Refills: 1 | Status: SHIPPED | OUTPATIENT
Start: 2024-07-02 | End: 2024-09-25

## 2024-07-02 NOTE — NURSING NOTE
"Initial /79 (BP Location: Left arm, Patient Position: Sitting, Cuff Size: Adult Regular)   Pulse 95   Temp 97.8  F (36.6  C) (Tympanic)   Resp 16   Ht 1.626 m (5' 4\")   Wt 77.9 kg (171 lb 12.8 oz)   BMI 29.49 kg/m   Estimated body mass index is 29.49 kg/m  as calculated from the following:    Height as of this encounter: 1.626 m (5' 4\").    Weight as of this encounter: 77.9 kg (171 lb 12.8 oz). .      "

## 2024-07-02 NOTE — PROGRESS NOTES
[facility]    Gynecology Office Note    CC:   Chief Complaint   Patient presents with    Follow Up        HPI: Jeanine Hernandez is a 24 year old  who presents for acute on chronic AUB.  Patient has had irregular and abnormal bleeding since the birth of her child about 3-1/2 months ago.  Had some bleeding issues that continued postpartum but this did eventually resolve by about 2 months postpartum.  Had a few weeks of no bleeding.  She then received a Depo-Provera shot on May 16.  Since that time, has had ongoing irregular bleeding.  She is bleeding more days than she is not.  Will have several days of heavier flow bleeding like a heavy period.  This will then get lighter for a few days before he gets heavier again.  She has used Depo-Provera in the past without any bleeding issues.  Has had associated pelvic cramping which is worse on days of heavier bleeding.    GYN Hx:     No LMP recorded.    Received Depo-Provera on May 16, 2024    Last Pap Smear: 2023 NIL      ROS: A 10 pt ROS was completed and found to be otherwise negative unless mentioned in the HPI.     PMH:   Past Medical History:   Diagnosis Date    Anxiety     in high school    Asthma     as child    Depression     in high school    History of urinary tract infection     as a child    OCD (obsessive compulsive disorder)     in past    Postpartum depression        PSHx:   Past Surgical History:   Procedure Laterality Date    FOOT SURGERY      right foot - ganglion cyst    MYRINGOTOMY, INSERT TUBE BILATERAL, COMBINED      ORTHOPEDIC SURGERY      planter warts foot    TONSILLECTOMY      TYMPANOPLASTY         OBHx:   OB History    Para Term  AB Living   2 2 2 0 0 2   SAB IAB Ectopic Multiple Live Births   0 0 0 0 2      # Outcome Date GA Lbr Macho/2nd Weight Sex Type Anes PTL Lv   2 Term 24 38w0d 04:35 / 00:11 3.35 kg (7 lb 6.2 oz) M Vag-Spont None N TONI      Name: Luis Oro      Apgar1:  8  Apgar5: 9   1 Term 05/17/20 39w1d / 02:27 3.459 kg (7 lb 10 oz) F Vag-Spont EPI N TONI      Complications: Intraamniotic Infection      Name: Jessie      Apgar1: 8  Apgar5: 9       Medications:   Current Outpatient Medications   Medication Sig Dispense Refill    norgestimate-ethinyl estradiol (ORTHO-CYCLEN) 0.25-35 MG-MCG tablet Take 1 tablet by mouth daily 28 tablet 1    ARIPiprazole (ABILIFY) 20 MG tablet Take 30 mg by mouth daily (Patient not taking: Reported on 6/30/2024)      lamoTRIgine (LAMICTAL) 100 MG tablet Take 1 tablet (100 mg) by mouth daily (Patient not taking: Reported on 6/30/2024) 90 tablet 0    sertraline (ZOLOFT) 25 MG tablet Take 3 tablets (75 mg) by mouth daily (Patient not taking: Reported on 6/30/2024) 90 tablet 1     Current Facility-Administered Medications   Medication Dose Route Frequency Provider Last Rate Last Admin    medroxyPROGESTERone (DEPO-PROVERA) injection 150 mg  150 mg Intramuscular Q90 Days Dottie Lee MD   150 mg at 05/16/24 1054       Allergies:      Allergies   Allergen Reactions    Iodine Anaphylaxis    Ivp Dye [Contrast Dye] Anaphylaxis    Latex Hives       Social History:   Social History     Socioeconomic History    Marital status: Patient Declined     Spouse name: Not on file    Number of children: Not on file    Years of education: Not on file    Highest education level: Not on file   Occupational History    Not on file   Tobacco Use    Smoking status: Former     Current packs/day: 0.00     Types: Cigarettes, Other     Passive exposure: Current    Smokeless tobacco: Never   Vaping Use    Vaping status: Every Day    Substances: Nicotine    Devices: Pre-filled pod   Substance and Sexual Activity    Alcohol use: Never    Drug use: Never    Sexual activity: Yes     Partners: Male     Birth control/protection: None     Comment: Need to get birth control   Other Topics Concern    Parent/sibling w/ CABG, MI or angioplasty before 65F 55M? No   Social History  Narrative    ** Merged History Encounter **          Social Determinants of Health     Financial Resource Strain: Low Risk  (5/21/2024)    Financial Resource Strain     Within the past 12 months, have you or your family members you live with been unable to get utilities (heat, electricity) when it was really needed?: No   Food Insecurity: Low Risk  (5/21/2024)    Food Insecurity     Within the past 12 months, did you worry that your food would run out before you got money to buy more?: No     Within the past 12 months, did the food you bought just not last and you didn t have money to get more?: No   Transportation Needs: High Risk (5/21/2024)    Transportation Needs     Within the past 12 months, has lack of transportation kept you from medical appointments, getting your medicines, non-medical meetings or appointments, work, or from getting things that you need?: Yes   Physical Activity: Not on file   Stress: Not on file   Social Connections: Not on file   Interpersonal Safety: Not on file   Housing Stability: Low Risk  (5/21/2024)    Housing Stability     Do you have housing? : Yes     Are you worried about losing your housing?: No         Family History:   Family History   Problem Relation Age of Onset    Diabetes Father         type II    Neurologic Disorder Father         TBI    Hyperlipidemia Father     Coronary Artery Disease Father         MI    Melanoma Maternal Grandmother     Other - See Comments Maternal Grandfather         murdered    Chronic Obstructive Pulmonary Disease Paternal Grandmother     Cerebrovascular Disease Paternal Grandmother     Heart Surgery Paternal Grandmother     Cerebrovascular Disease Paternal Grandfather         x3    Diabetes Paternal Grandfather         type II       Physical Exam:   Vitals:    07/02/24 1341   BP: 121/79   BP Location: Left arm   Patient Position: Sitting   Cuff Size: Adult Regular   Pulse: 95   Resp: 16   Temp: 97.8  F (36.6  C)   TempSrc: Tympanic   Weight:  "77.9 kg (171 lb 12.8 oz)   Height: 1.626 m (5' 4\")      Estimated body mass index is 29.49 kg/m  as calculated from the following:    Height as of this encounter: 1.626 m (5' 4\").    Weight as of this encounter: 77.9 kg (171 lb 12.8 oz).    General appearance: well-hydrated, A&O x 3, no apparent distress  Lungs: Equal expansion bilaterally, no accessory muscle use  Heart: No heaves or thrills.   Constitutional: See vitals  Neuro: CN II-XII grossly intact    Labs/Imagin24  Hb 11.6g/dL  Wet prep negative  UPT negative    Assessment and Plan:     Encounter Diagnoses   Name Primary?    Vaginal bleeding     Pelvic pain in female     Abnormal uterine bleeding (AUB) Yes    Breakthrough bleeding on depo provera      Patient presents with exacerbation of AUB after receiving depo provera. She had prolonged bleeding post partum which had eventually resolved but then has restarted after getting depo provera about 6-7 weeks ago.  She is bleeding more days than she has not.  Was seen in urgent care a couple of days ago and had some lab work done, which was normal.  Will add to this evaluation with TSH and hemoglobin A1c testing today.  Will also obtain pelvic ultrasound for evaluation.  Discussed likelihood of abnormal bleeding being attributed to the Depo-Provera.  Could try a short course of OCPs to see if this helps to reset her bleeding cycle.  Did discuss that likely with ongoing injections, the bleeding would subside and a high number of people do achieve amenorrhea with continued Depo use.  Discussed alternative options if she does not wish to proceed with the Depo.  For now, she would like to try a course of OCPs and if this improves symptoms, would plan to continue with the Depo.      Health maintenance: pap smear up to date and normal    Return to clinic as needed.    Alona Gomez,         "

## 2024-07-11 ENCOUNTER — OFFICE VISIT (OUTPATIENT)
Dept: OBGYN | Facility: CLINIC | Age: 24
End: 2024-07-11
Payer: COMMERCIAL

## 2024-07-11 VITALS
DIASTOLIC BLOOD PRESSURE: 70 MMHG | HEART RATE: 90 BPM | TEMPERATURE: 98.8 F | HEIGHT: 64 IN | BODY MASS INDEX: 29.53 KG/M2 | RESPIRATION RATE: 18 BRPM | WEIGHT: 173 LBS | SYSTOLIC BLOOD PRESSURE: 120 MMHG

## 2024-07-11 DIAGNOSIS — M79.671 RIGHT FOOT PAIN: ICD-10-CM

## 2024-07-11 DIAGNOSIS — Z00.00 ANNUAL PHYSICAL EXAM: Primary | ICD-10-CM

## 2024-07-11 DIAGNOSIS — B35.3 TINEA PEDIS OF RIGHT FOOT: ICD-10-CM

## 2024-07-11 DIAGNOSIS — N89.8 VAGINAL IRRITATION: ICD-10-CM

## 2024-07-11 LAB
CLUE CELLS: ABNORMAL
TRICHOMONAS, WET PREP: ABNORMAL
WBC'S/HIGH POWER FIELD, WET PREP: ABNORMAL
YEAST, WET PREP: ABNORMAL

## 2024-07-11 PROCEDURE — 99214 OFFICE O/P EST MOD 30 MIN: CPT | Mod: 25 | Performed by: PHYSICIAN ASSISTANT

## 2024-07-11 PROCEDURE — 99459 PELVIC EXAMINATION: CPT | Performed by: PHYSICIAN ASSISTANT

## 2024-07-11 PROCEDURE — 99395 PREV VISIT EST AGE 18-39: CPT | Performed by: PHYSICIAN ASSISTANT

## 2024-07-11 PROCEDURE — 87210 SMEAR WET MOUNT SALINE/INK: CPT | Performed by: PHYSICIAN ASSISTANT

## 2024-07-11 RX ORDER — CLOTRIMAZOLE 1 %
CREAM (GRAM) TOPICAL 2 TIMES DAILY
Qty: 30 G | Refills: 0 | Status: SHIPPED | OUTPATIENT
Start: 2024-07-11

## 2024-07-11 RX ORDER — FLUCONAZOLE 150 MG/1
TABLET ORAL
Qty: 2 TABLET | Refills: 0 | Status: SHIPPED | OUTPATIENT
Start: 2024-07-11 | End: 2024-07-14

## 2024-07-11 NOTE — PROGRESS NOTES
SUBJECTIVE:   CC: Jeanine Hernandez is an 24 year old woman who presents for preventive health visit.       Patient has been advised of split billing requirements and indicates understanding: Yes  Healthy Habits:  Do you get at least three servings of calcium containing foods daily (dairy, green leafy vegetables, etc.)? yes  Amount of exercise or daily activities, outside of work: 0 day(s) per week  Problems taking medications regularly No  Medication side effects: No  Have you had an eye exam in the past two years? yes  Do you see a dentist twice per year? no  Do you have sleep apnea, excessive snoring or daytime drowsiness?no    Patient with concerns for lumps on dorsum of right foot that were removed surgically in the past. She states not injury or pain to area. Occasional soreness with palpation. No numbness/tingling.     She also noticed round spot to dorsum of her right foot that is concerning for ringworm. She states no pain, redness, or drainage, but some occasional itching. She has a dog that likes feet.     She also notes vaginal dryness and dyspareunia since starting oral birth control. No vaginal odor, discharge, or UTI symptoms.       Today's PHQ-2 Score:       7/11/2024    11:43 AM 3/27/2024     1:30 PM   PHQ-2 ( 1999 [employer])   Q1: Little interest or pleasure in doing things 0 0   Q2: Feeling down, depressed or hopeless 0 0   PHQ-2 Score 0 0       Abuse: Current or Past(Physical, Sexual or Emotional)- No  Do you feel safe in your environment? Yes        Social History     Tobacco Use    Smoking status: Former     Current packs/day: 0.00     Types: Cigarettes, Other     Passive exposure: Current    Smokeless tobacco: Never   Substance Use Topics    Alcohol use: Never     If you drink alcohol do you typically have >3 drinks per day or >7 drinks per week? No                     Reviewed orders with patient.  Reviewed health maintenance and updated orders accordingly - Yes  BP Readings from Last 3  Encounters:   24 120/70   24 121/79   24 129/76    Wt Readings from Last 3 Encounters:   24 78.5 kg (173 lb)   24 77.9 kg (171 lb 12.8 oz)   24 79 kg (174 lb 3.2 oz)                  Patient Active Problem List   Diagnosis    Moderate episode of recurrent major depressive disorder (H)    Insomnia due to psychological stress    High risk medication use    Seizures (H)    Bipolar disorder with depression (H)    Tobacco use    Pre-eclampsia    Diet controlled gestational diabetes mellitus (GDM) in third trimester     (spontaneous vaginal delivery)    Gestational hypertension, third trimester    Preeclampsia, severe     Past Surgical History:   Procedure Laterality Date    FOOT SURGERY      right foot - ganglion cyst    MYRINGOTOMY, INSERT TUBE BILATERAL, COMBINED      ORTHOPEDIC SURGERY      planter warts foot    TONSILLECTOMY      TYMPANOPLASTY         Social History     Tobacco Use    Smoking status: Former     Current packs/day: 0.00     Types: Cigarettes, Other     Passive exposure: Current    Smokeless tobacco: Never   Substance Use Topics    Alcohol use: Never     Family History   Problem Relation Age of Onset    Diabetes Father         type II    Neurologic Disorder Father         TBI    Hyperlipidemia Father     Coronary Artery Disease Father         MI    Melanoma Maternal Grandmother     Other - See Comments Maternal Grandfather         murdered    Chronic Obstructive Pulmonary Disease Paternal Grandmother     Cerebrovascular Disease Paternal Grandmother     Heart Surgery Paternal Grandmother     Cerebrovascular Disease Paternal Grandfather         x3    Diabetes Paternal Grandfather         type II         Current Outpatient Medications   Medication Sig Dispense Refill    ARIPiprazole (ABILIFY) 20 MG tablet Take 30 mg by mouth daily      clotrimazole (LOTRIMIN) 1 % external cream Apply topically 2 times daily 30 g 0    fluconazole (DIFLUCAN) 150 MG tablet Take one  tablet now, and one tablet in three days if symptoms still present 2 tablet 0    lamoTRIgine (LAMICTAL) 100 MG tablet Take 1 tablet (100 mg) by mouth daily 90 tablet 0    norgestimate-ethinyl estradiol (ORTHO-CYCLEN) 0.25-35 MG-MCG tablet Take 1 tablet by mouth daily 28 tablet 1    sertraline (ZOLOFT) 25 MG tablet Take 3 tablets (75 mg) by mouth daily 90 tablet 1     Allergies   Allergen Reactions    Iodine Anaphylaxis    Ivp Dye [Contrast Dye] Anaphylaxis    Latex Hives     Recent Labs   Lab Test 07/02/24  1414 03/26/24  1907 03/16/24  1756 03/14/24  2215 03/12/24  1909 08/14/23  1543 10/29/20  1126 05/15/20  1500 05/14/20  1555   A1C 5.7*  --   --  5.3  --   --   --   --   --    ALT  --   --  14 6 8   < >  --  8 11   CR  --  0.89 0.68 0.72 0.64   < >  --  0.65 0.65   GFRESTIMATED  --  >90 >90 >90 >90   < >  --  >90 >90   GFRESTBLACK  --   --   --   --   --   --   --  >90 >90   POTASSIUM  --  3.7 4.4 3.8 4.1   < >  --   --  3.7   TSH 0.52  --   --   --   --   --  0.75  --   --     < > = values in this interval not displayed.        FHS-7:        No data to display              click delete button to remove this line now  Mammogram Screening - Patient under 40 years of age: Routine Mammogram Screening not recommended.   Pertinent mammograms are reviewed under the imaging tab.    Pertinent mammograms are reviewed under the imaging tab.  History of abnormal Pap smear: No - age 21-29 PAP every 3 years recommended      8/14/2023     2:58 PM 12/10/2019     9:35 AM   PAP / HPV   PAP Negative for Intraepithelial Lesion or Malignancy (NILM)     PAP (Historical)  NIL      Reviewed and updated as needed this visit by clinical staff   Tobacco  Allergies  Meds              Reviewed and updated as needed this visit by Provider                  Past Medical History:   Diagnosis Date    Anxiety     in high school    Asthma     as child    Depression     in high school    History of urinary tract infection     as a child    OCD  "(obsessive compulsive disorder)     in past    Postpartum depression       Past Surgical History:   Procedure Laterality Date    FOOT SURGERY      right foot - ganglion cyst    MYRINGOTOMY, INSERT TUBE BILATERAL, COMBINED      ORTHOPEDIC SURGERY      planter warts foot    TONSILLECTOMY      TYMPANOPLASTY       OB History    Para Term  AB Living   2 2 2 0 0 2   SAB IAB Ectopic Multiple Live Births   0 0 0 0 2      # Outcome Date GA Lbr Macho/2nd Weight Sex Type Anes PTL Lv   2 Term 24 38w0d 04:35 / 00:11 3.35 kg (7 lb 6.2 oz) M Vag-Spont None N TONI      Name: Luis Oro      Apgar1: 8  Apgar5: 9   1 Term 20 39w1d / 02:27 3.459 kg (7 lb 10 oz) F Vag-Spont EPI N TONI      Complications: Intraamniotic Infection      Name: Jessie      Apgar1: 8  Apgar5: 9       ROS:  CONSTITUTIONAL: NEGATIVE for fever, chills, change in weight  INTEGUMENTARU/SKIN: NEGATIVE for worrisome rashes, moles or lesions  EYES: NEGATIVE for vision changes or irritation  ENT: NEGATIVE for ear, mouth and throat problems  RESP: NEGATIVE for significant cough or SOB  BREAST: NEGATIVE for masses, tenderness or discharge  CV: NEGATIVE for chest pain, palpitations or peripheral edema  GI: NEGATIVE for nausea, abdominal pain, heartburn, or change in bowel habits  : NEGATIVE for unusual urinary or vaginal symptoms. Periods are regular.  MUSCULOSKELETAL: NEGATIVE for significant arthralgias or myalgia  NEURO: NEGATIVE for weakness, dizziness or paresthesias  ENDOCRINE: NEGATIVE for temperature intolerance, skin/hair changes  PSYCHIATRIC: NEGATIVE for changes in mood or affect    OBJECTIVE:   /70 (BP Location: Right arm, Patient Position: Chair, Cuff Size: Adult Regular)   Pulse 90   Temp 98.8  F (37.1  C) (Tympanic)   Resp 18   Ht 1.626 m (5' 4\")   Wt 78.5 kg (173 lb)   Breastfeeding No   BMI 29.70 kg/m    EXAM:  GENERAL: alert and no distress  EYES: Eyes grossly normal to inspection, PERRL and conjunctivae " and sclerae normal  HENT: ear canals and TM's normal, nose and mouth without ulcers or lesions  NECK: no adenopathy, no asymmetry, masses, or scars  RESP: lungs clear to auscultation - no rales, rhonchi or wheezes  BREAST: normal without masses, tenderness or nipple discharge and no palpable axillary masses or adenopathy  CV: regular rate and rhythm, normal S1 S2, no S3 or S4, no murmur, click or rub, no peripheral edema  ABDOMEN: soft, nontender, no hepatosplenomegaly, no masses and bowel sounds normal   (female) w/bimanual: positive whitish vaginal discharge consistent with yeast infection. Wet prep obtained. normal female external genitalia, normal urethral meatus, normal vaginal mucosa, and normal cervix/adnexa/uterus without masses or discharge  MS: no gross musculoskeletal defects noted, no edema. Positive clump of cyst like structures to the dorsum of the right foot over area of previous surgical scar. Podiatry referral placed.   SKIN: slight erythematous/purplish ring rash to the dorsum of the right foot consistent with ringworm.   NEURO: Normal strength and tone, mentation intact and speech normal  PSYCH: mentation appears normal, affect normal/bright    Diagnostic Test Results:  Labs reviewed in Epic    ASSESSMENT/PLAN:   (Z00.00) Annual physical exam  (primary encounter diagnosis)  Plan: pap smear due in 2026.     (N89.8) Vaginal irritation  Comment: symptoms appear typical of vaginal yeast infection. Diflucan sent   Plan: Wet prep - Clinic Collect, fluconazole         (DIFLUCAN) 150 MG tablet          (B35.3) Tinea pedis of right foot  Comment: will treat topically for ringworm with lotrimin twice daily for 2-4 weeks.   Plan: clotrimazole (LOTRIMIN) 1 % external cream          (M79.671) Right foot pain  Comment: no signs/symptoms of infection or blood clot. Referral for podiatry placed.   Plan: Orthopedic  Referral          Patient has been advised of split billing requirements and indicates  "understanding: Yes  COUNSELING:   Reviewed preventive health counseling, as reflected in patient instructions       Regular exercise       Healthy diet/nutrition       Safe sex practices/STD prevention    Estimated body mass index is 29.7 kg/m  as calculated from the following:    Height as of this encounter: 1.626 m (5' 4\").    Weight as of this encounter: 78.5 kg (173 lb).    Weight management plan: Discussed healthy diet and exercise guidelines    She reports that she has quit smoking. Her smoking use included cigarettes and other. She has been exposed to tobacco smoke. She has never used smokeless tobacco.      Counseling Resources:  ATP IV Guidelines  Pooled Cohorts Equation Calculator  Breast Cancer Risk Calculator  BRCA-Related Cancer Risk Assessment: FHS-7 Tool  FRAX Risk Assessment  ICSI Preventive Guidelines  Dietary Guidelines for Americans, 2010  USDA's MyPlate  ASA Prophylaxis  Lung CA Screening    Elisa Simmons PA-C  [facility] [place]    "

## 2024-07-11 NOTE — NURSING NOTE
"Initial /70 (BP Location: Right arm, Patient Position: Chair, Cuff Size: Adult Regular)   Pulse 90   Temp 98.8  F (37.1  C) (Tympanic)   Resp 18   Ht 1.626 m (5' 4\")   Wt 78.5 kg (173 lb)   Breastfeeding No   BMI 29.70 kg/m   Estimated body mass index is 29.7 kg/m  as calculated from the following:    Height as of this encounter: 1.626 m (5' 4\").    Weight as of this encounter: 78.5 kg (173 lb). .    "

## 2024-07-12 ENCOUNTER — HOSPITAL ENCOUNTER (OUTPATIENT)
Dept: ULTRASOUND IMAGING | Facility: CLINIC | Age: 24
Discharge: HOME | End: 2024-07-12
Attending: OBSTETRICS & GYNECOLOGY | Admitting: OBSTETRICS & GYNECOLOGY
Payer: COMMERCIAL

## 2024-07-12 DIAGNOSIS — N93.9 ABNORMAL UTERINE BLEEDING (AUB): ICD-10-CM

## 2024-07-12 PROCEDURE — 76830 TRANSVAGINAL US NON-OB: CPT

## 2024-07-12 PROCEDURE — 76856 US EXAM PELVIC COMPLETE: CPT

## 2024-07-18 ENCOUNTER — MEDICAL CORRESPONDENCE (OUTPATIENT)
Dept: HEALTH INFORMATION MANAGEMENT | Facility: CLINIC | Age: 24
End: 2024-07-18
Payer: COMMERCIAL

## 2024-07-29 ENCOUNTER — VIRTUAL VISIT (OUTPATIENT)
Dept: FAMILY MEDICINE | Facility: CLINIC | Age: 24
End: 2024-07-29
Payer: COMMERCIAL

## 2024-07-29 DIAGNOSIS — F31.81 BIPOLAR 2 DISORDER (H): ICD-10-CM

## 2024-07-29 DIAGNOSIS — F33.1 MODERATE EPISODE OF RECURRENT MAJOR DEPRESSIVE DISORDER (H): Primary | ICD-10-CM

## 2024-07-29 DIAGNOSIS — F41.9 ANXIETY: ICD-10-CM

## 2024-07-29 PROCEDURE — 99213 OFFICE O/P EST LOW 20 MIN: CPT | Mod: 95 | Performed by: FAMILY MEDICINE

## 2024-07-29 ASSESSMENT — ANXIETY QUESTIONNAIRES
3. WORRYING TOO MUCH ABOUT DIFFERENT THINGS: NEARLY EVERY DAY
2. NOT BEING ABLE TO STOP OR CONTROL WORRYING: NEARLY EVERY DAY
6. BECOMING EASILY ANNOYED OR IRRITABLE: NEARLY EVERY DAY
5. BEING SO RESTLESS THAT IT IS HARD TO SIT STILL: SEVERAL DAYS
1. FEELING NERVOUS, ANXIOUS, OR ON EDGE: NEARLY EVERY DAY
IF YOU CHECKED OFF ANY PROBLEMS ON THIS QUESTIONNAIRE, HOW DIFFICULT HAVE THESE PROBLEMS MADE IT FOR YOU TO DO YOUR WORK, TAKE CARE OF THINGS AT HOME, OR GET ALONG WITH OTHER PEOPLE: EXTREMELY DIFFICULT
7. FEELING AFRAID AS IF SOMETHING AWFUL MIGHT HAPPEN: NOT AT ALL
GAD7 TOTAL SCORE: 16
GAD7 TOTAL SCORE: 16

## 2024-07-29 ASSESSMENT — PATIENT HEALTH QUESTIONNAIRE - PHQ9
SUM OF ALL RESPONSES TO PHQ QUESTIONS 1-9: 11
5. POOR APPETITE OR OVEREATING: NEARLY EVERY DAY

## 2024-07-29 NOTE — PROGRESS NOTES
Christophe is a 24 year old who is being evaluated via a billable video visit.    How would you like to obtain your AVS? MyChart  If the video visit is dropped, the invitation should be resent by: Send to e-mail at: [email or web address]  Will anyone else be joining your video visit? No      Assessment & Plan   Problem List Items Addressed This Visit       Moderate episode of recurrent major depressive disorder (H) - Primary    Relevant Orders    Adult Mental Health  Referral     Other Visit Diagnoses       Bipolar 2 disorder (H)        Relevant Orders    Adult Mental Health  Referral    Anxiety        Relevant Orders    Adult Mental Health  Referral           Patient is a 24 yr old female with history of bipolar depression. She reports that she has been struggling and will like to see a psychiatrist. She is on medication but they don't seem to be helping her symptoms.   Referral placed.        FUTURE APPOINTMENTS:       - Follow-up visit as needed      Subjective   Christophe is a 24 year old, presenting for the following health issues:  Mental Health Problem      7/29/2024     2:51 PM   Additional Questions   Roomed by Chuck ACOSTA CMA   Accompanied by self     Video Start Time:  5:21PM    Mental Health Problem    History of Present Illness       Reason for visit:  Referral for mental health diagnosis    She eats 0-1 servings of fruits and vegetables daily.She consumes 1 sweetened beverage(s) daily.She exercises with enough effort to increase her heart rate 10 to 19 minutes per day.  She exercises with enough effort to increase her heart rate 5 days per week.   She is taking medications regularly.       Depression and Anxiety   How are you doing with your depression since your last visit? Worsened   How are you doing with your anxiety since your last visit?  Worsened   Are you having other symptoms that might be associated with depression or anxiety? Yes:  has not been to her job in 15 days   Have  you had a significant life event? OTHER: Birth of Baby 5 months ago     Do you have any concerns with your use of alcohol or other drugs? No    Social History     Tobacco Use    Smoking status: Former     Current packs/day: 0.00     Types: Cigarettes, Other     Passive exposure: Current    Smokeless tobacco: Never   Vaping Use    Vaping status: Every Day    Substances: Nicotine    Devices: Pre-filled pod   Substance Use Topics    Alcohol use: Never    Drug use: Never         7/21/2023     3:12 AM 3/27/2024     1:30 PM 7/29/2024     2:50 PM   PHQ   PHQ-9 Total Score 5 2 11   Q9: Thoughts of better off dead/self-harm past 2 weeks Not at all Not at all Not at all         11/24/2021    10:59 AM 3/27/2024     1:30 PM 7/29/2024     2:50 PM   CONNER-7 SCORE   Total Score 3 (minimal anxiety)     Total Score 3 0 16         7/29/2024     2:50 PM   Last PHQ-9   1.  Little interest or pleasure in doing things 3   2.  Feeling down, depressed, or hopeless 3   3.  Trouble falling or staying asleep, or sleeping too much 1   4.  Feeling tired or having little energy 1   5.  Poor appetite or overeating 0   6.  Feeling bad about yourself 3   7.  Trouble concentrating 0   8.  Moving slowly or restless 0   Q9: Thoughts of better off dead/self-harm past 2 weeks 0   PHQ-9 Total Score 11   Difficulty at work, home, or with people Extremely dIfficult         7/29/2024     2:50 PM   CONNER-7    1. Feeling nervous, anxious, or on edge 3   2. Not being able to stop or control worrying 3   3. Worrying too much about different things 3   4. Trouble relaxing 3   5. Being so restless that it is hard to sit still 1   6. Becoming easily annoyed or irritable 3   7. Feeling afraid, as if something awful might happen 0   CONNER-7 Total Score 16   If you checked any problems, how difficult have they made it for you to do your work, take care of things at home, or get along with other people? Extremely difficult       Suicide Assessment Five-step Evaluation and  Treatment (SAFE-T)    How many servings of fruits and vegetables do you eat daily?  0-1          Review of Systems  Constitutional, HEENT, cardiovascular, pulmonary, gi and gu systems are negative, except as otherwise noted.      Objective    Vitals - Patient Reported  Pain Score: No Pain (0)        Physical Exam   GENERAL: alert and no distress  EYES: Eyes grossly normal to inspection.  No discharge or erythema, or obvious scleral/conjunctival abnormalities.  RESP: No audible wheeze, cough, or visible cyanosis.    SKIN: Visible skin clear. No significant rash, abnormal pigmentation or lesions.  NEURO: Cranial nerves grossly intact.  Mentation and speech appropriate for age.  PSYCH: Appropriate affect, tone, and pace of words          Video-Visit Details    Type of service:  Video Visit   Video End Time: 5:27PM  Originating Location (pt. Location): Home  Distant Location (provider location):  On-site  Platform used for Video Visit: Sukhdeep  Signed Electronically by: Terrance Rayo MD

## 2024-07-31 ENCOUNTER — OFFICE VISIT (OUTPATIENT)
Dept: PODIATRY | Facility: CLINIC | Age: 24
End: 2024-07-31
Attending: PHYSICIAN ASSISTANT
Payer: COMMERCIAL

## 2024-07-31 VITALS
BODY MASS INDEX: 29.53 KG/M2 | HEART RATE: 92 BPM | DIASTOLIC BLOOD PRESSURE: 83 MMHG | HEIGHT: 64 IN | SYSTOLIC BLOOD PRESSURE: 121 MMHG | WEIGHT: 173 LBS

## 2024-07-31 DIAGNOSIS — M79.671 RIGHT FOOT PAIN: Primary | ICD-10-CM

## 2024-07-31 DIAGNOSIS — M79.89 PALPABLE MASS OF SOFT TISSUE OF FOOT: ICD-10-CM

## 2024-07-31 PROCEDURE — 99203 OFFICE O/P NEW LOW 30 MIN: CPT | Performed by: PODIATRIST

## 2024-07-31 NOTE — PROGRESS NOTES
PATIENT HISTORY:  Jeanine Hernandez is a 24 year old female who presents to clinic in consultation at the request of  Elisa Simmons PA-C with a chief complaint of a soft tissue mass on the right foot.  The patient is seen by themselves.  The patient relates the pain is primarily located around the top of the right forefoot.  Reports insidious onset without acute precipitating event.  The patient relates that the symptoms have been going on for several week(s).  The patient has previously had a similar soft tissue mass which was more extensive than was surgically removed.   Any previous notes and studies that pertain to the patient's condition were reviewed.    Pertinent medical, surgical and family history was reviewed in the Epic chart.    Past Medical History:   Past Medical History:   Diagnosis Date    Anxiety     in high school    Asthma     as child    Depression     in high school    History of urinary tract infection     as a child    OCD (obsessive compulsive disorder)     in past    Postpartum depression 2020       Medications:   Current Outpatient Medications:     ARIPiprazole (ABILIFY) 20 MG tablet, Take 30 mg by mouth daily, Disp: , Rfl:     clotrimazole (LOTRIMIN) 1 % external cream, Apply topically 2 times daily, Disp: 30 g, Rfl: 0    lamoTRIgine (LAMICTAL) 100 MG tablet, Take 1 tablet (100 mg) by mouth daily, Disp: 90 tablet, Rfl: 0    norgestimate-ethinyl estradiol (ORTHO-CYCLEN) 0.25-35 MG-MCG tablet, Take 1 tablet by mouth daily, Disp: 28 tablet, Rfl: 1    sertraline (ZOLOFT) 25 MG tablet, Take 3 tablets (75 mg) by mouth daily, Disp: 90 tablet, Rfl: 1    Current Facility-Administered Medications:     medroxyPROGESTERone (DEPO-PROVERA) injection 150 mg, 150 mg, Intramuscular, Q90 Days, Dottie Lee MD, 150 mg at 08/02/24 1539     Allergies:    Allergies   Allergen Reactions    Iodine Anaphylaxis    Ivp Dye [Contrast Dye] Anaphylaxis    Latex Hives       Vitals: /83    "Pulse 92   Ht 1.626 m (5' 4\")   Wt 78.5 kg (173 lb)   LMP 07/27/2024   BMI 29.70 kg/m    BMI= Body mass index is 29.7 kg/m .    LOWER EXTREMITY PHYSICAL EXAM    Dermatologic: Skin is intact to right lower extremity without significant lesions, rash or abrasion.        Vascular: DP & PT pulses are intact & regular on the right.   CFT and skin temperature is normal to the right lower extremity.     Neurologic: Lower extremity sensation is intact to light touch.  No evidence of weakness in the right lower extremity.        Musculoskeletal: Patient is ambulatory without assistive device or brace.  No gross ankle deformity noted.  No foot or ankle joint effusion is noted.  Noted small subdermal papules over the dorsal aspect of the right foot.  No surrounding erythema noted.           ASSESSMENT / PLAN:     ICD-10-CM    1. Right foot pain  M79.671 Orthopedic  Referral     MR Foot Right w/o Contrast      2. Palpable mass of soft tissue of foot  M79.89 MR Foot Right w/o Contrast    dorsal right foot          I have explained to Jeanine about the conditions.  We discussed the underlying contributing factors to the condition as well as both conservative and surgical treatment options along with expected length of recovery.  At this time, the patient will obtain an MRI for further evaluation of the soft tissue mass on the dorsal aspect of the right foot.  Patient will return to the office 1 week after obtaining the images to go over the findings.    Jeanine verbalized agreement with and understanding of the rational for the diagnosis and treatment plan.  All questions were answered to best of my ability and the patient's satisfaction. The patient was advised to contact the clinic with any questions that may arise after the clinic visit.      Disclaimer: This note consists of symbols derived from keyboarding, dictation and/or voice recognition software. As a result, there may be errors in the script that have " gone undetected. Please consider this when interpreting information found in this chart.       MARIA ESTHER Butt D.P.M., [email or web address].

## 2024-07-31 NOTE — LETTER
7/31/2024      Jeanine Hernandez  [address]  [facility] [address]      Dear Colleague,    Thank you for referring your patient, Jeanine Hernandez, to the [facility] [place]. Please see a copy of my visit note below.    PATIENT HISTORY:  Jeanine Hernandez is a 24 year old female who presents to clinic in consultation at the request of  Elisa Simmons PA-C with a chief complaint of a soft tissue mass on the right foot.  The patient is seen by themselves.  The patient relates the pain is primarily located around the top of the right forefoot.  Reports insidious onset without acute precipitating event.  The patient relates that the symptoms have been going on for several week(s).  The patient has previously had a similar soft tissue mass which was more extensive than was surgically removed.   Any previous notes and studies that pertain to the patient's condition were reviewed.    Pertinent medical, surgical and family history was reviewed in the [facility] chart.    Past Medical History:   Past Medical History:   Diagnosis Date     Anxiety     in high school     Asthma     as child     Depression     in high school     History of urinary tract infection     as a child     OCD (obsessive compulsive disorder)     in past     Postpartum depression 2020       Medications:   Current Outpatient Medications:      ARIPiprazole (ABILIFY) 20 MG tablet, Take 30 mg by mouth daily, Disp: , Rfl:      clotrimazole (LOTRIMIN) 1 % external cream, Apply topically 2 times daily, Disp: 30 g, Rfl: 0     lamoTRIgine (LAMICTAL) 100 MG tablet, Take 1 tablet (100 mg) by mouth daily, Disp: 90 tablet, Rfl: 0     norgestimate-ethinyl estradiol (ORTHO-CYCLEN) 0.25-35 MG-MCG tablet, Take 1 tablet by mouth daily, Disp: 28 tablet, Rfl: 1     sertraline (ZOLOFT) 25 MG tablet, Take 3 tablets (75 mg) by mouth daily, Disp: 90 tablet, Rfl: 1    Current Facility-Administered Medications:      medroxyPROGESTERone  "(DEPO-PROVERA) injection 150 mg, 150 mg, Intramuscular, Q90 Days, Dottie Lee MD, 150 mg at 08/02/24 1539     Allergies:    Allergies   Allergen Reactions     Iodine Anaphylaxis     Ivp Dye [Contrast Dye] Anaphylaxis     Latex Hives       Vitals: /83   Pulse 92   Ht 1.626 m (5' 4\")   Wt 78.5 kg (173 lb)   LMP 07/27/2024   BMI 29.70 kg/m    BMI= Body mass index is 29.7 kg/m .    LOWER EXTREMITY PHYSICAL EXAM    Dermatologic: Skin is intact to right lower extremity without significant lesions, rash or abrasion.        Vascular: DP & PT pulses are intact & regular on the right.   CFT and skin temperature is normal to the right lower extremity.     Neurologic: Lower extremity sensation is intact to light touch.  No evidence of weakness in the right lower extremity.        Musculoskeletal: Patient is ambulatory without assistive device or brace.  No gross ankle deformity noted.  No foot or ankle joint effusion is noted.  Noted small subdermal papules over the dorsal aspect of the right foot.  No surrounding erythema noted.           ASSESSMENT / PLAN:     ICD-10-CM    1. Right foot pain  M79.671 Orthopedic  Referral     MR Foot Right w/o Contrast      2. Palpable mass of soft tissue of foot  M79.89 MR Foot Right w/o Contrast    dorsal right foot          I have explained to Jeanine about the conditions.  We discussed the underlying contributing factors to the condition as well as both conservative and surgical treatment options along with expected length of recovery.  At this time, the patient will obtain an MRI for further evaluation of the soft tissue mass on the dorsal aspect of the right foot.  Patient will return to the office 1 week after obtaining the images to go over the findings.    Jeanine verbalized agreement with and understanding of the rational for the diagnosis and treatment plan.  All questions were answered to best of my ability and the patient's satisfaction. " The patient was advised to contact the clinic with any questions that may arise after the clinic visit.      Disclaimer: This note consists of symbols derived from keyboarding, dictation and/or voice recognition software. As a result, there may be errors in the script that have gone undetected. Please consider this when interpreting information found in this chart.       MARIA ESTHER Butt D.P.M., F.RAFITA.F.A.S.      Again, thank you for allowing me to participate in the care of your patient.        Sincerely,        Raffy Butt DPM

## 2024-07-31 NOTE — NURSING NOTE
"Chief Complaint   Patient presents with    Consult     Right foot pain- soft tissue lumps on foot       Initial /83   Pulse 92   Ht 1.626 m (5' 4\")   Wt 78.5 kg (173 lb)   LMP 07/27/2024   BMI 29.70 kg/m   Estimated body mass index is 29.7 kg/m  as calculated from the following:    Height as of this encounter: 1.626 m (5' 4\").    Weight as of this encounter: 78.5 kg (173 lb).  Medications and allergies reviewed.      Dottie PAUL MA    "

## 2024-07-31 NOTE — PATIENT INSTRUCTIONS
Diagnostic Imaging Scheduling Information    Please schedule your diagnostic imaging appointment by calling [phone].    After you have had your imaging performed, please make an appointment for the following week so that we can go over the images and discuss further treatment options.

## 2024-08-02 ENCOUNTER — ALLIED HEALTH/NURSE VISIT (OUTPATIENT)
Dept: OBGYN | Facility: CLINIC | Age: 24
End: 2024-08-02
Payer: COMMERCIAL

## 2024-08-02 VITALS
HEIGHT: 64 IN | BODY MASS INDEX: 29.7 KG/M2 | HEART RATE: 96 BPM | DIASTOLIC BLOOD PRESSURE: 76 MMHG | SYSTOLIC BLOOD PRESSURE: 139 MMHG

## 2024-08-02 DIAGNOSIS — Z30.9 CONTRACEPTION MANAGEMENT: Primary | ICD-10-CM

## 2024-08-02 PROCEDURE — 96372 THER/PROPH/DIAG INJ SC/IM: CPT | Performed by: OBSTETRICS & GYNECOLOGY

## 2024-08-02 PROCEDURE — 99207 PR NO CHARGE NURSE ONLY: CPT

## 2024-08-02 RX ADMIN — MEDROXYPROGESTERONE ACETATE 150 MG: 150 INJECTION, SUSPENSION INTRAMUSCULAR at 15:39

## 2024-08-02 NOTE — PROGRESS NOTES
Clinic Administered Medication Documentation      Depo Provera Documentation    Depo-Provera Standing Order inclusion/exclusion criteria reviewed.     Is this the initial or subsequent dose of Depo Provera? Subsequent dose - patient is within the acceptable window of time (11-15 weeks) for subsequent injection. Pregnancy test not indicated.    Patient meets: inclusion criteria     Is there an active order (written within the past 365 days, with administrations remaining, not ) in the chart? Yes.     Prior to injection, verified patient identity using patient's name and date of birth. Medication was administered. Please see MAR and medication order for additional information.     Vial/Syringe: Single dose vial. Was entire vial of medication used? Yes    Patient instructed to remain in clinic for 15 minutes and report any adverse reaction to staff immediately.  NEXT INJECTION DUE: 10/18/24 - 11/15/24    Unsure of refills, she had an annual 2024 with Elisa WEATHERS     Given in right ventroglute

## 2024-08-02 NOTE — NURSING NOTE
"Initial /76 (BP Location: Right arm, Patient Position: Sitting, Cuff Size: Adult Regular)   Pulse 96   Ht 1.626 m (5' 4\")   LMP 07/27/2024   BMI 29.70 kg/m   Estimated body mass index is 29.7 kg/m  as calculated from the following:    Height as of this encounter: 1.626 m (5' 4\").    Weight as of 7/31/24: 78.5 kg (173 lb). .    "

## 2024-08-05 ENCOUNTER — MYC REFILL (OUTPATIENT)
Dept: PSYCHIATRY | Facility: CLINIC | Age: 24
End: 2024-08-05
Payer: COMMERCIAL

## 2024-08-05 DIAGNOSIS — F33.1 MODERATE EPISODE OF RECURRENT MAJOR DEPRESSIVE DISORDER (H): ICD-10-CM

## 2024-08-05 RX ORDER — LAMOTRIGINE 100 MG/1
100 TABLET ORAL DAILY
Qty: 90 TABLET | Refills: 0 | OUTPATIENT
Start: 2024-08-05

## 2024-08-05 NOTE — TELEPHONE ENCOUNTER
1) Reviewed refill request from [facility] for lamoTRIgine (LAMICTAL) 100 MG tablet     2) The patient has not been seen by Sherley Hodgson since 2/1/22    Psychiatrically stable at present. The current medical regimen is effective; continue present plan and medications.   She will be returned to PCP for ongoing care, medication prescribing and future medication refills.  3 months of medications fills provided.  Follow up with [place], [facility] in about 3 months. Patient can be re-referred back to this service for further consultation in the future if needed but a new referral will need to be placed.    3) Notified patient via Space Racerajiv PATEL RN on 8/5/2024 at 11:12 AM

## 2024-08-06 ENCOUNTER — VIRTUAL VISIT (OUTPATIENT)
Dept: FAMILY MEDICINE | Facility: CLINIC | Age: 24
End: 2024-08-06
Payer: COMMERCIAL

## 2024-08-06 DIAGNOSIS — F33.1 MODERATE EPISODE OF RECURRENT MAJOR DEPRESSIVE DISORDER (H): ICD-10-CM

## 2024-08-06 DIAGNOSIS — F31.9 BIPOLAR DISORDER WITH DEPRESSION (H): Primary | ICD-10-CM

## 2024-08-06 PROCEDURE — 96127 BRIEF EMOTIONAL/BEHAV ASSMT: CPT | Mod: 95 | Performed by: FAMILY MEDICINE

## 2024-08-06 PROCEDURE — 99213 OFFICE O/P EST LOW 20 MIN: CPT | Mod: 95 | Performed by: FAMILY MEDICINE

## 2024-08-06 RX ORDER — LAMOTRIGINE 100 MG/1
100 TABLET ORAL DAILY
Qty: 60 TABLET | Refills: 0 | Status: SHIPPED | OUTPATIENT
Start: 2024-08-06 | End: 2024-09-25

## 2024-08-06 RX ORDER — ARIPIPRAZOLE 30 MG/1
30 TABLET ORAL DAILY
Qty: 60 TABLET | Refills: 0 | Status: SHIPPED | OUTPATIENT
Start: 2024-08-06 | End: 2024-09-25

## 2024-08-06 ASSESSMENT — ANXIETY QUESTIONNAIRES
1. FEELING NERVOUS, ANXIOUS, OR ON EDGE: MORE THAN HALF THE DAYS
7. FEELING AFRAID AS IF SOMETHING AWFUL MIGHT HAPPEN: MORE THAN HALF THE DAYS
6. BECOMING EASILY ANNOYED OR IRRITABLE: MORE THAN HALF THE DAYS
7. FEELING AFRAID AS IF SOMETHING AWFUL MIGHT HAPPEN: MORE THAN HALF THE DAYS
GAD7 TOTAL SCORE: 14
IF YOU CHECKED OFF ANY PROBLEMS ON THIS QUESTIONNAIRE, HOW DIFFICULT HAVE THESE PROBLEMS MADE IT FOR YOU TO DO YOUR WORK, TAKE CARE OF THINGS AT HOME, OR GET ALONG WITH OTHER PEOPLE: VERY DIFFICULT
8. IF YOU CHECKED OFF ANY PROBLEMS, HOW DIFFICULT HAVE THESE MADE IT FOR YOU TO DO YOUR WORK, TAKE CARE OF THINGS AT HOME, OR GET ALONG WITH OTHER PEOPLE?: VERY DIFFICULT
GAD7 TOTAL SCORE: 14
4. TROUBLE RELAXING: MORE THAN HALF THE DAYS
5. BEING SO RESTLESS THAT IT IS HARD TO SIT STILL: MORE THAN HALF THE DAYS
2. NOT BEING ABLE TO STOP OR CONTROL WORRYING: MORE THAN HALF THE DAYS
GAD7 TOTAL SCORE: 14
3. WORRYING TOO MUCH ABOUT DIFFERENT THINGS: MORE THAN HALF THE DAYS

## 2024-08-06 ASSESSMENT — PATIENT HEALTH QUESTIONNAIRE - PHQ9
SUM OF ALL RESPONSES TO PHQ QUESTIONS 1-9: 18
SUM OF ALL RESPONSES TO PHQ QUESTIONS 1-9: 18
10. IF YOU CHECKED OFF ANY PROBLEMS, HOW DIFFICULT HAVE THESE PROBLEMS MADE IT FOR YOU TO DO YOUR WORK, TAKE CARE OF THINGS AT HOME, OR GET ALONG WITH OTHER PEOPLE: VERY DIFFICULT

## 2024-08-06 NOTE — PATIENT INSTRUCTIONS
60 day refill of the lamictal and abilify provided today.     Follow up with psychiatry as scheduled.     Crisis Resources:    National Suicide Prevention Lifeline: 250.983.6369 (TTY: 133.897.6615). Call anytime for help.  (www.suicidepreventionlifeline.org)  National [place] on Mental Illness (www.ana.org): 521.981.3450 or 829-856-9025.   Mental Health Association (www.mentalhealth.org): 154.354.9349 or 318-792-2335.  [place] Crisis Text Line: Text [place] to 814504  Suicide LifeLine Chat: suicidepreventionlifeline.org/chat

## 2024-08-06 NOTE — PROGRESS NOTES
Christophe is a 24 year old who is being evaluated via a billable video visit.    How would you like to obtain your AVS? Claritahart  If the video visit is dropped, the invitation should be resent by: Send to e-mail at: [email or web address]  Will anyone else be joining your video visit? No      Assessment & Plan     Bipolar disorder with depression (H)  Moderate episode of recurrent major depressive disorder (H)  Current regimen:  Sertraline 75 mg daily.   Abilify 30 mg daily.   Lamictal 100 mg daily.   -- reports will run out of her lamictal and abilify prior to seeing Psychiatry on 9/25.   -- med refills were declined by Psychiatry.   -- will provided patient with 60 days worth of medication. She will follow up with Psychiatry as scheduled who should be managing her medications moving forward.   -- She reports feeling safe at this time.   -- crisis resources provided in AVS.   -- present to ED for concerning mood symptoms.   - lamoTRIgine (LAMICTAL) 100 MG tablet  Dispense: 60 tablet; Refill: 0  - ARIPiprazole (ABILIFY) 30 MG tablet  Dispense: 60 tablet; Refill: 0      The risks, benefits and treatment options of prescribed medications or other treatments have been discussed with the patient. The patient verbalized their understanding and should call or follow up if no improvement or if they develop further problems.          Depression Screening Follow Up        8/6/2024     3:46 PM   PHQ   PHQ-9 Total Score 18   Q9: Thoughts of better off dead/self-harm past 2 weeks More than half the days   F/U: Thoughts of suicide or self-harm No   F/U: Safety concerns No         Follow Up Actions Taken  Crisis resource information provided in the After Visit Summary  Referred patient back to mental health provider  Mental Health Referral placed    Discussed the following ways the patient can remain in a safe environment:  remove alcohol, remove drugs, and be around others      Subjective   Christophe is a 24 year old, presenting for  the following health issues:  Mental Health Problem and Depression        8/6/2024     4:02 PM   Additional Questions   Roomed by Lana ACOSTA       Video Start Time:  4:19    Refill request on Lamictal.    History of Present Illness       Reason for visit:  Referral for mental health diagnosis    She eats 0-1 servings of fruits and vegetables daily.She consumes 1 sweetened beverage(s) daily.She exercises with enough effort to increase her heart rate 10 to 19 minutes per day.  She exercises with enough effort to increase her heart rate 5 days per week.   She is taking medications regularly.       Recent virtual appt on 7/29/2024, was referred to Psychiatry at that time.   Has appt scheduled on 9/25/2024 with Dr. Dixon   Scheduled for therapy on 9/25/2024 with Dinah Garcia     Current regimen:  Sertraline 75 mg daily.   Abilify 30 mg daily.   Lamictal 100 mg daily.     She reports she will be out of lamictal and also abilify prior to seeing Psychiatry on 9/25   Discussed she should keep her appt as scheduled with Psychiatry who should proceed with prescribing medications moving forward.     Reports feeling safe at home. No SI.           Objective    Vitals - Patient Reported  Pain Score: No Pain (0)      Vitals:  No vitals were obtained today due to virtual visit.    Physical Exam   GENERAL: alert and no distress  EYES: Eyes grossly normal to inspection.  No discharge or erythema, or obvious scleral/conjunctival abnormalities.  RESP: No audible wheeze, cough, or visible cyanosis.    SKIN: Visible skin clear. No significant rash, abnormal pigmentation or lesions.  NEURO: Cranial nerves grossly intact.  Mentation and speech appropriate for age.  PSYCH: Appropriate affect, tone, and pace of words        Video-Visit Details    Type of service:  Video Visit   Video End Time:4:26 PM  Originating Location (pt. Location): Home    Distant Location (provider location):  On-site  Platform used for Video Visit: Sukhdeep Hendricks  Electronically by: Brandon Henry, DO

## 2024-08-12 ENCOUNTER — HOSPITAL ENCOUNTER (OUTPATIENT)
Dept: MRI IMAGING | Facility: CLINIC | Age: 24
Discharge: HOME | End: 2024-08-12
Attending: PODIATRIST | Admitting: PODIATRIST
Payer: COMMERCIAL

## 2024-08-12 DIAGNOSIS — M79.89 PALPABLE MASS OF SOFT TISSUE OF FOOT: ICD-10-CM

## 2024-08-12 DIAGNOSIS — M79.671 RIGHT FOOT PAIN: ICD-10-CM

## 2024-08-12 PROCEDURE — 73718 MRI LOWER EXTREMITY W/O DYE: CPT | Mod: 26 | Performed by: RADIOLOGY

## 2024-08-12 PROCEDURE — 73718 MRI LOWER EXTREMITY W/O DYE: CPT | Mod: RT

## 2024-08-14 DIAGNOSIS — Z76.0 ENCOUNTER FOR MEDICATION REFILL: Primary | ICD-10-CM

## 2024-08-14 PROCEDURE — 99207 PR NO CHARGE LOS: CPT | Performed by: PHYSICIAN ASSISTANT

## 2024-08-14 RX ORDER — MEDROXYPROGESTERONE ACETATE 150 MG/ML
150 INJECTION, SUSPENSION INTRAMUSCULAR
Status: ACTIVE | OUTPATIENT
Start: 2024-08-14 | End: 2025-08-09

## 2024-08-19 ENCOUNTER — OFFICE VISIT (OUTPATIENT)
Dept: PODIATRY | Facility: CLINIC | Age: 24
End: 2024-08-19
Payer: COMMERCIAL

## 2024-08-19 ENCOUNTER — MYC MEDICAL ADVICE (OUTPATIENT)
Dept: PODIATRY | Facility: CLINIC | Age: 24
End: 2024-08-19

## 2024-08-19 VITALS
SYSTOLIC BLOOD PRESSURE: 126 MMHG | WEIGHT: 173 LBS | HEIGHT: 64 IN | BODY MASS INDEX: 29.53 KG/M2 | DIASTOLIC BLOOD PRESSURE: 86 MMHG | HEART RATE: 86 BPM

## 2024-08-19 DIAGNOSIS — M79.89 PALPABLE MASS OF SOFT TISSUE OF FOOT: Primary | ICD-10-CM

## 2024-08-19 PROCEDURE — 99213 OFFICE O/P EST LOW 20 MIN: CPT | Performed by: PODIATRIST

## 2024-08-19 NOTE — PROGRESS NOTES
"Jeanine returns to the office for reevaluation of the right foot.  The patient relates following the instructions given at the last visit with noted overall more pain and less improvement in function of the right foot.   The patient relates no other problems.    Vitals: /86   Pulse 86   Ht 1.626 m (5' 4\")   Wt 78.5 kg (173 lb)   LMP 07/29/2024 (Exact Date)   BMI 29.70 kg/m    BMI= Body mass index is 29.7 kg/m .    Lower Extremity Physical Exam:      Neurovascular status remains unchanged.  Muscular exam is within normal limits to major muscle groups.  Integument is intact.      Noted palpable soft tissue mass over the dorsal aspect of the midfoot on the right.  Noted palpable soft tissue mass around the base of the right great toe.  No erythema noted.    Diagnostics:    MR right foot without  contrast 8/12/2024 8:41 PM     History: Right foot pain; Palpable mass of soft tissue of foot     Additional History from EMR: soft tissue mass on the right foot.  The  pain is primarily located around the top of the right forefoot.   Reports insidious onset without acute precipitating event.  H/o  ganglion cyst and plantar wart removal per chart.       Techniques: Multiplanar multisequence imaging of the right foot was  obtained without  administration of intravenous contrast.     Comparison: None     Findings:     4.7 x 0.8 x 2.5 cm ill defined T2 hyperintense soft tissue lesion  replacing the fat within the dorsal subcutaneus region overlying the  extensor tendons. Associated mild fluid in te extensor digitorum  tendons.      Additional patchy T2 hyperintense signal within the plantar aspect of  proximal phalanx of great toe (series 4 image 24, series 3 image 24).     Bones     No fracture, marrow contusion or marrow infiltrative changes. Os  trigonum.     Joints and periarticular soft tissue     Joint effusion: Physiologic amount of joint fluid are present.     Plantar plates: Intersesamoidal ligament and " sesamoidal phalangeal  ligaments of the first metatarsophalangeal joints are intact. Plantar  plates of the second through fifth toe at metatarsophalangeal joints  are grossly intact.     Intermetatarsal spaces: No interdigital neuroma. No intermetatarsal  bursitis.     Ligaments and Tendons     Lisfranc interosseous ligament: Intact.     Tendons: Trace fluid within the posterior tibialis tendon close to the  navicular attachment site. The visualized courses of flexor and  extensor tendons are intact.      Muscles     No atrophy or edema.     ANCILLARY FINDINGS     None                                                                      Impression:     In the area of concern; there is 4.7 x 0.8 x 2.5 cm ill defined T2  hyperintense soft tissue mass within the dorsal subcutaneus tissue  overlying the extensor tendons, replacing the normal fat plane.  Associated mild fluid in the extensor digitorum tendons suggesting  tenosynovitis. Additional area of patchy T2 hyperintense signal within  the plantar aspect of proximal phalanx of great toe with replacement  of subcutaneus fat beneath the plantar plate.  Findings are very nonspecific and differential includes; inflammatory  or infectious changes, such as cellulitis, nodular fasciitis,  lymphedema, vascular lesions. Recommend clinical correlation.     I have personally reviewed the examination and initial interpretation  and I agree with the findings.     JUTTA ELLERMANN, MD       Assessment:     ICD-10-CM    1. Palpable mass of soft tissue of foot  M79.89 Orthopedic  Referral          Plan:    I have explained to Jeanine about the progress of the conditions.  At this time, I would like her to see [facility] Department of orthopedics for further evaluation of soft tissue mass due to the extensive nature of the mass.    Jeanine verbalized agreement with and understanding of the rational for the diagnosis and treatment plan.  All questions were  answered to best of my ability and the patient's satisfaction. The patient was advised to contact the clinic with any questions that may arise after the clinic visit.      Disclaimer: This note consists of symbols derived from keyboarding, dictation and/or voice recognition software. As a result, there may be errors in the script that have gone undetected. Please consider this when interpreting information found in this chart.       MARIA ESTHER Butt D.P.M., F.ORI.ASHLIE.F.A.S.

## 2024-08-19 NOTE — NURSING NOTE
"Chief Complaint   Patient presents with    RECHECK     Discuss MRI and foot clusters       Initial /86   Pulse 86   Ht 1.626 m (5' 4\")   Wt 78.5 kg (173 lb)   LMP 07/29/2024 (Exact Date)   BMI 29.70 kg/m   Estimated body mass index is 29.7 kg/m  as calculated from the following:    Height as of this encounter: 1.626 m (5' 4\").    Weight as of this encounter: 78.5 kg (173 lb).  Medications and allergies reviewed.      Dottie PAUL MA    "

## 2024-08-19 NOTE — LETTER
"8/19/2024      Jeanine Hernandez  [address]  [facility] [address]      Dear Colleague,    Thank you for referring your patient, Jeanine Hernandez, to the [facility] [place]. Please see a copy of my visit note below.    Jeanine returns to the office for reevaluation of the right foot.  The patient relates following the instructions given at the last visit with noted overall more pain and less improvement in function of the right foot.   The patient relates no other problems.    Vitals: /86   Pulse 86   Ht 1.626 m (5' 4\")   Wt 78.5 kg (173 lb)   LMP 07/29/2024 (Exact Date)   BMI 29.70 kg/m    BMI= Body mass index is 29.7 kg/m .    Lower Extremity Physical Exam:      Neurovascular status remains unchanged.  Muscular exam is within normal limits to major muscle groups.  Integument is intact.      Noted palpable soft tissue mass over the dorsal aspect of the midfoot on the right.  Noted palpable soft tissue mass around the base of the right great toe.  No erythema noted.    Diagnostics:    MR right foot without  contrast 8/12/2024 8:41 PM     History: Right foot pain; Palpable mass of soft tissue of foot     Additional History from EMR: soft tissue mass on the right foot.  The  pain is primarily located around the top of the right forefoot.   Reports insidious onset without acute precipitating event.  H/o  ganglion cyst and plantar wart removal per chart.       Techniques: Multiplanar multisequence imaging of the right foot was  obtained without  administration of intravenous contrast.     Comparison: None     Findings:     4.7 x 0.8 x 2.5 cm ill defined T2 hyperintense soft tissue lesion  replacing the fat within the dorsal subcutaneus region overlying the  extensor tendons. Associated mild fluid in te extensor digitorum  tendons.      Additional patchy T2 hyperintense signal within the plantar aspect of  proximal phalanx of great toe (series 4 image 24, series 3 image " 24).     Bones     No fracture, marrow contusion or marrow infiltrative changes. Os  trigonum.     Joints and periarticular soft tissue     Joint effusion: Physiologic amount of joint fluid are present.     Plantar plates: Intersesamoidal ligament and sesamoidal phalangeal  ligaments of the first metatarsophalangeal joints are intact. Plantar  plates of the second through fifth toe at metatarsophalangeal joints  are grossly intact.     Intermetatarsal spaces: No interdigital neuroma. No intermetatarsal  bursitis.     Ligaments and Tendons     Lisfranc interosseous ligament: Intact.     Tendons: Trace fluid within the posterior tibialis tendon close to the  navicular attachment site. The visualized courses of flexor and  extensor tendons are intact.      Muscles     No atrophy or edema.     ANCILLARY FINDINGS     None                                                                      Impression:     In the area of concern; there is 4.7 x 0.8 x 2.5 cm ill defined T2  hyperintense soft tissue mass within the dorsal subcutaneus tissue  overlying the extensor tendons, replacing the normal fat plane.  Associated mild fluid in the extensor digitorum tendons suggesting  tenosynovitis. Additional area of patchy T2 hyperintense signal within  the plantar aspect of proximal phalanx of great toe with replacement  of subcutaneus fat beneath the plantar plate.  Findings are very nonspecific and differential includes; inflammatory  or infectious changes, such as cellulitis, nodular fasciitis,  lymphedema, vascular lesions. Recommend clinical correlation.     I have personally reviewed the examination and initial interpretation  and I agree with the findings.     JUTTA ELLERMANN, MD       Assessment:     ICD-10-CM    1. Palpable mass of soft tissue of foot  M79.89 Orthopedic  Referral          Plan:    I have explained to Jeanine about the progress of the conditions.  At this time, I would like her to see [facility]  [place] Department of orthopedics for further evaluation of soft tissue mass due to the extensive nature of the mass.    Jeanine verbalized agreement with and understanding of the rational for the diagnosis and treatment plan.  All questions were answered to best of my ability and the patient's satisfaction. The patient was advised to contact the clinic with any questions that may arise after the clinic visit.      Disclaimer: This note consists of symbols derived from keyboarding, dictation and/or voice recognition software. As a result, there may be errors in the script that have gone undetected. Please consider this when interpreting information found in this chart.       MARIA ESTHER Butt D.P.M., F.A.C.F.A.S.      Again, thank you for allowing me to participate in the care of your patient.        Sincerely,        Raffy Butt DPM

## 2024-08-19 NOTE — TELEPHONE ENCOUNTER
Patient has scheduled appointment today. Can discuss at that time. Closing encounter.     BOAZ Arias

## 2024-09-01 ENCOUNTER — E-VISIT (OUTPATIENT)
Dept: URGENT CARE | Facility: CLINIC | Age: 24
End: 2024-09-01
Payer: COMMERCIAL

## 2024-09-01 DIAGNOSIS — B35.3 TINEA PEDIS OF RIGHT FOOT: Primary | ICD-10-CM

## 2024-09-01 PROCEDURE — 99207 PR NON-BILLABLE SERV PER CHARTING: CPT | Performed by: PHYSICIAN ASSISTANT

## 2024-09-01 RX ORDER — TOLNAFTATE 1 G/100G
POWDER TOPICAL 2 TIMES DAILY
Qty: 45 G | Refills: 1 | Status: SHIPPED | OUTPATIENT
Start: 2024-09-01

## 2024-09-01 RX ORDER — FLUCONAZOLE 200 MG/1
200 TABLET ORAL
Qty: 2 TABLET | Refills: 0 | Status: SHIPPED | OUTPATIENT
Start: 2024-09-01 | End: 2024-09-16

## 2024-09-19 ENCOUNTER — MEDICAL CORRESPONDENCE (OUTPATIENT)
Dept: HEALTH INFORMATION MANAGEMENT | Facility: CLINIC | Age: 24
End: 2024-09-19
Payer: COMMERCIAL

## 2024-09-23 ENCOUNTER — VIRTUAL VISIT (OUTPATIENT)
Dept: FAMILY MEDICINE | Facility: CLINIC | Age: 24
End: 2024-09-23
Payer: COMMERCIAL

## 2024-09-23 DIAGNOSIS — F41.9 ANXIETY: Primary | ICD-10-CM

## 2024-09-23 PROCEDURE — 99213 OFFICE O/P EST LOW 20 MIN: CPT | Mod: 95 | Performed by: FAMILY MEDICINE

## 2024-09-23 RX ORDER — HYDROXYZINE HYDROCHLORIDE 10 MG/1
10 TABLET, FILM COATED ORAL 3 TIMES DAILY PRN
Qty: 30 TABLET | Refills: 0 | Status: SHIPPED | OUTPATIENT
Start: 2024-09-23

## 2024-09-23 ASSESSMENT — ANXIETY QUESTIONNAIRES
7. FEELING AFRAID AS IF SOMETHING AWFUL MIGHT HAPPEN: MORE THAN HALF THE DAYS
8. IF YOU CHECKED OFF ANY PROBLEMS, HOW DIFFICULT HAVE THESE MADE IT FOR YOU TO DO YOUR WORK, TAKE CARE OF THINGS AT HOME, OR GET ALONG WITH OTHER PEOPLE?: EXTREMELY DIFFICULT
GAD7 TOTAL SCORE: 14

## 2024-09-23 ASSESSMENT — PATIENT HEALTH QUESTIONNAIRE - PHQ9
SUM OF ALL RESPONSES TO PHQ QUESTIONS 1-9: 13
10. IF YOU CHECKED OFF ANY PROBLEMS, HOW DIFFICULT HAVE THESE PROBLEMS MADE IT FOR YOU TO DO YOUR WORK, TAKE CARE OF THINGS AT HOME, OR GET ALONG WITH OTHER PEOPLE: EXTREMELY DIFFICULT
SUM OF ALL RESPONSES TO PHQ QUESTIONS 1-9: 13

## 2024-09-23 NOTE — PROGRESS NOTES
Christophe is a 24 year old who is being evaluated via a billable video visit.    How would you like to obtain your AVS? MyChart  If the video visit is dropped, the invitation should be resent by: Text to cell phone: [phone]  Will anyone else be joining your video visit? No      Assessment & Plan     (F41.9) Anxiety  (primary encounter diagnosis)  Comment: Medication faxed, recommend follow up with psychiatry.   I did ask patient about suicidal ideations. She says she has no plans to carry out her thoughts.   She has an appointment this Wednesday to see her psychiatrist. Encouraged patient to proceed to the ER should her symptoms worsen.  Plan: hydrOXYzine HCl (ATARAX) 10 MG tablet          Depression Screening Follow Up        9/25/2024     6:12 AM   PHQ   PHQ-9 Total Score 14    14   Q9: Thoughts of better off dead/self-harm past 2 weeks Not at all         9/25/2024     6:12 AM   Last PHQ-9   1.  Little interest or pleasure in doing things 2   2.  Feeling down, depressed, or hopeless 2   3.  Trouble falling or staying asleep, or sleeping too much 3   4.  Feeling tired or having little energy 3   5.  Poor appetite or overeating 0   6.  Feeling bad about yourself 2   7.  Trouble concentrating 2   8.  Moving slowly or restless 0   Q9: Thoughts of better off dead/self-harm past 2 weeks 0   PHQ-9 Total Score 14    14         {          Follow Up Actions Taken  Crisis resource information provided in the After Visit Summary    Discussed the following ways the patient can remain in a safe environment:  remove alcohol, remove drugs, and be around others    FUTURE APPOINTMENTS:       - Follow-up visit as needed.    Rachael Saeed is a 24 year old, presenting for the following health issues:    Patient is a 24 yr old female with a past medical history that is significant for bipolar disorder. She reports that she is struggling with her symptoms. She is on Lamictal, Abilify, sertraline.  She reports that she has been having  rage events and she says she yells a lot. She also is dealing with depression. She reports that she is not sleeping well.   She is hoping to get something to calm her down.   Depression        9/23/2024     4:04 PM   Additional Questions   Roomed by Rubina ACOSTA CMA     Video Start Time:  5:33PM    History of Present Illness       Reason for visit:  New or worensing depression. She is missing 1 dose(s) of medications per week.  She is not taking prescribed medications regularly due to remembering to take.       Depression and Anxiety   How are you doing with your depression since your last visit? Worsened   How are you doing with your anxiety since your last visit?  No change  Are you having other symptoms that might be associated with depression or anxiety? Yes:  Chewing nails and skin around nails a lot  Have you had a significant life event? OTHER: Started a new job and daughter is in a different state    Do you have any concerns with your use of alcohol or other drugs? No    Social History     Tobacco Use    Smoking status: Former     Current packs/day: 0.00     Types: Cigarettes, Other     Passive exposure: Current    Smokeless tobacco: Never   Vaping Use    Vaping status: Every Day    Substances: Nicotine, Flavoring    Devices: Refillable tank, Pre-filled pod   Substance Use Topics    Alcohol use: Never    Drug use: Never         7/29/2024     2:50 PM 8/6/2024     3:46 PM 9/23/2024     4:10 PM   PHQ   PHQ-9 Total Score 11 18 13   Q9: Thoughts of better off dead/self-harm past 2 weeks Not at all More than half the days Several days   F/U: Thoughts of suicide or self-harm  No No   F/U: Safety concerns  No No         7/29/2024     2:50 PM 8/6/2024     3:47 PM 9/23/2024     4:11 PM   CONNER-7 SCORE   Total Score  14 (moderate anxiety) 14 (moderate anxiety)   Total Score 16 14 14         9/23/2024     4:10 PM   Last PHQ-9   1.  Little interest or pleasure in doing things 1   2.  Feeling down, depressed, or hopeless 1  "  3.  Trouble falling or staying asleep, or sleeping too much 3   4.  Feeling tired or having little energy 3   5.  Poor appetite or overeating 1   6.  Feeling bad about yourself 1   7.  Trouble concentrating 1   8.  Moving slowly or restless 1   Q9: Thoughts of better off dead/self-harm past 2 weeks 1   PHQ-9 Total Score 13   In the past two weeks have you had thoughts of suicide or self harm? No   Do you have concerns about your personal safety or the safety of others? No         9/23/2024     4:11 PM   CONNER-7    1. Feeling nervous, anxious, or on edge 1   2. Not being able to stop or control worrying 1   3. Worrying too much about different things 1   4. Trouble relaxing 3   5. Being so restless that it is hard to sit still 3   6. Becoming easily annoyed or irritable 3   7. Feeling afraid, as if something awful might happen 2   CONNER-7 Total Score 14   If you checked any problems, how difficult have they made it for you to do your work, take care of things at home, or get along with other people? Extremely difficult         Follow Up Actions Taken  Crisis resource information provided in the After Visit Summary     Discussed the following ways the patient can remain in a safe environment:  remove alcohol, remove drugs, and be around others  Suicide Assessment Five-step Evaluation and Treatment (SAFE-T)            Review of Systems  Constitutional, HEENT, cardiovascular, pulmonary, gi and gu systems are negative, except as otherwise noted.      Objective    Vitals - Patient Reported  Weight (Patient Reported): 77.1 kg (170 lb)  Height (Patient Reported): 162.6 cm (5' 4\")  Pulse (Patient Reported): 120  Pain Score: No Pain (0)      Vitals:  No vitals were obtained today due to virtual visit.    Physical Exam   GENERAL: alert and no distress  EYES: Eyes grossly normal to inspection.  No discharge or erythema, or obvious scleral/conjunctival abnormalities.  RESP: No audible wheeze, cough, or visible cyanosis.    SKIN: " Visible skin clear. No significant rash, abnormal pigmentation or lesions.  NEURO: Cranial nerves grossly intact.  Mentation and speech appropriate for age.  PSYCH: Appropriate affect, tone, and pace of words          Video-Visit Details    Type of service:  Video Visit   Video End Time: 5:38PM  Originating Location (pt. Location): Home  Distant Location (provider location):  On-site  Platform used for Video Visit: Sukhdeep  Signed Electronically by: Terrance Rayo MD

## 2024-09-24 ENCOUNTER — E-VISIT (OUTPATIENT)
Dept: URGENT CARE | Facility: CLINIC | Age: 24
End: 2024-09-24
Payer: COMMERCIAL

## 2024-09-24 DIAGNOSIS — R21 RASH: Primary | ICD-10-CM

## 2024-09-24 PROCEDURE — 99207 PR NON-BILLABLE SERV PER CHARTING: CPT | Performed by: NURSE PRACTITIONER

## 2024-09-24 NOTE — PROGRESS NOTES
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 history .     Recommendations:     1. Plan for Safety and Risk Management:   Safety and Risk: Recommended that patient call 911 or go to the local ED should there be a change in any of these risk factors.        Report to child / adult protection services was NA. Pt smokes cannabis away from their children and makes sure of this.     2. Patient's identified mental health concerns with a cultural influence will be addressed by [facility] Staff .     3. Initial Treatment will focus on:    Anxiety - worry, trouble focusing  Mood Instability - manic, depressed, anger, irritability .     4. Resources/Service Plan:    services are not indicated.   Modifications to assist communication are not indicated.   Additional disability accommodations are not indicated.      5. Collaboration:   Collaboration / coordination of treatment will be initiated with the following  support professionals: Behavioral Health Clinician (BHC), primary care physician, outpatient therapist, and psychiatry.      6.  Referrals:   The following referral(s) will be initiated: [facility].       A Release of Information has been obtained for the following:  N/A  .     Clinical Substantiation/medical necessity for the above recommendations:  Pt has a hx of depression, anxiety and wili symptoms that are impacting daily functioning in daily living and social settings. Through receiving support through CCPS model for medication and therapist checking on use of coping skills and therapy to help combat these symptoms may provide Pt with relief. Pt reports that they are struggling to manage depressive, anxiety, wili symptoms and again CCPS model and therapy can assist with providing coping skills, following up that pt is using these skills, safety plan or other interventions along with medication to have the best impact to manage symptoms and provide relief. At this time pt's symptoms are able to be managed with OP services and pt will be referred to a higher level of care if there are abrupt changes in presentation or risk of harm.    7. LYDIA:    LYDIA:  Discussed the general effects of drugs and alcohol on health and well-being. Provider gave patient printed information about the effects of chemical use on their health and well being. Recommendations:   continue low use or reduce as able.     8. Records:   These were reviewed at time of assessment.   Information in this assessment was obtained from the medical record and  provided by patient who is a good historian.    Patient will have open access to their mental health medical record.    9.   Interactive Complexity: No    10. Safety Plan:       Provider Name/ Credentials:  JAIME Coles, [facility]  September 25, 2024

## 2024-09-25 ENCOUNTER — VIRTUAL VISIT (OUTPATIENT)
Dept: BEHAVIORAL HEALTH | Facility: CLINIC | Age: 24
End: 2024-09-25
Payer: COMMERCIAL

## 2024-09-25 ENCOUNTER — HOSPITAL ENCOUNTER (EMERGENCY)
Facility: CLINIC | Age: 24
Discharge: HOME | End: 2024-09-25
Attending: NURSE PRACTITIONER | Admitting: NURSE PRACTITIONER
Payer: COMMERCIAL

## 2024-09-25 ENCOUNTER — VIRTUAL VISIT (OUTPATIENT)
Dept: PSYCHIATRY | Facility: CLINIC | Age: 24
End: 2024-09-25
Attending: FAMILY MEDICINE
Payer: COMMERCIAL

## 2024-09-25 VITALS
SYSTOLIC BLOOD PRESSURE: 138 MMHG | DIASTOLIC BLOOD PRESSURE: 77 MMHG | OXYGEN SATURATION: 97 % | RESPIRATION RATE: 20 BRPM | TEMPERATURE: 98.2 F | HEART RATE: 78 BPM

## 2024-09-25 DIAGNOSIS — F31.9 BIPOLAR I DISORDER (H): Primary | ICD-10-CM

## 2024-09-25 DIAGNOSIS — F41.1 GENERALIZED ANXIETY DISORDER: ICD-10-CM

## 2024-09-25 DIAGNOSIS — K04.7 DENTAL INFECTION: ICD-10-CM

## 2024-09-25 DIAGNOSIS — F42.2 MIXED OBSESSIONAL THOUGHTS AND ACTS: ICD-10-CM

## 2024-09-25 DIAGNOSIS — F41.9 ANXIETY: ICD-10-CM

## 2024-09-25 PROCEDURE — 90791 PSYCH DIAGNOSTIC EVALUATION: CPT | Mod: 95 | Performed by: COUNSELOR

## 2024-09-25 PROCEDURE — 99213 OFFICE O/P EST LOW 20 MIN: CPT | Performed by: NURSE PRACTITIONER

## 2024-09-25 PROCEDURE — 99215 OFFICE O/P EST HI 40 MIN: CPT | Mod: 95 | Performed by: PSYCHIATRY & NEUROLOGY

## 2024-09-25 PROCEDURE — G2211 COMPLEX E/M VISIT ADD ON: HCPCS | Mod: 95 | Performed by: PSYCHIATRY & NEUROLOGY

## 2024-09-25 PROCEDURE — G0463 HOSPITAL OUTPT CLINIC VISIT: HCPCS | Performed by: NURSE PRACTITIONER

## 2024-09-25 RX ORDER — LAMOTRIGINE 100 MG/1
100 TABLET ORAL DAILY
Qty: 90 TABLET | Refills: 0 | Status: SHIPPED | OUTPATIENT
Start: 2024-09-25

## 2024-09-25 RX ORDER — AMOXICILLIN 500 MG/1
500 CAPSULE ORAL 3 TIMES DAILY
Qty: 21 CAPSULE | Refills: 0 | Status: SHIPPED | OUTPATIENT
Start: 2024-09-25 | End: 2024-10-02

## 2024-09-25 RX ORDER — SERTRALINE HYDROCHLORIDE 25 MG/1
75 TABLET, FILM COATED ORAL DAILY
Qty: 90 TABLET | Refills: 1 | Status: SHIPPED | OUTPATIENT
Start: 2024-09-25

## 2024-09-25 RX ORDER — LURASIDONE HYDROCHLORIDE 40 MG/1
40 TABLET, FILM COATED ORAL
Qty: 21 TABLET | Refills: 0 | Status: SHIPPED | OUTPATIENT
Start: 2024-09-25

## 2024-09-25 RX ORDER — ARIPIPRAZOLE 20 MG/1
20 TABLET ORAL DAILY
Qty: 30 TABLET | Refills: 1 | Status: SHIPPED | OUTPATIENT
Start: 2024-09-25

## 2024-09-25 ASSESSMENT — PATIENT HEALTH QUESTIONNAIRE - PHQ9
SUM OF ALL RESPONSES TO PHQ QUESTIONS 1-9: 14
10. IF YOU CHECKED OFF ANY PROBLEMS, HOW DIFFICULT HAVE THESE PROBLEMS MADE IT FOR YOU TO DO YOUR WORK, TAKE CARE OF THINGS AT HOME, OR GET ALONG WITH OTHER PEOPLE: EXTREMELY DIFFICULT
10. IF YOU CHECKED OFF ANY PROBLEMS, HOW DIFFICULT HAVE THESE PROBLEMS MADE IT FOR YOU TO DO YOUR WORK, TAKE CARE OF THINGS AT HOME, OR GET ALONG WITH OTHER PEOPLE: EXTREMELY DIFFICULT
SUM OF ALL RESPONSES TO PHQ QUESTIONS 1-9: 14

## 2024-09-25 ASSESSMENT — COLUMBIA-SUICIDE SEVERITY RATING SCALE - C-SSRS
ATTEMPT LIFETIME: NO
1. IN THE PAST MONTH, HAVE YOU WISHED YOU WERE DEAD OR WISHED YOU COULD GO TO SLEEP AND NOT WAKE UP?: NO
2. HAVE YOU ACTUALLY HAD ANY THOUGHTS OF KILLING YOURSELF IN THE PAST MONTH?: NO
6. HAVE YOU EVER DONE ANYTHING, STARTED TO DO ANYTHING, OR PREPARED TO DO ANYTHING TO END YOUR LIFE?: NO
1. HAVE YOU WISHED YOU WERE DEAD OR WISHED YOU COULD GO TO SLEEP AND NOT WAKE UP?: YES
6. HAVE YOU EVER DONE ANYTHING, STARTED TO DO ANYTHING, OR PREPARED TO DO ANYTHING TO END YOUR LIFE?: NO
2. HAVE YOU ACTUALLY HAD ANY THOUGHTS OF KILLING YOURSELF?: NO
TOTAL  NUMBER OF INTERRUPTED ATTEMPTS LIFETIME: NO
TOTAL  NUMBER OF ABORTED OR SELF INTERRUPTED ATTEMPTS LIFETIME: NO
1. IN THE PAST MONTH, HAVE YOU WISHED YOU WERE DEAD OR WISHED YOU COULD GO TO SLEEP AND NOT WAKE UP?: NO

## 2024-09-25 NOTE — PATIENT INSTRUCTIONS
Dear Jeanine Hernandez,    We are sorry you are not feeling well. Based on the responses you provided, it is recommended that you be seen in-person in urgent care so we can better evaluate your symptoms. Please click here to find the nearest urgent care location to you.   You will not be charged for this Visit. Thank you for trusting us with your care.    WILMER Salas CNP

## 2024-09-25 NOTE — NURSING NOTE
Current patient location: [address]  [place] [facility] [address]    Is the patient currently in the state of [place]? YES    Visit mode:VIDEO    If the visit is dropped, the patient can be reconnected by: VIDEO VISIT: Text to cell phone:   Telephone Information:   Mobile [phone]       Will anyone else be joining the visit? NO  (If patient encounters technical issues they should call [phone]    How would you like to obtain your AVS? MyChart    Are changes needed to the allergy or medication list? Pt stated no changes to allergies and Pt stated no med changes    Are refills needed on medications prescribed by this physician? YES    Rooming Documentation:  Questionnaire(s) completed    Reason for visit: Consult    Abimbola VICTOR

## 2024-09-25 NOTE — DISCHARGE INSTRUCTIONS
And that you take all of the amoxicillin ordered for you please follow-up and trying to get into see dental next week.  If your symptoms worsen despite recommended treatment plan please return for further evaluation.  Tylenol and ibuprofen can be used for pain
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
## 2024-09-25 NOTE — ED PROVIDER NOTES
ED Provider Note  [facility]      History     Chief Complaint   Patient presents with    Dental Pain     Right lower jaw and tooth pain   Onset 3 days ago        HPI  Jeanine Hernandez is a 24 year old female who presents with 3 days of worsening dental pain reports that she has a broken off tooth on her right lower gumline there is some swelling and erythema surrounding the tooth.  Reports that she has been seen in the past for dental infections and hopes to be seen by dentist soon.  Tolerating oral fluid intake denies any measured fevers.  Requests antibiotics and recommendations for pain control today.  Patient declines any dental which was offered.            Allergies:  Allergies   Allergen Reactions    Iodine Anaphylaxis    Ivp Dye [Contrast Dye] Anaphylaxis    Latex Hives       Problem List:    Patient Active Problem List    Diagnosis Date Noted    Preeclampsia, severe 2024     Priority: Medium     (spontaneous vaginal delivery) 2024     Priority: Medium    Gestational hypertension, third trimester 2024     Priority: Medium    Diet controlled gestational diabetes mellitus (GDM) in third trimester 2024     Priority: Medium    Pre-eclampsia 2024     Priority: Medium    Bipolar disorder with depression (H) 2021     Priority: Medium    Seizures (H) 2021     Priority: Medium    Tobacco use 2021     Priority: Medium    High risk medication use 2021     Priority: Medium    Moderate episode of recurrent major depressive disorder (H) 2020     Priority: Medium     Postpartum onset.  Monitoring for a bipolar trajectory.        Insomnia due to psychological stress 2020     Priority: Medium        Past Medical History:    Past Medical History:   Diagnosis Date    Anxiety     Asthma     Depression     History of urinary tract infection     OCD (obsessive compulsive disorder)     Postpartum depression        Past Surgical  History:    Past Surgical History:   Procedure Laterality Date    FOOT SURGERY      right foot - ganglion cyst    MYRINGOTOMY, INSERT TUBE BILATERAL, COMBINED      ORTHOPEDIC SURGERY      planter warts foot    TONSILLECTOMY      TYMPANOPLASTY         Family History:    Family History   Problem Relation Age of Onset    Diabetes Father         type II    Neurologic Disorder Father         TBI    Hyperlipidemia Father     Coronary Artery Disease Father         MI    Melanoma Maternal Grandmother     Other - See Comments Maternal Grandfather         murdered    Chronic Obstructive Pulmonary Disease Paternal Grandmother     Cerebrovascular Disease Paternal Grandmother     Heart Surgery Paternal Grandmother     Cerebrovascular Disease Paternal Grandfather         x3    Diabetes Paternal Grandfather         type II       Social History:  Marital Status:  Patient Declined [9]  Social History     Tobacco Use    Smoking status: Former     Current packs/day: 0.00     Types: Cigarettes, Other     Passive exposure: Current    Smokeless tobacco: Never   Vaping Use    Vaping status: Every Day    Substances: Nicotine, Flavoring    Devices: Refillable tank, Pre-filled pod   Substance Use Topics    Alcohol use: Never    Drug use: Never        Medications:    amoxicillin (AMOXIL) 500 MG capsule  ARIPiprazole (ABILIFY) 20 MG tablet  clotrimazole (LOTRIMIN) 1 % external cream  hydrOXYzine HCl (ATARAX) 10 MG tablet  lamoTRIgine (LAMICTAL) 100 MG tablet  lurasidone (LATUDA) 40 MG TABS tablet  sertraline (ZOLOFT) 25 MG tablet  tolnaftate (TINACTIN) 1 % external powder          Review of Systems  A medically appropriate review of systems was performed with pertinent positives and negatives noted in the HPI, and all other systems negative.    Physical Exam   Patient Vitals for the past 24 hrs:   BP Temp Temp src Pulse Resp SpO2   09/25/24 1609 138/77 98.2  F (36.8  C) Tympanic 78 20 97 %          Physical Exam  General: alert and in no acute  distress on arrival  Ears/Nose/Throat: ENT: Left Ear: TM intact, middle ear is not erythremic, no purulence, canal patent. Right Ear: TM intact, middle ear is not erythremic, no purulence, canal patent. Nose: No erythema or edema patent nostrils bilateral. Throat: midline uvula, non-erythremic, non-enlarged tonsils, without exudate.  No cervical adenopathy.   Mouth: Has erythema and mild swelling of lower right gumline there are some caries and 1 broken tooth identified. No palpated abscess processes in or around any gumline or soft tissue today.  Head: atraumatic, normocephalic  Lungs:  nonlabored, CTA bilateral throughout  CV: Regular rate and rhythm, extremities warm and perfused  Abd: nondistended  Skin: no rashes, no diaphoresis and skin color normal  Neuro: Patient awake, alert, speech is fluent, no focal deficits  Psychiatric: affect/mood normal, appropriate historian      ED Course                 Procedures                    No results found for this or any previous visit (from the past 24 hour(s)).    MEDICATIONS GIVEN IN THE EMERGENCY DEPARTMENT:  Medications - No data to display             Assessments & Plan (with Medical Decision Making)  24 year old female who presents to the Urgent Care for evaluation of infection to lower right gumline there are several teeth with significant dental caries 1 tooth is broken off at the gumline patient reports this is where her pain is located.  No palpable abscess areas on any of gum lines, mucosa or soft tissue in mouth.  No obvious lesions.   Diagnosis: Dental infection  Treatment plan: Offered a dental block, patient declined at this, ordered antibiotics amoxicillin 3 times daily for 7 days, commended use of Aleve to decrease inflammation vies that she can alternate with Tylenol if she chooses.  Recommend dental care as soon as possible.  Advised to return if she develops fever, chills despite taking antibiotics.       I have reviewed the nursing notes.    I have  reviewed the findings, diagnosis, plan and need for follow up with the patient.        NEW PRESCRIPTIONS STARTED AT TODAY'S ER VISIT  New Prescriptions    AMOXICILLIN (AMOXIL) 500 MG CAPSULE    Take 1 capsule (500 mg) by mouth 3 times daily for 7 days.       Final diagnoses:   Dental infection       9/25/2024   [facility] EMERGENCY DEPT    Disclaimer: This note consists of symbols derived from keyboarding, dictation, and/or voice recognition software. As a result, there may be errors in the script that have gone undetected.  Please consider this when interpreting information found in the chart.      Padmini David, WILMER CNP  10/02/24 1038

## 2024-09-25 NOTE — Clinical Note
Dr. Rayo,  Thank you for your consult and care of the patient. I am switching abilify to toshia.   Sincerely, Sam Dixon M.D. Consultative Psychiatrist Program Medical Director, [facility]

## 2024-09-25 NOTE — PATIENT INSTRUCTIONS
"Patient Education   Collaborative Care Psychiatry Service  What to Expect  Here's what to expect from your Collaborative Care Psychiatry Service (CCPS).   About CCPS  CCPS means 2 people work together to help you get better. You'll meet with a behavioral health clinician and a psychiatric doctor. A behavioral health clinician helps people with mental health problems by talking with them. A psychiatric doctor helps people by giving them medicine.  How it works  At every visit, you'll see the behavioral health clinician (BHC) first. They'll talk with you about how you're doing and teach you how to feel better.   Then you'll see the psychiatric doctor. This doctor can help you deal with troubling thoughts and feelings by giving you medicine. They'll make sure you know the plan for your care.   CCPS usually takes 3 to 6 visits. If you need more visits, we may have you start seeing a different psychiatric doctor for ongoing care.  If you have any questions or concerns, we'll be glad to talk with you.  About visits  Be open  At your visits, please talk openly about your problems. It may feel hard, but it's the best way for us to help you.  Cancelling visits  If you can't come to your visit, please call us right away at [phone]. If you don't cancel at least 24 hours (1 full day) before your visit, that's \"late cancellation.\"  Being late to visits  Being very late is the same as not showing up. You will be a \"no show\" if:  Your appointment starts with a BHC, and you're more than 15 minutes late for a 30-minute (half hour) visit. This will also cancel your appointment with the psychiatric doctor.  Your appointment is with a psychiatric doctor only, and you're more than 15 minutes late for a 30-minute (half hour) visit.  Your appointment is with a psychiatric doctor only, and you're more than 30 minutes late for a 60-minute (full hour) visit.  If you cancel late or don't show up 2 times within 6 months, we may end your " care.   Getting help between visits  If you need help between visits, you can call us Monday to Friday from 8 a.m. to 4:30 p.m. at [phone].  Emergency care  Call 911 or go to the nearest emergency department if your life or someone else's life is in danger.  Call 988 anytime to reach the national Suicide and Crisis hotline.  Medicine refills  To refill your medicine, call your pharmacy. You can also call [facility]'s Behavioral Access at [phone], Monday to Friday, 8 a.m. to 4:30 p.m. It can take 1 to 3 business days to get a refill.   Forms, letters, and tests  You may have papers to fill out, like FMLA, short-term disability, and workability. We can help you with these forms at your visits, but you must have an appointment. You may need more than 1 visit for this, to be in an intensive therapy program, or both.  Before we can give you medicine for ADHD, we may refer you to get tested for it or confirm it another way.  We may not be able to give you an emotional support animal letter.  We don't do mental health checks ordered by the court.   We don't do mental health testing, but we can refer you to get tested.   Thank you for choosing us for your care.  For informational purposes only. Not to replace the advice of your health care provider. Copyright   2022 [facility]. All rights reserved. [employer] [id number] - 12/22.

## 2024-10-06 ENCOUNTER — VIRTUAL VISIT (OUTPATIENT)
Dept: URGENT CARE | Facility: CLINIC | Age: 24
End: 2024-10-06
Payer: COMMERCIAL

## 2024-10-06 ENCOUNTER — E-VISIT (OUTPATIENT)
Dept: URGENT CARE | Facility: CLINIC | Age: 24
End: 2024-10-06
Payer: COMMERCIAL

## 2024-10-06 DIAGNOSIS — B35.9 TINEA: Primary | ICD-10-CM

## 2024-10-06 DIAGNOSIS — B35.3 TINEA PEDIS, UNSPECIFIED LATERALITY: Primary | ICD-10-CM

## 2024-10-06 PROCEDURE — 99207 PR NON-BILLABLE SERV PER CHARTING: CPT | Performed by: NURSE PRACTITIONER

## 2024-10-06 PROCEDURE — 99212 OFFICE O/P EST SF 10 MIN: CPT | Mod: 95

## 2024-10-06 NOTE — PATIENT INSTRUCTIONS
Dear Jeanine Hernandez?     After reviewing your responses, I am unable to make a diagnosis that can be treated online.    You will not be charged for this eVisit.     We are dedicated to helping you achieve your best health and would like to see you in one of our many clinic locations - a primary care provider would be ideal for your concern.    Please use [employer] to schedule a visit with a provider or call [phone]) to schedule at any of our locations.    Thanks for choosing?us?as your health care partner,?   ?   MONIQUE CARROLL, WILMER CNP?

## 2024-10-06 NOTE — PROGRESS NOTES
Jeanine is a 24 year old female who presents for a billable video visit.    ASSESSMENT/PLAN:  Diagnoses and all orders for this visit:    Tinea pedis, unspecified laterality    Pt presents with fungal infection to her foot that has been ongoing for several months. She has tried a variety of different oral and topical medications without any success. Discussed with patient that unfortunately we are unable to prescribe longer dose oral medications for fungal infections through urgent care as they need to have ongoing lab tests with them. Referred her to derm and podiatry (as lesions are on her foot). Pt instructed to follow up with PCP as well. If worsening symptoms be seen in person.     SUBJECTIVE: Pt reports that she has a fungal infection on her foot for the past 4-5 months. She's done a variety of topical treatments as well as oral fluconazole which were all ineffective.       ROS: Pertinent ROS neg other than the symptoms noted above in the HPI.     OBJECTIVE:  Vitals not done due to this being a virtual visit    GENERAL: healthy, alert and no distress  EYES: Eyes grossly normal to inspection,conjunctivae and sclerae normal  RESP: Able to speak in complete sentences, no audible wheeze or cough  SKIN: tinea appearing rash noted to foot   NEURO: mentation intact and speech normal  PSYCH: mentation appears normal, affect normal/bright    Video-Visit Details    Type of service:  Video Visit  Video Start Time: 1705  Video End Time: 1712    Originating Location: Home    Distant Location:  [facility] VIRTUAL URGENT CARE     Platform used for Video Visit: WILMER Jasmine CNP

## 2024-10-08 ENCOUNTER — OFFICE VISIT (OUTPATIENT)
Dept: FAMILY MEDICINE | Facility: CLINIC | Age: 24
End: 2024-10-08
Payer: COMMERCIAL

## 2024-10-08 VITALS
BODY MASS INDEX: 30.11 KG/M2 | OXYGEN SATURATION: 98 % | DIASTOLIC BLOOD PRESSURE: 64 MMHG | WEIGHT: 176.4 LBS | RESPIRATION RATE: 16 BRPM | HEART RATE: 89 BPM | SYSTOLIC BLOOD PRESSURE: 114 MMHG | HEIGHT: 64 IN | TEMPERATURE: 97.7 F

## 2024-10-08 DIAGNOSIS — B35.3 TINEA PEDIS OF RIGHT FOOT: Primary | ICD-10-CM

## 2024-10-08 DIAGNOSIS — Z23 ENCOUNTER FOR VACCINATION: ICD-10-CM

## 2024-10-08 PROCEDURE — 91320 SARSCV2 VAC 30MCG TRS-SUC IM: CPT | Performed by: NURSE PRACTITIONER

## 2024-10-08 PROCEDURE — 99213 OFFICE O/P EST LOW 20 MIN: CPT | Mod: 25 | Performed by: NURSE PRACTITIONER

## 2024-10-08 PROCEDURE — 90656 IIV3 VACC NO PRSV 0.5 ML IM: CPT | Performed by: NURSE PRACTITIONER

## 2024-10-08 PROCEDURE — 90480 ADMN SARSCOV2 VAC 1/ONLY CMP: CPT | Performed by: NURSE PRACTITIONER

## 2024-10-08 PROCEDURE — 90471 IMMUNIZATION ADMIN: CPT | Performed by: NURSE PRACTITIONER

## 2024-10-08 ASSESSMENT — PAIN SCALES - GENERAL: PAINLEVEL: NO PAIN (0)

## 2024-10-08 NOTE — PATIENT INSTRUCTIONS
Make appointment with dermatology for evaluation of your rash that appears fungal.  Vaccinations today.  Let me know if you need a external referral for advanced dermatology.

## 2024-10-08 NOTE — PROGRESS NOTES
Assessment & Plan     Tinea pedis of right foot  Patient has failed treatment for this in topical and oral.  Referral to dermatology for further evaluation and possible biopsy.  - Adult Dermatology  Referral; Future    Encounter for vaccination  - INFLUENZA VACCINE,SPLIT VIRUS,TRIVALENT,PF(FLUZONE)  - COVID-19 12+ (PFIZER)    See Patient Instructions    Rachael Saeed is a 24 year old, presenting for the following health issues:  Derm Problem (Ring worm, right foot )        10/8/2024     1:12 PM   Additional Questions   Roomed by Farrah RICKS   Accompanied by self     History of Present Illness       Reason for visit:  Present ring worm after 4-5 months    She eats 2-3 servings of fruits and vegetables daily.She consumes 2 sweetened beverage(s) daily.She exercises with enough effort to increase her heart rate 30 to 60 minutes per day.  She exercises with enough effort to increase her heart rate 7 days per week.   She is taking medications regularly.       Rash  Onset/Duration: 5 months  Description  Location: right foot  Character: round, red  Itching: no  Intensity:  mild  Progression of Symptoms:  worsening, is spreading  Accompanying signs and symptoms:   Fever: No  Body aches or joint pain: No  Sore throat symptoms: No  Recent cold symptoms: YES  History:           Previous episodes of similar rash: None  New exposures:  None  Recent travel: No  Exposure to similar rash: No  Precipitating or alleviating factors: none  Therapies tried and outcome: lotrimine otc and rx, anti fungal powder.  No relief with any    Diflucan treatment oral x 2.  No change in this skin rash.  She does deny any irritation or itching but states that it is frustrating that it won't go away.      Review of Systems  CONSTITUTIONAL: NEGATIVE for fever, chills, change in weight  INTEGUMENTARY/SKIN: POSITIVE for circular rash on the left foot  RESP: NEGATIVE for significant cough or SOB  CV: NEGATIVE for chest pain, palpitations or  "peripheral edema  PSYCHIATRIC: NEGATIVE for changes in mood or affect  ROS otherwise negative      Objective    /64 (BP Location: Left arm, Patient Position: Chair, Cuff Size: Adult Large)   Pulse 89   Temp 97.7  F (36.5  C) (Tympanic)   Resp 16   Ht 1.619 m (5' 3.75\")   Wt 80 kg (176 lb 6.4 oz)   LMP  (LMP Unknown)   SpO2 98%   BMI 30.52 kg/m    Body mass index is 30.52 kg/m .  Physical Exam   GENERAL: alert and no distress  SKIN: circular rash with central clearing that is on the right dorsal foot between the second and big toe approximately 1-1.5 cm in diameter  PSYCH: mentation appears normal, affect normal/bright    Signed Electronically by: Evy Gerardo NP    "

## 2024-10-09 ENCOUNTER — HOSPITAL ENCOUNTER (EMERGENCY)
Facility: CLINIC | Age: 24
Discharge: HOME | End: 2024-10-09
Attending: STUDENT IN AN ORGANIZED HEALTH CARE EDUCATION/TRAINING PROGRAM | Admitting: STUDENT IN AN ORGANIZED HEALTH CARE EDUCATION/TRAINING PROGRAM
Payer: COMMERCIAL

## 2024-10-09 ENCOUNTER — APPOINTMENT (OUTPATIENT)
Dept: GENERAL RADIOLOGY | Facility: CLINIC | Age: 24
End: 2024-10-09
Attending: FAMILY MEDICINE
Payer: COMMERCIAL

## 2024-10-09 VITALS
DIASTOLIC BLOOD PRESSURE: 88 MMHG | RESPIRATION RATE: 16 BRPM | WEIGHT: 176.4 LBS | HEIGHT: 64 IN | OXYGEN SATURATION: 99 % | HEART RATE: 88 BPM | TEMPERATURE: 98 F | SYSTOLIC BLOOD PRESSURE: 133 MMHG | BODY MASS INDEX: 30.11 KG/M2

## 2024-10-09 DIAGNOSIS — S89.91XA KNEE INJURY, RIGHT, INITIAL ENCOUNTER: ICD-10-CM

## 2024-10-09 PROCEDURE — 99283 EMERGENCY DEPT VISIT LOW MDM: CPT | Performed by: STUDENT IN AN ORGANIZED HEALTH CARE EDUCATION/TRAINING PROGRAM

## 2024-10-09 PROCEDURE — 73560 X-RAY EXAM OF KNEE 1 OR 2: CPT | Mod: RT

## 2024-10-09 RX ORDER — HYDROCODONE BITARTRATE AND ACETAMINOPHEN 5; 325 MG/1; MG/1
1 TABLET ORAL EVERY 6 HOURS PRN
Qty: 4 TABLET | Refills: 0 | Status: SHIPPED | OUTPATIENT
Start: 2024-10-09 | End: 2024-11-26

## 2024-10-09 ASSESSMENT — COLUMBIA-SUICIDE SEVERITY RATING SCALE - C-SSRS
1. IN THE PAST MONTH, HAVE YOU WISHED YOU WERE DEAD OR WISHED YOU COULD GO TO SLEEP AND NOT WAKE UP?: NO
6. HAVE YOU EVER DONE ANYTHING, STARTED TO DO ANYTHING, OR PREPARED TO DO ANYTHING TO END YOUR LIFE?: NO
2. HAVE YOU ACTUALLY HAD ANY THOUGHTS OF KILLING YOURSELF IN THE PAST MONTH?: NO

## 2024-10-09 ASSESSMENT — ACTIVITIES OF DAILY LIVING (ADL): ADLS_ACUITY_SCORE: 35

## 2024-10-09 NOTE — Clinical Note
Jeanine Hernandez was seen and treated in our emergency department on 10/9/2024.  She may return to work on 10/10/2024.  The patient has a knee injury that is worsened by prolonged standing and going up and down stairs.  She should be allowed to do modified duty until cleared by her doctor.     If you have any questions or concerns, please don't hesitate to call.      Jeremy Ba MD

## 2024-10-09 NOTE — PROGRESS NOTES
[facility]  October 10, 2024      Behavioral Health Clinician Progress Note   Patient Name: Jeanine Hernandez           Service Type:  Individual      Service Location:   [facility] / Email (patient reached)     Session Start Time: 1108am  Session End Time: 1124am      Session Length: 16 - 37      Attendees: Patient     Service Modality:  Video Visit:      Provider verified identity through the following two step process.  Patient provided:  Patient is known previously to provider and Patient was verified at admission/transfer    Telemedicine Visit: The patient's condition can be safely assessed and treated via synchronous audio and visual telemedicine encounter.      Reason for Telemedicine Visit: Patient has requested telehealth visit    Originating Site (Patient Location): Patient's home    Distant Site (Provider Location): Provider Remote Setting- Home Office    Consent:  The patient/guardian has verbally consented to: the potential risks and benefits of telemedicine (video visit) versus in person care; bill my insurance or make self-payment for services provided; and responsibility for payment of non-covered services.     Patient would like the video invitation sent by:  My Chart    Mode of Communication:  Video Conference via [facility]    Distant Location (Provider):  Off-site    As the provider I attest to compliance with applicable laws and regulations related to telemedicine.    Visit Activities (Refresh list every visit): [facility] Only    Diagnostic Assessment Date: 9/25/2024  Treatment Plan Review Date: 1/8/25  See Flowsheets for today's PHQ-9 and CONNER-7 results  Previous PHQ-9:       8/6/2024     3:46 PM 9/23/2024     4:10 PM 9/25/2024     6:12 AM   PHQ-9 SCORE   PHQ-9 Total Score Katharine 18 (Moderately severe depression) 13 (Moderate depression) 14 (Moderate depression)   PHQ-9 Total Score 18 13 14    14     Previous CONNER-7:       7/29/2024     2:50 PM  8/6/2024     3:47 PM 9/23/2024     4:11 PM   CONNER-7 SCORE   Total Score  14 (moderate anxiety) 14 (moderate anxiety)   Total Score 16 14 14       DATA  Extended Session (60+ minutes): No  Interactive Complexity: No  Crisis: No  [facility] Patient: No    Treatment Objective(s) Addressed in This Session:  Target Behavior(s): disease management/lifestyle changes mood, depressive and anxiety sx    Anxiety: will experience a reduction in anxiety and will increase ability to function adaptively  Mood Instability: will develop better understanding of triggers and coping strategies to stabilize mood  Psychological distress related to Sleep Disturbance    Current Stressors / Issues:  Questions/Thoughts for Dr. Dixon:    Better/worse: lack of interest, project jumping, concentration, distractibility, hygiene is still difficult, trying to be more social  ADLs: sleep- no nightmares, not restful, by the time she gets off work and home is 2 and done eating it's 3 and her son wakes up at 730am, appetite- eating at least 2x/day and normal, hygiene- not showering for a few days     Hard to stay focused and then are project jumping.     Current Symptoms: Pt sates that they hurt themselves and are going to have an MRI after pt twisted their knee. She says that she is still feeling down. Pt still isn't showering for days as well. She states that anger outbursts have improved. Concentration is hard and pt isn't having much interest or pleasure is normal hobbies. Pt is reaching out to people at work and trying to make friends here and there. Physical sx of anxiety are only are head aches and also there on their own. She is easily distracted. Still is still needing to eat things in the rainbow and is still cleaning and concerned about this. She is feeling more depressed and not manic. When she was last seen she was towards the end of a manic episode. Pt denies any auditory psychosis sx.     Stressors: hurting knee and are getting sent home from  "work and are getting pointed from her and she is leaving early which is against policy, there are concerns about losing her job, it's hard for her to stand, lift, moving     Mood: \"fair\"    Substance Use: no changes in vaping  Therapist: pt missed a [email or web address] call which might have been them, scheduling number [phone] [facility] will send through [facility] recommendations: ask providers for a restriction note for work, continue to try to reach out and be social     Progress towards goals: finding a therapist      Progress on Treatment Objective(s) / Homework:  New Objective established this session - ACTION (Actively working towards change); Intervened by reinforcing change plan / affirming steps taken    Motivational Interviewing    MI Intervention: Co-Developed Goal: find a therapist, Expressed Empathy/Understanding, Supported Autonomy, Collaboration, Evocation, Permission to raise concern or advise, Open-ended questions, Reflections: simple and complex, Change talk (evoked), and Reframe     Change Talk Expressed by the Patient: Need to change Committment to change Activation Taking steps    Provider Response to Change Talk: E - Evoked more info from patient about behavior change, A - Affirmed patient's thoughts, decisions, or attempts at behavior change, R - Reflected patient's change talk, and S - Summarized patient's change talk statements    Also provided psychoeducation about behavioral health condition, symptoms, and treatment options    Assessments completed prior to visit:  The following assessments were completed by patient for this visit:  PHQ9:       2/1/2022    10:44 AM 7/21/2023     3:12 AM 3/27/2024     1:30 PM 7/29/2024     2:50 PM 8/6/2024     3:46 PM 9/23/2024     4:10 PM 9/25/2024     6:12 AM   PHQ-9 SCORE   PHQ-9 Total Score [facility] 8 (Mild depression) 5 (Mild depression)   18 (Moderately severe depression) 13 (Moderate depression) 14 (Moderate depression)   PHQ-9 Total Score 8 5 2 11 18 13 14    " 14     GAD7:       1/25/2021     3:14 PM 2/1/2021     2:06 PM 11/24/2021    10:59 AM 3/27/2024     1:30 PM 7/29/2024     2:50 PM 8/6/2024     3:47 PM 9/23/2024     4:11 PM   CONNER-7 SCORE   Total Score  4 (minimal anxiety) 3 (minimal anxiety)   14 (moderate anxiety) 14 (moderate anxiety)   Total Score 4 4 3 0 16 14 14     PROMIS 10-Global Health (only subscores and total score):       9/20/2024     9:43 AM   PROMIS-10 Scores Only   Global Mental Health Score 7    7   Global Physical Health Score 11    11   PROMIS TOTAL - SUBSCORES 18    18       Care Plan review completed: Yes    Medication Review:  No changes to current psychiatric medication(s)    Medication Compliance:  Yes    Changes in Health Issues:   None reported    Chemical Use Review:   Substance Use: Chemical use reviewed, no active concerns identified      Tobacco Use: No change in amount of tobacco use since last session.Still vaping.     Assessment: Current Emotional / Mental Status (status of significant symptoms):  Risk status (Self / Other harm or suicidal ideation)  Patient has had a history of suicidal ideation: n high school and before bipolar was managed, no attempts or aborted attempts and self-injurious behavior: in high school, cutting, superficial  Patient denies current fears or concerns for personal safety.  Patient denies current or recent suicidal ideation or behaviors.  Patient denies current or recent homicidal ideation or behaviors.  Patient denies current or recent self injurious behavior or ideation.  Patient denies other safety concerns.  A safety and risk management plan has not been developed at this time, however patient was encouraged to call [place] [phone] should there be a change in any of these risk factors.    Appearance:   Appropriate   Eye Contact:   Good   Psychomotor Behavior: Normal   Attitude:   Cooperative  Interested Friendly  Orientation:   All  Speech   Rate / Production: Normal    Volume:  Normal  "  Mood:    Anxious  Depressed  Anhedonia  Affect:    Lethargic  Subdued   Thought Content:  Clear   Thought Form:  Coherent  Logical   Insight:    Good     Diagnoses:  1. Bipolar I disorder (H)    2. Generalized anxiety disorder      Collateral Reports Completed:  Communicated with: Sam Dixon M.D.     Plan: (Homework, other):  Patient was given information about behavioral services and encouraged to schedule a follow up appointment with the clinic [facility] in 1 month.  Patient was also given information about mental health symptoms and treatment options .  CD Recommendations: No indications of CD issues.     Dinah Garcia, [facility]    ______________________________________________________________________    Integrated Primary Care Behavioral Health Treatment Plan    Individual Treatment Plan    Patient's Name: Jeanine Hernandez   YOB: 2000  Date of Creation: 10/10/2024  Date Treatment Plan Last Reviewed/Revised: 10/10/2024    DSM5 Diagnoses:   Psychosocial / Contextual Factors: partnered 24 year-old female, has children, experienced bipolar disorder sx, is in bankruptcy, works   PROMIS (reviewed every 90 days):   PROMIS 10-Global Health (only subscores and total score):       9/20/2024     9:43 AM   PROMIS-10 Scores Only   Global Mental Health Score 7    7   Global Physical Health Score 11    11   PROMIS TOTAL - SUBSCORES 18    18        Referral / Collaboration:  Referral to another professional/service is not indicated at this time..    Anticipated number of session for this episode of care: 3-6 sessions  Anticipation frequency of session: Monthly  Anticipated Duration of each session: 16-37 minutes  Treatment plan will be reviewed in 90 days or when goals have been changed.       MeasurableTreatment Goal(s) related to diagnosis / functional impairment(s)  Goal 1: Patient will manage manic episodes, anxiety and depressive sx   \"I will know I've met my goal when I find a therapist who will give me the " "tools to manage anxiety, depression and bipolar symptoms.\"     Objective #A (Patient Action)    Patient will find a therapist who will give tools to manage anxiety, depression and bipolar sx.  Status: New - Date: 10/10/2024      Intervention(s)  [facility] will make referrals to outpatient therapy.      Patient has reviewed and agreed to the above plan.    Written by  Dinah Garcia, LICANUSHA, [facility]     "

## 2024-10-10 ENCOUNTER — VIRTUAL VISIT (OUTPATIENT)
Dept: BEHAVIORAL HEALTH | Facility: CLINIC | Age: 24
End: 2024-10-10
Payer: COMMERCIAL

## 2024-10-10 ENCOUNTER — TRANSFERRED RECORDS (OUTPATIENT)
Dept: HEALTH INFORMATION MANAGEMENT | Facility: CLINIC | Age: 24
End: 2024-10-10
Payer: COMMERCIAL

## 2024-10-10 ENCOUNTER — VIRTUAL VISIT (OUTPATIENT)
Dept: PSYCHIATRY | Facility: CLINIC | Age: 24
End: 2024-10-10
Payer: COMMERCIAL

## 2024-10-10 DIAGNOSIS — F31.9 BIPOLAR I DISORDER (H): Primary | ICD-10-CM

## 2024-10-10 DIAGNOSIS — F41.1 GENERALIZED ANXIETY DISORDER: ICD-10-CM

## 2024-10-10 PROCEDURE — 90832 PSYTX W PT 30 MINUTES: CPT | Mod: 95 | Performed by: COUNSELOR

## 2024-10-10 PROCEDURE — G2211 COMPLEX E/M VISIT ADD ON: HCPCS | Mod: 95 | Performed by: PSYCHIATRY & NEUROLOGY

## 2024-10-10 PROCEDURE — 99214 OFFICE O/P EST MOD 30 MIN: CPT | Mod: 95 | Performed by: PSYCHIATRY & NEUROLOGY

## 2024-10-10 RX ORDER — ARIPIPRAZOLE 20 MG/1
20 TABLET ORAL DAILY
Qty: 30 TABLET | Refills: 1 | Status: SHIPPED | OUTPATIENT
Start: 2024-10-10

## 2024-10-10 RX ORDER — LAMOTRIGINE 100 MG/1
TABLET ORAL
Qty: 60 TABLET | Refills: 2 | Status: SHIPPED | OUTPATIENT
Start: 2024-10-10

## 2024-10-10 ASSESSMENT — PAIN SCALES - GENERAL: PAINLEVEL: NO PAIN (0)

## 2024-10-10 NOTE — Clinical Note
Please call patient to schedule followup with Dinah Garcia and MELIA in 6 weeks CCPS - thank you!  Sam

## 2024-10-10 NOTE — PATIENT INSTRUCTIONS
Based on our discussion, I have outlined the following instructions for you:    - Start taking 150 mg of Lamotrigine every day for the next two weeks.  - After two weeks, increase your Lamotrigine dosage to 200 mg every day.  - Stop taking Latuda for the next 4 days to see if your restlessness improves. Send a [employer] message with how that went   - After you have been taking the increased dosage of Lamotrigine 200 mg for about 10 days, go to the lab to have your blood tested. They will check the level of Lamotrigine in your blood. if you are able to get the lab about 12 hours after the last dose of lamotrigine  - You also need to have a blood test to check your liver function. This is because a previous test showed a slight increase in a liver enzyme called alkaline phosphatase.  - Make sure to schedule an appointment to see me again in 6 weeks.    Next appointment(s): Follow-up appointment in 6 weeks    Thank you again for your visit, and we look forward to supporting you in your journey to better health.      Patient Education   Collaborative Care Psychiatry Service  What to Expect  Here's what to expect from your Collaborative Care Psychiatry Service (CCPS).   About CCPS  CCPS means 2 people work together to help you get better. You'll meet with a behavioral health clinician and a psychiatric doctor. A behavioral health clinician helps people with mental health problems by talking with them. A psychiatric doctor helps people by giving them medicine.  How it works  At every visit, you'll see the behavioral health clinician (BHC) first. They'll talk with you about how you're doing and teach you how to feel better.   Then you'll see the psychiatric doctor. This doctor can help you deal with troubling thoughts and feelings by giving you medicine. They'll make sure you know the plan for your care.   CCPS usually takes 3 to 6 visits. If you need more visits, we may have you start seeing a different psychiatric doctor  "for ongoing care.  If you have any questions or concerns, we'll be glad to talk with you.  About visits  Be open  At your visits, please talk openly about your problems. It may feel hard, but it's the best way for us to help you.  Cancelling visits  If you can't come to your visit, please call us right away at [phone]. If you don't cancel at least 24 hours (1 full day) before your visit, that's \"late cancellation.\"  Being late to visits  Being very late is the same as not showing up. You will be a \"no show\" if:  Your appointment starts with a [facility], and you're more than 15 minutes late for a 30-minute (half hour) visit. This will also cancel your appointment with the psychiatric doctor.  Your appointment is with a psychiatric doctor only, and you're more than 15 minutes late for a 30-minute (half hour) visit.  Your appointment is with a psychiatric doctor only, and you're more than 30 minutes late for a 60-minute (full hour) visit.  If you cancel late or don't show up 2 times within 6 months, we may end your care.   Getting help between visits  If you need help between visits, you can call us Monday to Friday from 8 a.m. to 4:30 p.m. at [phone].  Emergency care  Call 911 or go to the nearest emergency department if your life or someone else's life is in danger.  Call 988 anytime to reach the national Suicide and Crisis hotline.  Medicine refills  To refill your medicine, call your pharmacy. You can also call [facility]'s Behavioral Access at [phone], Monday to Friday, 8 a.m. to 4:30 p.m. It can take 1 to 3 business days to get a refill.   Forms, letters, and tests  You may have papers to fill out, like FMLA, short-term disability, and workability. We can help you with these forms at your visits, but you must have an appointment. You may need more than 1 visit for this, to be in an intensive therapy program, or both.  Before we can give you medicine for ADHD, we may refer you to get tested " for it or confirm it another way.  We may not be able to give you an emotional support animal letter.  We don't do mental health checks ordered by the court.   We don't do mental health testing, but we can refer you to get tested.   Thank you for choosing us for your care.  For informational purposes only. Not to replace the advice of your health care provider. Copyright   2022 [facility]. All rights reserved. [employer] [id number] - 12/22.

## 2024-10-10 NOTE — PROGRESS NOTES
Virtual Visit Details    Type of service:  Video Visit   Video Start Time:  11:44AM  Video End Time:11:55 AM    Originating Location (pt. Location): Home    Distant Location (provider location):  Off-site  Platform used for Video Visit: [facility] Psychiatry Consult Note    IDENTIFICATION   Name: Jeanine Hernandez   : 2000/24 year old      Sex:    @ female          Telemedicine Visit: The patient's condition can be safely assessed and treated via synchronous audio and visual telemedicine encounter.        Face to Face/patient Contact total time: 11 minutes  Pre Charting time: 2 minutes; Post charting time, communication and other activities: 2 minutes; Total time 15 minutes  11:38 AM           SUBJECTIVE     History of Present Illness    The patient, Jeanine Hernandez, a 24-year-old female, reported that she has been experiencing increased difficulty with depression, which has been particularly challenging for her. She noted that her ability to perform self-care tasks has been compromised due to a recent change in her medication and an injury. The change in medication has not yet shown the desired effects, and the injury has further complicated her situation. She mentioned that she injured her knee about 3-4 days ago, suspecting a torn meniscus after missing a step and twisting her leg. This injury has made it difficult for her to perform tasks such as getting into the shower.    Jeanine reported that her depression started getting worse about two or three days after her last visit, which was before the injury. She also mentioned that she has been experiencing increased fidgeting with her hands, which she is unsure whether it is a side effect of her medication, Latuda, or a symptom of anxiety. She noted that she has not experienced restlessness with anxiety as severe as it is now. Her anxiety levels have been high, which she stated tends to come and go with her depression. The anxiety has  been exacerbated by her injury, as it has resulted in her missing work and being sent home.    Jeanine expressed concerns about her current medication, Latuda, and wondered if it was the right choice for her. She mentioned that her spouse suggested the possibility of combining Latuda with a mood stabilizer. She expressed a desire to give her current medication a full trial run before making any changes, to determine if the fidgeting was caused by the injury or the medication.    Jeanine also mentioned that she had been on a higher dose of Lamotrigine while she was pregnant, but it was not effective as it was not registering in her blood levels. She expressed understanding of the plan and agreed to check in after six weeks.             The following assessments were completed by patient for this visit:  PROMIS 10-Global Health (only subscores and total score):       9/20/2024     9:43 AM   PROMIS-10 Scores Only   Global Mental Health Score 7    7   Global Physical Health Score 11    11   PROMIS TOTAL - SUBSCORES 18    18             8/6/2024     3:46 PM 9/23/2024     4:10 PM 9/25/2024     6:12 AM   PHQ   PHQ-9 Total Score 18 13 14    14   Q9: Thoughts of better off dead/self-harm past 2 weeks More than half the days Several days Not at all   F/U: Thoughts of suicide or self-harm No No    F/U: Safety concerns No No           7/29/2024     2:50 PM 8/6/2024     3:47 PM 9/23/2024     4:11 PM   CONNER-7 SCORE   Total Score  14 (moderate anxiety) 14 (moderate anxiety)   Total Score 16 14 14        OBJECTIVE     Vital Signs:   LMP  (LMP Unknown)     Labs:    Results    Previous liver labs showed slightly elevated alkaline phosphatase levels (196 on March 14)               Current Medications:  Current Outpatient Medications   Medication Sig Dispense Refill    ARIPiprazole (ABILIFY) 20 MG tablet Take 1 tablet (20 mg) by mouth daily. 30 tablet 1    lamoTRIgine (LAMICTAL) 100 MG tablet Take 1.5 tablets daily for two weeks  then increase to two tablets daily 60 tablet 2    clotrimazole (LOTRIMIN) 1 % external cream Apply topically 2 times daily 30 g 0    HYDROcodone-acetaminophen (NORCO) 5-325 MG tablet Take 1 tablet by mouth every 6 hours as needed for severe pain. 4 tablet 0    hydrOXYzine HCl (ATARAX) 10 MG tablet Take 1 tablet (10 mg) by mouth 3 times daily as needed for anxiety. 30 tablet 0    lurasidone (LATUDA) 40 MG TABS tablet Take 1 tablet (40 mg) by mouth daily with food. 21 tablet 0    sertraline (ZOLOFT) 25 MG tablet Take 3 tablets (75 mg) by mouth daily. 90 tablet 1    tolnaftate (TINACTIN) 1 % external powder Apply topically 2 times daily. 45 g 1     Current Facility-Administered Medications   Medication Dose Route Frequency Provider Last Rate Last Admin    medroxyPROGESTERone (DEPO-PROVERA) injection 150 mg  150 mg Intramuscular Q90 Days         medroxyPROGESTERone (DEPO-PROVERA) injection 150 mg  150 mg Intramuscular Q90 Days Dottie Lee MD   150 mg at 08/02/24 7536            ADDED HISTORY   Patient is currently missing work due to her knee injury, which is causing increased anxiety. No details on substance use, history, or treatment were provided.    Physical exam    Physical Exam    Physical exam  NEUROLOGIC: Patient exhibits restlessness and fidgeting, which is worse than usual.    Mental status exam  - Anxiety, depression, affect:  - Speech and language:  - Insight and judgment:  - Alert and orientation to person, place and situation:  - SI: None  - HI: None  - AH: None  - VH: None             MENTAL STATUS EXAMINATION:   Appearance: Intact attention to grooming and hygiene, casual garb  Attitude: Cooperative  Eye Contact: Good  Gait and Station: Sitting  Psychomotor Behavior: Increased  Oriented to: Grossly person place and time  Attention Span and Concentration: Grossly intact  Speech: Elevated tone speed and volume  Language: English  Mood:  Fair  Affect: Constricted  Associations:  no loose  associations  Thought Process:  logical, linear and goal oriented  Thought Content: No evidence of delusions or suicidal or homicidal ideation plan or intent  Memory: Grossly intact  Fund of Knowledge: Good  Insight:  good  Judgment:  intact, adequate for safety  Impulse Control:  intact        DIAGNOSES:   Anxiety  Bipolar I disorder  Mixed obsessional thoughts and acts        ASSESSMENT:   The patient has a history of bipolar disorder (rapid cycling), anxiety, and non-epileptic seizures. She has been experiencing manic episodes for the past 2-3 months, characterized by impulsivity, racing thoughts, and increased productivity, followed by depressive states. The patient's symptoms have been causing significant distress in her personal life and work. Abilify at max dose with very limited benefits, switch to latuda. Has prediabetes. Selecting antipsychotic - patient wants 1-2 more kids in the future and antipsychotics that are newer with lower metabolic risks are preferred.     Today Jeanine Hernandez reports no suicidal ideation. In addition, she has notable risk factors for self-harm, including anxiety. However, risk is mitigated by commitment to family, absence of past attempts, ability to volunteer a safety plan, and history of seeking help when needed. Therefore, based on all available evidence including the factors cited above, she does not appear to be at imminent risk for self-harm, does not meet criteria for a 72-hr hold, and therefore remains appropriate for ongoing outpatient level of care.       PLAN:     Patient advised of consultative model. Patient will continue to be seen for ongoing consultation and stabilization.  Does not meet criteria for involuntary treatment or hospitalization  Abilify 30 mg daily => 9/25/24 20mg once daily until next consultation -Risks, benefits and alternatives discussed.  Patient provides verbal consent to treatment. Cross taper to Latuda  And Latuda 9/25/2024 40 mg every  afternoon with dinner -Risks, benefits and alternatives discussed.  Patient provides verbal consent to treatment.  Advised of tardive dyskinesia.  Advised of metabolic effects and lab monitoring.  Advised the risk of converting prediabetes to diabetes. PAUSE FOR FOUR DAYS AND ASSESS RESTLESSNESS - PT TO SEND [email or web address] MESSAGE  Continue lamotrigine 100 mg daily => 10/10/24 150 mg daily x 2 weeks then 200 mg daily -Risks, benefits and alternatives discussed.  Patient provides verbal consent to treatment. Follow-up lamotrigine level, may need to titrate 300-400 mg  Continue sertraline 75 mg daily-provides verbal consent to treatment  Labs -pending standing A1c/fasting lipid panel - review next due labs      Assessment & Plan     Assessment  The patient's depressive and anxiety symptoms have exacerbated recently, potentially secondary to a recent alteration in pharmacotherapy and a recent knee injury. The patient is currently on Latuda and Lamotrigine. The akathisia she is experiencing may be an adverse effect of Latuda. The plan is to titrate up the dose of Lamotrigine and temporarily discontinue Latuda for a few days to observe if the akathisia improves. If improvement is noted, it may suggest that Latuda is the causative agent for this adverse effect. The patient's serum Lamotrigine levels will also be monitored after she has been on the increased dose for a couple of weeks.    Plan  - Increase Lamotrigine to 150 mg for two weeks, then increase to 200 mg  - Discontinue Latuda for 4 days to evaluate if restlessness ameliorates  - Monitor Lamotrigine serum levels after the patient has been on the increased dosage for approximately two weeks  - Evaluate hepatic function tests due to previously noted mild elevation in alkaline phosphatase levels  - Follow-up appointment in 6 weeks    Prescription  Lamotrigine: Increase to 150 mg for two weeks, then increase to 200 mg    Appointments  Follow-up appointment in 6 weeks            Administrative Billing:   Time spent with patient was greater than 50% of time and/or significant time was spent in counseling and coordination of care regarding above diagnoses and treatment plan. Pre charting time and post charting time/documentation/coordination are done on date of service.      Signed:   Sam Dixon M.D.  [facility] Psychiatry Service    Disclaimer: This note consists of symbols derived from keyboarding, dictation and/or voice recognition software. As a result, there may be errors in the script that have gone undetected. Please consider this when interpreting information found in this chart.    Consent was obtained from the patient to use an AI documentation tool in the creation of this note.     Eoc  The longitudinal plan of care for the diagnosis(es)/condition(s) as documented were addressed during this visit. Due to the added complexity in care, I will continue to support Christophe in the subsequent management and with ongoing continuity of care.

## 2024-10-10 NOTE — ED TRIAGE NOTES
Patient presents for right knee pain after stepping wrong a few days ago. Pain is not improving. Feels a grinding click and a warm feeling in the knee  Patient states that she has a fungal infection on her right foot that her primary cannot figure out. Has been on 4 meds with no improvement. Patient has a derm referral but has not scheduled an appointment yet.    Triage Assessment (Adult)       Row Name 10/09/24 8336          Triage Assessment    Airway WDL WDL        Respiratory WDL    Respiratory WDL WDL        Skin Circulation/Temperature WDL    Skin Circulation/Temperature WDL WDL        Cardiac WDL    Cardiac WDL WDL        Peripheral/Neurovascular WDL    Peripheral Neurovascular WDL WDL        Cognitive/Neuro/Behavioral WDL    Cognitive/Neuro/Behavioral WDL WDL

## 2024-10-10 NOTE — ED NOTES
Pt ambulated from waiting room with steady gait. States hurts a little only when it walks but mainly when standing on it for a long period of time. Denied tylenol or ibuprofen.

## 2024-10-10 NOTE — DISCHARGE INSTRUCTIONS
Your x-rays did not show any bony injuries.  It is possible that you could have some structural injury such as a meniscal tear.  You will need to follow-up with orthopedics for further workup such as a possible MRI.  I referred you to orthopedics.  If you need to be seen sooner you can go to one of the orthopedic walk-in clinics.  In the meantime, continue to use your compression brace.  Use Tylenol and ibuprofen as needed for pain.  Use the prescribed Norco for severe pain only.  A work note has been provided.  Return with new or worsening symptoms.

## 2024-10-10 NOTE — ED PROVIDER NOTES
History     Chief Complaint   Patient presents with    Knee Pain     Injury 2 days ago- right knee pain. Stepped and foot slipped- has had knee pain since then.    Wound Check     Patient states that she has a fungal infection on her right foot that her primary cannot figure out. Has been on 4 meds with no improvement. Patient has a derm referral but has not scheduled an appointment yet.      CORINA Hernandez is a 24 year old female who has bipolar disorder, who presents for evaluation of right knee injury.  The patient states that 2 days ago she was at home when she missed a step and twisted her her right knee.  She did not fall or strike her her knee.  She has had increased knee pain and swelling since that time.  She states that the knee seems to grind and click.  She denies the knee locking out.  Pain is worse with prolonged standing.  Pain is mostly on the medial aspect of the knee.  Patient states that she works at [employer] and is on her feet for 10 hours a day and this is making the pain worse.  She denies history of knee injuries.  She has no other injuries.  She has been wearing a compression sleeve which does seem to be a little bit helpful.  She denies any other concerns to me.    Allergies:  Allergies   Allergen Reactions    Iodine Anaphylaxis    Ivp Dye [Contrast Dye] Anaphylaxis    Latex Hives       Problem List:    Patient Active Problem List    Diagnosis Date Noted    Preeclampsia, severe 2024     Priority: Medium     (spontaneous vaginal delivery) 2024     Priority: Medium    Gestational hypertension, third trimester 2024     Priority: Medium    Diet controlled gestational diabetes mellitus (GDM) in third trimester 2024     Priority: Medium    Pre-eclampsia 2024     Priority: Medium    Bipolar disorder with depression (H) 2021     Priority: Medium    Seizures (H) 2021     Priority: Medium    Tobacco use 2021     Priority:  Medium    High risk medication use 02/01/2021     Priority: Medium    Moderate episode of recurrent major depressive disorder (H) 11/23/2020     Priority: Medium     Postpartum onset.  Monitoring for a bipolar trajectory.        Insomnia due to psychological stress 11/23/2020     Priority: Medium        Past Medical History:    Past Medical History:   Diagnosis Date    Anxiety     Asthma     Depression     History of urinary tract infection     OCD (obsessive compulsive disorder)     Postpartum depression 2020       Past Surgical History:    Past Surgical History:   Procedure Laterality Date    FOOT SURGERY      right foot - ganglion cyst    MYRINGOTOMY, INSERT TUBE BILATERAL, COMBINED      ORTHOPEDIC SURGERY      planter warts foot    TONSILLECTOMY      TYMPANOPLASTY         Family History:    Family History   Problem Relation Age of Onset    Diabetes Father         type II    Neurologic Disorder Father         TBI    Hyperlipidemia Father     Coronary Artery Disease Father         MI    Melanoma Maternal Grandmother     Other - See Comments Maternal Grandfather         murdered    Chronic Obstructive Pulmonary Disease Paternal Grandmother     Cerebrovascular Disease Paternal Grandmother     Heart Surgery Paternal Grandmother     Cerebrovascular Disease Paternal Grandfather         x3    Diabetes Paternal Grandfather         type II       Social History:  Marital Status:  Patient Declined [9]  Social History     Tobacco Use    Smoking status: Former     Current packs/day: 0.00     Types: Cigarettes, Other     Passive exposure: Current    Smokeless tobacco: Never   Vaping Use    Vaping status: Every Day    Substances: Nicotine, Flavoring    Devices: Refillable tank, Pre-filled pod   Substance Use Topics    Alcohol use: Never    Drug use: Never        Medications:    HYDROcodone-acetaminophen (NORCO) 5-325 MG tablet  ARIPiprazole (ABILIFY) 20 MG tablet  clotrimazole (LOTRIMIN) 1 % external cream  hydrOXYzine HCl  "(ATARAX) 10 MG tablet  lamoTRIgine (LAMICTAL) 100 MG tablet  lurasidone (LATUDA) 40 MG TABS tablet  sertraline (ZOLOFT) 25 MG tablet  tolnaftate (TINACTIN) 1 % external powder          Review of Systems  See HPI  Physical Exam   BP: (!) 139/90  Pulse: 89  Temp: 98  F (36.7  C)  Resp: 16  Height: 162.6 cm (5' 4\")  Weight: 80 kg (176 lb 6.4 oz)  SpO2: 99 %      Physical Exam  Constitutional:       General: She is not in acute distress.     Appearance: She is not toxic-appearing.   HENT:      Head: Atraumatic.      Nose: Nose normal.   Eyes:      Extraocular Movements: Extraocular movements intact.      Conjunctiva/sclera: Conjunctivae normal.      Pupils: Pupils are equal, round, and reactive to light.   Cardiovascular:      Rate and Rhythm: Normal rate.      Pulses: Normal pulses.   Pulmonary:      Effort: Pulmonary effort is normal.   Musculoskeletal:      Right knee: No swelling, deformity or erythema. Normal range of motion. No LCL laxity, MCL laxity, ACL laxity or PCL laxity. Normal pulse.      Left knee: Normal.      Comments: She is full passive range of motion of the right knee.  She did have pain with deep flexion of the knee.   Skin:     General: Skin is warm and dry.      Capillary Refill: Capillary refill takes less than 2 seconds.   Neurological:      General: No focal deficit present.      Mental Status: She is alert.         ED Course        Procedures             Critical Care time:  none             Results for orders placed or performed during the hospital encounter of 10/09/24 (from the past 24 hour(s))   XR Knee Right 1/2 Views    Narrative    EXAM: XR KNEE RIGHT 1/2 VIEWS  LOCATION: [facility]  DATE: 10/9/2024    INDICATION: Right knee pain.  COMPARISON: None.      Impression    IMPRESSION: Normal joint spaces and alignment. No fracture or joint effusion.       Medications - No data to display    Assessments & Plan (with Medical Decision Making)     I have reviewed the " nursing notes.    I have reviewed the findings, diagnosis, plan and need for follow up with the patient.          Medical Decision Making  Jeanine Hernandez is a 24 year old female who has bipolar disorder, who presents for evaluation of right knee injury.  Vital signs reviewed and reassuring.  X-ray obtained prior to my evaluation is normal and without bony injury or dislocation.  Patient has a reassuring exam.  No deformity and no evidence of joint laxity.  She has no other injuries.  She is able to ambulate.  Possible that she could have a meniscal tear.  No further workup needed in the ER.  I have referred her to orthopedics.  I recommend she continue to use her knee brace.  I gave her a small amount of Norco to use for severe pain.  I provided her with a work note.  Return precautions were discussed as well.        Discharge Medication List as of 10/9/2024 11:47 PM        START taking these medications    Details   HYDROcodone-acetaminophen (NORCO) 5-325 MG tablet Take 1 tablet by mouth every 6 hours as needed for severe pain., Disp-4 tablet, R-0, [facility]             Final diagnoses:   Knee injury, right, initial encounter       10/9/2024   [facility] EMERGENCY DEPT       Jeremy Ba MD  10/10/24 0014

## 2024-10-21 ENCOUNTER — TRANSFERRED RECORDS (OUTPATIENT)
Dept: HEALTH INFORMATION MANAGEMENT | Facility: CLINIC | Age: 24
End: 2024-10-21
Payer: COMMERCIAL

## 2024-11-08 ENCOUNTER — HOSPITAL ENCOUNTER (EMERGENCY)
Facility: CLINIC | Age: 24
Discharge: HOME | End: 2024-11-08
Attending: NURSE PRACTITIONER | Admitting: NURSE PRACTITIONER
Payer: COMMERCIAL

## 2024-11-08 VITALS
TEMPERATURE: 98.4 F | SYSTOLIC BLOOD PRESSURE: 126 MMHG | DIASTOLIC BLOOD PRESSURE: 76 MMHG | RESPIRATION RATE: 16 BRPM | OXYGEN SATURATION: 98 % | HEART RATE: 83 BPM

## 2024-11-08 DIAGNOSIS — K02.9 DENTAL CARIES: ICD-10-CM

## 2024-11-08 PROCEDURE — G0463 HOSPITAL OUTPT CLINIC VISIT: HCPCS | Performed by: NURSE PRACTITIONER

## 2024-11-08 PROCEDURE — 99213 OFFICE O/P EST LOW 20 MIN: CPT | Performed by: NURSE PRACTITIONER

## 2024-11-08 RX ORDER — CHLORHEXIDINE GLUCONATE ORAL RINSE 1.2 MG/ML
15 SOLUTION DENTAL 2 TIMES DAILY
Qty: 1893 ML | Refills: 0 | Status: SHIPPED | OUTPATIENT
Start: 2024-11-08

## 2024-11-08 ASSESSMENT — COLUMBIA-SUICIDE SEVERITY RATING SCALE - C-SSRS
6. HAVE YOU EVER DONE ANYTHING, STARTED TO DO ANYTHING, OR PREPARED TO DO ANYTHING TO END YOUR LIFE?: NO
2. HAVE YOU ACTUALLY HAD ANY THOUGHTS OF KILLING YOURSELF IN THE PAST MONTH?: NO
1. IN THE PAST MONTH, HAVE YOU WISHED YOU WERE DEAD OR WISHED YOU COULD GO TO SLEEP AND NOT WAKE UP?: NO

## 2024-11-08 ASSESSMENT — ACTIVITIES OF DAILY LIVING (ADL)
ADLS_ACUITY_SCORE: 0
ADLS_ACUITY_SCORE: 0

## 2024-11-08 NOTE — ED PROVIDER NOTES
History     Chief Complaint   Patient presents with    Dental Pain     Left lower dental pain in front of mouth   Onset 1 week ago   No known injury      HPI  Jeanine Hernandez is a 24 year old female who presents emergency department requesting antibiotics for dental infection.  Patient reports that she has genetic poor enamel and this will require all of her teeth to be removed.  She states she has an appointment in 1 week to have one of the teeth removed in the left lower jawline.  Patient denies fevers, aches, chills, sweats.  She states she is able to drink fluids and swallow and denies any difficulty breathing.  She states that she is under the understanding that if she comes and the tooth is actively infected with an abscess they will not pull the tooth.  She does not want to have that happen and is requesting antibiotics.    Allergies:  Allergies   Allergen Reactions    Iodine Anaphylaxis    Ivp Dye [Contrast Dye] Anaphylaxis    Latex Hives       Problem List:    Patient Active Problem List    Diagnosis Date Noted    Preeclampsia, severe 2024     Priority: Medium     (spontaneous vaginal delivery) 2024     Priority: Medium    Gestational hypertension, third trimester 2024     Priority: Medium    Diet controlled gestational diabetes mellitus (GDM) in third trimester 2024     Priority: Medium    Pre-eclampsia 2024     Priority: Medium    Bipolar disorder with depression (H) 2021     Priority: Medium    Seizures (H) 2021     Priority: Medium    Tobacco use 2021     Priority: Medium    High risk medication use 2021     Priority: Medium    Moderate episode of recurrent major depressive disorder (H) 2020     Priority: Medium     Postpartum onset.  Monitoring for a bipolar trajectory.        Insomnia due to psychological stress 2020     Priority: Medium        Past Medical History:    Past Medical History:   Diagnosis Date    Anxiety     Asthma      Depression     History of urinary tract infection     OCD (obsessive compulsive disorder)     Postpartum depression 2020       Past Surgical History:    Past Surgical History:   Procedure Laterality Date    FOOT SURGERY      right foot - ganglion cyst    MYRINGOTOMY, INSERT TUBE BILATERAL, COMBINED      ORTHOPEDIC SURGERY      planter warts foot    TONSILLECTOMY      TYMPANOPLASTY         Family History:    Family History   Problem Relation Age of Onset    Diabetes Father         type II    Neurologic Disorder Father         TBI    Hyperlipidemia Father     Coronary Artery Disease Father         MI    Melanoma Maternal Grandmother     Other - See Comments Maternal Grandfather         murdered    Chronic Obstructive Pulmonary Disease Paternal Grandmother     Cerebrovascular Disease Paternal Grandmother     Heart Surgery Paternal Grandmother     Cerebrovascular Disease Paternal Grandfather         x3    Diabetes Paternal Grandfather         type II       Social History:  Marital Status:  Patient Declined [9]  Social History     Tobacco Use    Smoking status: Former     Current packs/day: 0.00     Types: Cigarettes, Other     Passive exposure: Current    Smokeless tobacco: Never   Vaping Use    Vaping status: Every Day    Substances: Nicotine, Flavoring    Devices: Refillable tank, Pre-filled pod   Substance Use Topics    Alcohol use: Never    Drug use: Never        Medications:    amoxicillin-clavulanate (AUGMENTIN) 875-125 MG tablet  chlorhexidine (PERIDEX) 0.12 % solution  ARIPiprazole (ABILIFY) 20 MG tablet  clotrimazole (LOTRIMIN) 1 % external cream  HYDROcodone-acetaminophen (NORCO) 5-325 MG tablet  hydrOXYzine HCl (ATARAX) 10 MG tablet  lamoTRIgine (LAMICTAL) 100 MG tablet  lurasidone (LATUDA) 40 MG TABS tablet  sertraline (ZOLOFT) 25 MG tablet  tolnaftate (TINACTIN) 1 % external powder          Review of Systems  As mentioned above in the history present illness. All other systems were reviewed and are  negative.    Physical Exam   BP: 126/76  Pulse: 83  Temp: 98.4  F (36.9  C)  Resp: 16  SpO2: 98 %      Physical Exam  General: healthy, alert, no distress, cooperative, and smiling  ENT: ENT exam normal, no neck nodes or sinus tenderness  Mouth: facial swelling is Present and Absent   tenderness to touch at tooth: yes   Teeth carious:yes    Teeth broken: yes   Visible abscess: no   Neck: Neck supple. No adenopathy.   ED Course        Procedures      No results found for this or any previous visit (from the past 24 hours).    Medications - No data to display    Assessments & Plan (with Medical Decision Making)     I have reviewed the nursing notes.    I have reviewed the findings, diagnosis, plan and need for follow up with the patient.  Plan:   Home with oral antibiotics.  A dental block was not indicated  Because pain was moderate and severe, periapical abscess was presumed and therefore, antibiotics were prescribed.  There was no visible abscess amenable to incision and drainage.  Follow up with dental clinic as soon as possible.   Return ED with fevers, uncontrolled pain, inability to eat/drink.    Condition on disposition: Stable      Discharge Medication List as of 11/8/2024  2:15 PM        START taking these medications    Details   amoxicillin-clavulanate (AUGMENTIN) 875-125 MG tablet Take 1 tablet by mouth 2 times daily for 7 days., Disp-14 tablet, R-0, E-Prescribe      chlorhexidine (PERIDEX) 0.12 % solution Swish and spit 15 mLs in mouth 2 times daily., Disp-1893 mL, R-0, E-Prescribe             Final diagnoses:   Dental caries       11/8/2024   [facility] EMERGENCY DEPT       Enid Messina APRN CNP  11/08/24 2022

## 2024-11-08 NOTE — DISCHARGE INSTRUCTIONS
Start Meant and today take this twice daily for 7 days.  Consider probiotics to help prevent diarrhea.  Start the Peridex mouthwash today continue it twice daily until the dentist tells you to stop.  Return to urgent care or the emergency room as needed.  Thank you for coming in today

## 2024-11-11 ENCOUNTER — MYC REFILL (OUTPATIENT)
Dept: PSYCHIATRY | Facility: CLINIC | Age: 24
End: 2024-11-11
Payer: COMMERCIAL

## 2024-11-11 DIAGNOSIS — F31.9 BIPOLAR I DISORDER (H): ICD-10-CM

## 2024-11-12 RX ORDER — LURASIDONE HYDROCHLORIDE 40 MG/1
40 TABLET, FILM COATED ORAL
Qty: 30 TABLET | Refills: 0 | Status: SHIPPED | OUTPATIENT
Start: 2024-11-12

## 2024-11-12 NOTE — TELEPHONE ENCOUNTER
Date of Last Office Visit: 10/10/24  Date of Next Office Visit:  11/26/24  No shows since last visit: No  More than one patient-initiated cancellation (with reschedule) since last seen in clinic? No    []Medication refilled per  Medication Refill in Ambulatory Care  policy.  [x]Medication unable to be refilled by RN due to criteria not met as indicated below:    []Eligibility: has not had a provider visit within last 6 months   []Supervision: no future appointment; < 7 days before next appointment   []Compliance: no shows; cancellations; lapse in therapy   []Verification: order discrepancy; may need modification...   [] > 30-day supply request   []Advanced refill request: > 7 days before refill date   []Controlled medication   []Medication not included in policy   [x]Review: new med; med adjusted <= 30 days; safety alert; requires lab monitoring...   []Scope of Practice: refill request processed by LPN/MA   []Other:      Medication(s) requested:     -  lurasidone (LATUDA) 40 MG TABS tablet   Date last ordered: 9/25/24  Qty: 21  Refills: 0  Appropriate for refill? Provider to review. Same dose?     Any Controlled Substance(s)? No      Requested medication(s) verified as identical to current order? Yes    Any lapse in adherence to medication(s) greater than 5 days? No      Additional action taken? cued up medication/order(s) and routed encounter to provider for review.      Last visit treatment plan:   Abilify 30 mg daily => 9/25/24 20mg once daily until next consultation -Risks, benefits and alternatives discussed.  Patient provides verbal consent to treatment. Cross taper to Latuda  And Latuda 9/25/2024 40 mg every afternoon with dinner -Risks, benefits and alternatives discussed.  Patient provides verbal consent to treatment.  Advised of tardive dyskinesia.  Advised of metabolic effects and lab monitoring.  Advised the risk of converting prediabetes to diabetes. PAUSE FOR FOUR DAYS AND ASSESS RESTLESSNESS - PT TO  SEND MYCHART MESSAGE  Continue lamotrigine 100 mg daily => 10/10/24 150 mg daily x 2 weeks then 200 mg daily -Risks, benefits and alternatives discussed.  Patient provides verbal consent to treatment. Follow-up lamotrigine level, may need to titrate 300-400 mg  Continue sertraline 75 mg daily-provides verbal consent to treatment  Labs -pending standing A1c/fasting lipid panel - review next due lab    Any medication(s) require lab monitoring? No

## 2024-11-21 ENCOUNTER — MYC MEDICAL ADVICE (OUTPATIENT)
Dept: PSYCHIATRY | Facility: CLINIC | Age: 24
End: 2024-11-21
Payer: COMMERCIAL

## 2024-11-21 DIAGNOSIS — F31.9 BIPOLAR I DISORDER (H): ICD-10-CM

## 2024-11-21 RX ORDER — LURASIDONE HYDROCHLORIDE 40 MG/1
40 TABLET, FILM COATED ORAL
Qty: 30 TABLET | Refills: 1 | Status: SHIPPED | OUTPATIENT
Start: 2024-11-21

## 2024-11-21 NOTE — TELEPHONE ENCOUNTER
RN received a Loginza message from this patient that she had lost her recent 11/12/24 prescription of lurasidone (LATUDA) 40 MG TABS tablet.       RN phoned [facility] [place], [place] - [address].  The Pharmacist reported she had picked up this prescription 11/13/24.  The Pharmacist indicated the patient needed to contact her insurance to report a lost medication.  The pharmacy will not be able to refill this medication without authorization from the insurance.      2.  RN  sent the patient a Loginza message instructing her to contact her insurance and make a lost medication claim.      3.  RN sent  a new prescription request for this medication as recommended by the Pharmacist.     Miguel Ortiz RN on 11/21/2024 at 11:37 AM

## 2024-11-25 ENCOUNTER — VIRTUAL VISIT (OUTPATIENT)
Dept: BEHAVIORAL HEALTH | Facility: CLINIC | Age: 24
End: 2024-11-25
Payer: COMMERCIAL

## 2024-11-25 DIAGNOSIS — F31.9 BIPOLAR I DISORDER (H): Primary | ICD-10-CM

## 2024-11-25 DIAGNOSIS — F41.1 GENERALIZED ANXIETY DISORDER: ICD-10-CM

## 2024-11-25 PROCEDURE — 90832 PSYTX W PT 30 MINUTES: CPT | Mod: 95 | Performed by: COUNSELOR

## 2024-11-25 ASSESSMENT — PATIENT HEALTH QUESTIONNAIRE - PHQ9
10. IF YOU CHECKED OFF ANY PROBLEMS, HOW DIFFICULT HAVE THESE PROBLEMS MADE IT FOR YOU TO DO YOUR WORK, TAKE CARE OF THINGS AT HOME, OR GET ALONG WITH OTHER PEOPLE: EXTREMELY DIFFICULT
10. IF YOU CHECKED OFF ANY PROBLEMS, HOW DIFFICULT HAVE THESE PROBLEMS MADE IT FOR YOU TO DO YOUR WORK, TAKE CARE OF THINGS AT HOME, OR GET ALONG WITH OTHER PEOPLE: EXTREMELY DIFFICULT
SUM OF ALL RESPONSES TO PHQ QUESTIONS 1-9: 15

## 2024-11-25 ASSESSMENT — COLUMBIA-SUICIDE SEVERITY RATING SCALE - C-SSRS
6. HAVE YOU EVER DONE ANYTHING, STARTED TO DO ANYTHING, OR PREPARED TO DO ANYTHING TO END YOUR LIFE?: NO
1. IN THE PAST MONTH, HAVE YOU WISHED YOU WERE DEAD OR WISHED YOU COULD GO TO SLEEP AND NOT WAKE UP?: NO
2. HAVE YOU ACTUALLY HAD ANY THOUGHTS OF KILLING YOURSELF IN THE PAST MONTH?: NO

## 2024-11-25 NOTE — Clinical Note
Hi,  Patient is interested in a referral for long term psychiatry. Is that something you can help with?  Thank you, Laura
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
## 2024-11-26 ENCOUNTER — VIRTUAL VISIT (OUTPATIENT)
Dept: PSYCHIATRY | Facility: CLINIC | Age: 24
End: 2024-11-26
Payer: COMMERCIAL

## 2024-11-26 ENCOUNTER — VIRTUAL VISIT (OUTPATIENT)
Dept: BEHAVIORAL HEALTH | Facility: CLINIC | Age: 24
End: 2024-11-26
Payer: COMMERCIAL

## 2024-11-26 DIAGNOSIS — F41.1 GENERALIZED ANXIETY DISORDER: Primary | ICD-10-CM

## 2024-11-26 DIAGNOSIS — F31.9 BIPOLAR I DISORDER (H): Primary | ICD-10-CM

## 2024-11-26 PROCEDURE — 90832 PSYTX W PT 30 MINUTES: CPT | Mod: 95 | Performed by: COUNSELOR

## 2024-11-26 RX ORDER — LURASIDONE HYDROCHLORIDE 60 MG/1
60 TABLET, FILM COATED ORAL
Qty: 30 TABLET | Refills: 2 | Status: SHIPPED | OUTPATIENT
Start: 2024-11-26

## 2024-11-26 RX ORDER — SERTRALINE HYDROCHLORIDE 25 MG/1
75 TABLET, FILM COATED ORAL DAILY
Qty: 90 TABLET | Refills: 1 | Status: SHIPPED | OUTPATIENT
Start: 2024-11-26

## 2024-11-26 RX ORDER — LAMOTRIGINE 100 MG/1
200 TABLET ORAL DAILY
Qty: 60 TABLET | Refills: 2 | Status: SHIPPED | OUTPATIENT
Start: 2024-11-26

## 2024-11-26 NOTE — PROGRESS NOTES
"Virtual Visit Details    Type of service:  Video Visit   Video Start Time: 11:41 AM  Video End Time:12:05 PM    Originating Location (pt. Location): Home    Distant Location (provider location):  On-site  Platform used for Video Visit: [facility] Psychiatry Consult Note    IDENTIFICATION   Name: Jeanine Hernandez   : 2000/24 year old      Sex:    @ female          Telemedicine Visit: The patient's condition can be safely assessed and treated via synchronous audio and visual telemedicine encounter.        Face to Face/patient Contact total time: 14 minutes  Pre Charting time: 2 minutes; Post charting time, communication and other activities: 2 minutes; Total time 18 minutes  9:06 AM          SUBJECTIVE     History of Present Illness    Jeanine Hernandez, a 24-year-old female, presented with significant distress related to her current employment situation and its impact on her mental health. 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 She expressed frustration and stress over the commission-based nature of her job, which has resulted in her not earning any income for the past two weeks. She also expressed concern about the possibility of needing to take on a second job, which she feared would add more stress and limit her time with her family.    Jeanine reported feeling extremely unstable and unsure about her current job, despite enjoying the work and the people. She expressed fear about quitting her job and potentially ending up in a similar situation, where she might be earning money but feeling miserable. She also expressed uncertainty about her future and her options, stating that she didn't know what to do.    Regarding her medication, Jeanine reported experiencing difficulties with her transition from Abilify to Latuda. She reported that her Latuda medication had gotten lost, resulting in " her being without it for five days. This incident caused her to feel like she had taken several steps back in her mental health. She reported feeling unstable without the Latuda and expressed a desire to get off Abilify, as she didn't feel it was doing anything for her. She agreed to increase her Latuda dosage to 60 mg and to gradually discontinue Abilify over the next three weeks.    Jeanine also reported experiencing restlessness, which she attributed to a knee problem rather than a side effect of her medication. She reported having fluid on top of her meniscus, which was causing swelling and might need to be drained if it didn't resolve with the use of a compression sleeve.    Despite her current struggles, Jeanine denied having any suicidal thoughts or self-harm ideation. However, she did admit to often thinking about not getting out of bed and just laying there all day, although she recognized that this wouldn't be good for her. She expressed a self-awareness about her situation and a belief that it wouldn't last forever.    She also expressed a desire for a long-term psychiatrist and was open to the idea of increasing her Latuda dosage to 80 mg in two to three weeks if needed.             The following assessments were completed by patient for this visit:  PROMIS 10-Global Health (only subscores and total score):       9/20/2024     9:43 AM   PROMIS-10 Scores Only   Global Mental Health Score 7    7   Global Physical Health Score 11    11   PROMIS TOTAL - SUBSCORES 18    18             9/23/2024     4:10 PM 9/25/2024     6:12 AM 11/25/2024     8:30 AM   PHQ   PHQ-9 Total Score 13 14    14 15    Q9: Thoughts of better off dead/self-harm past 2 weeks Several days  Not at all  Not at all    F/U: Thoughts of suicide or self-harm No      F/U: Safety concerns No          Patient-reported    Multiple values from one day are sorted in reverse-chronological order          7/29/2024     2:50 PM 8/6/2024     3:47 PM  9/23/2024     4:11 PM   CONNER-7 SCORE   Total Score  14 (moderate anxiety) 14 (moderate anxiety)   Total Score 16 14 14        OBJECTIVE     Vital Signs:   LMP  (LMP Unknown)     Labs:    Results                    Current Medications:  Current Outpatient Medications   Medication Sig Dispense Refill    lamoTRIgine (LAMICTAL) 100 MG tablet Take 2 tablets (200 mg) by mouth daily. 60 tablet 2    lurasidone (LATUDA) 60 MG TABS tablet Take 1 tablet (60 mg) by mouth daily (with dinner). 30 tablet 2    sertraline (ZOLOFT) 25 MG tablet Take 3 tablets (75 mg) by mouth daily. 90 tablet 1    chlorhexidine (PERIDEX) 0.12 % solution Swish and spit 15 mLs in mouth 2 times daily. 1893 mL 0    clotrimazole (LOTRIMIN) 1 % external cream Apply topically 2 times daily 30 g 0    hydrOXYzine HCl (ATARAX) 10 MG tablet Take 1 tablet (10 mg) by mouth 3 times daily as needed for anxiety. 30 tablet 0    tolnaftate (TINACTIN) 1 % external powder Apply topically 2 times daily. 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 She has been in this job for two weeks and is finding it difficult due to the commission-based pay structure. She has not made any money in the past two weeks and is considering picking up a second job, but is concerned about the additional stress and lack of time with her family. She enjoys her job and the people she works with, but is struggling with the financial instability. No discussions about trauma or abuse were mentioned during the encounter.    Physical exam    Physical Exam    Physical  exam  Review of systems    - Anxiety, depression, affect: Positive for anxiety and depression.  - Speech and language: Not discussed.  - Insight and judgment: Not discussed.  - Alert and orientation to person, place and situation: Not discussed.  - SI: None  - HI: None  - AH: None  - VH: None    Mental status exam  - Anxiety, depression, affect:  - Speech and language:  - Insight and judgment:  - Alert and orientation to person, place and situation:  - SI: None  - HI: None  - AH: None  - VH: None         MENTAL STATUS EXAMINATION:   Appearance: Intact attention to grooming and hygiene, casual garb  Attitude: Cooperative  Eye Contact: Good  Gait and Station: Sitting  Psychomotor Behavior: Increased  Oriented to: Grossly person place and time  Attention Span and Concentration: Grossly intact  Speech: Elevated tone speed and volume  Language: English  Mood:  Fair  Affect: Constricted  Associations:  no loose associations  Thought Process:  logical, linear and goal oriented  Thought Content: No evidence of delusions or suicidal or homicidal ideation plan or intent  Memory: Grossly intact  Fund of Knowledge: Good  Insight:  good  Judgment:  intact, adequate for safety  Impulse Control:  intact        DIAGNOSES:   Anxiety  Bipolar I disorder  Mixed obsessional thoughts and acts        ASSESSMENT:   The patient has a history of bipolar disorder (rapid cycling), anxiety, and non-epileptic seizures. She has been experiencing manic episodes for the past 2-3 months, characterized by impulsivity, racing thoughts, and increased productivity, followed by depressive states. The patient's symptoms have been causing significant distress in her personal life and work. Abilify at max dose with very limited benefits, switch to latuda. Has prediabetes. Selecting antipsychotic - patient wants 1-2 more kids in the future and antipsychotics that are newer with lower metabolic risks are preferred.     Today Jeanine Hernandez reports no  suicidal ideation. In addition, she has notable risk factors for self-harm, including anxiety. However, risk is mitigated by commitment to family, absence of past attempts, ability to volunteer a safety plan, and history of seeking help when needed. Therefore, based on all available evidence including the factors cited above, she does not appear to be at imminent risk for self-harm, does not meet criteria for a 72-hr hold, and therefore remains appropriate for ongoing outpatient level of care.       PLAN:     Patient advised of consultative model. Patient will continue to be seen for ongoing consultation and stabilization.  Does not meet criteria for involuntary treatment or hospitalization  And Latuda 9/25/2024 40 mg every afternoon with dinner partial benefits => 11/26/24 60 mg qpm with dinner-Risks, benefits and alternatives discussed.  Patient provides verbal consent to treatment.  Advised of tardive dyskinesia.  Advised of metabolic effects and lab monitoring.  Advised the risk of converting prediabetes to diabetes. PAUSE FOR FOUR DAYS AND ASSESS RESTLESSNESS - PT TO SEND [employer] MESSAGE  Continue lamotrigine 100 mg daily => 10/10/24 150 mg daily x 2 weeks then 200 mg daily -Risks, benefits and alternatives discussed.  Patient provides verbal consent to treatment. Follow-up lamotrigine level, may need to titrate 300-400 mg  Continue sertraline 75 mg daily-provides verbal consent to treatment  Dc'd aripiprazole (ineffective)  Labs -pending standing A1c/fasting lipid panel - review next due labs  Referred for neurology previously by PCP  Referred for social work consult for resources  Referred for IOP  Referred for long-term psychiatry        Assessment & Plan     Assessment  Patient is experiencing psychological distress due to occupational stress and major depressive disorder. She recently misplaced her Latuda (lurasidone) medication and had to go without it for five days, which she feels has regressed her  psychiatric condition. She is currently still on Abilify (aripiprazole) 20mg, but reports it is not efficacious. She expresses interest in titrating up her Latuda (lurasidone) dosage and tapering off Abilify (aripiprazole). She is also considering participation in a partial hospitalization program (PHP) or intensive outpatient program (IOP) to help manage her mental health.    Plan  - Increase Latuda dosage to 60mg, taken at dinner  - Taper off Abilify over three weeks, then discontinue  - Refill sertraline prescription  - Refer patient to a long-term psychiatric specialist  - Refer patient to a medical social worker to discuss financial resources  - Refer patient to a partial hospitalization program or intensive outpatient program  - Follow-up appointment on December 23rd, 2024    Prescription  - Latuda 60mg, once daily at dinner  - Abilify 20mg, half tablet for three weeks, then discontinue  - Sertraline, refill prescription    Appointments  Follow-up appointment on December 23rd, 2024 at 10:00 AM           Administrative Billing:   Time spent with patient was greater than 50% of time and/or significant time was spent in counseling and coordination of care regarding above diagnoses and treatment plan. Pre charting time and post charting time/documentation/coordination are done on date of service.      Signed:   Sam Dixon M.D.  [facility] Psychiatry Service    Disclaimer: This note consists of symbols derived from keyboarding, dictation and/or voice recognition software. As a result, there may be errors in the script that have gone undetected. Please consider this when interpreting information found in this chart.    Consent was obtained from the patient to use an AI documentation tool in the creation of this note.     eoc
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
## 2024-11-26 NOTE — NURSING NOTE
Current patient location:  Pt at work    Is the patient currently in the state of [place]? YES    Visit mode:VIDEO    If the visit is dropped, the patient can be reconnected by:VIDEO VISIT: Text to cell phone:   Telephone Information:   Mobile [phone]       Will anyone else be joining the visit? NO  (If patient encounters technical issues they should call [phone] :[phone])    Are changes needed to the allergy or medication list? Pt stated no changes to allergies and Pt stated no med changes    Are refills needed on medications prescribed by this physician? YES    Rooming Documentation:  Questionnaire(s) completed    Reason for visit: RECHBARBARA VICTOR

## 2024-11-27 ENCOUNTER — PATIENT OUTREACH (OUTPATIENT)
Dept: CARE COORDINATION | Facility: CLINIC | Age: 24
End: 2024-11-27
Payer: COMMERCIAL

## 2024-11-27 ENCOUNTER — MYC REFILL (OUTPATIENT)
Dept: FAMILY MEDICINE | Facility: CLINIC | Age: 24
End: 2024-11-27
Payer: COMMERCIAL

## 2024-11-27 ENCOUNTER — TELEPHONE (OUTPATIENT)
Dept: BEHAVIORAL HEALTH | Facility: CLINIC | Age: 24
End: 2024-11-27
Payer: COMMERCIAL

## 2024-11-27 DIAGNOSIS — F41.9 ANXIETY: ICD-10-CM

## 2024-11-27 RX ORDER — HYDROXYZINE HYDROCHLORIDE 10 MG/1
10 TABLET, FILM COATED ORAL 3 TIMES DAILY PRN
Qty: 30 TABLET | Refills: 0 | Status: SHIPPED | OUTPATIENT
Start: 2024-11-27

## 2024-11-27 NOTE — TELEPHONE ENCOUNTER
Provider LVM for pt re: outpatient programming for mental health. Left provider's name, direct phone #, and requested return call if pt is interested at this time.    Ekaterina SANDOVAL, LICSW

## 2024-11-27 NOTE — PROGRESS NOTES
Clinic Care Coordination Contact  [facility]/Voicemail    Clinical Data: Care Coordinator Outreach    Outreach Documentation Number of Outreach Attempt   11/27/2024   9:55 AM 1       Left message on patient's voicemail with call back information and requested return call.      Plan: Care Coordinator will try to reach patient again in 1-2 business days.    PAULA Molina? Social Work Care Coordinator   [facility]  [email or web address]? [email or web address]    Phone: [phone]  she/her

## 2024-12-11 ENCOUNTER — TELEPHONE (OUTPATIENT)
Dept: BEHAVIORAL HEALTH | Facility: CLINIC | Age: 24
End: 2024-12-11

## 2024-12-11 NOTE — TELEPHONE ENCOUNTER
12/11/2024 1030am  [facility] unsuccessfully contacts pt to check in with her about her sx and supports she may be needing. [facility] leaves a vm with her work number and will also send pt a [employer] message in hopes to connect.

## 2025-01-15 ENCOUNTER — MYC REFILL (OUTPATIENT)
Dept: PSYCHIATRY | Facility: CLINIC | Age: 25
End: 2025-01-15
Payer: COMMERCIAL

## 2025-01-15 DIAGNOSIS — F31.9 BIPOLAR I DISORDER (H): ICD-10-CM

## 2025-01-16 RX ORDER — LURASIDONE HYDROCHLORIDE 60 MG/1
60 TABLET, FILM COATED ORAL
Qty: 30 TABLET | Refills: 2 | OUTPATIENT
Start: 2025-01-16

## 2025-01-16 RX ORDER — SERTRALINE HYDROCHLORIDE 25 MG/1
75 TABLET, FILM COATED ORAL DAILY
Qty: 30 TABLET | Refills: 1 | Status: SHIPPED | OUTPATIENT
Start: 2025-01-16

## 2025-01-16 RX ORDER — LAMOTRIGINE 100 MG/1
200 TABLET ORAL DAILY
Qty: 60 TABLET | Refills: 2 | OUTPATIENT
Start: 2025-01-16

## 2025-01-16 NOTE — TELEPHONE ENCOUNTER
1) Reviewed refill request for Lamotrigine 100mg, Sertraline 25mg, and Latuda 60mg.     2) Lamotrigine refill refused: #60 tablets ordered on 11/26/24 with 2 refills  Lurasidone refill refused: #30 tablets ordered on 11/26/24 with 2 refills  Pharmacy should have refills on file for each prescription     3) Notified patient via [employer].    4) Sertraline is due for a refill.       Date of Last Office Visit: 11/26/24  Date of Next Office Visit:  None - Dr. Dixon patient   No shows since last visit: No  More than one patient-initiated cancellation (with reschedule) since last seen in clinic? No    []Medication refilled per  Medication Refill in Ambulatory Care  policy.  [x]Medication unable to be refilled by RN due to criteria not met as indicated below:    []Eligibility: has not had a provider visit within last 6 months   [x]Supervision: no future appointment; < 7 days before next appointment   [x]Compliance: no shows; cancellations; lapse in therapy   []Verification: order discrepancy; may need modification...   [] > 30-day supply request   []Advanced refill request: > 7 days before refill date   []Controlled medication   []Medication not included in policy   []Review: new med; med adjusted <= 30 days; safety alert; requires lab monitoring...   []Scope of Practice: refill request processed by LPN/MA   []Other:      Medication(s) requested:     -  sertraline (ZOLOFT) 25 MG tablet   Date last ordered: 11/26/24  Qty: 90  Refills: 0      Any Controlled Substance(s)? No      Requested medication(s) verified as identical to current order? Yes    Any lapse in adherence to medication(s) greater than 5 days? Yes     Additional action taken? contacted patient via [employer]   .      Last visit treatment plan:       PLAN:      Patient advised of consultative model. Patient will continue to be seen for ongoing consultation and stabilization.  Does not meet criteria for involuntary treatment or hospitalization  And Latuda 9/25/2024 40  mg every afternoon with dinner partial benefits => 11/26/24 60 mg qpm with dinner-Risks, benefits and alternatives discussed.  Patient provides verbal consent to treatment.  Advised of tardive dyskinesia.  Advised of metabolic effects and lab monitoring.  Advised the risk of converting prediabetes to diabetes. PAUSE FOR FOUR DAYS AND ASSESS RESTLESSNESS - PT TO SEND [employer] MESSAGE  Continue lamotrigine 100 mg daily => 10/10/24 150 mg daily x 2 weeks then 200 mg daily -Risks, benefits and alternatives discussed.  Patient provides verbal consent to treatment. Follow-up lamotrigine level, may need to titrate 300-400 mg  Continue sertraline 75 mg daily-provides verbal consent to treatment  Dc'd aripiprazole (ineffective)  Labs -pending standing A1c/fasting lipid panel - review next due labs  Referred for neurology previously by PCP  Referred for social work consult for resources  Referred for IOP  Referred for long-term psychiatry    Any medication(s) require lab monitoring? No

## 2025-01-20 ENCOUNTER — VIRTUAL VISIT (OUTPATIENT)
Dept: FAMILY MEDICINE | Facility: CLINIC | Age: 25
End: 2025-01-20
Payer: COMMERCIAL

## 2025-01-20 DIAGNOSIS — F31.9 BIPOLAR I DISORDER (H): ICD-10-CM

## 2025-01-20 PROCEDURE — 98013 SYNCH AUDIO-ONLY EST LOW 20: CPT | Performed by: FAMILY MEDICINE

## 2025-01-20 RX ORDER — LURASIDONE HYDROCHLORIDE 60 MG/1
60 TABLET, FILM COATED ORAL
Qty: 30 TABLET | Refills: 2 | Status: SHIPPED | OUTPATIENT
Start: 2025-01-20

## 2025-01-20 ASSESSMENT — PATIENT HEALTH QUESTIONNAIRE - PHQ9
SUM OF ALL RESPONSES TO PHQ QUESTIONS 1-9: 15
10. IF YOU CHECKED OFF ANY PROBLEMS, HOW DIFFICULT HAVE THESE PROBLEMS MADE IT FOR YOU TO DO YOUR WORK, TAKE CARE OF THINGS AT HOME, OR GET ALONG WITH OTHER PEOPLE: EXTREMELY DIFFICULT
SUM OF ALL RESPONSES TO PHQ QUESTIONS 1-9: 15

## 2025-01-20 NOTE — PROGRESS NOTES
"Christophe is a 24 year old who is being evaluated via a billable video visit.    How would you like to obtain your AVS? MyChart  If the video visit is dropped, the invitation should be resent by: Send to e-mail at: [email or web address]  Will anyone else be joining your video visit? No      Assessment & Plan     Bipolar I disorder (H)  She is doing very well on this refiled follow up with psych as planned   - lurasidone (LATUDA) 60 MG TABS tablet; Take 1 tablet (60 mg) by mouth daily (with dinner).          BMI  Estimated body mass index is 30.28 kg/m  as calculated from the following:    Height as of 10/9/24: 1.626 m (5' 4\").    Weight as of 10/9/24: 80 kg (176 lb 6.4 oz).         CONSULTATION/REFERRAL to psychiatry     Subjective   Christophe is a 24 year old, presenting for the following health issues:  Medication Request    Video Start Time: unable to complete    History of Present Illness       Reason for visit:  Antipsychotic medication needs refill and no luck with online request.   She is taking medications regularly.         9/25/2024     6:12 AM 11/25/2024     8:30 AM 1/20/2025     9:00 AM   PHQ   PHQ-9 Total Score 14    14 15  15    Q9: Thoughts of better off dead/self-harm past 2 weeks Not at all Not at all Not at all       Patient-reported         7/29/2024     2:50 PM 8/6/2024     3:47 PM 9/23/2024     4:11 PM   CONNER-7 SCORE   Total Score  14 (moderate anxiety) 14 (moderate anxiety)   Total Score 16 14 14           Doing well on the increased latuda dose. She has cont her lamictal and the sertraline. Will have upcoming appointment with psych   Her mood is a bit worse as she is off of the medications for the last 5 days psych has not rerfioled these.       Review of Systems  Constitutional, HEENT, cardiovascular, pulmonary, gi and gu systems are negative, except as otherwise noted.      Objective           Vitals:  No vitals were obtained today due to virtual visit.    Physical Exam   Unable to complete " video      18   spent in chart review medication interactions and phone call and charting    Video-Visit Details    Type of service:  Video Visit   Video End Time:unable   Originating Location (pt. Location): Home    Distant Location (provider location):  Off-site  Platform used for Video Visit: Unable to complete video visit  Signed Electronically by: Stephanie Stark MD

## 2025-02-10 ENCOUNTER — MYC REFILL (OUTPATIENT)
Dept: PSYCHIATRY | Facility: CLINIC | Age: 25
End: 2025-02-10
Payer: COMMERCIAL

## 2025-02-10 DIAGNOSIS — F31.9 BIPOLAR I DISORDER (H): ICD-10-CM

## 2025-02-11 DIAGNOSIS — F31.9 BIPOLAR I DISORDER (H): ICD-10-CM

## 2025-02-11 RX ORDER — SERTRALINE HYDROCHLORIDE 25 MG/1
75 TABLET, FILM COATED ORAL DAILY
Qty: 30 TABLET | Refills: 1 | OUTPATIENT
Start: 2025-02-11

## 2025-02-11 NOTE — TELEPHONE ENCOUNTER
1) Reviewed refill request(s) from  [employer]     2) Any Controlled Substance(s)? No    3) Refill(s) requested for:     pharmacy should have refill(s) on file      4) Action taken: contacted patient via [employer]   .    IRMA PATEL RN on 2/11/2025 at 9:13 AM

## 2025-02-12 ENCOUNTER — VIRTUAL VISIT (OUTPATIENT)
Dept: FAMILY MEDICINE | Facility: CLINIC | Age: 25
End: 2025-02-12
Payer: COMMERCIAL

## 2025-02-12 DIAGNOSIS — F31.9 BIPOLAR I DISORDER (H): ICD-10-CM

## 2025-02-12 PROCEDURE — 96127 BRIEF EMOTIONAL/BEHAV ASSMT: CPT | Mod: 95 | Performed by: FAMILY MEDICINE

## 2025-02-12 PROCEDURE — 98005 SYNCH AUDIO-VIDEO EST LOW 20: CPT | Performed by: FAMILY MEDICINE

## 2025-02-12 RX ORDER — SERTRALINE HYDROCHLORIDE 25 MG/1
75 TABLET, FILM COATED ORAL DAILY
Qty: 90 TABLET | Refills: 3 | Status: SHIPPED | OUTPATIENT
Start: 2025-02-12

## 2025-02-12 ASSESSMENT — ANXIETY QUESTIONNAIRES
1. FEELING NERVOUS, ANXIOUS, OR ON EDGE: SEVERAL DAYS
5. BEING SO RESTLESS THAT IT IS HARD TO SIT STILL: NOT AT ALL
GAD7 TOTAL SCORE: 6
2. NOT BEING ABLE TO STOP OR CONTROL WORRYING: SEVERAL DAYS
6. BECOMING EASILY ANNOYED OR IRRITABLE: NEARLY EVERY DAY
IF YOU CHECKED OFF ANY PROBLEMS ON THIS QUESTIONNAIRE, HOW DIFFICULT HAVE THESE PROBLEMS MADE IT FOR YOU TO DO YOUR WORK, TAKE CARE OF THINGS AT HOME, OR GET ALONG WITH OTHER PEOPLE: SOMEWHAT DIFFICULT
7. FEELING AFRAID AS IF SOMETHING AWFUL MIGHT HAPPEN: NOT AT ALL
GAD7 TOTAL SCORE: 6
3. WORRYING TOO MUCH ABOUT DIFFERENT THINGS: NOT AT ALL

## 2025-02-12 ASSESSMENT — PATIENT HEALTH QUESTIONNAIRE - PHQ9
10. IF YOU CHECKED OFF ANY PROBLEMS, HOW DIFFICULT HAVE THESE PROBLEMS MADE IT FOR YOU TO DO YOUR WORK, TAKE CARE OF THINGS AT HOME, OR GET ALONG WITH OTHER PEOPLE: SOMEWHAT DIFFICULT
SUM OF ALL RESPONSES TO PHQ QUESTIONS 1-9: 5
SUM OF ALL RESPONSES TO PHQ QUESTIONS 1-9: 5
5. POOR APPETITE OR OVEREATING: SEVERAL DAYS

## 2025-02-12 NOTE — PROGRESS NOTES
"Christophe is a 24 year old who is being evaluated via a billable video visit.    How would you like to obtain your AVS? MyChart  If the video visit is dropped, the invitation should be resent by: Text to cell phone: [phone]  Will anyone else be joining your video visit? No      Assessment & Plan   Problem List Items Addressed This Visit    None  Visit Diagnoses       Bipolar I disorder (H)        Relevant Medications    sertraline (ZOLOFT) 25 MG tablet             Patient here for medication refills. She has a diagnosis of Bipolar disorder and sees a psychiatrist. She ran out of her medications and needs refills.  Patient reports that medication is helping her symptoms.      BMI  Estimated body mass index is 30.28 kg/m  as calculated from the following:    Height as of 10/9/24: 1.626 m (5' 4\").    Weight as of 10/9/24: 80 kg (176 lb 6.4 oz).       FUTURE APPOINTMENTS:       - Follow-up visit as needed      Subjective   Christophe is a 24 year old, presenting for the following health issues:  Depression and Anxiety        10/8/2024     1:12 PM   Additional Questions   Roomed by Farrah RICKS   Accompanied by self       Video Start Time:  5:04PM    History of Present Illness       Reason for visit:  Medication refill   She is taking medications regularly.       Depression and Anxiety   How are you doing with your depression since your last visit? No change  How are you doing with your anxiety since your last visit?  No change  Are you having other symptoms that might be associated with depression or anxiety? No  Have you had a significant life event? No   Do you have any concerns with your use of alcohol or other drugs? No    Social History     Tobacco Use    Smoking status: Former     Current packs/day: 0.00     Types: Cigarettes, Other     Passive exposure: Current    Smokeless tobacco: Never   Vaping Use    Vaping status: Every Day    Substances: Nicotine, Flavoring    Devices: Refillable tank, Pre-filled pod   Substance Use " Topics    Alcohol use: Never    Drug use: Never         11/25/2024     8:30 AM 1/20/2025     9:00 AM 2/12/2025     9:51 AM   PHQ   PHQ-9 Total Score 15  15  5    Q9: Thoughts of better off dead/self-harm past 2 weeks Not at all Not at all Not at all       Patient-reported         8/6/2024     3:47 PM 9/23/2024     4:11 PM 2/12/2025     4:49 PM   CONNER-7 SCORE   Total Score 14 (moderate anxiety) 14 (moderate anxiety)    Total Score 14 14 6         2/12/2025     9:51 AM   Last PHQ-9   1.  Little interest or pleasure in doing things 1   2.  Feeling down, depressed, or hopeless 1   3.  Trouble falling or staying asleep, or sleeping too much 0   4.  Feeling tired or having little energy 1   5.  Poor appetite or overeating 0   6.  Feeling bad about yourself 1   7.  Trouble concentrating 1   8.  Moving slowly or restless 0   Q9: Thoughts of better off dead/self-harm past 2 weeks 0   PHQ-9 Total Score 5        Patient-reported         2/12/2025     4:49 PM   CONNER-7    1. Feeling nervous, anxious, or on edge 1   2. Not being able to stop or control worrying 1   3. Worrying too much about different things 0   4. Trouble relaxing 1   5. Being so restless that it is hard to sit still 0   6. Becoming easily annoyed or irritable 3   7. Feeling afraid, as if something awful might happen 0   CONNER-7 Total Score 6   If you checked any problems, how difficult have they made it for you to do your work, take care of things at home, or get along with other people? Somewhat difficult       Suicide Assessment Five-step Evaluation and Treatment (SAFE-T)        Review of Systems  Constitutional, HEENT, cardiovascular, pulmonary, gi and gu systems are negative, except as otherwise noted.      Objective           Vitals:  No vitals were obtained today due to virtual visit.    Physical Exam   GENERAL: alert and no distress  EYES: Eyes grossly normal to inspection.  No discharge or erythema, or obvious scleral/conjunctival abnormalities.  RESP: No  audible wheeze, cough, or visible cyanosis.    SKIN: Visible skin clear. No significant rash, abnormal pigmentation or lesions.  NEURO: Cranial nerves grossly intact.  Mentation and speech appropriate for age.  PSYCH: Appropriate affect, tone, and pace of words          Video-Visit Details    Type of service:  Video Visit   Video End Time: 5:10PM  Originating Location (pt. Location): Home  Distant Location (provider location):  On-site  Platform used for Video Visit: [employer]  Signed Electronically by: Terrance Rayo MD

## 2025-02-20 ENCOUNTER — VIRTUAL VISIT (OUTPATIENT)
Dept: PSYCHIATRY | Facility: CLINIC | Age: 25
End: 2025-02-20
Payer: COMMERCIAL

## 2025-02-20 DIAGNOSIS — F41.9 ANXIETY: ICD-10-CM

## 2025-02-20 DIAGNOSIS — Z72.0 VAPES NICOTINE CONTAINING SUBSTANCE: ICD-10-CM

## 2025-02-20 DIAGNOSIS — F31.9 BIPOLAR I DISORDER (H): Primary | ICD-10-CM

## 2025-02-20 RX ORDER — HYDROXYZINE HYDROCHLORIDE 10 MG/1
10 TABLET, FILM COATED ORAL 3 TIMES DAILY PRN
Qty: 90 TABLET | Refills: 2 | Status: SHIPPED | OUTPATIENT
Start: 2025-02-20

## 2025-02-20 RX ORDER — LURASIDONE HYDROCHLORIDE 60 MG/1
60 TABLET, FILM COATED ORAL
Qty: 30 TABLET | Refills: 2 | Status: SHIPPED | OUTPATIENT
Start: 2025-02-20

## 2025-02-20 RX ORDER — LAMOTRIGINE 150 MG/1
300 TABLET ORAL DAILY
Qty: 60 TABLET | Refills: 2 | Status: SHIPPED | OUTPATIENT
Start: 2025-02-20

## 2025-02-20 RX ORDER — SERTRALINE HYDROCHLORIDE 25 MG/1
75 TABLET, FILM COATED ORAL DAILY
Qty: 90 TABLET | Refills: 2 | Status: SHIPPED | OUTPATIENT
Start: 2025-02-20

## 2025-02-20 NOTE — NURSING NOTE
Current patient location: [address] [place] [address] [address]  [facility] [address]    Is the patient currently in the state of [place]? YES    Visit mode: VIDEO    If the visit is dropped, the patient can be reconnected by:VIDEO VISIT: Text to cell phone:   Telephone Information:   Mobile [phone]       Will anyone else be joining the visit? NO  (If patient encounters technical issues they should call [phone]    Are changes needed to the allergy or medication list? Pt stated no changes to allergies and Pt stated no med changes    Are refills needed on medications prescribed by this physician? Discuss with provider    Rooming Documentation:  Questionnaire(s) completed    Reason for visit: CHUN VICTOR

## 2025-02-20 NOTE — PATIENT INSTRUCTIONS
"Patient Education   Collaborative Care Psychiatry Service  What to Expect  Here's what to expect from your Collaborative Care Psychiatry Service (CCPS).   About CCPS  CCPS means 2 people work together to help you get better. You'll meet with a behavioral health clinician and a psychiatric doctor. A behavioral health clinician helps people with mental health problems by talking with them. A psychiatric doctor helps people by giving them medicine.  How it works  At every visit, you'll see the behavioral health clinician (BHC) first. They'll talk with you about how you're doing and teach you how to feel better.   Then you'll see the psychiatric doctor. This doctor can help you deal with troubling thoughts and feelings by giving you medicine. They'll make sure you know the plan for your care.   CCPS usually takes 3 to 6 visits. If you need more visits, we may have you start seeing a different psychiatric doctor for ongoing care.  If you have any questions or concerns, we'll be glad to talk with you.  About visits  Be open  At your visits, please talk openly about your problems. It may feel hard, but it's the best way for us to help you.  Cancelling visits  If you can't come to your visit, please call us right away at [phone]. If you don't cancel at least 24 hours (1 full day) before your visit, that's \"late cancellation.\"  Being late to visits  Being very late is the same as not showing up. You will be a \"no show\" if:  Your appointment starts with a BHC, and you're more than 15 minutes late for a 30-minute (half hour) visit. This will also cancel your appointment with the psychiatric doctor.  Your appointment is with a psychiatric doctor only, and you're more than 15 minutes late for a 30-minute (half hour) visit.  Your appointment is with a psychiatric doctor only, and you're more than 30 minutes late for a 60-minute (full hour) visit.  If you cancel late or don't show up 2 times within 6 months, we may end your " care.   Getting help between visits  If you need help between visits, you can call us Monday to Friday from 8 a.m. to 4:30 p.m. at [phone].  Emergency care  Call 911 or go to the nearest emergency department if your life or someone else's life is in danger.  Call 988 anytime to reach the national Suicide and Crisis hotline.  Medicine refills  To refill your medicine, call your pharmacy. You can also call [facility]'s Behavioral Access at [phone], Monday to Friday, 8 a.m. to 4:30 p.m. It can take 1 to 3 business days to get a refill.   Forms, letters, and tests  You may have papers to fill out, like FMLA, short-term disability, and workability. We can help you with these forms at your visits, but you must have an appointment. You may need more than 1 visit for this, to be in an intensive therapy program, or both.  Before we can give you medicine for ADHD, we may refer you to get tested for it or confirm it another way.  We may not be able to give you an emotional support animal letter.  We don't do mental health checks ordered by the court.   We don't do mental health testing, but we can refer you to get tested.   Thank you for choosing us for your care.  For informational purposes only. Not to replace the advice of your health care provider. Copyright   2022 [facility]. All rights reserved. [employer] 365747 - Rev 11/24.

## 2025-02-20 NOTE — PROGRESS NOTES
Virtual Visit Details        Originating Location (pt. Location): Home    Distant Location (provider location):  On-site  Platform used for Video Visit: [facility] Psychiatry Consult Note- Follow up    IDENTIFICATION   Name: Jeanine Hernandez   : 2000/24 year old      Sex:    @ female          Telemedicine Visit: The patient's condition can be safely assessed and treated via synchronous audio and visual telemedicine encounter.      Consent:  The patient/guardian has verbally consented to: the potential risks and benefits of telemedicine (video visit or phone) versus in person care; bill my insurance or make self-payment for services provided; and responsibility for payment of non-covered services.    As the provider I attest to compliance with applicable laws and regulations related to telemedicine.    Face to Face/patient Contact total time: 26 minutes  Pre Charting time: 0 minutes; Post charting time, communication and other activities: 20 minutes; Total time 46 minutes      INTERVAL HISTORY     Jeanine Hernandez is a 24 year old White, female who presents for follow up with [facility] ([facility]).  They were last seen here by Sam Dixon MD on 24.      History of Present Illness          Reports appreciation of current regimen but states that once a month she experiences's 2-7 days of irritability and low mood.  Does not believe that it is in context to menses. Has not had a period in the past year, recently postpartum and now on depo inj,  Reports last manic episode was a month ago when she had a short lapse in her latuda. Reports that since starting latuda she has had a marked decrease in manic episodes and they are now occurring every few months . Now when having manic episodes her symptoms only include  increased activity but  no longer   impulsivity, decreased need for sleep, or  increased racing thoughts.    Today she also reports ongoing anxiety. Has  difficulty controlling her worry and frequently bites at her hand as a coping mechanism. She asks to return to hydoryxzine use which she had successfully used in the past.    Did hold latuda for a period, as directed by Dr. Dixon at last visit, due to concern for akathisia but patient reports her restlessness became worse when stopping the agent.    She has noted completed lab work ordered previously by Dr. Dixon due to not having transportation.       SI/HI/SIB: Denies  Panic attacks: Denies  Psychosis/Tri/Irritability: See above, denies AVH, AVH stopped after optimizing latuda    Sleep: Good but is frequently woken by baby, getting ~ 9 hours most nights  Energy: fair  Appetite: good  Exercise/Activity: none  Substance: vapes nicotine  Therapy:None  New health changes:Denies    Medication side effects:  Reports headaches x1 every 1-2 weeks  Adherence: Taking daily, no difficulty remebering    Current Medications:    Current Outpatient Medications:     chlorhexidine (PERIDEX) 0.12 % solution, Swish and spit 15 mLs in mouth 2 times daily., Disp: 1893 mL, Rfl: 0    lamoTRIgine (LAMICTAL) 100 MG tablet, Take 2 tablets (200 mg) by mouth daily., Disp: 60 tablet, Rfl: 2    lurasidone (LATUDA) 60 MG TABS tablet, Take 1 tablet (60 mg) by mouth daily (with dinner)., Disp: 30 tablet, Rfl: 2    sertraline (ZOLOFT) 25 MG tablet, Take 3 tablets (75 mg) by mouth daily., Disp: 90 tablet, Rfl: 3    Current Facility-Administered Medications:     medroxyPROGESTERone (DEPO-PROVERA) injection 150 mg, 150 mg, Intramuscular, Q90 Days,     medroxyPROGESTERone (DEPO-PROVERA) injection 150 mg, 150 mg, Intramuscular, Q90 Days, Dottie Lee MD, 150 mg at 08/02/24 1539      OBJECTIVE     Vitals:   There were no vitals taken for this visit.    Pulse Readings from Last 5 Encounters:   11/08/24 83   10/09/24 88   10/08/24 89   09/25/24 78   08/19/24 86     Wt Readings from Last 5 Encounters:   10/09/24 80 kg (176 lb 6.4 oz)    10/08/24 80 kg (176 lb 6.4 oz)   08/19/24 78.5 kg (173 lb)   07/31/24 78.5 kg (173 lb)   07/11/24 78.5 kg (173 lb)     BP Readings from Last 5 Encounters:   11/08/24 126/76   10/09/24 133/88   10/08/24 114/64   09/25/24 138/77   08/19/24 126/86       Labs:    Liver/kidney function Metabolic Blood counts   Recent Labs   Lab Test 03/26/24  1907 03/16/24  1756 03/14/24  2215   CR 0.89 0.68 0.72   AST  --  38 20   ALT  --  14 6   ALKPHOS  --  201* 196*    Recent Labs   Lab Test 07/02/24  1414   A1C 5.7*   TSH 0.52    Recent Labs   Lab Test 06/30/24  1633   WBC 10.6   HGB 11.6*   HCT 37.3   MCV 76*           Recent Labs   Lab Test 07/02/24  1414   TSH 0.52       ECG    No results found for this or any previous visit.        9/20/2024     9:43 AM 2/15/2025    10:12 AM   PROMIS-10 Total Score w/o Sub Scores   PROMIS TOTAL - SUBSCORES 18    18 23        Patient-reported         12/28/2021     3:38 PM 2/1/2022    11:11 AM 9/20/2024     9:36 AM   CAGE-AID Total Score   Total Score 0 0 0    0   Total Score MyChart 0 (A total score of 2 or greater is considered clinically significant)  0 (A total score of 2 or greater is considered clinically significant)         11/25/2024     8:30 AM 1/20/2025     9:00 AM 2/12/2025     9:51 AM   PHQ   PHQ-9 Total Score 15  15  5    Q9: Thoughts of better off dead/self-harm past 2 weeks Not at all Not at all Not at all       Patient-reported         8/6/2024     3:47 PM 9/23/2024     4:11 PM 2/12/2025     4:49 PM   CONNER-7 SCORE   Total Score 14 (moderate anxiety) 14 (moderate anxiety)    Total Score 14 14 6         [place]-PDMP was checked today: not using controlled substances    ADDED HISTORY   Dx with Bipolar disorder by Silvia Mccoy CNP. Hx of OCD dx as well.  Has fmhx of BPAD and AUD.    PSYCH MED TRIAL HX:     Lamotrigine  Abilify   Trazodone 5   Sertraline  Quetiapine 25 mg, SSE and sedation  Hydroxyzine 10mg TID       MENTAL STATUS EXAMINATION:     Alertness: alert  and  oriented  Appearance: adequately groomed, stated age  Behavior/Demeanor: cooperative, with good eye contact, holding baby  Speech: normal and regular rate and rhythm  Language: intact and no problems  Psychomotor: normal or unremarkable  Gait and Station:  not observed  Mood: depressed and anxious  Affect:  constricted ; was congruent to mood  Thought Process/Associations:  logical, linear, goal directed  Thought Content:  Reports none;  Denies suicidal ideation, violent ideation, and delusions  Perception:  Reports none;  Denies auditory hallucinations and visual hallucinations  Insight: intact  Judgment: adequate for safety and intact  Cognition: does  appear grossly intact; formal cognitive testing was not done         ASSESSMENT AND PLAN:    Bipolar I disorder  Anxiety  Mixed obsessional thoughts and acts, by history    2/20/25: First encounter with patient, transferring from Sam Dixon MD. Has history of Bipolar I, rapid cycling, anxiety, and non-epileptic seizures. Manic episodes significantly improved after switching from ability to latuda although patient continues to have depressive and anxiety symptoms. Recommend continued optimization of lamictal for depression and to prevent further manic episodes. Does experience restlessness although may not be related to latuda as the symptom worsened after stopping agent. May be anxiety. Restart hydroxyzine prn.      Today Jeanine Hernandez reports no suicidal ideation. In addition, she has notable risk factors for self-harm, including anxiety. However, risk is mitigated by commitment to family, absence of past attempts, ability to volunteer a safety plan, and history of seeking help when needed. Therefore, based on all available evidence including the factors cited above, she does not appear to be at imminent risk for self-harm, does not meet criteria for a 72-hr hold, and therefore remains appropriate for ongoing outpatient level of care.           PLAN:       Patient advised of consultative model. Patient will continue to be seen for ongoing consultation and stabilization.  Does not meet criteria for involuntary treatment or hospitalization  START Hydroxyzine 10mg tid prn for anxiety  INCREASE lamotrigine to 300mg. Patient is aware of SJS risks. CONSIDER lamotrigine level once at stable dose given desire for future pregnancies.   Continue Lautda 60mg at dinner- is aware of risks for TD and metabolic effects  Continue sertraline 75mg daily.  Labs- CMP, A1c, and  fasting lipids still pending, complete when able to transport to clinic  Referral to neurology placed by PCP  Referaal placed for long term counseling by [facility]  Referral placed by Dr. Dixon for long term psychiatry- 1800 coordinator  number provided to call to schedule  Follow up in 4-6 weeks or sooner if needed    Patient Education:  Medication side effects and alternatives reviewed. Health promotion activities recommended and reviewed today. All questions addressed. Education and counseling completed regarding risks and benefits of medications and psychotherapy options.  Consent provided by patient/guardian  Call the psychiatric nurse line with medication questions or concerns at [phone].  EGEN may be used to communicate with your provider, but this is not intended to be used for emergencies.  FIRST GENERATION ANTIPSYCHOTIC/ SECOND GENERATION ANTIPSYCHOTIC USE:  Atypical need for cardiometabolic monitoring with medication- B/P, weight, blood sugar, cholesterol.  Need to monitor for abnormal movements taught  Medlineplus.gov is information for patients.  It is run by the National Library of Medicine and it contains information about all disorders, diseases and all medications.      Community Resources:    National Suicide Prevention Lifeline: 125.497.7595 (TTY: 144.763.1728). Call anytime for help.  (www.suicidepreventionlifeline.org)  National [place] on Mental Illness (www.ana.org): 120.655.5843 or  690.627.6082.   Mental Health Association (www.mentalhealth.org): 242.433.1375 or 771-855-6637.  [place] Crisis Text Line: Text [place] to 620472  Suicide LifeLine Chat: suicidepreventionlifeline.org/chat    Patient Status:  [facility] MD/DO/NP/PA providers offer care a specialty service for Primary Care Providers in the [facility] that seek to optimize psychotropic medications for unstable patients.  Once medications have been optimized, our providers discharge the patient back to the referring Primary Care Provider for ongoing medication management.  This type of system allows our providers to serve a high volume of patients.   The patient is being referred to long term community psychiatry care and provider will provide bridging until patient is established with new community provider.     Administrative Billing:   Time spent with patient was greater than 50% of time and/or significant time was spent in counseling and coordination of care regarding above diagnoses and treatment plan. Pre charting time and post charting time/documentation/coordination are done on date of service.     Episode of Care   The longitudinal plan of care for the diagnosis(es)/condition(s) as documented were addressed during this visit. Due to the added complexity in care, I will continue to support Christophe in the subsequent management and with ongoing continuity of care.      Level of Medical Decision Making:   - At least 1 chronic problem that is not stable  - Engaged in prescription drug management during visit (discussed any medication benefits, side effects, alternatives, etc.)           Signed:   WILMER Avalos CNP on 2/20/2025 at 11:39 AM  [facility]

## 2025-02-24 ENCOUNTER — LAB (OUTPATIENT)
Dept: LAB | Facility: CLINIC | Age: 25
End: 2025-02-24
Payer: COMMERCIAL

## 2025-02-24 DIAGNOSIS — F31.9 BIPOLAR I DISORDER (H): ICD-10-CM

## 2025-02-24 LAB
ALBUMIN SERPL BCG-MCNC: 4.5 G/DL (ref 3.5–5.2)
ALP SERPL-CCNC: 131 U/L (ref 40–150)
ALT SERPL W P-5'-P-CCNC: 10 U/L (ref 0–50)
ANION GAP SERPL CALCULATED.3IONS-SCNC: 14 MMOL/L (ref 7–15)
AST SERPL W P-5'-P-CCNC: 20 U/L (ref 0–45)
BILIRUB SERPL-MCNC: <0.2 MG/DL
BUN SERPL-MCNC: 17.2 MG/DL (ref 6–20)
CALCIUM SERPL-MCNC: 9.6 MG/DL (ref 8.8–10.4)
CHLORIDE SERPL-SCNC: 103 MMOL/L (ref 98–107)
CHOLEST SERPL-MCNC: 181 MG/DL
CREAT SERPL-MCNC: 0.86 MG/DL (ref 0.51–0.95)
EGFRCR SERPLBLD CKD-EPI 2021: >90 ML/MIN/1.73M2
EST. AVERAGE GLUCOSE BLD GHB EST-MCNC: 114 MG/DL
FASTING STATUS PATIENT QL REPORTED: NO
FASTING STATUS PATIENT QL REPORTED: NO
GLUCOSE SERPL-MCNC: 89 MG/DL (ref 70–99)
HBA1C MFR BLD: 5.6 % (ref 0–5.6)
HCO3 SERPL-SCNC: 21 MMOL/L (ref 22–29)
HDLC SERPL-MCNC: 44 MG/DL
LDLC SERPL CALC-MCNC: 111 MG/DL
NONHDLC SERPL-MCNC: 137 MG/DL
POTASSIUM SERPL-SCNC: 4 MMOL/L (ref 3.4–5.3)
PROT SERPL-MCNC: 7.4 G/DL (ref 6.4–8.3)
SODIUM SERPL-SCNC: 138 MMOL/L (ref 135–145)
TRIGL SERPL-MCNC: 132 MG/DL

## 2025-02-24 PROCEDURE — 80053 COMPREHEN METABOLIC PANEL: CPT

## 2025-02-24 PROCEDURE — 83036 HEMOGLOBIN GLYCOSYLATED A1C: CPT

## 2025-02-24 PROCEDURE — 80061 LIPID PANEL: CPT

## 2025-02-24 PROCEDURE — 36415 COLL VENOUS BLD VENIPUNCTURE: CPT

## 2025-03-19 NOTE — TELEPHONE ENCOUNTER
RECORDS RECEIVED FROM: internal   REASON FOR VISIT: seizures   PROVIDER: Dr. Quinones   DATE OF APPT: 5/7/25   NOTES (FOR ALL VISITS) STATUS DETAILS   OFFICE NOTE from referring provider Internal Dr Sam Dixon @ [facility] Pysch:  9/25/24   MEDICATION LIST Internal    IMAGING  (FOR ALL VISITS)     CT (HEAD, NECK, SPINE) N/A

## 2025-03-25 ENCOUNTER — MYC MEDICAL ADVICE (OUTPATIENT)
Dept: PSYCHIATRY | Facility: CLINIC | Age: 25
End: 2025-03-25

## 2025-03-26 ENCOUNTER — VIRTUAL VISIT (OUTPATIENT)
Dept: PSYCHIATRY | Facility: CLINIC | Age: 25
End: 2025-03-26
Payer: COMMERCIAL

## 2025-03-26 ENCOUNTER — VIRTUAL VISIT (OUTPATIENT)
Dept: FAMILY MEDICINE | Facility: CLINIC | Age: 25
End: 2025-03-26
Payer: COMMERCIAL

## 2025-03-26 VITALS — BODY MASS INDEX: 32.1 KG/M2 | WEIGHT: 188 LBS | HEIGHT: 64 IN

## 2025-03-26 DIAGNOSIS — Z72.0 VAPES NICOTINE CONTAINING SUBSTANCE: ICD-10-CM

## 2025-03-26 DIAGNOSIS — F31.9 BIPOLAR I DISORDER (H): Primary | ICD-10-CM

## 2025-03-26 DIAGNOSIS — K04.7 TOOTH INFECTION: Primary | ICD-10-CM

## 2025-03-26 DIAGNOSIS — F41.9 ANXIETY: ICD-10-CM

## 2025-03-26 PROCEDURE — 1125F AMNT PAIN NOTED PAIN PRSNT: CPT | Performed by: FAMILY MEDICINE

## 2025-03-26 PROCEDURE — 98005 SYNCH AUDIO-VIDEO EST LOW 20: CPT | Performed by: FAMILY MEDICINE

## 2025-03-26 RX ORDER — LAMOTRIGINE 150 MG/1
300 TABLET ORAL DAILY
Qty: 60 TABLET | Refills: 1 | Status: SHIPPED | OUTPATIENT
Start: 2025-03-26

## 2025-03-26 RX ORDER — AMOXICILLIN 500 MG/1
500 CAPSULE ORAL 2 TIMES DAILY
Qty: 20 CAPSULE | Refills: 0 | Status: SHIPPED | OUTPATIENT
Start: 2025-03-26

## 2025-03-26 RX ORDER — SERTRALINE HYDROCHLORIDE 25 MG/1
75 TABLET, FILM COATED ORAL DAILY
Qty: 90 TABLET | Refills: 1 | Status: SHIPPED | OUTPATIENT
Start: 2025-03-26

## 2025-03-26 RX ORDER — LURASIDONE HYDROCHLORIDE 80 MG/1
80 TABLET, FILM COATED ORAL
Qty: 30 TABLET | Refills: 1 | Status: SHIPPED | OUTPATIENT
Start: 2025-03-26

## 2025-03-26 ASSESSMENT — PAIN SCALES - GENERAL: PAINLEVEL_OUTOF10: MILD PAIN (3)

## 2025-03-26 ASSESSMENT — PATIENT HEALTH QUESTIONNAIRE - PHQ9: SUM OF ALL RESPONSES TO PHQ QUESTIONS 1-9: 20

## 2025-03-26 NOTE — NURSING NOTE
Current patient location: [address] [place] [address] [address]  [facility] [address]    Is the patient currently in the state of [place]? YES    Visit mode: VIDEO    If the visit is dropped, the patient can be reconnected by:VIDEO VISIT: Text to cell phone:   Telephone Information:   Mobile [phone]       Will anyone else be joining the visit? NO  (If patient encounters technical issues they should call [phone]    Are changes needed to the allergy or medication list? No    Are refills needed on medications prescribed by this physician? NO    Rooming Documentation:  Questionnaire(s) not pre-assigned    Reason for visit: RECHECK    Dolores VICTOR

## 2025-03-26 NOTE — PATIENT INSTRUCTIONS
"Patient Education   Collaborative Care Psychiatry Service  What to Expect  Here's what to expect from your Collaborative Care Psychiat[facility] Service ([facility]).   About [facility]  [facility] means 2 people work together to help you get better. You'll meet with a behavioral health clinician and a psychiatric doctor. A behavioral health clinician helps people with mental health problems by talking with them. A psychiatric doctor helps people by giving them medicine.  How it works  At every visit, you'll see the behavioral health clinician (BHC) first. They'll talk with you about how you're doing and teach you how to feel better.   Then you'll see the psychiatric doctor. This doctor can help you deal with troubling thoughts and feelings by giving you medicine. They'll make sure you know the plan for your care.   [facility] usually takes 3 to 6 visits. If you need more visits, we may have you start seeing a different psychiatric doctor for ongoing care.  If you have any questions or concerns, we'll be glad to talk with you.  About visits  Be open  At your visits, please talk openly about your problems. It may feel hard, but it's the best way for us to help you.  Cancelling visits  If you can't come to your visit, please call us right away at [phone]. If you don't cancel at least 24 hours (1 full day) before your visit, that's \"late cancellation.\"  Being late to visits  Being very late is the same as not showing up. You will be a \"no show\" if:  Your appointment starts with a BHC, and you're more than 15 minutes late for a 30-minute (half hour) visit. This will also cancel your appointment with the psychiatric doctor.  Your appointment is with a psychiatric doctor only, and you're more than 15 minutes late for a 30-minute (half hour) visit.  Your appointment is with a psychiatric doctor only, and you're more than 30 minutes late for a 60-minute (full hour) visit.  If you cancel late or don't show up 2 times within 6 months, we may end your " care.   Getting help between visits  If you need help between visits, you can call us Monday to Friday from 8 a.m. to 4:30 p.m. at [phone].  Emergency care  Call 911 or go to the nearest emergency department if your life or someone else's life is in danger.  Call 988 anytime to reach the national Suicide and Crisis hotline.  Medicine refills  To refill your medicine, call your pharmacy. You can also call [facility]'s Behavioral Access at [phone], Monday to Friday, 8 a.m. to 4:30 p.m. It can take 1 to 3 business days to get a refill.   Forms, letters, and tests  You may have papers to fill out, like FMLA, short-term disability, and workability. We can help you with these forms at your visits, but you must have an appointment. You may need more than 1 visit for this, to be in an intensive therapy program, or both.  Before we can give you medicine for ADHD, we may refer you to get tested for it or confirm it another way.  We may not be able to give you an emotional support animal letter.  We don't do mental health checks ordered by the court.   We don't do mental health testing, but we can refer you to get tested.   Thank you for choosing us for your care.  For informational purposes only. Not to replace the advice of your health care provider. Copyright   2022 [facility]. All rights reserved. [employer] 622224 - Rev 11/24.

## 2025-03-26 NOTE — PROGRESS NOTES
Notified patient per provider result note. Mailing lab order today. Virtual Visit Details         Originating Location (pt. Location): Home    Distant Location (provider location):  On-site  Platform used for Video Visit: [facility] Consult Note- Follow up    IDENTIFICATION   Name: Jeanine Hernandez   : 2000/25 year old      Sex:    @ female          Telemedicine Visit: The patient's condition can be safely assessed and treated via synchronous audio and visual telemedicine encounter.      Consent:  The patient/guardian has verbally consented to: the potential risks and benefits of telemedicine (video visit or phone) versus in person care; bill my insurance or make self-payment for services provided; and responsibility for payment of non-covered services.    As the provider I attest to compliance with applicable laws and regulations related to telemedicine.    Face to Face/patient Contact total time: 15 minutes  Pre Charting time: 0 minutes; Post charting time, communication and other activities: 15 minutes; Total time 30 minutes      INTERVAL HISTORY     Jeanine Hernandez is a 25 year old White, female who presents for follow up with [facility], [facility]  They were last seen here 25 with plan of: transfer of care from Dr Dixon. Improvement n manic episodes with switch from abilify to latuda noted but ongoing depression and anxiety symptoms. Plan was to increase lamotrigine to 300mg.    History of Present Illness          See Tinypass message 3/25/25.   Reports yesterday she woke up agitated, easily tearful, nauseated, and irritable.   Rode out symptoms and went to bed at a regular time. Today she is feeling herself again but has been very productive this morning. Only slept 4 hours last night because her son had woken her up and since cleaned her whole house. Does feel increased energy, need to complete tasks, and racing thoughts.      SI/HI/SIB: Denies  Panic attacks: Denies  Psychosis/Tri/Irritability: see above,  "denies AVH      Sleep: Very poor, has not been able to sleep because her son has been sick and teething.  Getting about 5 hours total in short spurts. Has been sleeping this way for the past two weeks.   Energy: Previously tired but today feeling marked increased energy  Appetite: variable  Exercise/Activity: Limited physical activity  Focus: \"horrible\" hasn't crocheted in the past two weeks  Substance: Denies use  Therapy: Will restart next week  New health changes:Denies    Medication side effects: Denies including restlessness or abnormal muscle movements  Adherence: Good, taking daily    Current Medications:    Current Outpatient Medications:     chlorhexidine (PERIDEX) 0.12 % solution, Swish and spit 15 mLs in mouth 2 times daily., Disp: 1893 mL, Rfl: 0    hydrOXYzine HCl (ATARAX) 10 MG tablet, Take 1 tablet (10 mg) by mouth 3 times daily as needed for anxiety. (Patient not taking: Reported on 3/21/2025), Disp: 90 tablet, Rfl: 2    lamoTRIgine (LAMICTAL) 150 MG tablet, Take 2 tablets (300 mg) by mouth daily., Disp: 60 tablet, Rfl: 2    lurasidone (LATUDA) 60 MG TABS tablet, Take 1 tablet (60 mg) by mouth daily (with dinner)., Disp: 30 tablet, Rfl: 2    Current Facility-Administered Medications:     medroxyPROGESTERone (DEPO-PROVERA) injection 150 mg, 150 mg, Intramuscular, Q90 Days,     medroxyPROGESTERone (DEPO-PROVERA) injection 150 mg, 150 mg, Intramuscular, Q90 Days, Dottie Lee MD, 150 mg at 08/02/24 1539      OBJECTIVE     Vitals:   Ht 1.626 m (5' 4\")   Wt 85.3 kg (188 lb)   BMI 32.27 kg/m      Pulse Readings from Last 5 Encounters:   11/08/24 83   10/09/24 88   10/08/24 89   09/25/24 78   08/19/24 86     Wt Readings from Last 5 Encounters:   03/26/25 85.3 kg (188 lb)   10/09/24 80 kg (176 lb 6.4 oz)   10/08/24 80 kg (176 lb 6.4 oz)   08/19/24 78.5 kg (173 lb)   07/31/24 78.5 kg (173 lb)     BP Readings from Last 5 Encounters:   11/08/24 126/76   10/09/24 133/88   10/08/24 114/64 "   09/25/24 138/77   08/19/24 126/86       Labs:    Liver/kidney function Metabolic Blood counts   Recent Labs   Lab Test 02/24/25  1427 03/26/24  1907 03/16/24  1756   CR 0.86 0.89 0.68   AST 20  --  38   ALT 10  --  14   ALKPHOS 131  --  201*    Recent Labs   Lab Test 02/24/25  1427 07/02/24  1414   CHOL 181  --    TRIG 132  --    *  --    HDL 44*  --    A1C 5.6 5.7*   TSH  --  0.52    Recent Labs   Lab Test 06/30/24  1633   WBC 10.6   HGB 11.6*   HCT 37.3   MCV 76*           Recent Labs   Lab Test 07/02/24  1414   TSH 0.52       ECG  No results found for this or any previous visit.        9/20/2024     9:43 AM 2/15/2025    10:12 AM   PROMIS-10 Total Score w/o Sub Scores   PROMIS TOTAL - SUBSCORES 18    18 23        Patient-reported         12/28/2021     3:38 PM 2/1/2022    11:11 AM 9/20/2024     9:36 AM   CAGE-AID Total Score   Total Score 0 0 0    0   Total Score MyChart 0 (A total score of 2 or greater is considered clinically significant)  0 (A total score of 2 or greater is considered clinically significant)         2/12/2025     9:51 AM 3/20/2025     9:26 AM 3/26/2025    11:47 AM   PHQ   PHQ-9 Total Score 5  17  20   Q9: Thoughts of better off dead/self-harm past 2 weeks Not at all Several days Not at all   F/U: Thoughts of suicide or self-harm  No    F/U: Safety concerns  No        Patient-reported         9/23/2024     4:11 PM 2/12/2025     4:49 PM 3/20/2025     9:27 AM   CONNER-7 SCORE   Total Score 14 (moderate anxiety)  19 (severe anxiety)   Total Score 14 6 19        Patient-reported         [place]-PDMP was [place] today: indicates no controlled prescriptions reported in previous 5 months    ADDED HISTORY   2/20/25:  Improvement n manic episodes with switch from abilify to latuda noted but ongoing depression and anxiety symptoms. Plan was to increase lamotrigine to 300mg.    PSYCH MED TRIAL HX:  Lamotrigine- 300  Abilify   Trazodone 5  Sertraline  Latuda  Quetiapine 25 mg, SSE and  sedation  Hydroxyzine 10mg TID       MENTAL STATUS EXAMINATION:     Alertness: alert  and oriented  Appearance:  slightly disheveled  Behavior/Demeanor: cooperative, with fair eye contact   Speech: normal and regular rate and rhythm  Language: intact and no problems, hyperverbal  Psychomotor: fidgety, no abnormal facial movements noted  Gait and Station:  not observed  Mood: anxious, irritable, agitated, and labile  Affect: restricted; was congruent to mood  Thought Process/Associations:  logical, linear, goal directed, asked appropriate questions about her care/medications/safety  Thought Content:  Reports none;  Denies suicidal ideation, violent ideation, and delusions  Perception:  Reports none;  Denies auditory hallucinations and visual hallucinations  Insight: adequate  Judgment: adequate for safety  Cognition: does  appear grossly intact; formal cognitive testing was not done          ASSESSMENT AND PLAN:   Bipolar I disorder, most recent episode hypomanic  Anxiety  Mixed obsessional thoughts and acts, by history     2/20/25: First encounter with patient, transferring from Sam Dixon MD. Has history of Bipolar I, rapid cycling, anxiety, and non-epileptic seizures. Manic episodes significantly improved after switching from ability to latuda although patient continues to have depressive and anxiety symptoms. Recommend continued optimization of lamictal for depression and to prevent further manic episodes. Does experience restlessness although may not be related to latuda as the symptom worsened after stopping agent. May be anxiety. Restart hydroxyzine prn.    3/6/25: Notable emerging  manic symptoms. No SI or AVH. Good adherence to regimen. Recommend optimizing latuda to 80mg today. Discussed symptoms of when to reach out to higher level of care and recommended eval at [facility] or ED if symptoms worsen.  May consider decrease in lamotrigine back to 200mg at upcoming visits as it does not appear to be more  efficacious at 300mg.        Today Jeanine Hernandez reports no suicidal ideation. In addition, she has notable risk factors for self-harm, including anxiety. However, risk is mitigated by commitment to family, absence of past attempts, ability to volunteer a safety plan, and history of seeking help when needed. Therefore, based on all available evidence including the factors cited above, she does not appear to be at imminent risk for self-harm, does not meet criteria for a 72-hr hold, and therefore remains appropriate for ongoing outpatient level of care.            PLAN:      Patient advised of consultative model. Patient will continue to be seen for ongoing consultation and stabilization.  Does not meet criteria for involuntary treatment or hospitalization  INCREASE Latuda to 80mg at dinner- is aware of risks for TD and metabolic effects  CONTINUE Hydroxyzine 10mg tid prn for anxiety  CONTINUE lamotrigine 300mg. Patient is aware of SJS risks. CONSIDER lamotrigine level once at stable dose given desire for future pregnancies.   Continue sertraline 75mg daily.  Labs- none additional at this time  Referaal placed for long term counseling by [facility]  Referral placed by Dr. Dixon for long term psychiatry- 1800 coordinator  number provided to call to schedule  Discussed safety resources including [facility]  Follow up in 2-3 weeks or sooner if needed    Patient Education:  Medication side effects and alternatives reviewed. Health promotion activities recommended and reviewed today. All questions addressed. Education and counseling completed regarding risks and benefits of medications and psychotherapy options.  Consent provided by patient/guardian  Call the psychiatric nurse line with medication questions or concerns at [phone].  Gigwellhart may be used to communicate with your provider, but this is not intended to be used for emergencies.  FIRST GENERATION ANTIPSYCHOTIC/ SECOND GENERATION ANTIPSYCHOTIC USE:  Atypical need  for cardiometabolic monitoring with medication- B/P, weight, blood sugar, cholesterol.  Need to monitor for abnormal movements taught  SAFETY:  We all care about your loved one's safety. To reduce the risk of self-harm, remove access to all:  Firearms, Medicines (both prescribed and over-the-counter), Knives and other sharp objects, Ropes and like materials, and Alcohol  SLEEP HYGIENE: establish a sleep routine, limit screen time 1 hour prior to bed, use bed for sleep only, take sleep/medications on time (including sleepy time tea, trazadone or herbal treatments such as melatonin), aroma therapy, limit caffeine/sugar, yoga, guided imagery, stretch, meditation, limit naps to 20 minutes, make a temperature change in the room, white noise, be mindful of slowing down breathing, take a warm bath/shower, frequently wash sheets, and journaling.   Medlineplus.gov is information for patients.  It is run by the [employer] and it contains information about all disorders, diseases and all medications.      Community Resources:    National Suicide Prevention Lifeline: 652.401.6419 (TTY: 977.268.9073). Call anytime for help.  (www.suicidepreventionlifeline.org)  National [place] on Mental Illness (www.ana.org): 414.943.1750 or 053-201-7720.   Mental Health Association (www.mentalhealth.org): 886.796.7851 or 041-690-2541.  [place] Crisis Text Line: Text [place] to 095478  Suicide LifeLine Chat: suicidepreMettlline.org/chat    Patient Status:  [facility] MD/DO/NP/PA providers offer care a specialty service for Primary Care Providers in the [facility] that seek to optimize psychotropic medications for unstable patients.  Once medications have been optimized, our providers discharge the patient back to the referring Primary Care Provider for ongoing medication management.  This type of system allows our providers to serve a high volume of patients.   Patient will continue to be seen for ongoing consultation and  stabilization.    Administrative Billing:   Time spent with patient was greater than 50% of time and/or significant time was spent in counseling and coordination of care regarding above diagnoses and treatment plan. Pre charting time and post charting time/documentation/coordination are done on date of service.     Episode of Care   The longitudinal plan of care for the diagnosis(es)/condition(s) as documented were addressed during this visit. Due to the added complexity in care, I will continue to support Christophe in the subsequent management and with ongoing continuity of care.      Level of Medical Decision Making:   - At least 1 chronic problem that is not stable  - Engaged in prescription drug management during visit (discussed any medication benefits, side effects, alternatives, etc.)           Signed:   WILMER Avalos CNP on 3/26/2025 at 12:05 PM  [facility]

## 2025-03-26 NOTE — PROGRESS NOTES
"Christophe is a 25 year old who is being evaluated via a billable video visit.    How would you like to obtain your AVS? MyChart  If the video visit is dropped, the invitation should be resent by: Send to e-mail at: [email or web address]  Will anyone else be joining your video visit? No      Assessment & Plan   Problem List Items Addressed This Visit    None  Visit Diagnoses       Tooth infection    -  Primary    Relevant Medications    amoxicillin (AMOXIL) 500 MG capsule           Antibiotics faxed.        BMI  Estimated body mass index is 32.27 kg/m  as calculated from the following:    Height as of an earlier encounter on 3/26/25: 1.626 m (5' 4\").    Weight as of an earlier encounter on 3/26/25: 85.3 kg (188 lb).       FUTURE APPOINTMENTS:       - Follow-up visit as needed      Subjective   Christophe is a 25 year old, presenting for the following health issues:  25 yr old female here for possible tooth infection. Symptoms started a few days ago.   Patient reports swelling of her gums  Dental Pain (Feels she has a tooth infection. Right lower side.  This just started this morning.  States she a history of poor enamel.  Has been working on her dental hygiene. ) and Medication to add (Sertraline 25 mg taking three daily.)        3/26/2025     4:08 PM   Additional Questions   Roomed by Jazlyn Hand CMA     Chief Complaint   Patient presents with    Dental Pain     Feels she has a tooth infection. Right lower side.  This just started this morning.  States she a history of poor enamel.  Has been working on her dental hygiene.     Medication to add     Sertraline 25 mg taking three daily.     Video Start Time:  4:20 PM    History of Present Illness       Reason for visit:  Tooth infection spreading to jaw  Symptom onset:  1-3 days ago  Symptoms include:  Pain in jaw and infected tooth. Facial pain.  Symptom intensity:  Mild  Symptom progression:  Staying the same  Had these symptoms before:  Yes  Has tried/received " "treatment for these symptoms:  Yes  Previous treatment was successful:  Yes  Prior treatment description:  Antibiotic  What makes it worse:  Eating or drinking.  What makes it better:  Tylenol or ibuprofen   She is taking medications regularly.              Review of Systems   Constitutional: Negative.    HENT:  Positive for dental problem.    Eyes: Negative.    Respiratory: Negative.     Cardiovascular: Negative.    Gastrointestinal: Negative.    Endocrine: Negative.    Genitourinary: Negative.    Musculoskeletal: Negative.    Skin: Negative.    Allergic/Immunologic: Negative.    Neurological: Negative.    Hematological: Negative.    Psychiatric/Behavioral: Negative.            Objective    Vitals - Patient Reported  Weight (Patient Reported): 85.3 kg (188 lb)  Height (Patient Reported): 162.6 cm (5' 4\")  BMI (Based on Pt Reported Ht/Wt): 32.27  Pain Score: Mild Pain (3)  Pain Loc: Other - see comment (Dental pain.)        Physical Exam  HENT:      Mouth/Throat:      Dentition: Abnormal dentition. Dental tenderness and gum lesions present.        Comments: Poor dentition,cavity in tooth demarcated       GENERAL: alert and no distress  EYES: Eyes grossly normal to inspection.  No discharge or erythema, or obvious scleral/conjunctival abnormalities.  HENT: normal cephalic/atraumatic  RESP: No audible wheeze, cough, or visible cyanosis.    SKIN: Visible skin clear. No significant rash, abnormal pigmentation or lesions.  NEURO: Cranial nerves grossly intact.  Mentation and speech appropriate for age.  PSYCH: Appropriate affect, tone, and pace of words          Video-Visit Details    Type of service:  Video Visit   Video End Time: 4:25 PM  Originating Location (pt. Location): Home  Distant Location (provider location):  Off-site  Platform used for Video Visit: Well  Signed Electronically by: Terrance Rayo MD    "

## 2025-03-27 ASSESSMENT — ENCOUNTER SYMPTOMS
NEUROLOGICAL NEGATIVE: 1
ALLERGIC/IMMUNOLOGIC NEGATIVE: 1
PSYCHIATRIC NEGATIVE: 1
CARDIOVASCULAR NEGATIVE: 1
HEMATOLOGIC/LYMPHATIC NEGATIVE: 1
RESPIRATORY NEGATIVE: 1
EYES NEGATIVE: 1
ENDOCRINE NEGATIVE: 1
GASTROINTESTINAL NEGATIVE: 1
CONSTITUTIONAL NEGATIVE: 1
MUSCULOSKELETAL NEGATIVE: 1

## 2025-03-31 ENCOUNTER — MYC MEDICAL ADVICE (OUTPATIENT)
Dept: PSYCHIATRY | Facility: CLINIC | Age: 25
End: 2025-03-31
Payer: COMMERCIAL

## 2025-03-31 DIAGNOSIS — F31.9 BIPOLAR I DISORDER (H): ICD-10-CM

## 2025-03-31 NOTE — TELEPHONE ENCOUNTER
"    1) Per 3/26/25 Virtual Visit note:     \"3/6/25: May consider decrease in lamotrigine back to 200mg at upcoming visits as it does not appear to be more efficacious at 300mg....    CONTINUE lamotrigine 300mg. Patient is aware of SJS risks.\"    2) No current order for 200 MG dose of lamoTRIgine (LAMICTAL).      3) Routing to provider for review and response to pt directly [facility]     Va Ashby RN on 3/31/2025 at 10:36 AM      "

## 2025-04-07 RX ORDER — LAMOTRIGINE 200 MG/1
200 TABLET ORAL DAILY
Qty: 90 TABLET | Refills: 0 | Status: SHIPPED | OUTPATIENT
Start: 2025-04-07

## 2025-04-16 ENCOUNTER — VIRTUAL VISIT (OUTPATIENT)
Dept: PSYCHIATRY | Facility: CLINIC | Age: 25
End: 2025-04-16
Payer: COMMERCIAL

## 2025-04-16 DIAGNOSIS — F41.9 ANXIETY: ICD-10-CM

## 2025-04-16 DIAGNOSIS — Z72.0 VAPES NICOTINE CONTAINING SUBSTANCE: ICD-10-CM

## 2025-04-16 DIAGNOSIS — F31.9 BIPOLAR I DISORDER (H): Primary | ICD-10-CM

## 2025-04-16 RX ORDER — LURASIDONE HYDROCHLORIDE 80 MG/1
80 TABLET, FILM COATED ORAL
Qty: 30 TABLET | Refills: 1 | Status: SHIPPED | OUTPATIENT
Start: 2025-04-16

## 2025-04-16 RX ORDER — SERTRALINE HYDROCHLORIDE 100 MG/1
100 TABLET, FILM COATED ORAL DAILY
Qty: 30 TABLET | Refills: 1 | Status: SHIPPED | OUTPATIENT
Start: 2025-04-16

## 2025-04-16 NOTE — NURSING NOTE
Current patient location: [address] [place] [address] [address]  [facility] [id number]    Is the patient currently in the state of [place]? YES    Visit mode: VIDEO    If the visit is dropped, the patient can be reconnected by:VIDEO VISIT: Text to cell phone:   Telephone Information:   Mobile [phone]       Will anyone else be joining the visit? NO  (If patient encounters technical issues they should call [phone])    Are changes needed to the allergy or medication list? No    Are refills needed on medications prescribed by this physician? Discuss with provider    Rooming Documentation:  Questionnaire(s) completed    Reason for visit: RECHECK    [place] VICTOR

## 2025-04-16 NOTE — PROGRESS NOTES
"Virtual Visit Details       Originating Location (pt. Location): Home    Distant Location (provider location):  On-site  Platform used for Video Visit: [facility] Psychiatry Consult Note- Follow up    IDENTIFICATION   Name: Jeanine Hernandez   : 2000/25 year old      Sex:    @ female          Telemedicine Visit: The patient's condition can be safely assessed and treated via synchronous audio and visual telemedicine encounter.      Consent:  The patient/guardian has verbally consented to: the potential risks and benefits of telemedicine (video visit or phone) versus in person care; bill my insurance or make self-payment for services provided; and responsibility for payment of non-covered services.    As the provider I attest to compliance with applicable laws and regulations related to telemedicine.    Face to Face/patient Contact total time: 24 minutes  Pre Charting time: 0 minutes; Post charting time, communication and other activities: 10 minutes; Total time 34 minutes      INTERVAL HISTORY     Jeanine Hernandez is a 25 year old White, female who presents for follow up with [facility]  They were last seen here 3/26/25 with plan of: increase latuda to 80mg.    History of Present Illness           \"The depression is a lot better. I'm not spiraling.\"  Has noticed less overall irritability and episodes of increased activity and insomnia.   \"Things have been better but I am still quick to anger so I am not sure what is going on.\"  Shares example of frequently fighting with her fiance.    Continues report also having significant anxiety. Did try to decrease hydroxyzine to 5mg but it was not helpful because she still felt too tired on the agent.      SI/HI/SIB: Denies  Panic attacks: x1 since last visit during a fight with her partner  Psychosis/Tri/Irritability: Denies AVH    Sleep: Variable, has been working on sleep training her 1 year old  Energy: " poor  Appetite: good, regular meals  Exercise/Activity: daily  Focus: improving  Substance: denies use  Occupation:no change  New health changes: Recently got over a cold    Medication side effects: Denies including restlessness or abnormal facial movements  Adherence: good, taking daily    Current Medications:    Current Outpatient Medications:     amoxicillin (AMOXIL) 500 MG capsule, Take 1 capsule (500 mg) by mouth 2 times daily., Disp: 20 capsule, Rfl: 0    chlorhexidine (PERIDEX) 0.12 % solution, Swish and spit 15 mLs in mouth 2 times daily., Disp: 1893 mL, Rfl: 0    hydrOXYzine HCl (ATARAX) 10 MG tablet, Take 1 tablet (10 mg) by mouth 3 times daily as needed for anxiety., Disp: 90 tablet, Rfl: 2    lamoTRIgine (LAMICTAL) 200 MG tablet, Take 1 tablet (200 mg) by mouth daily., Disp: 90 tablet, Rfl: 0    lurasidone (LATUDA) 80 MG TABS tablet, Take 1 tablet (80 mg) by mouth daily (with dinner)., Disp: 30 tablet, Rfl: 1    sertraline (ZOLOFT) 25 MG tablet, Take 3 tablets (75 mg) by mouth daily., Disp: 90 tablet, Rfl: 1    Current Facility-Administered Medications:     medroxyPROGESTERone (DEPO-PROVERA) injection 150 mg, 150 mg, Intramuscular, Q90 Days,     medroxyPROGESTERone (DEPO-PROVERA) injection 150 mg, 150 mg, Intramuscular, Q90 Days, Dottie Lee MD, 150 mg at 08/02/24 1539      OBJECTIVE     Vitals:   There were no vitals taken for this visit.    Pulse Readings from Last 5 Encounters:   11/08/24 83   10/09/24 88   10/08/24 89   09/25/24 78   08/19/24 86     Wt Readings from Last 5 Encounters:   03/26/25 85.3 kg (188 lb)   10/09/24 80 kg (176 lb 6.4 oz)   10/08/24 80 kg (176 lb 6.4 oz)   08/19/24 78.5 kg (173 lb)   07/31/24 78.5 kg (173 lb)     BP Readings from Last 5 Encounters:   11/08/24 126/76   10/09/24 133/88   10/08/24 114/64   09/25/24 138/77   08/19/24 126/86       Labs:    Liver/kidney function Metabolic Blood counts   Recent Labs   Lab Test 02/24/25  1427 03/26/24  1903  03/16/24  1756   CR 0.86 0.89 0.68   AST 20  --  38   ALT 10  --  14   ALKPHOS 131  --  201*    Recent Labs   Lab Test 02/24/25  1427 07/02/24  1414   CHOL 181  --    TRIG 132  --    *  --    HDL 44*  --    A1C 5.6 5.7*   TSH  --  0.52    Recent Labs   Lab Test 06/30/24  1633   WBC 10.6   HGB 11.6*   HCT 37.3   MCV 76*           Recent Labs   Lab Test 07/02/24  1414   TSH 0.52       ECG    No results found for this or any previous visit.        9/20/2024     9:43 AM 2/15/2025    10:12 AM   PROMIS-10 Total Score w/o Sub Scores   PROMIS TOTAL - SUBSCORES 18    18 23        Patient-reported         12/28/2021     3:38 PM 2/1/2022    11:11 AM 9/20/2024     9:36 AM   CAGE-AID Total Score   Total Score 0 0 0    0   Total Score MyChart 0 (A total score of 2 or greater is considered clinically significant)  0 (A total score of 2 or greater is considered clinically significant)         2/12/2025     9:51 AM 3/20/2025     9:26 AM 3/26/2025    11:47 AM   PHQ   PHQ-9 Total Score 5  17  20   Q9: Thoughts of better off dead/self-harm past 2 weeks Not at all Several days Not at all   F/U: Thoughts of suicide or self-harm  No    F/U: Safety concerns  No        Patient-reported         9/23/2024     4:11 PM 2/12/2025     4:49 PM 3/20/2025     9:27 AM   CONNER-7 SCORE   Total Score 14 (moderate anxiety)  19 (severe anxiety)   Total Score 14 6 19        Patient-reported         [place]-PDMP [place] today: not using controlled substances    ADDED HISTORY   2/20/25:  Improvement n manic episodes with switch from abilify to latuda noted but ongoing depression and anxiety symptoms. Plan was to increase lamotrigine to 300mg.  3/26/25:  increase latuda to 80mg.     PSYCH MED TRIAL HX:  Lamotrigine- 300, not more efficacious than 200mg  Abilify   Trazodone   Sertraline  Latuda- 80mg  Quetiapine 25 mg, SSE and sedation  Hydroxyzine- 5mg too sedating    MENTAL STATUS EXAMINATION:     Alertness: alert  and oriented  Appearance:  "casually groomed and rocking her baby  Behavior/Demeanor: cooperative and calm, with good eye contact   Speech: normal and regular rate and rhythm  Language: intact and no problems  Psychomotor:  rocking her baby , no tics or dystonia noted  Gait and Station:  grossly normal  Mood:  \"I'm easily angered\"  Affect: full range and appropriate; was congruent to mood  Thought Process/Associations:  logical, linear, goal directed  Thought Content:  Reports none;  Denies suicidal ideation, violent ideation, and delusions  Perception:  Reports none;  Denies auditory hallucinations and visual hallucinations  Insight: adequate  Judgment: adequate for safety  Cognition: does  appear grossly intact; formal cognitive testing was not done          ASSESSMENT AND PLAN:   Bipolar I disorder  Anxiety  Nicotine use disorder, dependence  Mixed obsessional thoughts and acts, by history     2/20/25: First encounter with patient, transferring from Sam Dixon MD. Has history of Bipolar I, rapid cycling, anxiety, and non-epileptic seizures. Manic episodes significantly improved after switching from ability to latuda although patient continues to have depressive and anxiety symptoms. Recommend continued optimization of lamictal for depression and to prevent further manic episodes. Does experience restlessness although may not be related to latuda as the symptom worsened after stopping agent. May be anxiety. Restart hydroxyzine prn.     3/6/25: Notable emerging  manic symptoms. No SI or AVH. Good adherence to regimen. Recommend optimizing latuda to 80mg today. Discussed symptoms of when to reach out to higher level of care and recommended eval at [facility] or ED if symptoms worsen.  May consider decrease in lamotrigine back to 200mg at upcoming visits as it does not appear to be more efficacious at 300mg.     4/16/25: Improvement in depression and manic symptoms but ongoing anger outbursts and anxiety. Recommended optimization of sertraline. " She understands that this may increase risks for wili and is aware of symptoms to monitor for emergence of manic episode.       Today Jeanine Hernandez reports no suicidal ideation. In addition, she has notable risk factors for self-harm, including anxiety. However, risk is mitigated by commitment to family, absence of past attempts, ability to volunteer a safety plan, and history of seeking help when needed. Therefore, based on all available evidence including the factors cited above, she does not appear to be at imminent risk for self-harm, does not meet criteria for a 72-hr hold, and therefore remains appropriate for ongoing outpatient level of care.            PLAN:      Patient advised of consultative model. Patient will continue to be seen for ongoing consultation and stabilization.  Does not meet criteria for involuntary treatment or hospitalization  INCREASE Sertraline to 100mg daily  CONTINUE Latuda to 80mg at dinner- is aware of risks for TD and metabolic effects  CONTINUE lamotrigine 200mg. Patient is aware of SJS risks. CONSIDER lamotrigine level once at stable dose given desire for future pregnancies.   Labs- none additional at this time, metabolic labs checked 2/24/25  Referral placed for long term counseling by [facility] [place]  Long term psychiatry 8/13/25 with Jef Lino CNP  Follow up in 4 weeks or sooner if needed    Patient Education:  Medication side effects and alternatives reviewed. Health promotion activities recommended and reviewed today. All questions addressed. Education and counseling completed regarding risks and benefits of medications and psychotherapy options.  Consent provided by patient/guardian  Call the psychiatric nurse line with medication questions or concerns at [phone].  [email or web address] may be used to communicate with your provider, but this is not intended to be used for emergencies.  FIRST GENERATION ANTIPSYCHOTIC/ SECOND GENERATION ANTIPSYCHOTIC USE:  Atypical need for cardiometabolic  monitoring with medication- B/P, weight, blood sugar, cholesterol.  Need to monitor for abnormal movements taught  Medlineplus.gov is information for patients.  It is run by the [employer] of Medicine and it contains information about all disorders, diseases and all medications.      Community Resources:    National Suicide Prevention Lifeline: 531.615.2715 (TTY: 349.565.2494). Call anytime for help.  (www.suicidepreventionlifeline.org)  National [employer] on Mental Illness (www.ana.org): 920.385.5222 or 967-225-3758.   Mental Health Association (www.mentalhealth.org): 378.380.4535 or 182-776-0987.  [place] Crisis Text Line: Text [place] to 695201  Suicide LifeLine Chat: suicidePopJam.org/chat    Patient Status:  [facility] MD/DO/NP/PA providers offer care a specialty service for Primary Care Providers in the [facility] that seek to optimize psychotropic medications for unstable patients.  Once medications have been optimized, our providers discharge the patient back to the referring Primary Care Provider for ongoing medication management.  This type of system allows our providers to serve a high volume of patients.   Patient will continue to be seen for ongoing consultation and stabilization.    Administrative Billing:   Time spent with patient was greater than 50% of time and/or significant time was spent in counseling and coordination of care regarding above diagnoses and treatment plan. Pre charting time and post charting time/documentation/coordination are done on date of service.     Episode of Care   The longitudinal plan of care for the diagnosis(es)/condition(s) as documented were addressed during this visit. Due to the added complexity in care, I will continue to support Christophe in the subsequent management and with ongoing continuity of care.      Level of Medical Decision Making:   - At least 1 chronic problem that is not stable  - Engaged in prescription drug management during visit (discussed  any medication benefits, side effects, alternatives, etc.)           Signed:   WILMER Avalos CNP on 4/16/2025 at 12:34 PM  [facility]

## 2025-05-07 ENCOUNTER — LAB (OUTPATIENT)
Dept: LAB | Facility: CLINIC | Age: 25
End: 2025-05-07
Payer: COMMERCIAL

## 2025-05-07 ENCOUNTER — PRE VISIT (OUTPATIENT)
Dept: NEUROLOGY | Facility: CLINIC | Age: 25
End: 2025-05-07

## 2025-05-07 ENCOUNTER — OFFICE VISIT (OUTPATIENT)
Dept: NEUROLOGY | Facility: CLINIC | Age: 25
End: 2025-05-07
Payer: COMMERCIAL

## 2025-05-07 VITALS
OXYGEN SATURATION: 98 % | RESPIRATION RATE: 16 BRPM | DIASTOLIC BLOOD PRESSURE: 86 MMHG | SYSTOLIC BLOOD PRESSURE: 121 MMHG | HEART RATE: 94 BPM

## 2025-05-07 DIAGNOSIS — G40.909 SEIZURE DISORDER (H): Primary | ICD-10-CM

## 2025-05-07 DIAGNOSIS — F31.9 BIPOLAR I DISORDER (H): ICD-10-CM

## 2025-05-07 DIAGNOSIS — G40.909 SEIZURE DISORDER (H): ICD-10-CM

## 2025-05-07 LAB
ALBUMIN SERPL BCG-MCNC: 4.4 G/DL (ref 3.5–5.2)
ALP SERPL-CCNC: 116 U/L (ref 40–150)
ALT SERPL W P-5'-P-CCNC: 8 U/L (ref 0–50)
ANION GAP SERPL CALCULATED.3IONS-SCNC: 10 MMOL/L (ref 7–15)
AST SERPL W P-5'-P-CCNC: 21 U/L (ref 0–45)
BILIRUB SERPL-MCNC: <0.2 MG/DL
BUN SERPL-MCNC: 13.4 MG/DL (ref 6–20)
CALCIUM SERPL-MCNC: 9.7 MG/DL (ref 8.8–10.4)
CHLORIDE SERPL-SCNC: 103 MMOL/L (ref 98–107)
CREAT SERPL-MCNC: 0.81 MG/DL (ref 0.51–0.95)
EGFRCR SERPLBLD CKD-EPI 2021: >90 ML/MIN/1.73M2
ERYTHROCYTE [DISTWIDTH] IN BLOOD BY AUTOMATED COUNT: 12.6 % (ref 10–15)
GLUCOSE SERPL-MCNC: 95 MG/DL (ref 70–99)
HCO3 SERPL-SCNC: 23 MMOL/L (ref 22–29)
HCT VFR BLD AUTO: 41.5 % (ref 35–47)
HGB BLD-MCNC: 13.4 G/DL (ref 11.7–15.7)
MCH RBC QN AUTO: 27.1 PG (ref 26.5–33)
MCHC RBC AUTO-ENTMCNC: 32.3 G/DL (ref 31.5–36.5)
MCV RBC AUTO: 84 FL (ref 78–100)
PLATELET # BLD AUTO: 301 10E3/UL (ref 150–450)
POTASSIUM SERPL-SCNC: 4.2 MMOL/L (ref 3.4–5.3)
PROT SERPL-MCNC: 7.4 G/DL (ref 6.4–8.3)
RBC # BLD AUTO: 4.95 10E6/UL (ref 3.8–5.2)
SODIUM SERPL-SCNC: 136 MMOL/L (ref 135–145)
WBC # BLD AUTO: 14.2 10E3/UL (ref 4–11)

## 2025-05-07 PROCEDURE — 99000 SPECIMEN HANDLING OFFICE-LAB: CPT | Performed by: PATHOLOGY

## 2025-05-07 PROCEDURE — 36415 COLL VENOUS BLD VENIPUNCTURE: CPT | Performed by: PATHOLOGY

## 2025-05-07 PROCEDURE — 80053 COMPREHEN METABOLIC PANEL: CPT | Performed by: PATHOLOGY

## 2025-05-07 PROCEDURE — 85027 COMPLETE CBC AUTOMATED: CPT | Performed by: PATHOLOGY

## 2025-05-07 PROCEDURE — 80175 DRUG SCREEN QUAN LAMOTRIGINE: CPT | Mod: 90 | Performed by: PATHOLOGY

## 2025-05-07 RX ORDER — FOLIC ACID 1 MG/1
1 TABLET ORAL DAILY
Qty: 90 TABLET | Refills: 0 | Status: SHIPPED | OUTPATIENT
Start: 2025-05-07 | End: 2025-08-05

## 2025-05-07 RX ORDER — LAMOTRIGINE 200 MG/1
200 TABLET ORAL DAILY
Qty: 90 TABLET | Refills: 0 | Status: SHIPPED | OUTPATIENT
Start: 2025-05-07

## 2025-05-07 RX ORDER — LAMOTRIGINE 100 MG/1
100 TABLET ORAL DAILY
Qty: 90 TABLET | Refills: 1 | Status: SHIPPED | OUTPATIENT
Start: 2025-05-07 | End: 2025-08-05

## 2025-05-07 ASSESSMENT — PAIN SCALES - GENERAL: PAINLEVEL_OUTOF10: NO PAIN (0)

## 2025-05-07 NOTE — NURSING NOTE
Chief Complaint   Patient presents with    New Patient     New Seizure       /86 (BP Location: Right arm, Patient Position: Sitting, Cuff Size: Adult Large)   Pulse 94   Resp 16   SpO2 98%   Nusrat Connolly

## 2025-05-07 NOTE — PROGRESS NOTES
[facility] Epilepsy Care History and Physical       Patient:  Jeanine Hernandez  :  2000   Age:  25 year old   Today's Office Visit:  2025    Referring Provider:    Referred Self, MD  No address on file    Epilepsy Data:  Chief complaint  Evaluation of seizure history and management, with concerns about potential epilepsy following head injuries and past seizures.    25 year old right handed woman, accompanied in the clinic with her partner    History of present illness  - Had seizures back in  for about a year and a half, following two concussions that caused minor brain bleeding.  - Seizures stopped after starting lamotrigine.  - Missed a dose of lamotrigine and had a seizure in the bathroom in .  - Last seizure occurred in 2021.  - Experienced quite a few seizures during the initial period, with whole body shaking and spitting.  - No seizures since 2021, but occasional hand shaking.  - Was off lamotrigine for three months before the seizure in  due to insurance issues.  - Diagnosed with mood disorders before the second concussion in .  - Past treatment with Topamax before switching to lamotrigine.    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  - Previously worked in [employer] and at [employer]    Allergies  - Iodine allergy causing rash  - IVP dye allergy causing breathing issues, manageable with Benadryl  - Anaphylaxis with IVP dye    Current medications  - Lamotrigine 200 mg daily  - Sertraline daily  - Off-brand Latuda 80 mg daily  - Over-the-counter medications as needed (e.g., Tylenol, ibuprofen)    Lab results  - Lamotrigine level: 0.9 (normal range: 3 to 15)        Epilepsy Risk Factors:  Head Trauma  Precipitating factors:   Stress  Past Medical History:   Diagnosis Date    Anxiety     in high school    Asthma     as child    Depression     in high school     History of urinary tract infection     as a child    OCD (obsessive compulsive disorder)     in past    Postpartum depression 2020      Past Surgical History:   Procedure Laterality Date    FOOT SURGERY      right foot - ganglion cyst    MYRINGOTOMY, INSERT TUBE BILATERAL, COMBINED      ORTHOPEDIC SURGERY      planter warts foot    TONSILLECTOMY      TYMPANOPLASTY       Family History   Problem Relation Age of Onset    Diabetes Father         type II    Neurologic Disorder Father         TBI    Hyperlipidemia Father     Coronary Artery Disease Father         MI    Melanoma Maternal Grandmother     Other - See Comments Maternal Grandfather         murdered    Chronic Obstructive Pulmonary Disease Paternal Grandmother     Cerebrovascular Disease Paternal Grandmother     Heart Surgery Paternal Grandmother     Cerebrovascular Disease Paternal Grandfather         x3    Diabetes Paternal Grandfather         type II      Social History     Socioeconomic History    Marital status: Patient Declined   Tobacco Use    Smoking status: Former     Current packs/day: 0.00     Types: Cigarettes, Other     Passive exposure: Current    Smokeless tobacco: Never   Vaping Use    Vaping status: Every Day    Substances: Nicotine, Flavoring    Devices: Refillable tank, Pre-filled pod   Substance and Sexual Activity    Alcohol use: Never    Drug use: Never    Sexual activity: Yes     Partners: Male     Birth control/protection: Pill     Comment: Need to get birth control   Other Topics Concern    Parent/sibling w/ CABG, MI or angioplasty before 65F 55M? No   Social History Narrative    ** Merged History Encounter **          Social Drivers of Health     Financial Resource Strain: Low Risk  (5/21/2024)    Financial Resource Strain     Within the past 12 months, have you or your family members you live with been unable to get utilities (heat, electricity) when it was really needed?: No   Food Insecurity: Low Risk  (5/21/2024)    Food  Insecurity     Within the past 12 months, did you worry that your food would run out before you got money to buy more?: No     Within the past 12 months, did the food you bought just not last and you didn t have money to get more?: No   Transportation Needs: High Risk (5/21/2024)    Transportation Needs     Within the past 12 months, has lack of transportation kept you from medical appointments, getting your medicines, non-medical meetings or appointments, work, or from getting things that you need?: Yes   Interpersonal Safety: Low Risk  (10/8/2024)    Interpersonal Safety     Do you feel physically and emotionally safe where you currently live?: Yes     Within the past 12 months, have you been hit, slapped, kicked or otherwise physically hurt by someone?: No     Within the past 12 months, have you been humiliated or emotionally abused in other ways by your partner or ex-partner?: No   Housing Stability: Low Risk  (5/21/2024)    Housing Stability     Do you have housing? : Yes     Are you worried about losing your housing?: No      Employment/School:  Unemployed, not seeking work  Driving:  Currently patient is:  Driving: Patient was made aware of state driving laws. Currently meets criteria to continue to drive.    Previous Evaluations for Epilepsy:   EEG: not available  MRI of Brain: not eperformed    Review of Systems:  Lethargy / Tiredness:  No  Nausea / Vomiting:  No  Double Vision:  No  Sleepiness:  No      Current Outpatient Medications   Medication Sig Dispense Refill    chlorhexidine (PERIDEX) 0.12 % solution Swish and spit 15 mLs in mouth 2 times daily. 1893 mL 0    folic acid (FOLVITE) 1 MG tablet Take 1 tablet (1 mg) by mouth daily. 90 tablet 0    lamoTRIgine (LAMICTAL) 100 MG tablet Take 1 tablet (100 mg) by mouth daily. 90 tablet 1    lamoTRIgine (LAMICTAL) 200 MG tablet Take 1 tablet (200 mg) by mouth daily. 90 tablet 0    lurasidone (LATUDA) 80 MG TABS tablet Take 1 tablet (80 mg) by mouth daily  (with dinner). 30 tablet 1    sertraline (ZOLOFT) 100 MG tablet Take 1 tablet (100 mg) by mouth daily. 30 tablet 1       Perceived AED Side Effects: No    Medication Notes:   AED Medication Compliance:  compliant all of the time  Using a pill box:  No    Past AEDs:      5/7/2025     6:08 PM   AED - ANTIEPILEPTIC DRUGS   lamoTRIgine 100 mg Daily PO        200 mg Daily PO          Medication marked as long-term         Exam:    /86 (BP Location: Right arm, Patient Position: Sitting, Cuff Size: Adult Large)   Pulse 94   Resp 16   SpO2 98%      Wt Readings from Last 5 Encounters:   03/26/25 188 lb (85.3 kg)   10/09/24 176 lb 6.4 oz (80 kg)   10/08/24 176 lb 6.4 oz (80 kg)   08/19/24 173 lb (78.5 kg)   07/31/24 173 lb (78.5 kg)       General Appearance: Normal    Gait:  Normal  Attention Span:  Normal  Language:  Normal  Extraocular Movements:  Normal  Coordination:  Normal  Facial Strength:  Normal  Limb Strength:  Normal  Limb Tone:  Normal  Limb Sensation:  Normal  General Physical Findings:  Normal    Assessment and Plan:     25 year old right handed with h/o evere TBI and then possible multiple seizures.     D/d includes post traumatic epilepsy vs. Non epileptic spells. However she had no event > 12 mo    - Possible epilepsy due to past head injuries and seizures  - Seizures may be related to scarring in the brain from previous concussions  - Current medication dosage may be insufficient for seizure control, potentially treated for bipolar disorder rather than epilepsy    Plan  - Adjust lamotrigine dosage to ensure 24-hour coverage by taking 100 mg in the daytime and 200 mg at night, totaling 300 mg daily. This adjustment aims to manage potential epilepsy more effectively while minimizing side effects.  - Prescribe folic acid as a precautionary measure, especially considering the potential for future pregnancy and its benefits in reducing risks associated with medication.  - Conduct an 3 hrs video EEG to  assess brain wave activity and an 7 T MRI brain scan without contrast (allergy to dye) to investigate any potential scarring or damage that could indicate epilepsy.  - Perform blood work to evaluate the current levels of lamotrigine in the system and ensure adequate therapeutic levels are maintained.  - Schedule a follow-up appointment in six months to review test results and adjust treatment as necessary. If any test results are abnormal before this time, immediate contact will be made to discuss changes in the treatment plan.    Prescription  - Lamotrigine 300 mg daily: 200 mg at night and 100 mg in the morning for 24-hour coverage.  - Folic acid: prescribed as a precaution for potential pregnancy.    Appointments  - EEG and MRI brain to be scheduled; location not specified.  - Follow-up appointment in six months; location not specified.    Based on our discussion, I have outlined the following instructions for you:      - Take 100 mg of lamotrigine during the day and 200 mg at night. This will add up to 300 mg each day.  - Start taking folic acid. This is important, especially if you might become pregnant in the future, as it helps reduce certain risks.  - Get a blood test done to check how much lamotrigine is in your body and make sure the levels are right.    Next appointment(s): - EEG and MRI brain to be scheduled; location not specified.  - Follow-up appointment in six months; location not specified.    Thank you again for your visit, and we look forward to supporting you in your journey to better health.      This note was created using 8digits, Artificial Intelligence Transcription.             As described above, I met with the patient for 60 minutes and during this time counseling was greater than 50% of the visit time.

## 2025-05-07 NOTE — LETTER
2025       RE: Jeanine Hernandez  [address]  [facility] [address]     Dear Colleague,    Thank you for referring your patient, Jeanine Hernandez, to the [facility] [place] at [facility]. Please see a copy of my visit note below.    [facility] Epilepsy Care History and Physical       Patient:  Jeanine Hernandez  :  2000   Age:  25 year old   Today's Office Visit:  2025    Referring Provider:    Referred Self, MD  No address on file    Epilepsy Data:  Chief complaint  Evaluation of seizure history and management, with concerns about potential epilepsy following head injuries and past seizures.    25 year old right handed woman, accompanied in the clinic with her partner    History of present illness  - Had seizures back in  for about a year and a half, following two concussions that caused minor brain bleeding.  - Seizures stopped after starting lamotrigine.  - Missed a dose of lamotrigine and had a seizure in the bathroom in .  - Last seizure occurred in 2021.  - Experienced quite a few seizures during the initial period, with whole body shaking and spitting.  - No seizures since 2021, but occasional hand shaking.  - Was off lamotrigine for three months before the seizure in  due to insurance issues.  - Diagnosed with mood disorders before the second concussion in .  - Past treatment with Topamax before switching to lamotrigine.    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  - Previously worked in hospitality and at [employer]    Allergies  - Iodine allergy causing rash  - IVP dye allergy causing breathing issues, manageable with Benadryl  - Anaphylaxis with IVP dye    Current medications  - Lamotrigine 200 mg daily  - Sertraline daily  - Off-brand Latuda 80 mg daily  - Over-the-counter  medications as needed (e.g., Tylenol, ibuprofen)    Lab results  - Lamotrigine level: 0.9 (normal range: 3 to 15)        Epilepsy Risk Factors:  Head Trauma  Precipitating factors:   Stress  Past Medical History:   Diagnosis Date     Anxiety     in high school     Asthma     as child     Depression     in high school     History of urinary tract infection     as a child     OCD (obsessive compulsive disorder)     in past     Postpartum depression 2020      Past Surgical History:   Procedure Laterality Date     FOOT SURGERY      right foot - ganglion cyst     MYRINGOTOMY, INSERT TUBE BILATERAL, COMBINED       ORTHOPEDIC SURGERY      planter warts foot     TONSILLECTOMY       TYMPANOPLASTY       Family History   Problem Relation Age of Onset     Diabetes Father         type II     Neurologic Disorder Father         TBI     Hyperlipidemia Father      Coronary Artery Disease Father         MI     Melanoma Maternal Grandmother      Other - See Comments Maternal Grandfather         murdered     Chronic Obstructive Pulmonary Disease Paternal Grandmother      Cerebrovascular Disease Paternal Grandmother      Heart Surgery Paternal Grandmother      Cerebrovascular Disease Paternal Grandfather         x3     Diabetes Paternal Grandfather         type II      Social History     Socioeconomic History     Marital status: Patient Declined   Tobacco Use     Smoking status: Former     Current packs/day: 0.00     Types: Cigarettes, Other     Passive exposure: Current     Smokeless tobacco: Never   Vaping Use     Vaping status: Every Day     Substances: Nicotine, Flavoring     Devices: Refillable tank, Pre-filled pod   Substance and Sexual Activity     Alcohol use: Never     Drug use: Never     Sexual activity: Yes     Partners: Male     Birth control/protection: Pill     Comment: Need to get birth control   Other Topics Concern     Parent/sibling w/ CABG, MI or angioplasty before 65F 55M? No   Social History Narrative    ** Merged  History Encounter **          Social Drivers of Health     Financial Resource Strain: Low Risk  (5/21/2024)    Financial Resource Strain      Within the past 12 months, have you or your family members you live with been unable to get utilities (heat, electricity) when it was really needed?: No   Food Insecurity: Low Risk  (5/21/2024)    Food Insecurity      Within the past 12 months, did you worry that your food would run out before you got money to buy more?: No      Within the past 12 months, did the food you bought just not last and you didn t have money to get more?: No   Transportation Needs: High Risk (5/21/2024)    Transportation Needs      Within the past 12 months, has lack of transportation kept you from medical appointments, getting your medicines, non-medical meetings or appointments, work, or from getting things that you need?: Yes   Interpersonal Safety: Low Risk  (10/8/2024)    Interpersonal Safety      Do you feel physically and emotionally safe where you currently live?: Yes      Within the past 12 months, have you been hit, slapped, kicked or otherwise physically hurt by someone?: No      Within the past 12 months, have you been humiliated or emotionally abused in other ways by your partner or ex-partner?: No   Housing Stability: Low Risk  (5/21/2024)    Housing Stability      Do you have housing? : Yes      Are you worried about losing your housing?: No      Employment/School:  Unemployed, not seeking work  Driving:  Currently patient is:  Driving: Patient was made aware of state driving laws. Currently meets criteria to continue to drive.    Previous Evaluations for Epilepsy:   EEG: not available  MRI of Brain: not eperformed    Review of Systems:  Lethargy / Tiredness:  No  Nausea / Vomiting:  No  Double Vision:  No  Sleepiness:  No      Current Outpatient Medications   Medication Sig Dispense Refill     chlorhexidine (PERIDEX) 0.12 % solution Swish and spit 15 mLs in mouth 2 times daily. 1893  mL 0     folic acid (FOLVITE) 1 MG tablet Take 1 tablet (1 mg) by mouth daily. 90 tablet 0     lamoTRIgine (LAMICTAL) 100 MG tablet Take 1 tablet (100 mg) by mouth daily. 90 tablet 1     lamoTRIgine (LAMICTAL) 200 MG tablet Take 1 tablet (200 mg) by mouth daily. 90 tablet 0     lurasidone (LATUDA) 80 MG TABS tablet Take 1 tablet (80 mg) by mouth daily (with dinner). 30 tablet 1     sertraline (ZOLOFT) 100 MG tablet Take 1 tablet (100 mg) by mouth daily. 30 tablet 1       Perceived AED Side Effects: No    Medication Notes:   AED Medication Compliance:  compliant all of the time  Using a pill box:  No    Past AEDs:      5/7/2025     6:08 PM   AED - ANTIEPILEPTIC DRUGS   lamoTRIgine 100 mg Daily PO        200 mg Daily PO          Medication marked as long-term         Exam:    /86 (BP Location: Right arm, Patient Position: Sitting, Cuff Size: Adult Large)   Pulse 94   Resp 16   SpO2 98%      Wt Readings from Last 5 Encounters:   03/26/25 188 lb (85.3 kg)   10/09/24 176 lb 6.4 oz (80 kg)   10/08/24 176 lb 6.4 oz (80 kg)   08/19/24 173 lb (78.5 kg)   07/31/24 173 lb (78.5 kg)       General Appearance: Normal    Gait:  Normal  Attention Span:  Normal  Language:  Normal  Extraocular Movements:  Normal  Coordination:  Normal  Facial Strength:  Normal  Limb Strength:  Normal  Limb Tone:  Normal  Limb Sensation:  Normal  General Physical Findings:  Normal    Assessment and Plan:     25 year old right handed with h/o evere TBI and then possible multiple seizures.     D/d includes post traumatic epilepsy vs. Non epileptic spells. However she had no event > 12 mo    - Possible epilepsy due to past head injuries and seizures  - Seizures may be related to scarring in the brain from previous concussions  - Current medication dosage may be insufficient for seizure control, potentially treated for bipolar disorder rather than epilepsy    Plan  - Adjust lamotrigine dosage to ensure 24-hour coverage by taking 100 mg in the  daytime and 200 mg at night, totaling 300 mg daily. This adjustment aims to manage potential epilepsy more effectively while minimizing side effects.  - Prescribe folic acid as a precautionary measure, especially considering the potential for future pregnancy and its benefits in reducing risks associated with medication.  - Conduct an 3 hrs video EEG to assess brain wave activity and an 7 T MRI brain scan without contrast (allergy to dye) to investigate any potential scarring or damage that could indicate epilepsy.  - Perform blood work to evaluate the current levels of lamotrigine in the system and ensure adequate therapeutic levels are maintained.  - Schedule a follow-up appointment in six months to review test results and adjust treatment as necessary. If any test results are abnormal before this time, immediate contact will be made to discuss changes in the treatment plan.    Prescription  - Lamotrigine 300 mg daily: 200 mg at night and 100 mg in the morning for 24-hour coverage.  - Folic acid: prescribed as a precaution for potential pregnancy.    Appointments  - EEG and MRI brain to be scheduled; location not specified.  - Follow-up appointment in six months; location not specified.    Based on our discussion, I have outlined the following instructions for you:      - Take 100 mg of lamotrigine during the day and 200 mg at night. This will add up to 300 mg each day.  - Start taking folic acid. This is important, especially if you might become pregnant in the future, as it helps reduce certain risks.  - Get a blood test done to check how much lamotrigine is in your body and make sure the levels are right.    Next appointment(s): - EEG and MRI brain to be scheduled; location not specified.  - Follow-up appointment in six months; location not specified.    Thank you again for your visit, and we look forward to supporting you in your journey to better health.      This note was created using Nabla, Artificial  Intelligence Transcription.             As described above, I met with the patient for 60 minutes and during this time counseling was greater than 50% of the visit time.        Again, thank you for allowing me to participate in the care of your patient.      Sincerely,    Minnie Quinones MD

## 2025-05-13 ENCOUNTER — RESULTS FOLLOW-UP (OUTPATIENT)
Dept: NEUROLOGY | Facility: CLINIC | Age: 25
End: 2025-05-13

## 2025-05-13 ENCOUNTER — TELEPHONE (OUTPATIENT)
Dept: FAMILY MEDICINE | Facility: CLINIC | Age: 25
End: 2025-05-13

## 2025-05-13 ASSESSMENT — ASTHMA QUESTIONNAIRES
QUESTION_3 LAST FOUR WEEKS HOW OFTEN DID YOUR ASTHMA SYMPTOMS (WHEEZING, COUGHING, SHORTNESS OF BREATH, CHEST TIGHTNESS OR PAIN) WAKE YOU UP AT NIGHT OR EARLIER THAN USUAL IN THE MORNING: NOT AT ALL
QUESTION_4 LAST FOUR WEEKS HOW OFTEN HAVE YOU USED YOUR RESCUE INHALER OR NEBULIZER MEDICATION (SUCH AS ALBUTEROL): NOT AT ALL
QUESTION_5 LAST FOUR WEEKS HOW WOULD YOU RATE YOUR ASTHMA CONTROL: WELL CONTROLLED
QUESTION_2 LAST FOUR WEEKS HOW OFTEN HAVE YOU HAD SHORTNESS OF BREATH: ONCE OR TWICE A WEEK
QUESTION_1 LAST FOUR WEEKS HOW MUCH OF THE TIME DID YOUR ASTHMA KEEP YOU FROM GETTING AS MUCH DONE AT WORK, SCHOOL OR AT HOME: NONE OF THE TIME
ACT_TOTALSCORE: 23

## 2025-05-13 NOTE — TELEPHONE ENCOUNTER
Patient Quality Outreach    Patient is due for the following:   Asthma  -  ACT needed    Action(s) Taken:   Patient was assigned appropriate questionnaire to complete    Type of outreach:    Sent Data[employer] message.  Will postpone x 1 week, if not viewed or completed please mail ACT.       Questions for provider review:    None         Farrah Brush, [facility]  Chart routed to Care Team.

## 2025-05-19 ENCOUNTER — VIRTUAL VISIT (OUTPATIENT)
Dept: PSYCHIATRY | Facility: CLINIC | Age: 25
End: 2025-05-19
Payer: COMMERCIAL

## 2025-05-19 DIAGNOSIS — F42.2 MIXED OBSESSIONAL THOUGHTS AND ACTS: ICD-10-CM

## 2025-05-19 DIAGNOSIS — F41.9 ANXIETY: ICD-10-CM

## 2025-05-19 DIAGNOSIS — F31.9 BIPOLAR I DISORDER (H): Primary | ICD-10-CM

## 2025-05-19 DIAGNOSIS — Z72.0 VAPES NICOTINE CONTAINING SUBSTANCE: ICD-10-CM

## 2025-05-19 RX ORDER — LURASIDONE HYDROCHLORIDE 80 MG/1
80 TABLET, FILM COATED ORAL
Qty: 30 TABLET | Refills: 1 | Status: SHIPPED | OUTPATIENT
Start: 2025-05-19

## 2025-05-19 NOTE — PROGRESS NOTES
Virtual Visit Details       Originating Location (pt. Location): Home    Distant Location (provider location):  Off-site  Platform used for Video Visit: [facility] Psychiatry Consult Note- Follow up    IDENTIFICATION   Name: Jeanine Hernandez   : 2000/25 year old      Sex:    @ female          Telemedicine Visit: The patient's condition can be safely assessed and treated via synchronous audio and visual telemedicine encounter.      Consent:  The patient/guardian has verbally consented to: the potential risks and benefits of telemedicine (video visit or phone) versus in person care; bill my insurance or make self-payment for services provided; and responsibility for payment of non-covered services.    As the provider I attest to compliance with applicable laws and regulations related to telemedicine.    Face to Face/patient Contact total time: 23 minutes  Pre Charting time: 0 minutes; Post charting time, communication and other activities: 10 minutes; Total time 33 minutes      INTERVAL HISTORY     Jeanine Hernandez is a 25 year old White, female who presents for follow up with [facility]  They were last seen here 25 with plan of: increase sertraline to 100mg due to ongoing anger outbursts and anxiety.    History of Present Illness            Christophe presents with her fiancee Sulaiman and son.  Sulaiman reports that her mood fluctuations have been less frequent but the severity is still the same.  Shares that when they get into arguments it seems to affect her intensely where she is dark for days.  He notes that it is hard to get her move past arguments, even over small stuff such as accidentally  Breaking a glass in the home.    Did increase sertraline but did not find much of a change.    Recently saw the neurologist who increased her lamotrigine to 300mg which she has found beneficial for her irritability.  Also started on folic acid.    SI/HI/SIB:  Denies  Panic attacks: Denies  Psychosis/Tri/Irritability: Reports episodes of hypomania that last few hours-1 day every few weeks. Also reports seeing shadows in the corner of her eyes occasionally when in dark rooms.    Sleep: variable due to her sons teething, wakes every 1-2 hours these days, getting 5-9 hours  Energy: poor  Appetite: good, taking latuda with food  Focus: poor  Substance: nicotine vaping  Occupation:[employer]  Therapy: None    Medication side effects: Yes: grogginess in AM although unsure if this is due to her lack of sleep  Adherence: good, taking daily    Current Medications:    Current Outpatient Medications:     chlorhexidine (PERIDEX) 0.12 % solution, Swish and spit 15 mLs in mouth 2 times daily., Disp: 1893 mL, Rfl: 0    folic acid (FOLVITE) 1 MG tablet, Take 1 tablet (1 mg) by mouth daily., Disp: 90 tablet, Rfl: 0    lamoTRIgine (LAMICTAL) 100 MG tablet, Take 1 tablet (100 mg) by mouth daily., Disp: 90 tablet, Rfl: 1    lamoTRIgine (LAMICTAL) 200 MG tablet, Take 1 tablet (200 mg) by mouth daily., Disp: 90 tablet, Rfl: 0    lurasidone (LATUDA) 80 MG TABS tablet, Take 1 tablet (80 mg) by mouth daily (with dinner)., Disp: 30 tablet, Rfl: 1    sertraline (ZOLOFT) 100 MG tablet, Take 1 tablet (100 mg) by mouth daily., Disp: 30 tablet, Rfl: 1    Current Facility-Administered Medications:     medroxyPROGESTERone (DEPO-PROVERA) injection 150 mg, 150 mg, Intramuscular, Q90 Days,     medroxyPROGESTERone (DEPO-PROVERA) injection 150 mg, 150 mg, Intramuscular, Q90 Days, Dottie Lee MD, 150 mg at 08/02/24 1539      OBJECTIVE     Vitals:   There were no vitals taken for this visit.    Pulse Readings from Last 5 Encounters:   05/07/25 94   11/08/24 83   10/09/24 88   10/08/24 89   09/25/24 78     Wt Readings from Last 5 Encounters:   03/26/25 85.3 kg (188 lb)   10/09/24 80 kg (176 lb 6.4 oz)   10/08/24 80 kg (176 lb 6.4 oz)   08/19/24 78.5 kg (173 lb)   07/31/24 78.5 kg (173 lb)     BP  Readings from Last 5 Encounters:   05/07/25 121/86   11/08/24 126/76   10/09/24 133/88   10/08/24 114/64   09/25/24 138/77       Labs:    Liver/kidney function Metabolic Blood counts   Recent Labs   Lab Test 05/07/25  1647 02/24/25  1427   CR 0.81 0.86   AST 21 20   ALT 8 10   ALKPHOS 116 131    Recent Labs   Lab Test 02/24/25  1427 07/02/24  1414   CHOL 181  --    TRIG 132  --    *  --    HDL 44*  --    A1C 5.6 5.7*   TSH  --  0.52    Recent Labs   Lab Test 05/07/25  1647   WBC 14.2*   HGB 13.4   HCT 41.5   MCV 84           Recent Labs   Lab Test 07/02/24  1414   TSH 0.52       ECG    No results found for this or any previous visit.        9/20/2024     9:43 AM 2/15/2025    10:12 AM 5/14/2025     9:21 AM   PROMIS-10 Total Score w/o Sub Scores   PROMIS TOTAL - SUBSCORES 18    18 23  21        Patient-reported         12/28/2021     3:38 PM 2/1/2022    11:11 AM 9/20/2024     9:36 AM   CAGE-AID Total Score   Total Score 0 0 0    0   Total Score MyChart 0 (A total score of 2 or greater is considered clinically significant)  0 (A total score of 2 or greater is considered clinically significant)         2/12/2025     9:51 AM 3/20/2025     9:26 AM 3/26/2025    11:47 AM   PHQ   PHQ-9 Total Score 5  17  20   Q9: Thoughts of better off dead/self-harm past 2 weeks Not at all Several days Not at all   F/U: Thoughts of suicide or self-harm  No    F/U: Safety concerns  No        Patient-reported         9/23/2024     4:11 PM 2/12/2025     4:49 PM 3/20/2025     9:27 AM   CONNER-7 SCORE   Total Score 14 (moderate anxiety)  19 (severe anxiety)   Total Score 14 6 19        Patient-reported         [place]-PD[place] today: will be checked next visit    ADDED HISTORY   2/20/25:  Improvement n manic episodes with switch from abilify to latuda noted but ongoing depression and anxiety symptoms. Plan was to increase lamotrigine to 300mg.  3/26/25:  increase latuda to 80mg  4/16/25:  increase sertraline to 100mg due to  ongoing anger outbursts and anxiety.     PSYCH MED TRIAL HX:  Lamotrigine  Abilify   Trazodone   Sertraline  Latuda- 80mg  Quetiapine 25 mg, SSE and sedation  Hydroxyzine- 5mg too sedating    MENTAL STATUS EXAMINATION:     Alertness: alert   Appearance: adequately groomed, casual clothing  Behavior/Demeanor: cooperative, with fair eye contact, caring for her child  Speech: normal and regular rate and rhythm  Language: intact and no problems  Psychomotor: grossly normal, no tics or dyskinesias  Gait and Station: grossly normal  Mood: irritable  Affect: restricted; was congruent to mood  Thought Process/Associations: logical, linear, and goal directed  Thought Content:  Reports none;  Denies suicidal ideation, violent ideation, and delusions  Perception:  Reports visual distortion seen as shadows ;  Denies auditory hallucinations and visual hallucinations  Insight: adequate  Judgment: fair  Cognition: does  appear grossly intact; formal cognitive testing was not done      ASSESSMENT AND PLAN:      Bipolar I disorder  Anxiety  Nicotine use disorder, dependence  Mixed obsessional thoughts and acts, by history  Seizure disorder  H/o head injuries     2/20/25: First encounter with patient, transferring from aSm Dixon MD. Has history of Bipolar I, rapid cycling, anxiety, and non-epileptic seizures. Manic episodes significantly improved after switching from ability to latuda although patient continues to have depressive and anxiety symptoms. Recommend continued optimization of lamictal for depression and to prevent further manic episodes. Does experience restlessness although may not be related to latuda as the symptom worsened after stopping agent. May be anxiety. Restart hydroxyzine prn.     3/6/25: Notable emerging  manic symptoms. No SI or AVH. Good adherence to regimen. Recommend optimizing latuda to 80mg today. Discussed symptoms of when to reach out to higher level of care and recommended eval at [facility] or ED if  symptoms worsen.  May consider decrease in lamotrigine back to 200mg at upcoming visits as it does not appear to be more efficacious at 300mg.     4/16/25: Improvement in depression and manic symptoms but ongoing anger outbursts and anxiety. Recommended optimization of sertraline. She understands that this may increase risks for wili and is aware of symptoms to monitor for emergence of manic episode.    5/19/25: Mood fluctuations less frequent but intensity of manic and depressive states still the same, significantly impairing her social function including relationship with partner. Per patient, latuda does seem to have helped with the severity of her manic episodes but is still having fluctuations every few weeks. Good adherance. No overt signs of psychosis, akathisia, or TD.  No significant improvement noted with  optimization of sertraline for irritability outbursts and anxiety but it was notably better after re-increasing lamotrigine by neurology. Recommended tapering down dose of sertraline at this time due to perceived lack of benefit at this dose. Also will coordinate with neurology on optimizing lamotrigine. Both actions could also pharmacologically help reduce her mood fluctuations further.  Non pharmacologically it was recommended that Christophe return to psychotherapy to help with distress intolerance, emotional regulation, and reality acceptance skills. We discussed that this could be addressed in CBT and also DBT. Discussed both modalities and intensity of treatments.        Today Jeanine Hernandez reports no suicidal ideation. In addition, she has notable risk factors for self-harm, including anxiety. However, risk is mitigated by commitment to family, absence of past attempts, ability to volunteer a safety plan, and history of seeking help when needed. Therefore, based on all available evidence including the factors cited above, she does not appear to be at imminent risk for self-harm, does not meet  criteria for a 72-hr hold, and therefore remains appropriate for ongoing outpatient level of care.            PLAN:      Patient advised of consultative model. Patient will continue to be seen for ongoing consultation and stabilization.  Does not meet criteria for involuntary treatment or hospitalization  DECREASE Sertraline to 50mg daily  CONTINUE Latuda to 80mg at dinner- is aware of risks for TD and metabolic effects  CONTINUE lamotrigine 300mg. Patient is aware of SJS risks.  Labs- reviewed, none additional at this time  Referral place for individual counseling to work on DBT skills  Long term psychiatry 8/13/25 with Jef Lino CNP  Follow up in 4-6 weeks or sooner if needed    Patient Education:  Medication side effects and alternatives reviewed. Health promotion activities recommended and reviewed today. All questions addressed. Education and counseling completed regarding risks and benefits of medications and psychotherapy options.  Consent provided by patient/guardian  Call the psychiatric nurse line with medication questions or concerns at [phone].  [employer] may be used to communicate with your provider, but this is not intended to be used for emergencies.  LAMOTRIGINE: Discussed risk of rash and instructed to stop taking the drug at the first sign of a rash regardless of its type or severity, and contact the nurse line at [phone]  Medline[employer]asisnote AG.gov is information for patients.  It is run by the National Library of Medicine and it contains information about all disorders, diseases and all medications.      Community Resources:    National Suicide Prevention Lifeline: 961.383.5598 (TTY: 114.750.1758). Call anytime for help.  (www.suicidepreventionlifeline.org)  National [place] on Mental Illness (www.ana.org): 729.359.6957 or 226-611-3098.   Mental Health Association (www.mentalhealth.org): 250.384.4317 or 826-884-4985.  [place] Crisis Text Line: Text [place] to 559929  Suicide LifeLine Chat:  suicidepreventionlifeline.org/chat    Patient Status:  [facility] MD/DO/NP/PA providers offer care a specialty service for Primary Care Providers in the [facility] that seek to optimize psychotropic medications for unstable patients.  Once medications have been optimized, our providers discharge the patient back to the referring Primary Care Provider for ongoing medication management.  This type of system allows our providers to serve a high volume of patients.   Patient will continue to be seen for ongoing consultation and stabilization.    Administrative Billing:   Time spent with patient was greater than 50% of time and/or significant time was spent in counseling and coordination of care regarding above diagnoses and treatment plan. Pre charting time and post charting time/documentation/coordination are done on date of service.     Episode of Care   The longitudinal plan of care for the diagnosis(es)/condition(s) as documented were addressed during this visit. Due to the added complexity in care, I will continue to support Christophe in the subsequent management and with ongoing continuity of care.       Signed:   WILMER Avalos CNP on 5/19/2025 at 12:34 PM  [facility]

## 2025-05-19 NOTE — PATIENT INSTRUCTIONS
"Patient Education   Collaborative Care Psychiatry Service  What to Expect  Here's what to expect from your Collaborative Care Psychiatry Service (CCPS).   About CCPS  CCPS means 2 people work together to help you get better. You'll meet with a behavioral health clinician and a psychiatric doctor. A behavioral health clinician helps people with mental health problems by talking with them. A psychiatric doctor helps people by giving them medicine.  How it works  At every visit, you'll see the behavioral health clinician (BHC) first. They'll talk with you about how you're doing and teach you how to feel better.   Then you'll see the psychiatric doctor. This doctor can help you deal with troubling thoughts and feelings by giving you medicine. They'll make sure you know the plan for your care.   CCPS usually takes 3 to 6 visits. If you need more visits, we may have you start seeing a different psychiatric doctor for ongoing care.  If you have any questions or concerns, we'll be glad to talk with you.  About visits  Be open  At your visits, please talk openly about your problems. It may feel hard, but it's the best way for us to help you.  Cancelling visits  If you can't come to your visit, please call us right away at [phone]. If you don't cancel at least 24 hours (1 full day) before your visit, that's \"late cancellation.\"  Being late to visits  Being very late is the same as not showing up. You will be a \"no show\" if:  Your appointment starts with a BHC, and you're more than 15 minutes late for a 30-minute (half hour) visit. This will also cancel your appointment with the psychiatric doctor.  Your appointment is with a psychiatric doctor only, and you're more than 15 minutes late for a 30-minute (half hour) visit.  Your appointment is with a psychiatric doctor only, and you're more than 30 minutes late for a 60-minute (full hour) visit.  If you cancel late or don't show up 2 times within 6 months, we may end your " care.   Getting help between visits  If you need help between visits, you can call us Monday to Friday from 8 a.m. to 4:30 p.m. at [phone].  Emergency care  Call 911 or go to the nearest emergency department if your life or someone else's life is in danger.  Call 988 anytime to reach the national Suicide and Crisis hotline.  Medicine refills  To refill your medicine, call your pharmacy. You can also call [facility]'s [facility] at [phone], Monday to Friday, 8 a.m. to 4:30 p.m. It can take 1 to 3 business days to get a refill.   Forms, letters, and tests  You may have papers to fill out, like FMLA, short-term disability, and workability. We can help you with these forms at your visits, but you must have an appointment. You may need more than 1 visit for this, to be in an intensive therapy program, or both.  Before we can give you medicine for ADHD, we may refer you to get tested for it or confirm it another way.  We may not be able to give you an emotional support animal letter.  We don't do mental health checks ordered by the court.   We don't do mental health testing, but we can refer you to get tested.   Thank you for choosing us for your care.  For informational purposes only. Not to replace the advice of your health care provider. Copyright   2022 [facility]. All rights reserved. [employer] 869852 - Rev 11/24.

## 2025-05-19 NOTE — Clinical Note
Gentry uQinones, I met with Christophe today and she shared with me that since you increased her lamotrigine to 300mg for seizures she has noticed an improvement in her irritability. Questioning if we can continue optimizing this drug for her adjunctively with Latuda as her irritability continues to impact her daily life. Her last lamotrigine level was 2.5.  Lana

## 2025-05-19 NOTE — NURSING NOTE
Current patient location: [address] [place] [address] [address]  [facility] [address]    Is the patient currently in the state of [place]? YES    Visit mode: VIDEO    If the visit is dropped, the patient can be reconnected by:VIDEO VISIT: Text to cell phone:   Telephone Information:   Mobile [phone]    and VIDEO VISIT: Send to e-mail at: [email or web address]    Will anyone else be joining the visit? NO  (If patient encounters technical issues they should call [phone]    Are changes needed to the allergy or medication list? No    Are refills needed on medications prescribed by this physician? NO    Rooming Documentation:  Questionnaire(s) completed    Reason for visit: RECHECK    Lea VICTOR

## 2025-05-20 ENCOUNTER — PATIENT OUTREACH (OUTPATIENT)
Dept: CARE COORDINATION | Facility: CLINIC | Age: 25
End: 2025-05-20

## 2025-05-22 ENCOUNTER — PATIENT OUTREACH (OUTPATIENT)
Dept: CARE COORDINATION | Facility: CLINIC | Age: 25
End: 2025-05-22
Payer: COMMERCIAL

## 2025-06-02 ENCOUNTER — TELEPHONE (OUTPATIENT)
Dept: BEHAVIORAL HEALTH | Facility: CLINIC | Age: 25
End: 2025-06-02

## 2025-06-02 ENCOUNTER — HOSPITAL ENCOUNTER (EMERGENCY)
Facility: CLINIC | Age: 25
Discharge: NURSING, REHAB OR LONG-TERM CARE | End: 2025-06-03
Attending: EMERGENCY MEDICINE | Admitting: EMERGENCY MEDICINE
Payer: COMMERCIAL

## 2025-06-02 DIAGNOSIS — R44.1 VISUAL HALLUCINATIONS: ICD-10-CM

## 2025-06-02 DIAGNOSIS — R44.0 AUDITORY HALLUCINATIONS: ICD-10-CM

## 2025-06-02 DIAGNOSIS — F29 PSYCHOSIS, UNSPECIFIED PSYCHOSIS TYPE (H): ICD-10-CM

## 2025-06-02 LAB
AMPHETAMINES UR QL SCN: NORMAL
BARBITURATES UR QL SCN: NORMAL
BENZODIAZ UR QL SCN: NORMAL
BZE UR QL SCN: NORMAL
CANNABINOIDS UR QL SCN: NORMAL
FENTANYL UR QL: NORMAL
HCG UR QL: NEGATIVE
OPIATES UR QL SCN: NORMAL
PCP QUAL URINE (ROCHE): NORMAL

## 2025-06-02 PROCEDURE — 250N000013 HC RX MED GY IP 250 OP 250 PS 637: Performed by: STUDENT IN AN ORGANIZED HEALTH CARE EDUCATION/TRAINING PROGRAM

## 2025-06-02 PROCEDURE — 81025 URINE PREGNANCY TEST: CPT | Performed by: STUDENT IN AN ORGANIZED HEALTH CARE EDUCATION/TRAINING PROGRAM

## 2025-06-02 PROCEDURE — 99285 EMERGENCY DEPT VISIT HI MDM: CPT | Performed by: EMERGENCY MEDICINE

## 2025-06-02 PROCEDURE — 80307 DRUG TEST PRSMV CHEM ANLYZR: CPT | Performed by: STUDENT IN AN ORGANIZED HEALTH CARE EDUCATION/TRAINING PROGRAM

## 2025-06-02 RX ORDER — LAMOTRIGINE 200 MG/1
200 TABLET ORAL AT BEDTIME
Status: DISCONTINUED | OUTPATIENT
Start: 2025-06-02 | End: 2025-06-03 | Stop reason: HOSPADM

## 2025-06-02 RX ORDER — HYDROXYZINE HYDROCHLORIDE 25 MG/1
25 TABLET, FILM COATED ORAL
Status: DISCONTINUED | OUTPATIENT
Start: 2025-06-02 | End: 2025-06-03 | Stop reason: HOSPADM

## 2025-06-02 RX ORDER — LAMOTRIGINE 100 MG/1
100 TABLET ORAL DAILY
Status: DISCONTINUED | OUTPATIENT
Start: 2025-06-03 | End: 2025-06-03 | Stop reason: HOSPADM

## 2025-06-02 RX ORDER — HYDROXYZINE HYDROCHLORIDE 25 MG/1
25 TABLET, FILM COATED ORAL EVERY 4 HOURS PRN
Status: DISCONTINUED | OUTPATIENT
Start: 2025-06-02 | End: 2025-06-03 | Stop reason: HOSPADM

## 2025-06-02 RX ORDER — SERTRALINE HYDROCHLORIDE 100 MG/1
100 TABLET, FILM COATED ORAL DAILY
Status: DISCONTINUED | OUTPATIENT
Start: 2025-06-03 | End: 2025-06-03 | Stop reason: HOSPADM

## 2025-06-02 RX ORDER — LURASIDONE HYDROCHLORIDE 80 MG/1
80 TABLET, FILM COATED ORAL
Status: DISCONTINUED | OUTPATIENT
Start: 2025-06-02 | End: 2025-06-03 | Stop reason: HOSPADM

## 2025-06-02 RX ADMIN — LURASIDONE HYDROCHLORIDE 80 MG: 80 TABLET, FILM COATED ORAL at 22:00

## 2025-06-02 RX ADMIN — LAMOTRIGINE 200 MG: 200 TABLET ORAL at 22:00

## 2025-06-02 ASSESSMENT — ACTIVITIES OF DAILY LIVING (ADL)
ADLS_ACUITY_SCORE: 44

## 2025-06-02 ASSESSMENT — COLUMBIA-SUICIDE SEVERITY RATING SCALE - C-SSRS
2. HAVE YOU ACTUALLY HAD ANY THOUGHTS OF KILLING YOURSELF IN THE PAST MONTH?: YES
3. HAVE YOU BEEN THINKING ABOUT HOW YOU MIGHT KILL YOURSELF?: YES
5. HAVE YOU STARTED TO WORK OUT OR WORKED OUT THE DETAILS OF HOW TO KILL YOURSELF? DO YOU INTEND TO CARRY OUT THIS PLAN?: NO
1. IN THE PAST MONTH, HAVE YOU WISHED YOU WERE DEAD OR WISHED YOU COULD GO TO SLEEP AND NOT WAKE UP?: NO
6. HAVE YOU EVER DONE ANYTHING, STARTED TO DO ANYTHING, OR PREPARED TO DO ANYTHING TO END YOUR LIFE?: YES
4. HAVE YOU HAD THESE THOUGHTS AND HAD SOME INTENTION OF ACTING ON THEM?: NO

## 2025-06-02 NOTE — ED TRIAGE NOTES
Thoughts only, no plan, but auditory hallucinations are telling her how to kill herself. Pt states she has gone through 4-5 psychiatrists in the last 5 years. Pt states she thinks she needs to be admitted because she can't do this anymore. She states she is done being just put on all these different medications. Pt states she is complaint with medications, updated on last dose in home medications list.      Triage Assessment (Adult)       Row Name 06/02/25 6185          Triage Assessment    Airway WDL WDL        Respiratory WDL    Respiratory WDL WDL        Skin Circulation/Temperature WDL    Skin Circulation/Temperature WDL WDL        Cardiac WDL    Cardiac WDL WDL        Peripheral/Neurovascular WDL    Peripheral Neurovascular WDL WDL        Cognitive/Neuro/Behavioral WDL    Cognitive/Neuro/Behavioral WDL WDL

## 2025-06-02 NOTE — ED PROVIDER NOTES
ED Provider Note  [facility]      History     Chief Complaint   Patient presents with    Suicidal     Thoughts only, no plan, but auditory hallucinations are telling her how to kill herself    Hallucinations     Auditory hallucinations telling her to kill herself, going on for the past month     The history is provided by the patient and medical records.     Jeanine Hernandez is a 25 year old female with past medical history significant for bipolar I, MDD, insomnia, PTSD, OCD who presents to the emergency department for evaluation of suicidal ideation and hallucinations. She reports increased suicidal ideation and both auditory and visual hallucinations over the last month since increasing her Latuda. Auditory hallucinations are telling her to kill herself, suggesting multiple methods, most recently to slit her wrists today. Visual hallucinations are of shadows and feeling like she can see the outline of a figure in front of her but that she can see through them to the other side. She reports that she has been seen by 4-5 different psychiatrists over the last 5 years and does not feel that any of the medications she has been taking have been helping but rather make symptoms worse or have concerning side effects. She denies any suicide plan at this time. Additionally denies homicidal ideation and recent substance or alcohol use. Does note that she has a 14 month old son at home who isn't sleeping through the night which she feels may be contributing to her symptoms. Endorses chronic mild left-sided headache but otherwise denies any physical concerns. No fever, cough, congestion, runny nose, sore throat, chest pain, shortness of breath, abdominal pain, nausea, vomiting, diarrhea, urinary symptoms, or any other concerns at this time.       Past Medical History  Past Medical History:   Diagnosis Date    Anxiety     in high school    Asthma     as child    Depression     in high school     "History of urinary tract infection     as a child    OCD (obsessive compulsive disorder)     in past    Postpartum depression 2020     Past Surgical History:   Procedure Laterality Date    FOOT SURGERY      right foot - ganglion cyst    MYRINGOTOMY, INSERT TUBE BILATERAL, COMBINED      ORTHOPEDIC SURGERY      planter warts foot    TONSILLECTOMY      TYMPANOPLASTY       folic acid (FOLVITE) 1 MG tablet  lamoTRIgine (LAMICTAL) 100 MG tablet  lamoTRIgine (LAMICTAL) 200 MG tablet  lurasidone (LATUDA) 80 MG TABS tablet  sertraline (ZOLOFT) 50 MG tablet      Allergies   Allergen Reactions    Iodine Anaphylaxis    Ivp Dye [Contrast Dye] Anaphylaxis    Latex Hives     Family History  Family History   Problem Relation Age of Onset    Diabetes Father         type II    Neurologic Disorder Father         TBI    Hyperlipidemia Father     Coronary Artery Disease Father         MI    Melanoma Maternal Grandmother     Other - See Comments Maternal Grandfather         murdered    Chronic Obstructive Pulmonary Disease Paternal Grandmother     Cerebrovascular Disease Paternal Grandmother     Heart Surgery Paternal Grandmother     Cerebrovascular Disease Paternal Grandfather         x3    Diabetes Paternal Grandfather         type II     Social History   Social History     Tobacco Use    Smoking status: Former     Current packs/day: 0.00     Types: Cigarettes, Other     Passive exposure: Current    Smokeless tobacco: Never   Vaping Use    Vaping status: Every Day    Substances: Nicotine, Flavoring    Devices: Refillable tank, Pre-filled pod   Substance Use Topics    Alcohol use: Never    Drug use: Never      A medically appropriate review of systems was performed with pertinent positives and negatives noted in the HPI, and all other systems negative.    Physical Exam   BP: 121/86  Pulse: 93  Temp: 98.3  F (36.8  C)  Resp: 18  Height: 162.6 cm (5' 4\")  Weight: 90.8 kg (200 lb 1.6 oz)  SpO2: 97 %  Physical Exam  Vitals and nursing note " reviewed.   Constitutional:       General: She is not in acute distress.     Appearance: Normal appearance. She is not ill-appearing, toxic-appearing or diaphoretic.   HENT:      Head: Normocephalic and atraumatic.      Mouth/Throat:      Mouth: Mucous membranes are moist.      Pharynx: Oropharynx is clear.   Eyes:      Extraocular Movements: Extraocular movements intact.      Pupils: Pupils are equal, round, and reactive to light.   Cardiovascular:      Rate and Rhythm: Normal rate and regular rhythm.   Pulmonary:      Effort: Pulmonary effort is normal. No respiratory distress.      Breath sounds: Normal breath sounds. No stridor. No wheezing, rhonchi or rales.   Abdominal:      General: Abdomen is flat.      Palpations: Abdomen is soft.      Tenderness: There is no abdominal tenderness. There is no guarding or rebound.   Musculoskeletal:      Cervical back: Normal range of motion and neck supple. No rigidity or tenderness.   Lymphadenopathy:      Cervical: No cervical adenopathy.   Skin:     General: Skin is warm and dry.      Capillary Refill: Capillary refill takes less than 2 seconds.   Neurological:      General: No focal deficit present.      Mental Status: She is alert and oriented to person, place, and time.   Psychiatric:         Attention and Perception: Attention normal. She is attentive.         Mood and Affect: Mood normal.         Speech: Speech normal.         Behavior: Behavior normal.         Thought Content: Thought content is not paranoid or delusional. Thought content includes suicidal ideation. Thought content does not include homicidal ideation. Thought content does not include homicidal or suicidal plan.         Judgment: Judgment normal.       ED Course, Procedures, & Data      Procedures          Results for orders placed or performed during the hospital encounter of 06/02/25   HCG qualitative urine     Status: Normal   Result Value Ref Range    hCG Urine Qualitative Negative Negative    Urine Drug Screen Panel     Status: Normal   Result Value Ref Range    Amphetamines Urine Screen Negative Screen Negative    Barbituates Urine Screen Negative Screen Negative    Benzodiazepine Urine Screen Negative Screen Negative    Cannabinoids Urine Screen Negative Screen Negative    Cocaine Urine Screen Negative Screen Negative    Fentanyl Qual Urine Screen Negative Screen Negative    Opiates Urine Screen Negative Screen Negative    PCP Urine Screen Negative Screen Negative   Urine Drug Screen     Status: Normal    Narrative    The following orders were created for panel order Urine Drug Screen.  Procedure                               Abnormality         Status                     ---------                               -----------         ------                     Urine Drug Screen Panel[[id number]]     Normal              Final result                 Please view results for these tests on the individual orders.     Medications   hydrOXYzine HCl (ATARAX) tablet 25 mg (has no administration in time range)   hydrOXYzine HCl (ATARAX) tablet 25 mg (has no administration in time range)   melatonin tablet 3 mg (has no administration in time range)   lamoTRIgine (LaMICtal) tablet 100 mg (has no administration in time range)   lamoTRIgine (LaMICtal) tablet 200 mg (has no administration in time range)   lurasidone (LATUDA) tablet 80 mg (has no administration in time range)   sertraline (ZOLOFT) tablet 100 mg (has no administration in time range)     Labs Ordered and Resulted from Time of ED Arrival to Time of ED Departure   HCG QUALITATIVE URINE - Normal       Result Value    hCG Urine Qualitative Negative     URINE DRUG SCREEN PANEL - Normal    Amphetamines Urine Screen Negative      Barbituates Urine Screen Negative      Benzodiazepine Urine Screen Negative      Cannabinoids Urine Screen Negative      Cocaine Urine Screen Negative      Fentanyl Qual Urine Screen Negative      Opiates Urine Screen Negative      PCP  Urine Screen Negative       No orders to display          Critical care was not performed.     Medical Decision Making  The patient's presentation was of high complexity (an acute health issue posing potential threat to life or bodily function).    The patient's evaluation involved:  review of external note(s) from 3+ sources (clinical notes including outpatient psychiatry)  ordering and/or review of 2 test(s) in this encounter (see separate area of note for details)  discussion of management or test interpretation with another health professional (DEC )    The patient's management necessitated high risk (a decision regarding hospitalization).    Assessment & Plan    Jeanine Hernandez is a 25 year old female with past medical history significant for bipolar I, MDD, insomnia, PTSD, OCD who presents to the emergency department for evaluation of suicidal ideation and hallucinations. She reports increasing suicidal ideation and both auditory command and visual hallucinations over the last month since increasing her dose of Latuda.  Most recently the voices have been suggesting that she slit her wrists in front of her 14-month-old son, which was distressing to her, prompting her to come to the ED today. She denies any active suicidal ideation although feels that she is getting overwhelmed with the voices. On evaluation, vitals are in normal ranges, normotensive, heart rate 93, afebrile, normoxic. On exam, she is awake, alert, sitting up on the edge of bed, answering questions appropriately. Calm, cooperative, normal mood and affect. Breathing comfortably on room air, no respiratory distress, no adventitious lung sounds, normal heart rate, regular rhythm. Abdomen is soft, nontender, no rebound, no guarding. Exam is otherwise benign.  UDS and urine hCG negative.  DEC assessment completed with recommendation for inpatient psychiatric admission which I feel is reasonable. Patient is voluntary for admission at this  time, not on a hold but would reassess if wanting to leave. Patient will board in the emergency department awaiting availability of inpatient bed. She has remained stable while in the ED.    I have reviewed the nursing notes. I have reviewed the findings, diagnosis, plan and need for follow up with the patient.    New Prescriptions    No medications on file       Final diagnoses:   Auditory hallucinations   Visual hallucinations   Psychosis, unspecified psychosis type (H)       Kathi Carrera PA-C   [facility] EMERGENCY DEPARTMENT  6/2/2025     Kathi Carrera PA-C  06/02/25 4562    --    ED Attending Physician Attestation    I Lars Hoffman MD, cared for this patient with the Advanced Practice Provider (KHARI). I personally provided a substantive portion of the care for this patient, including approving the care plan for the number and complexity of problems addressed and taking responsibility related to the risk of complications and/or morbidity or mortality of patient management. Please see the KHARI's documentation for full details.    Patient is medically clear.  Her last seizure was in 2021.      Lars Hoffman MD  Emergency Medicine         Lars Hoffman MD  06/03/25 0159

## 2025-06-03 ENCOUNTER — TELEPHONE (OUTPATIENT)
Dept: BEHAVIORAL HEALTH | Facility: CLINIC | Age: 25
End: 2025-06-03
Payer: COMMERCIAL

## 2025-06-03 VITALS
DIASTOLIC BLOOD PRESSURE: 74 MMHG | TEMPERATURE: 98.3 F | OXYGEN SATURATION: 99 % | BODY MASS INDEX: 34.16 KG/M2 | WEIGHT: 200.1 LBS | HEIGHT: 64 IN | RESPIRATION RATE: 18 BRPM | SYSTOLIC BLOOD PRESSURE: 112 MMHG | HEART RATE: 87 BPM

## 2025-06-03 LAB
ALBUMIN SERPL BCG-MCNC: 4.2 G/DL (ref 3.5–5.2)
ALP SERPL-CCNC: 99 U/L (ref 40–150)
ALT SERPL W P-5'-P-CCNC: 9 U/L (ref 0–50)
ANION GAP SERPL CALCULATED.3IONS-SCNC: 10 MMOL/L (ref 7–15)
AST SERPL W P-5'-P-CCNC: 14 U/L (ref 0–45)
BASOPHILS # BLD AUTO: 0.1 10E3/UL (ref 0–0.2)
BASOPHILS NFR BLD AUTO: 1 %
BILIRUB SERPL-MCNC: 0.2 MG/DL
BUN SERPL-MCNC: 16.4 MG/DL (ref 6–20)
CALCIUM SERPL-MCNC: 9.3 MG/DL (ref 8.8–10.4)
CHLORIDE SERPL-SCNC: 105 MMOL/L (ref 98–107)
CREAT SERPL-MCNC: 0.85 MG/DL (ref 0.51–0.95)
EGFRCR SERPLBLD CKD-EPI 2021: >90 ML/MIN/1.73M2
EOSINOPHIL # BLD AUTO: 0.4 10E3/UL (ref 0–0.7)
EOSINOPHIL NFR BLD AUTO: 3 %
ERYTHROCYTE [DISTWIDTH] IN BLOOD BY AUTOMATED COUNT: 12.4 % (ref 10–15)
GLUCOSE SERPL-MCNC: 115 MG/DL (ref 70–99)
HCO3 SERPL-SCNC: 24 MMOL/L (ref 22–29)
HCT VFR BLD AUTO: 40 % (ref 35–47)
HGB BLD-MCNC: 12.9 G/DL (ref 11.7–15.7)
HOLD SPECIMEN: NORMAL
IMM GRANULOCYTES # BLD: 0.1 10E3/UL
IMM GRANULOCYTES NFR BLD: 1 %
LYMPHOCYTES # BLD AUTO: 4.1 10E3/UL (ref 0.8–5.3)
LYMPHOCYTES NFR BLD AUTO: 28 %
MCH RBC QN AUTO: 27.2 PG (ref 26.5–33)
MCHC RBC AUTO-ENTMCNC: 32.3 G/DL (ref 31.5–36.5)
MCV RBC AUTO: 84 FL (ref 78–100)
MONOCYTES # BLD AUTO: 1.1 10E3/UL (ref 0–1.3)
MONOCYTES NFR BLD AUTO: 8 %
NEUTROPHILS # BLD AUTO: 8.9 10E3/UL (ref 1.6–8.3)
NEUTROPHILS NFR BLD AUTO: 61 %
NRBC # BLD AUTO: 0 10E3/UL
NRBC BLD AUTO-RTO: 0 /100
PLATELET # BLD AUTO: 299 10E3/UL (ref 150–450)
POTASSIUM SERPL-SCNC: 4.1 MMOL/L (ref 3.4–5.3)
PROT SERPL-MCNC: 6.9 G/DL (ref 6.4–8.3)
RBC # BLD AUTO: 4.74 10E6/UL (ref 3.8–5.2)
SODIUM SERPL-SCNC: 139 MMOL/L (ref 135–145)
WBC # BLD AUTO: 14.7 10E3/UL (ref 4–11)

## 2025-06-03 PROCEDURE — 85025 COMPLETE CBC W/AUTO DIFF WBC: CPT | Performed by: EMERGENCY MEDICINE

## 2025-06-03 PROCEDURE — 36415 COLL VENOUS BLD VENIPUNCTURE: CPT | Performed by: EMERGENCY MEDICINE

## 2025-06-03 PROCEDURE — 250N000013 HC RX MED GY IP 250 OP 250 PS 637: Performed by: STUDENT IN AN ORGANIZED HEALTH CARE EDUCATION/TRAINING PROGRAM

## 2025-06-03 PROCEDURE — 82374 ASSAY BLOOD CARBON DIOXIDE: CPT | Performed by: EMERGENCY MEDICINE

## 2025-06-03 RX ADMIN — LAMOTRIGINE 100 MG: 100 TABLET ORAL at 08:07

## 2025-06-03 ASSESSMENT — ACTIVITIES OF DAILY LIVING (ADL)
ADLS_ACUITY_SCORE: 44

## 2025-06-03 NOTE — PHARMACY-ADMISSION MEDICATION HISTORY
Pharmacist Admission Medication History    Admission medication history is complete. The information provided in this note is only as accurate as the sources available at the time of the update.    Medication reconciliation/reorder completed by provider prior to medication history? No    Information Source(s): Patient and Patient's pharmacy via in-person    Pertinent Information: Discussed medication history with patient and updated list accordingly.    Changes made to PTA medication list:  Added: None  Deleted: chlorhexidine, medroxyprogesterone   Changed: Sertraline from 50mg TO 100mg      Allergies reviewed with patient and updates made in EHR: yes    Medication History Completed By: LOAN LEY [facility] 6/2/2025 9:12 PM    Prior to Admission medications    Medication Sig Last Dose Taking? Auth Provider Long Term End Date   folic acid (FOLVITE) 1 MG tablet Take 1 tablet (1 mg) by mouth daily. 6/1/2025 Yes Minnie Quinones MD  8/5/25   lamoTRIgine (LAMICTAL) 100 MG tablet Take 1 tablet (100 mg) by mouth daily. 6/1/2025 Yes Minnie Quinones MD Yes 8/5/25   lamoTRIgine (LAMICTAL) 200 MG tablet Take 1 tablet (200 mg) by mouth daily.  Patient taking differently: Take 200 mg by mouth at bedtime. 6/1/2025 Yes Minnie Quinones MD Yes    lurasidone (LATUDA) 80 MG TABS tablet Take 1 tablet (80 mg) by mouth daily (with dinner). 6/1/2025 Yes Lana Belvins APRN CNP Yes    sertraline (ZOLOFT) 50 MG tablet Take 1 tablet (50 mg) by mouth daily.  Patient taking differently: Take 100 mg by mouth daily. 6/1/2025 Yes Lana Blevins APRN CNP Yes

## 2025-06-03 NOTE — ED NOTES
IP MH Referral Acuity Rating Score (RARS)    LMHP complete at referral to IP MH, with DEC; and, daily while awaiting IP MH placement. Call score to PPS.  CRITERIA SCORING   New 70 HH and Involuntary for IP MH (not adolescent) 0/3   Boarding over 24 hours 0/1   Vulnerable adult at least 55+ with multiple co morbidities; or, Patient age 11 or under 0/1   Suicide ideation without relief of precipitating factors 1/1   Current plan for suicide 1/1   Current plan for homicide 0/1   Imminent risk or actual attempt to seriously harm another without relief of factors precipitating the attempt 0/1   Severe dysfunction in daily living (ex: complete neglect for self care, extreme disruption in vegetative function, extreme deterioration in social interactions) 1/1   Recent (last 2 weeks) or current physical aggression in the ED 0/1   Restraints or seclusion episode in ED 0/1   Verbal aggression, agitation, yelling, etc., while in the ED 0/1   Active psychosis with psychomotor agitation or catatonia 0/1   Need for constant or near constant redirection (from leaving, from others, etc).  0/1   Intrusive or disruptive behaviors 0/1   TOTAL 3      Maren RBOWN

## 2025-06-03 NOTE — TELEPHONE ENCOUNTER
S: [facility]  DEC  Maren calling at 8:31 PM about 25 year old/female presenting with concerns of psychosis.      B: Pt arrived via Family. Presenting problem, stressors: Pt comes in for worsening auditory hallucinations, paranoia, and sleep deprive of sleep.  She has a 14 months old baby and a toddler.  She hears 3 distinct voices from 3 different people, commanding voices to cut her wrist and have her son watch her bleed out.  She herself does not want to end her life, but the voices are becoming more intrusive.     Pt affect in ED: calm and cooperative.   Pt Dx: Bipolar Disorder 1 disorder with rapid cycling and CONNER.  Previous IPMH hx? No  Pt denies SI   Hx of suicide attempt? No  Pt denies SIB  Pt denies HI   Pt endorses auditory hallucinations  and endorses visual hallucination . Visual of figures and of her house, waves of things, becoming paranoid that there are people in her house.   Pt RARS Score: 3    Hx of aggression/violence, sexual offenses, legal concerns, Epic care plan? describe: no  Current concerns for aggression this visit? No  Does pt have a history of Civil Commitment? No  Is Pt their own guardian? Yes    Pt is prescribed medication. Is patient medication compliant? Yes  Pt endorses OP services: Psychiatrist  CD concerns: None  Acute or chronic medical concerns: She has non-epileptic seizures.  Does Pt present with specific needs, assistive devices, or exclusionary criteria? None      Pt is ambulatory  Pt is able to perform ADLs independently      A: Pt to be reviewed for [facility] admission. Pt is Voluntary  Preferred placement: Anywhere that would take her insurance, Medical Assistance.    COVID Symptoms: No  If yes, COVID test required   Utox: Negative   CMP: Not ordered, intake requested lab  CBC: Not ordered, intake requested lab  HCG: Negative    R: Patient cleared and ready for behavioral bed placement: Yes  Pt placed on [facility] worklist? Yes    Does Patient need a Transfer Center  request created? No, Pt is located within [facility], [facility], or [facility]     R: [place]  Access Inpatient Bed Call Log 6/2/2025 @ 9:40PM:   Intake has called facilities that have not updated their bed status within the last 12 hours.    [facility] is posting 0 beds.                  [facility] is posting 4 beds. [phone]. Has to be roommate appropriate.   [facility] is posting 0 beds. Negative covid required.  [facility] is posting 0 beds. Neg covid. No high school/[employer]. [phone]. Per Jennifer @ 3:23 PM, they are full  [facility] is posting 0 beds. [phone].  [facility] is posting 0 beds. [phone].  [facility] is posting 6 beds. (Ages 18-35) Negative covid, no aggression, physical or sexual assault, violence hx or drug abuse, or psychosis.  [phone]. Per Cora @ 3:24 PM, they have 6 YA beds.  9:40pm-Per Samira, she can review.  Pending HPI and will fax clinicals to her. 10:51pm- faxed clinicals for review.  Pending call back.   [facility] is posting 0 beds.    [facility] ([facility] is posting 0 beds. [phone].      [facility] is posting 3 beds. LOW acuity ONLY. Mixed unit 12+. Negative covid- [phone].  [facility] has 0 beds posted. No aggression. Negative Covid. Low acuity.  [facility] ([place] is posting 0 beds. Low acuity only. Neg covid.  [phone].  [facility] is posting 0 beds. Low acuity. No current aggression.  [facility] is posting 0 beds. Negative covid. [phone] ext [phone].  [facility] ([place]) is posting 0 beds. Negative covid.  [phone].  [facility] is posting 0 beds. Low acuity. 72 HH hold preferred. Negative covid required. [phone].  [facility] ([place]) is posting 0 beds. Low acuity only. Neg covid.  [phone].      [facility] [place] is posting 2 beds.  Negative covid required.   Vol only, No history of  aggression, violence, or assault. No sexual offenders. No 72  holds. [phone].  [facility] is posting 4 beds. Negative covid required.  (Must have the cognitive ability to do programming. No aggressive or violent behavior or recent HX in the last 2 yrs. MH must be primary.) Always low acuity. [phone].  [facility] has 3 beds posted. Negative covid required.  Low acuity only. Violence and aggression capped. [phone].  [facility] is posting 1 bed. Low acuity, Negative covid required. [phone].  [facility] posting 0 beds. Negative covid required.  [phone]. Per Savanna @ 3:26 PM, they are capped  [facility] [place] is posting 4 beds. Negative covid. LOW acuity. (No lines, drains, or tubes, oxygen, CPAP, IV, etc.) Must Have a Ride Home. [phone].  [facility] is posting 5 beds. Negative covid. (No. lines, drains, or tubes, oxygen, CPAP, IV, etc.) [phone]. Per Phyliss @ 3:28 PM, high acuity beds available  [facility] is posting 2 beds. No covid test required. OUT OF STATE. [phone]; Per  Intake policy, patients must be voluntary for out of state placement    Pt remains on the work list pending appropriate bed availability.          11:35pm- Pending call back from [facility]. Currently in review.

## 2025-06-03 NOTE — PLAN OF CARE
"Jeanine Hernandez  June 2, 2025  Plan of Care Hand-off Note     Patient Recommended Care Path: inpatient mental health    Clinical Substantiation:  Pt presents to the ED, brought in by family members, for concerns of worsening auditory and visual hallucinations that are commanding her to end her life via cutting her wrists or overdosing on medications. Pt has been experiencing increased paranoia, agitation, irritability, and depression due to symptoms. Pt denies intent to end her life, though she has frequent thoughts of suicide due to the command hallucinations that are becoming increasingly difficult to reality test. Pt notes increased forgetfulness and difficulty orientating herself to time and space; She describes \"blacking out\" for thirty minutes today prior to coming to the ED; Her parents told her she was screaming incoherently, which pt has no recollection of. Pt has hx of Bipolar 1 disorder with rapid cycling, CONNER, and past post partum depression. Pt has a 14 month old child who does not sleep through the night, contributing to sleep deprivation. Pt denies HI, LYDIA, and SIB.        It is the recommendation of this clinician that pt admit to IP MH for safety and stabilization due to the risk factors noted above. Pt is unable to engage in safety planning to mitigate risk level in a non-secure setting. Lower levels of care would not be sufficient in managing the level of risk pt is presenting with. Due to this IP is the least restrictive option of care for pt. Pt should remain in IP until deemed safe to return to the community and engage in OP MH supports. Pt will need assistance establishing OP MH services prior to discharge.    Goals for crisis stabilization:  Safety and stabilization    Next steps for Care Team:  Pt awaiting IPMH placement; EC will follow    Treatment Objectives Addressed:  rapport building, orienting the patient to therapy, processing feelings, assessing safety, identifying additional " supports    Therapeutic Interventions:  Engaged in guided discovery, explored patient's perspectives and helped expand them through socratic dialogue., Provided positive reinforcement for progress towards goals, gains in knowledge, and application of skills previously taught.    Has a specific means been identified for suicidal.homicide actions: Yes  If yes, describe: AH commanding her to overdose on medications or slit her wrists  Explain action steps toward mitigation: IPMH - risk is currently mitigated  Document completion of mitigation action: IPMH - risk is currently mitigated  The follow up action still needed prior to discharge: Complete safety planning with pt    Patient coping skills attempted to reduce the crisis:  Pt shows insight and help seeking behavior by asking family to bring her to ED.       Imminent risk of harm: Suicidal Behavior  Severe psychiatric, behavioral or other comorbid conditions are appropriate for management at inpatient mental health as indicated by at least one of the following: Psychiatric Symptoms, Symptoms of impact to function  Severe dysfunction in daily living is present as indicated by at least one of the following: Extreme deterioration in social interactions, Other evidence of severe dysfunction  Situation and expectations are appropriate for inpatient care: Patient management/treatment at lower level of care is not feasible or is inappropriate  Inpatient mental health services are necessary to meet patient needs and at least one of the following: Specific condition related to admission diagnosis is present and judged likely to deteriorate in absence of treatment at proposed level of care      Collateral contact information:   LVM for pt's , Sulaiman Oro, at [phone]     Legal Status: Voluntary/Patient has signed consent for treatment                                                                                                                                  Reviewed court records: yes     Psychiatry Consult: Not ordered yet    STEPHANIE Loco
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
## 2025-06-03 NOTE — TELEPHONE ENCOUNTER
12:39am - Received a call from [facility]. To continue their review for possible placement, they are requesting a CMP and a CBC along with a edical clearance note and a date or history of when last seizure was for pt. If she is able to remember. Writer will call the ED and request these.     12:46am - Notified ED of requested info to continue review.     2:30am - Sent resulted labs to [facility] via RightFax. Still waiting on medical clearance note and last seizure history from pt.     4:10am - Faxed over medical clearance and last seizure date to [facility]. Awaiting update.    4:38am - Pt has been accepted to [facility] for [facility]. N2N# [phone] and pt can arrive after 8am.     4:40am - Notified ED of pt placement.     R: Pt placement to [facility] - [facility]/Dr. Delgado

## 2025-06-04 ENCOUNTER — TELEPHONE (OUTPATIENT)
Dept: NEUROLOGY | Facility: CLINIC | Age: 25
End: 2025-06-04

## 2025-06-04 NOTE — TELEPHONE ENCOUNTER
Left Voicemail (1st Attempt) and Sent Mychart (1st Attempt) for the patient to call back and schedule the following:    Appointment type: Return Seizure  Provider: Stacie  Return date: around 11/7/25  Specialty phone number: [phone]  Additional appointment(s) needed: EEG  Additonal Notes: 6 month follow up    Radha Lester on 6/4/2025 at 2:12 PM

## 2025-06-16 ASSESSMENT — PATIENT HEALTH QUESTIONNAIRE - PHQ9
SUM OF ALL RESPONSES TO PHQ QUESTIONS 1-9: 5
SUM OF ALL RESPONSES TO PHQ QUESTIONS 1-9: 5
10. IF YOU CHECKED OFF ANY PROBLEMS, HOW DIFFICULT HAVE THESE PROBLEMS MADE IT FOR YOU TO DO YOUR WORK, TAKE CARE OF THINGS AT HOME, OR GET ALONG WITH OTHER PEOPLE: SOMEWHAT DIFFICULT

## 2025-06-17 ENCOUNTER — VIRTUAL VISIT (OUTPATIENT)
Dept: FAMILY MEDICINE | Facility: CLINIC | Age: 25
End: 2025-06-17
Payer: COMMERCIAL

## 2025-06-17 DIAGNOSIS — F43.10 PTSD (POST-TRAUMATIC STRESS DISORDER): ICD-10-CM

## 2025-06-17 DIAGNOSIS — R44.0 AUDITORY HALLUCINATIONS: Primary | ICD-10-CM

## 2025-06-17 DIAGNOSIS — F31.9 BIPOLAR I DISORDER (H): ICD-10-CM

## 2025-06-17 DIAGNOSIS — F33.1 MODERATE EPISODE OF RECURRENT MAJOR DEPRESSIVE DISORDER (H): ICD-10-CM

## 2025-06-17 DIAGNOSIS — F41.9 ANXIETY: ICD-10-CM

## 2025-06-17 DIAGNOSIS — R56.9 SEIZURES (H): ICD-10-CM

## 2025-06-17 PROCEDURE — 98005 SYNCH AUDIO-VIDEO EST LOW 20: CPT | Performed by: FAMILY MEDICINE

## 2025-06-17 RX ORDER — LURASIDONE HYDROCHLORIDE 120 MG/1
120 TABLET, FILM COATED ORAL AT BEDTIME
COMMUNITY
Start: 2025-06-03

## 2025-06-17 RX ORDER — HYDROXYZINE HYDROCHLORIDE 10 MG/1
10 TABLET, FILM COATED ORAL EVERY 6 HOURS PRN
COMMUNITY
Start: 2025-06-03

## 2025-06-17 RX ORDER — BUSPIRONE HYDROCHLORIDE 10 MG/1
10 TABLET ORAL 2 TIMES DAILY
COMMUNITY
Start: 2025-06-03

## 2025-06-17 RX ORDER — LAMOTRIGINE 300 MG/1
300 TABLET, EXTENDED RELEASE ORAL EVERY MORNING
COMMUNITY
Start: 2025-06-03

## 2025-06-17 RX ORDER — CETIRIZINE HYDROCHLORIDE 10 MG/1
10 TABLET ORAL DAILY
COMMUNITY

## 2025-06-17 NOTE — PROGRESS NOTES
"Christophe is a 25 year old who is being evaluated via a billable video visit.    How would you like to obtain your AVS? MyChart  If the video visit is dropped, the invitation should be resent by: Text to cell phone: [phone]  Will anyone else be joining your video visit? No      Assessment & Plan     Auditory hallucinations  Reviewed ED visit 6/3/25.   Doing better now. No longer having hallucinations.    Bipolar I disorder (H)  Updated her meds.    Moderate episode of recurrent major depressive disorder (H)  Updated meds.    Anxiety  Updated meds    PTSD (post-traumatic stress disorder)  See above    Seizures (H)  Stable.             BMI  Estimated body mass index is 34.35 kg/m  as calculated from the following:    Height as of 6/2/25: 1.626 m (5' 4\").    Weight as of 6/2/25: 90.8 kg (200 lb 1.6 oz).             Subjective   Christophe is a 25 year old, presenting for the following health issues:  Medication Update (Updating medication and dosing )        6/17/2025    10:31 AM   Additional Questions   Roomed by Antonina galarza CMA     Video Start Time: 10:50 AM    HPI      Chief Complaint   Patient presents with    Medication Update     Updating medication and dosing      H/o bipolar, MDD, PTSD, OCD.   Was at [facility] ED for suicidal ideation and hallucinations she was then transferred to [facility] 6/3/25 through 6/8/25 for inpatient treatment and had meds changed and also had individual counseling and DBT and medication case management.   She's feeling great except feeling fidgety so she's having appointment with medication management hoping they would increase her buspar.       Review of Systems  Constitutional, HEENT, cardiovascular, pulmonary, gi and gu systems are negative, except as otherwise noted.      Objective           Vitals:  No vitals were obtained today due to virtual visit.    Physical Exam   GENERAL: alert and no distress  EYES: Eyes grossly normal to inspection.  No discharge or erythema, or obvious " scleral/conjunctival abnormalities.  RESP: No audible wheeze, cough, or visible cyanosis.    SKIN: Visible skin clear. No significant rash, abnormal pigmentation or lesions.  NEURO: Cranial nerves grossly intact.  Mentation and speech appropriate for age.  PSYCH: Appropriate affect, tone, and pace of words    Admission on 06/02/2025, Discharged on 06/03/2025   Component Date Value Ref Range Status    hCG Urine Qualitative 06/02/2025 Negative  Negative Final    This test is for screening purposes.  Results should be interpreted along with the clinical picture.  Confirmation testing is available if warranted by ordering [id number], HCG Quantitative Pregnancy.    Amphetamines Urine 06/02/2025 Screen Negative  Screen Negative Final    Cutoff for a negative amphetamine is less than 500 ng/mL.    Barbituates Urine 06/02/2025 Screen Negative  Screen Negative Final    Cutoff for a negative barbiturate is less than 200 ng/mL.    Benzodiazepine Urine 06/02/2025 Screen Negative  Screen Negative Final    Cutoff for a negative benzodiazepine is less than 100 ng/mL.    Cannabinoids Urine 06/02/2025 Screen Negative  Screen Negative Final    Cutoff for a negative cannabinoid is less than 50 ng/mL.    Cocaine Urine 06/02/2025 Screen Negative  Screen Negative Final    Cutoff for a negative cocaine is less than 300 ng/mL.    Fentanyl Qual Urine 06/02/2025 Screen Negative  Screen Negative Final    Cutoff for negative fentanyl is less than 5 ng/mL.    Opiates Urine 06/02/2025 Screen Negative  Screen Negative Final    Cutoff for a negative opiate is less than 300 ng/mL.    PCP Urine 06/02/2025 Screen Negative  Screen Negative Final    Cutoff for a negative PCP is less than 25 ng/mL.    Sodium 06/03/2025 139  135 - 145 mmol/L Final    Potassium 06/03/2025 4.1  3.4 - 5.3 mmol/L Final    Carbon Dioxide (CO2) 06/03/2025 24  22 - 29 mmol/L Final    Anion Gap 06/03/2025 10  7 - 15 mmol/L Final    Urea Nitrogen 06/03/2025 16.4  6.0 - 20.0 mg/dL  Final    Creatinine 06/03/2025 0.85  0.51 - 0.95 mg/dL Final    GFR Estimate 06/03/2025 >90  >60 mL/min/1.73m2 Final    eGFR calculated using 2021 CKD-EPI equation.    Calcium 06/03/2025 9.3  8.8 - 10.4 mg/dL Final    Chloride 06/03/2025 105  98 - 107 mmol/L Final    Glucose 06/03/2025 115 (H)  70 - 99 mg/dL Final    Alkaline Phosphatase 06/03/2025 99  40 - 150 U/L Final    AST 06/03/2025 14  0 - 45 U/L Final    ALT 06/03/2025 9  0 - 50 U/L Final    Protein Total 06/03/2025 6.9  6.4 - 8.3 g/dL Final    Albumin 06/03/2025 4.2  3.5 - 5.2 g/dL Final    Bilirubin Total 06/03/2025 0.2  <=1.2 mg/dL Final    WBC Count 06/03/2025 14.7 (H)  4.0 - 11.0 10e3/uL Final    RBC Count 06/03/2025 4.74  3.80 - 5.20 10e6/uL Final    Hemoglobin 06/03/2025 12.9  11.7 - 15.7 g/dL Final    Hematocrit 06/03/2025 40.0  35.0 - 47.0 % Final    MCV 06/03/2025 84  78 - 100 fL Final    MCH 06/03/2025 27.2  26.5 - 33.0 pg Final    MCHC 06/03/2025 32.3  31.5 - 36.5 g/dL Final    RDW 06/03/2025 12.4  10.0 - 15.0 % Final    Platelet Count 06/03/2025 299  150 - 450 10e3/uL Final    % Neutrophils 06/03/2025 61  % Final    % Lymphocytes 06/03/2025 28  % Final    % Monocytes 06/03/2025 8  % Final    % Eosinophils 06/03/2025 3  % Final    % Basophils 06/03/2025 1  % Final    % Immature Granulocytes 06/03/2025 1  % Final    NRBCs per 100 WBC 06/03/2025 0  <1 /100 Final    Absolute Neutrophils 06/03/2025 8.9 (H)  1.6 - 8.3 10e3/uL Final    Absolute Lymphocytes 06/03/2025 4.1  0.8 - 5.3 10e3/uL Final    Absolute Monocytes 06/03/2025 1.1  0.0 - 1.3 10e3/uL Final    Absolute Eosinophils 06/03/2025 0.4  0.0 - 0.7 10e3/uL Final    Absolute Basophils 06/03/2025 0.1  0.0 - 0.2 10e3/uL Final    Absolute Immature Granulocytes 06/03/2025 0.1  <=0.4 10e3/uL Final    Absolute NRBCs 06/03/2025 0.0  10e3/uL Final    Hold Specimen 06/03/2025 [facility]   Final         Video-Visit Details    Type of service:  Video Visit   Video End Time:11:00 AM  Originating Location (pt.  Location): Home    Distant Location (provider location):  Off-site  Platform used for Video Visit: Sukhdeep  Signed Electronically by: Juany Kelsey MD    Answers submitted by the patient for this visit:  Patient Health Questionnaire (Submitted on 6/16/2025)  If you checked off any problems, how difficult have these problems made it for you to do your work, take care of things at home, or get along with other people?: Somewhat difficult  PHQ9 TOTAL SCORE: 5

## 2025-07-10 ENCOUNTER — OFFICE VISIT (OUTPATIENT)
Dept: OBGYN | Facility: CLINIC | Age: 25
End: 2025-07-10
Payer: COMMERCIAL

## 2025-07-10 VITALS
BODY MASS INDEX: 35.17 KG/M2 | TEMPERATURE: 99.5 F | WEIGHT: 206 LBS | RESPIRATION RATE: 18 BRPM | SYSTOLIC BLOOD PRESSURE: 131 MMHG | HEART RATE: 87 BPM | HEIGHT: 64 IN | DIASTOLIC BLOOD PRESSURE: 74 MMHG

## 2025-07-10 DIAGNOSIS — Z30.017 ENCOUNTER FOR INITIAL PRESCRIPTION OF IMPLANTABLE SUBDERMAL CONTRACEPTIVE: Primary | ICD-10-CM

## 2025-07-10 DIAGNOSIS — Z97.5 NEXPLANON IN PLACE: ICD-10-CM

## 2025-07-10 DIAGNOSIS — Z30.017 INSERTION OF IMPLANTABLE SUBDERMAL CONTRACEPTIVE: ICD-10-CM

## 2025-07-10 NOTE — PROGRESS NOTES
"Nexplanon Insertion:    Is a pregnancy test required: Yes.  Was it positive or negative?  Negative  Was a consent obtained?  Yes    Subjective: Jeanine Hernandez is a 25 year old  presents for Nexplanon.    Patient has been given the opportunity to ask questions about all forms of birth control, including all options appropriate for Jeanine Hernandez. Discussed that no method of birth control, except abstinence is 100% effective against pregnancy or sexually transmitted infection.     Jeanine Hernandez understands she may have the Nexplanon removed at any time and it should be removed by a health care provider.    The entire insertion procedure was reviewed with the patient, including care after placement.    Patient's last menstrual period was 2025 (approximate). Has current contraception. No allergy to betadine or shellfish. Patient declines STD screening  HCG Qual Urine   Date Value Ref Range Status   2024 Negative Negative Final     hCG Urine Qualitative   Date Value Ref Range Status   2025 Negative Negative Final     Comment:     This test is for screening purposes.  Results should be interpreted along with the clinical picture.  Confirmation testing is available if warranted by ordering [id number], HCG Quantitative Pregnancy.         /74 (BP Location: Right arm, Patient Position: Chair, Cuff Size: Adult Regular)   Pulse 87   Temp 99.5  F (37.5  C) (Tympanic)   Resp 18   Ht 1.626 m (5' 4\")   Wt 93.4 kg (206 lb)   LMP 2025 (Approximate)   BMI 35.36 kg/m      PROCEDURE NOTE: -- Nexplanon Insertion    Reason for Insertion: contraception    Patient was placed supine with left arm exposed.  John was made 8-10 cm above medial epicondyle and a guiding john 4 cm above the first.  Arm was prepped with Betadine. Insertion point was anesthetized with 4 mL 1% lidocaine. After stretching the skin with thumb and index finger around the insertion site, skin punctured with the tip of " the needle inserted at 30 degrees and then lowered to horizontal position. The needle was then advanced to its full length. Applicator was then stabilized and slider was unlocked. Slider was pulled back until it stopped and then removed.    Correct placement of the implant was confirmed by palpation in the patient's arm and visualizing the purple top of the obturator.   Bandage and pressure dressing applied to insertion site.    EBL: minimal    Complications: none    ASSESSMENT:       ICD-10-CM    1. Contraceptive management  Z30.9 HCG qualitative urine POCT      2. Insertion of implantable subdermal contraceptive  Z30.017 etonogestrel (NEXPLANON) subdermal implant 68 mg     INSERTION NON-BIODEGRADABLE DRUG DELIVERY IMPLANT      3. Nexplanon in place  Z97.5             PLAN:    Given 's handouts, including when to have Nexplanon removed, list of danger s/sx, side effects and follow up recommended. Encouraged condom use for prevention of STD. Back up contraception advised for 7 days. Advised to call for any fever, for prolonged or severe pain or bleeding, abnormal vaginal dischage. She was advised to use pain medications (ibuprofen) as needed for mild to moderate pain.     Elisa Simmons PA-C

## 2025-07-10 NOTE — NURSING NOTE
"Initial /74 (BP Location: Right arm, Patient Position: Chair, Cuff Size: Adult Regular)   Pulse 87   Temp 99.5  F (37.5  C) (Tympanic)   Resp 18   Ht 1.626 m (5' 4\")   Wt 93.4 kg (206 lb)   LMP 07/03/2025 (Approximate)   BMI 35.36 kg/m   Estimated body mass index is 35.36 kg/m  as calculated from the following:    Height as of this encounter: 1.626 m (5' 4\").    Weight as of this encounter: 93.4 kg (206 lb). .    "

## 2025-07-14 ENCOUNTER — E-VISIT (OUTPATIENT)
Dept: URGENT CARE | Facility: CLINIC | Age: 25
End: 2025-07-14
Payer: COMMERCIAL

## 2025-07-14 DIAGNOSIS — B35.6 TINEA CRURIS: Primary | ICD-10-CM

## 2025-07-14 RX ORDER — CICLOPIROX OLAMINE 7.7 MG/G
CREAM TOPICAL 2 TIMES DAILY
Qty: 30 G | Refills: 1 | Status: SHIPPED | OUTPATIENT
Start: 2025-07-14

## 2025-07-14 NOTE — PATIENT INSTRUCTIONS
Jock Itch: Care Instructions  Overview  Jock itch is a fungal infection of the groin. The fungus that causes jock itch lives on your skin. It often affects athletes, but anyone can get jock itch. You may get an itchy rash on your inner thighs and rear end (buttocks). It spreads and starts to itch when you sweat or are in steamy showers or locker rooms.  Jock itch should clear up if you keep your skin dry after you clean it. You can treat jock itch at home with antifungal creams that you can buy without a prescription.  Follow-up care is a key part of your treatment and safety. Be sure to make and go to all appointments, and call your doctor if you are having problems. It's also a good idea to know your test results and keep a list of the medicines you take.  How can you care for yourself at home?  Wash the rash with soap and water. Pat the skin dry.  Put a cool compress on the skin to relieve itching.  Spread antifungal cream over and around the entire edge of the rash. Follow the directions on the package.  To avoid spreading it, wash your hands well after treating or touching the rash.  If your doctor prescribed medicine, take it exactly as directed. Call your doctor if you have any problems with your medicine.  Try not to scratch the rash.  Shower or bathe daily and after you exercise.  Keep your skin dry as much as possible to allow it to heal.  Until your jock itch is cured, wear loose-fitting clothing. Avoid tight underwear, pants, and tights.  Wash your exercise clothes after every wearing.  Do not share clothing, sports equipment, towels, or sheets to avoid spreading the fungi to other people.  How can you help prevent it?  You can help prevent jock itch by keeping your groin, inner thighs, and buttocks clean and dry. Dry off well after you exercise and shower.  Here are some other ways you can help prevent jock itch:  Wash your exercise clothes, underwear, socks, and towels after each use.  Wear shower  "shoes when you use public showers and locker rooms.  If you have athlete's foot, treat it. During treatment, put your socks on before you put on your underwear. This can prevent the spread of the fungus from your feet to your groin.  If you keep getting athlete's foot, dry your feet last when you towel off after a shower or bath. This can help prevent spreading infection from your feet to your groin.  When should you call for help?  Call your doctor now or seek immediate medical care if:  You have signs of infection, such as:  Increased pain, swelling, warmth, or redness.  Red streaks leading from the rash.  Pus draining from the rash.  A fever.  Watch closely for changes in your health, and be sure to contact your doctor if:  You do not get better as expected.  Where can you learn more?  Go to https://www.ShopIt.net/patiented  Enter G303 in the search box to learn more about \"Jock Itch: Care Instructions.\"  Current as of: December 4, 2024  Content Version: 14.5 2024-2025 [employer].   Care instructions adapted under license by your healthcare professional. If you have questions about a medical condition or this instruction, always ask your healthcare professional. [employer] disclaims any warranty or liability for your use of this information.    "

## 2025-07-15 ENCOUNTER — OFFICE VISIT (OUTPATIENT)
Dept: OBGYN | Facility: CLINIC | Age: 25
End: 2025-07-15
Payer: COMMERCIAL

## 2025-07-15 VITALS
SYSTOLIC BLOOD PRESSURE: 124 MMHG | HEIGHT: 64 IN | BODY MASS INDEX: 34.83 KG/M2 | HEART RATE: 101 BPM | TEMPERATURE: 97.8 F | DIASTOLIC BLOOD PRESSURE: 81 MMHG | WEIGHT: 204 LBS | RESPIRATION RATE: 18 BRPM

## 2025-07-15 DIAGNOSIS — Z01.419 WOMEN'S ANNUAL ROUTINE GYNECOLOGICAL EXAMINATION: Primary | ICD-10-CM

## 2025-07-15 DIAGNOSIS — N89.8 VAGINAL IRRITATION: ICD-10-CM

## 2025-07-15 PROCEDURE — 3079F DIAST BP 80-89 MM HG: CPT | Performed by: PHYSICIAN ASSISTANT

## 2025-07-15 PROCEDURE — 99459 PELVIC EXAMINATION: CPT | Performed by: PHYSICIAN ASSISTANT

## 2025-07-15 PROCEDURE — 87210 SMEAR WET MOUNT SALINE/INK: CPT | Performed by: PHYSICIAN ASSISTANT

## 2025-07-15 PROCEDURE — 99395 PREV VISIT EST AGE 18-39: CPT | Performed by: PHYSICIAN ASSISTANT

## 2025-07-15 PROCEDURE — 3074F SYST BP LT 130 MM HG: CPT | Performed by: PHYSICIAN ASSISTANT

## 2025-07-15 PROCEDURE — 99212 OFFICE O/P EST SF 10 MIN: CPT | Mod: 25 | Performed by: PHYSICIAN ASSISTANT

## 2025-07-15 ASSESSMENT — PATIENT HEALTH QUESTIONNAIRE - PHQ9: SUM OF ALL RESPONSES TO PHQ QUESTIONS 1-9: 9

## 2025-07-15 NOTE — PROGRESS NOTES
SUBJECTIVE:   CC: Jeanine Hernandez is an 25 year old woman who presents for preventive health visit.       Patient has been advised of split billing requirements and indicates understanding: Yes  Healthy Habits:  Do you get at least three servings of calcium containing foods daily (dairy, green leafy vegetables, etc.)? yes  Amount of exercise or daily activities, outside of work: 1 day(s) per week  Problems taking medications regularly No  Medication side effects: No  Have you had an eye exam in the past two years? no  Do you see a dentist twice per year? no  Do you have sleep apnea, excessive snoring or daytime drowsiness?no      Vaginal itching on and off will get wet prep. No concerns for STD. She had  recent testing done, which was negative.     Patient did virtual visit yesterday for rash that looks like ringworm in left groin region. Sometimes itches, flaky, and slight soreness with palpation. No tick bites. Patient given prescription for ringworm, but wanted to make sure there are no other concerns.     Nexplanon recently placed. Doing well.   Pap smear due in 2026    Today's PHQ-2 Score:       7/15/2025     1:14 PM 3/26/2025    11:47 AM   PHQ-2 ( 1999 [employer])   Q1: Little interest or pleasure in doing things 2 3   Q2: Feeling down, depressed or hopeless 2 3   PHQ-2 Score 4 6       Abuse: Current or Past(Physical, Sexual or Emotional)- No  Do you feel safe in your environment? Yes        Social History     Tobacco Use    Smoking status: Former     Current packs/day: 0.00     Types: Cigarettes, Other     Passive exposure: Current    Smokeless tobacco: Never   Substance Use Topics    Alcohol use: Never     If you drink alcohol do you typically have >3 drinks per day or >7 drinks per week? No                     Reviewed orders with patient.  Reviewed health maintenance and updated orders accordingly - Yes  Labs reviewed in EPIC  BP Readings from Last 3 Encounters:   07/15/25 124/81   07/10/25 131/74    25 112/74    Wt Readings from Last 3 Encounters:   07/15/25 92.5 kg (204 lb)   07/10/25 93.4 kg (206 lb)   25 90.8 kg (200 lb 1.6 oz)                  Patient Active Problem List   Diagnosis    Moderate episode of recurrent major depressive disorder (H)    Insomnia due to psychological stress    High risk medication use    Seizures (H)    Bipolar disorder with depression (H)    Tobacco use    Pre-eclampsia    Diet controlled gestational diabetes mellitus (GDM) in third trimester     (spontaneous vaginal delivery)    Gestational hypertension, third trimester    Preeclampsia, severe    Posttraumatic stress disorder    Plantar wart of right foot    OCD (obsessive compulsive disorder)    Nodule, subcutaneous    Multiple allergies    Mild intermittent asthma without complication    Inattention    Foot mass    Dissociative convulsions    Psychosis, unspecified psychosis type (H)    Nexplanon in place     Past Surgical History:   Procedure Laterality Date    FOOT SURGERY      right foot - ganglion cyst    MYRINGOTOMY, INSERT TUBE BILATERAL, COMBINED      ORTHOPEDIC SURGERY      planter warts foot    TONSILLECTOMY      TYMPANOPLASTY         Social History     Tobacco Use    Smoking status: Former     Current packs/day: 0.00     Types: Cigarettes, Other     Passive exposure: Current    Smokeless tobacco: Never   Substance Use Topics    Alcohol use: Never     Family History   Problem Relation Age of Onset    Diabetes Father         type II    Neurologic Disorder Father         TBI    Hyperlipidemia Father     Coronary Artery Disease Father         MI    Melanoma Maternal Grandmother     Other - See Comments Maternal Grandfather         murdered    Chronic Obstructive Pulmonary Disease Paternal Grandmother     Cerebrovascular Disease Paternal Grandmother     Heart Surgery Paternal Grandmother     Cerebrovascular Disease Paternal Grandfather         x3    Diabetes Paternal Grandfather         type II          Current Outpatient Medications   Medication Sig Dispense Refill    busPIRone (BUSPAR) 10 MG tablet Take 10 mg by mouth 2 times daily.      cetirizine (ZYRTEC) 10 MG tablet Take 10 mg by mouth daily.      ciclopirox (LOPROX) 0.77 % cream Apply topically 2 times daily. 30 g 1    etonogestrel (NEXPLANON) 68 MG IMPL 1 each (68 mg) by Subdermal route See Admin Instructions.      folic acid (FOLVITE) 1 MG tablet Take 1 tablet (1 mg) by mouth daily. 90 tablet 0    hydrOXYzine HCl (ATARAX) 10 MG tablet Take 10 mg by mouth every 6 hours as needed for anxiety.      LamoTRIgine (LAMICTAL XR) 300 MG 24 hr tablet Take 300 mg by mouth every morning.      lurasidone (LATUDA) 120 MG TABS tablet Take 120 mg by mouth at bedtime.      sertraline (ZOLOFT) 50 MG tablet Take 1 tablet (50 mg) by mouth daily. 30 tablet 1     Allergies   Allergen Reactions    Iodine Anaphylaxis    Ivp Dye [Contrast Dye] Anaphylaxis    Latex Hives     Recent Labs   Lab Test 06/03/25  0104 05/07/25  1647 02/24/25  1427 07/02/24  1414 03/16/24  1756 03/14/24  2215 08/14/23  1543 10/29/20  1126 05/15/20  1500 05/14/20  1555   A1C  --   --  5.6 5.7*  --  5.3  --   --   --   --    LDL  --   --  111*  --   --   --   --   --   --   --    HDL  --   --  44*  --   --   --   --   --   --   --    TRIG  --   --  132  --   --   --   --   --   --   --    ALT 9 8 10  --    < > 6   < >  --  8 11   CR 0.85 0.81 0.86  --    < > 0.72   < >  --  0.65 0.65   GFRESTIMATED >90 >90 >90  --    < > >90   < >  --  >90 >90   GFRESTBLACK  --   --   --   --   --   --   --   --  >90 >90   POTASSIUM 4.1 4.2 4.0  --    < > 3.8   < >  --   --  3.7   TSH  --   --   --  0.52  --   --   --  0.75  --   --     < > = values in this interval not displayed.        FHS-7:        No data to display              click delete button to remove this line now  Mammogram Screening - Patient under 40 years of age: Routine Mammogram Screening not recommended.   Pertinent mammograms are reviewed under the  imaging tab.    Pertinent mammograms are reviewed under the imaging tab.  History of abnormal Pap smear: no      2023     2:58 PM 12/10/2019     9:35 AM   PAP / HPV   PAP Negative for Intraepithelial Lesion or Malignancy (NILM)     PAP (Historical)  NIL      Reviewed and updated as needed this visit by clinical staff   Tobacco  Allergies  Meds   Med Hx  Surg Hx  Fam Hx  Soc Hx        Reviewed and updated as needed this visit by Provider                  Past Medical History:   Diagnosis Date    Anxiety     in high school    Asthma     as child    Depression     in high school    History of urinary tract infection     as a child    OCD (obsessive compulsive disorder)     in past    Postpartum depression       Past Surgical History:   Procedure Laterality Date    FOOT SURGERY      right foot - ganglion cyst    MYRINGOTOMY, INSERT TUBE BILATERAL, COMBINED      ORTHOPEDIC SURGERY      planter warts foot    TONSILLECTOMY      TYMPANOPLASTY       OB History    Para Term  AB Living   2 2 2 0 0 2   SAB IAB Ectopic Multiple Live Births   0 0 0 0 2      # Outcome Date GA Lbr Macho/2nd Weight Sex Type Anes PTL Lv   2 Term 24 38w0d 04:35 / 00:11 3.35 kg (7 lb 6.2 oz) M Vag-Spont None N TONI      Name: Luis CARSON Hienar      Apgar1: 8  Apgar5: 9   1 Term 20 39w1d / 02:27 3.459 kg (7 lb 10 oz) F Vag-Spont EPI N TONI      Complications: Intraamniotic Infection      Name: Jessie      Apgar1: 8  Apgar5: 9       ROS:  CONSTITUTIONAL: NEGATIVE for fever, chills, change in weight  INTEGUMENTARU/SKIN: NEGATIVE for worrisome rashes, moles or lesions  EYES: NEGATIVE for vision changes or irritation  ENT: NEGATIVE for ear, mouth and throat problems  RESP: NEGATIVE for significant cough or SOB  BREAST: NEGATIVE for masses, tenderness or discharge  CV: NEGATIVE for chest pain, palpitations or peripheral edema  GI: NEGATIVE for nausea, abdominal pain, heartburn, or change in bowel habits  : NEGATIVE for  "unusual urinary or vaginal symptoms. Periods are regular.  MUSCULOSKELETAL: NEGATIVE for significant arthralgias or myalgia  NEURO: NEGATIVE for weakness, dizziness or paresthesias  ENDOCRINE: NEGATIVE for temperature intolerance, skin/hair changes  HEME/ALLERGY/IMMUNE: NEGATIVE for bleeding problems  PSYCHIATRIC: NEGATIVE for changes in mood or affect    OBJECTIVE:   /81 (BP Location: Right arm, Patient Position: Chair, Cuff Size: Adult Regular)   Pulse 101   Temp 97.8  F (36.6  C) (Tympanic)   Resp 18   Ht 1.626 m (5' 4\")   Wt 92.5 kg (204 lb)   LMP 07/03/2025 (Approximate)   BMI 35.02 kg/m    EXAM:  GENERAL: alert and no distress  EYES: Eyes grossly normal to inspection, PERRL and conjunctivae and sclerae normal  NECK: no adenopathy, no asymmetry, masses, or scars  RESP: lungs clear to auscultation - no rales, rhonchi or wheezes  BREAST: normal without masses, tenderness or nipple discharge and no palpable axillary masses or adenopathy  CV: regular rate and rhythm, normal S1 S2, no S3 or S4, no murmur, click or rub, no peripheral edema  ABDOMEN: soft, nontender, no hepatosplenomegaly, no masses and bowel sounds normal   (female) w/bimanual: normal female external genitalia, normal urethral meatus, normal vaginal mucosa, and normal cervix/adnexa/uterus without masses or discharge. Whitish vaginal discharge. Wet prep obtained.   MS: no gross musculoskeletal defects noted, no edema  SKIN: 3 annular lesions noted to the left groin consistent with recent diagnosis of ring worm.   NEURO: Normal strength and tone, mentation intact and speech normal  PSYCH: mentation appears normal, affect normal/bright    Diagnostic Test Results:  Labs reviewed in Epic    ASSESSMENT/PLAN:       ICD-10-CM    1. Women's annual routine gynecological examination  Z01.419       2. Vaginal irritation  N89.8 Wet prep - Clinic Collect          Patient has been advised of split billing requirements and indicates understanding: " "Yes  COUNSELING:   Reviewed preventive health counseling, as reflected in patient instructions       Regular exercise       Healthy diet/nutrition    Estimated body mass index is 35.02 kg/m  as calculated from the following:    Height as of this encounter: 1.626 m (5' 4\").    Weight as of this encounter: 92.5 kg (204 lb).    Weight management plan: Discussed healthy diet and exercise guidelines    She reports that she has quit smoking. Her smoking use included cigarettes and other. She has been exposed to tobacco smoke. She has never used smokeless tobacco.      Counseling Resources:  ATP IV Guidelines  Pooled Cohorts Equation Calculator  Breast Cancer Risk Calculator  BRCA-Related Cancer Risk Assessment: FHS-7 Tool  FRAX Risk Assessment  ICSI Preventive Guidelines  Dietary Guidelines for Americans, 2010  USDA's MyPlate  ASA Prophylaxis  Lung CA Screening    Elisa Simmons PA-C  [facility] [place]   "

## 2025-07-15 NOTE — NURSING NOTE
"Initial /81 (BP Location: Right arm, Patient Position: Chair, Cuff Size: Adult Regular)   Pulse 101   Temp 97.8  F (36.6  C) (Tympanic)   Resp 18   Ht 1.626 m (5' 4\")   Wt 92.5 kg (204 lb)   LMP 07/03/2025 (Approximate)   BMI 35.02 kg/m   Estimated body mass index is 35.02 kg/m  as calculated from the following:    Height as of this encounter: 1.626 m (5' 4\").    Weight as of this encounter: 92.5 kg (204 lb). .    "

## 2025-07-22 ASSESSMENT — ANXIETY QUESTIONNAIRES
1. FEELING NERVOUS, ANXIOUS, OR ON EDGE: MORE THAN HALF THE DAYS
4. TROUBLE RELAXING: NEARLY EVERY DAY
GAD7 TOTAL SCORE: 16
8. IF YOU CHECKED OFF ANY PROBLEMS, HOW DIFFICULT HAVE THESE MADE IT FOR YOU TO DO YOUR WORK, TAKE CARE OF THINGS AT HOME, OR GET ALONG WITH OTHER PEOPLE?: EXTREMELY DIFFICULT
7. FEELING AFRAID AS IF SOMETHING AWFUL MIGHT HAPPEN: SEVERAL DAYS
GAD7 TOTAL SCORE: 16
GAD7 TOTAL SCORE: 16
5. BEING SO RESTLESS THAT IT IS HARD TO SIT STILL: NEARLY EVERY DAY
6. BECOMING EASILY ANNOYED OR IRRITABLE: NEARLY EVERY DAY
2. NOT BEING ABLE TO STOP OR CONTROL WORRYING: MORE THAN HALF THE DAYS
7. FEELING AFRAID AS IF SOMETHING AWFUL MIGHT HAPPEN: SEVERAL DAYS
IF YOU CHECKED OFF ANY PROBLEMS ON THIS QUESTIONNAIRE, HOW DIFFICULT HAVE THESE PROBLEMS MADE IT FOR YOU TO DO YOUR WORK, TAKE CARE OF THINGS AT HOME, OR GET ALONG WITH OTHER PEOPLE: EXTREMELY DIFFICULT
3. WORRYING TOO MUCH ABOUT DIFFERENT THINGS: MORE THAN HALF THE DAYS

## 2025-07-23 ENCOUNTER — VIRTUAL VISIT (OUTPATIENT)
Dept: FAMILY MEDICINE | Facility: CLINIC | Age: 25
End: 2025-07-23
Payer: COMMERCIAL

## 2025-07-23 DIAGNOSIS — F32.2 SEVERE DEPRESSION (H): Primary | ICD-10-CM

## 2025-07-23 NOTE — PROGRESS NOTES
Christophe is a 25 year old who is being evaluated via a billable video visit.    How would you like to obtain your AVS? MyChart  If the video visit is dropped, the invitation should be resent by: Send to e-mail at: [email or web address]  Will anyone else be joining your video visit? No  {If patient encounters technical issues they should call [phone] :[id number]}    {PROVIDER CHARTING PREFERENCE:172501}    Subjective   Christophe is a 25 year old, presenting for the following health issues:  25 yr old female here for worsening depression. She has a history of bipolar disorder, depression and anxiety. She follows with [facility] but lately she has not been able to because of insurance reasons. She is on six medication presently and her symptoms are not better. She says the depression is worse. We discussed this and I recommended placing an e consult for psychiatry. I also emphasized she should seek immediate help if her symptoms worsen and she has strong suicidal ideations.      Depression (Recheck on depression and anxiety. )        7/23/2025     4:29 PM   Additional Questions   Roomed by Jazlyn Hand CMA     Video Start Time: {video visit start/end time for provider to select:513040}    History of Present Illness       Mental Health Follow-up:  Patient presents to follow-up on Depression & Anxiety.Patient's depression since last visit has been:  Worse  The patient is having other symptoms associated with depression.  Patient's anxiety since last visit has been:  Worse  The patient is having other symptoms associated with anxiety.  Any significant life events: No  Patient is feeling anxious or having panic attacks.  Patient has no concerns about alcohol or drug use.    She eats 2-3 servings of fruits and vegetables daily.She consumes 1 sweetened beverage(s) daily.She exercises with enough effort to increase her heart rate 9 or less minutes per day.  She exercises with enough effort to increase her heart rate 3 or  less days per week.   She is taking medications regularly.              Review of Systems  Constitutional, HEENT, cardiovascular, pulmonary, gi and gu systems are negative, except as otherwise noted.      Objective    Vitals - Patient Reported  Weight (Patient Reported): 91.2 kg (201 lb)  Pain Score: No Pain (0)        Physical Exam   GENERAL: alert and no distress  EYES: Eyes grossly normal to inspection.  No discharge or erythema, or obvious scleral/conjunctival abnormalities.  RESP: No audible wheeze, cough, or visible cyanosis.    SKIN: Visible skin clear. No significant rash, abnormal pigmentation or lesions.  NEURO: Cranial nerves grossly intact.  Mentation and speech appropriate for age.  PSYCH: mentation appears normal, affect flat, tearful, and anxious          Video-Visit Details    Type of service:  Video Visit   Video End Time:{video visit start/end time for provider to select:685858}  Originating Location (pt. Location): Home  Distant Location (provider location):  On-site  Platform used for Video Visit: Sukhdeep  Signed Electronically by: Terrance Rayo MD

## 2025-07-24 ENCOUNTER — E-CONSULT (OUTPATIENT)
Dept: PSYCHIATRY | Facility: CLINIC | Age: 25
End: 2025-07-24
Payer: COMMERCIAL

## 2025-07-24 NOTE — PROGRESS NOTES
7/24/2025     E-Consult has been accepted.    Interprofessional consultation requested by:  Terrance Rayo MD      Clinical Question/Purpose: MY CLINICAL QUESTION IS:     Patient assessment and information reviewed: chart review    Recommendations: For someone who has bipolar disorder, sometimes less is more with the antidepressants. I would stop the zoloft.  This may help fairly quickly.    The next thing that can be done is to increase lamictal to 350 mg.  This may take some more time to see the improvements.      Another option, although we don't do it often, is add another antipsychotic.  They treat wili, depression, anxiety and psychosis.  Latuda is at its max but in a situation like this, adding zyprexa or seroquel could be helpful.  Zyprexa 10 mg at bedtime would be a good start.  For seroquel xr, 100 mg at bedtime is a good start.  Not knowing what she has done in the past, it is difficult say which one would be better.      Is there a therapist?  That is also another avenue to help with a more sudden decline.  Therapist's can help with the depression, crisis planning, safety planning, etc.      If safety is an issue, then going to the ED or [facility] would be the next step.    Thank you for the consult.        The recommendations provided in this E-Consult are based on a review of clinical data pertinent to the clinical question presented, without a review of the patient's complete medical record or, the benefit of a comprehensive in-person or virtual patient evaluation. This consultation should not replace the clinical judgement and evaluation of the provider ordering this E-Consult. Any new clinical issues, or changes in patient status since the filing of this E-Consult will need to be taken into account when assessing these recommendations. Please contact me if you have further questions.    My total time spent reviewing clinical information and formulating assessment was 10  minutes.        Don Padilla MD

## 2025-07-28 ENCOUNTER — VIRTUAL VISIT (OUTPATIENT)
Dept: PSYCHIATRY | Facility: CLINIC | Age: 25
End: 2025-07-28
Payer: COMMERCIAL

## 2025-07-28 ENCOUNTER — MYC REFILL (OUTPATIENT)
Dept: PSYCHIATRY | Facility: CLINIC | Age: 25
End: 2025-07-28
Payer: COMMERCIAL

## 2025-07-28 DIAGNOSIS — Z72.0 VAPES NICOTINE CONTAINING SUBSTANCE: ICD-10-CM

## 2025-07-28 DIAGNOSIS — F41.9 ANXIETY: ICD-10-CM

## 2025-07-28 DIAGNOSIS — F31.9 BIPOLAR I DISORDER (H): Primary | ICD-10-CM

## 2025-07-28 DIAGNOSIS — Z51.81 ENCOUNTER FOR THERAPEUTIC DRUG MONITORING: ICD-10-CM

## 2025-07-28 RX ORDER — CETIRIZINE HYDROCHLORIDE 10 MG/1
10 TABLET ORAL DAILY
Status: CANCELLED | OUTPATIENT
Start: 2025-07-28

## 2025-07-28 RX ORDER — BUSPIRONE HYDROCHLORIDE 10 MG/1
10 TABLET ORAL 2 TIMES DAILY
Status: CANCELLED | OUTPATIENT
Start: 2025-07-28

## 2025-07-28 RX ORDER — HYDROXYZINE HYDROCHLORIDE 10 MG/1
10 TABLET, FILM COATED ORAL EVERY 6 HOURS PRN
Qty: 120 TABLET | Refills: 0 | Status: SHIPPED | OUTPATIENT
Start: 2025-07-28

## 2025-07-28 RX ORDER — QUETIAPINE FUMARATE 50 MG/1
100 TABLET, EXTENDED RELEASE ORAL AT BEDTIME
Qty: 60 TABLET | Refills: 1 | Status: SHIPPED | OUTPATIENT
Start: 2025-07-28

## 2025-07-28 RX ORDER — BUSPIRONE HYDROCHLORIDE 10 MG/1
10 TABLET ORAL 2 TIMES DAILY
Qty: 60 TABLET | Refills: 1 | Status: SHIPPED | OUTPATIENT
Start: 2025-07-28

## 2025-07-28 RX ORDER — LURASIDONE HYDROCHLORIDE 120 MG/1
120 TABLET, FILM COATED ORAL AT BEDTIME
Qty: 30 TABLET | Refills: 1 | Status: SHIPPED | OUTPATIENT
Start: 2025-07-28

## 2025-07-28 RX ORDER — LAMOTRIGINE 300 MG/1
300 TABLET, EXTENDED RELEASE ORAL EVERY MORNING
Qty: 30 TABLET | Refills: 1 | Status: SHIPPED | OUTPATIENT
Start: 2025-07-28

## 2025-07-28 ASSESSMENT — PAIN SCALES - GENERAL: PAINLEVEL_OUTOF10: NO PAIN (0)

## 2025-07-28 NOTE — PROGRESS NOTES
Virtual Visit Details      Originating Location (pt. Location): Home    Distant Location (provider location):  Off-site  Platform used for Video Visit: [facility] Psychiatry Consult Note- Follow up    IDENTIFICATION   Name: Jeanine Hernandez   : 2000/25 year old      Sex:    @ female          Telemedicine Visit: The patient's condition can be safely assessed and treated via synchronous audio and visual telemedicine encounter.      Consent:  The patient/guardian has verbally consented to: the potential risks and benefits of telemedicine (video visit or phone) versus in person care; bill my insurance or make self-payment for services provided; and responsibility for payment of non-covered services.    As the provider I attest to compliance with applicable laws and regulations related to telemedicine.    Face to Face/patient Contact total time: 14 minutes  Pre Charting time: 0 minutes; Post charting time, communication and other activities: 30 minutes; Total time 44 minutes      INTERVAL HISTORY     Jeanine Hernandez is a 25 year old White, female who presents for follow up with [facility] ([facility]).  They were last seen here 25 with plan of: decrease sertraline to 50mg, continue Latuda 80 and lamotrigine 300.    History of Present Illness          Reports inpatient hospitalization  at [facility] for one week from 25 due to suicidal ideation and hallucinations.  During her stay her Latuda was increased to 120mg and Lamictal 300was changed to XR which stopped her hallucinations but since leaving the hospital she has been feeling very depressed and anxious.  Appears to have also started on buspirone 10mg BID and hydroxyzine 10mg Q6h PRN.     Last week she was unable to get out of bed for three days. Denies recent SI.  Had E-Consult with Dr. Padilla who recommended discontinuation of sertraline and either optimize lamotrigine to 350mg or add another  antipsychotic such as Olanzapine or Quetiapine.  Over the past two weeks she has been off the sertraline and started Quetiapine IR 100mg.  She notes that the new changes have helped with her sleep but not significantly with her mood or anxiety during the day.      Panic attacks: Denies, has been taking hydroxyzine throughout the day  Psychosis/Tri/Irritability: Denies AVH  Good adherence to meds with alarm.    Sleep: 6-8 hours, son is waking her up less  Energy: good  Appetite: increased since starting quetiapine, taking latuda with 300kcal daily  Substance: vapking 30% solution 5ml every other day- considering stopping use  Therapy: DBT with [facility] on Fridays  New health changes Denies    Medication side effects: Denies including restlessness or abnormal movements.       Current Medications:    Current Outpatient Medications:     busPIRone (BUSPAR) 10 MG tablet, Take 10 mg by mouth 2 times daily., Disp: , Rfl:     cetirizine (ZYRTEC) 10 MG tablet, Take 10 mg by mouth daily., Disp: , Rfl:     ciclopirox (LOPROX) 0.77 % cream, Apply topically 2 times daily., Disp: 30 g, Rfl: 1    etonogestrel (NEXPLANON) 68 MG IMPL, 1 each (68 mg) by Subdermal route See Admin Instructions., Disp: , Rfl:     hydrOXYzine HCl (ATARAX) 10 MG tablet, Take 10 mg by mouth every 6 hours as needed for anxiety., Disp: , Rfl:     LamoTRIgine (LAMICTAL XR) 300 MG 24 hr tablet, Take 300 mg by mouth every morning., Disp: , Rfl:     lurasidone (LATUDA) 120 MG TABS tablet, Take 120 mg by mouth at bedtime., Disp: , Rfl:     QUEtiapine (SEROQUEL) 100 MG tablet, Take 1 tablet (100 mg) by mouth at bedtime., Disp: 30 tablet, Rfl: 0    Current Facility-Administered Medications:     etonogestrel (NEXPLANON) subdermal implant 68 mg, 1 each, Subdermal, See Admin Instructions, Elisa Simmons PA-C, 68 mg at 07/10/25 1551      OBJECTIVE     Vitals:   LMP 07/03/2025 (Approximate)     Pulse Readings from Last 5 Encounters:   07/15/25 101   07/10/25 87    06/03/25 87   05/07/25 94   11/08/24 83     Wt Readings from Last 5 Encounters:   07/15/25 92.5 kg (204 lb)   07/10/25 93.4 kg (206 lb)   06/02/25 90.8 kg (200 lb 1.6 oz)   03/26/25 85.3 kg (188 lb)   10/09/24 80 kg (176 lb 6.4 oz)     BP Readings from Last 5 Encounters:   07/15/25 124/81   07/10/25 131/74   06/03/25 112/74   05/07/25 121/86   11/08/24 126/76       Labs:    Liver/kidney function Metabolic Blood counts   Recent Labs   Lab Test 06/03/25  0104 05/07/25  1647   CR 0.85 0.81   AST 14 21   ALT 9 8   ALKPHOS 99 116    Recent Labs   Lab Test 02/24/25  1427 07/02/24  1414   CHOL 181  --    TRIG 132  --    *  --    HDL 44*  --    A1C 5.6 5.7*   TSH  --  0.52    Recent Labs   Lab Test 06/03/25  0104   WBC 14.7*   HGB 12.9   HCT 40.0   MCV 84           Recent Labs   Lab Test 07/02/24  1414   TSH 0.52       ECG    No results found for this or any previous visit.        2/15/2025    10:12 AM 5/14/2025     9:21 AM 5/31/2025     9:25 AM   PROMIS-10 Total Score w/o Sub Scores   PROMIS TOTAL - SUBSCORES 23  21  15        Patient-reported         12/28/2021     3:38 PM 2/1/2022    11:11 AM 9/20/2024     9:36 AM   CAGE-AID Total Score   Total Score 0 0 0    0   Total Score MyChart 0 (A total score of 2 or greater is considered clinically significant)  0 (A total score of 2 or greater is considered clinically significant)         5/22/2025    10:57 PM 6/16/2025     8:04 AM 7/15/2025     1:14 PM   PHQ   PHQ-9 Total Score 9  5  9   Q9: Thoughts of better off dead/self-harm past 2 weeks Not at all Not at all Not at all       Patient-reported         5/31/2025     9:24 AM 6/13/2025     5:29 PM 7/22/2025     8:06 PM   CONNER-7 SCORE   Total Score 12 (moderate anxiety) 12 (moderate anxiety) 16 (severe anxiety)   Total Score 12  12  16        Patient-reported         [place]-PDMP [place] today: indicates no controlled prescriptions reported in previous 9 months    ADDED HISTORY   2/20/25:  Improvement n manic  episodes with switch from abilify to latuda noted but ongoing depression and anxiety symptoms. Plan was to increase lamotrigine to 300mg.  3/26/25:  increase latuda to 80mg  4/16/25:  increase sertraline to 100mg due to ongoing anger outbursts and anxiety.  5/19/25:decrease sertraline to 50mg, continue Latuda 80 and lamotrigine 300.  ~6/2/25: Latuda increased to 120mg, Lamotrigine changed to XR 300mg, and started on buspirone 10mg BID with hydroxyzine 10mg Q6h prn. ([facility])  7/24/25: Sertraline discontinued and started on quetiapine 100mg at bedtime(E- consult)       PSYCH MED TRIAL HX:  Lamotrigine  Abilify   Trazodone   Sertraline  Latuda- 120mg  Quetiapine 25 mg, SSE and sedation  Hydroxyzine- 5mg too sedating; retrial at 10mg 7/2025 helpful    MENTAL STATUS EXAMINATION:     Alertness: alert  and oriented  Appearance: disheveled and tired  Behavior/Demeanor: cooperative, pleasant, and engaged, with good eye contact   Speech: normal and regular rate and rhythm  Language: intact and no problems, English speaking  Psychomotor: fidgety. Denies feeling restless but does experience feeling physically anxious. No signs of TD or dyskinesias  Gait and Station: NICK  Mood: depressed and anxious  Affect: anxious; was congruent to mood  Thought Process/Associations: no loose associations. logical, linear, and goal directed   Thought Content:  Reports none;  Denies suicidal ideation and violent ideation. No signs of delusions or paranoia.  Perception:  Reports none;  Denies auditory hallucinations and visual hallucinations  Insight: good  Judgment: appropriate  Cognition: does  appear grossly intact; formal cognitive testing was not done         ASSESSMENT AND PLAN:   Bipolar I disorder  Anxiety  Nicotine use disorder, dependence  Mixed obsessional thoughts and acts, by history  Seizure disorder  H/o head injuries     2/20/25: First encounter with patient, transferring from Sam Dixon MD. Has history of Bipolar I, rapid  cycling, anxiety, and non-epileptic seizures. Manic episodes significantly improved after switching from ability to latuda although patient continues to have depressive and anxiety symptoms. Recommend continued optimization of lamictal for depression and to prevent further manic episodes. Does experience restlessness although may not be related to latuda as the symptom worsened after stopping agent. May be anxiety. Restart hydroxyzine prn.     3/6/25: Notable emerging  manic symptoms. No SI or AVH. Good adherence to regimen. Recommend optimizing latuda to 80mg today. Discussed symptoms of when to reach out to higher level of care and recommended eval at [facility] or ED if symptoms worsen.  May consider decrease in lamotrigine back to 200mg at upcoming visits as it does not appear to be more efficacious at 300mg.     4/16/25: Improvement in depression and manic symptoms but ongoing anger outbursts and anxiety. Recommended optimization of sertraline. She understands that this may increase risks for wili and is aware of symptoms to monitor for emergence of manic episode.     5/19/25: Mood fluctuations less frequent but intensity of manic and depressive states still the same, significantly impairing her social function including relationship with partner. Per patient, latuda does seem to have helped with the severity of her manic episodes but is still having fluctuations every few weeks. Good adherance. No overt signs of psychosis, akathisia, or TD.  No significant improvement noted with  optimization of sertraline for irritability outbursts and anxiety but it was notably better after re-increasing lamotrigine by neurology. Recommended tapering down dose of sertraline at this time due to perceived lack of benefit at this dose. Also will coordinate with neurology on optimizing lamotrigine. Both actions could also pharmacologically help reduce her mood fluctuations further.  Non pharmacologically it was recommended that Christophe  return to psychotherapy to help with distress intolerance, emotional regulation, and reality acceptance skills. We discussed that this could be addressed in CBT and also DBT. Discussed both modalities and intensity of treatments.    7/28/25: Christophe returns for follow up in [facility] after 1 week of in patient psychiatric hospitalization at [facility] 6/2025 due to hallucinations and SI. Since increasing her latuda to 120mg and changing lamotrigine  300mg to XR formulation she has not experienced any manic symptoms, AVH, SI/HI/SIB.  Despite this she continues to suffer from significant depression and anxiety where PCP started her on Quetiapine IR 100mg 7.24.25 by recommendation of psychiatry e-consult.  Since starting this she has not noticed much improvement in her anxiety but is sleeping better. She was also started on low dose buspirone and hydroxyzine prn at her inpatient stay which she feels has been slightly helpful.  While use of two different antipsychotics is not ideal due to increased risks for adverse side effects optimizing Quetiapine is an option as it can be helpful for anxiety. Shared decision making today was to continue with quetiapine trial and change to XR prior to optimizing further. We could also consider switching to Quetiapine if it is more efficacious than Latuda. Reasons for not using Quetiapine previously were due to her reports of not tolerating the sedating effects but it doesn't seem to be a concern at this time.  Could also consider bupsirone as a treatment option as well if it is perceived to be helpful as it is uncommonly associated with wili.        Today Jeanine Hernandez reports no suicidal ideation. In addition, she has notable risk factors for self-harm, including anxiety. However, risk is mitigated by commitment to family, absence of past attempts, ability to volunteer a safety plan, and history of seeking help when needed. Therefore, based on all available evidence including the  factors cited above, she does not appear to be at imminent risk for self-harm, does not meet criteria for a 72-hr hold, and therefore remains appropriate for ongoing outpatient level of care.            PLAN:      Patient advised of consultative model. Patient will continue to be seen for ongoing consultation and stabilization.  Does not meet criteria for involuntary treatment or hospitalization  CONSIDER NICOTINE CESSATION. Can start by reducing percentage of vape cartridges. Reach out if wanting to start on nicotine replacement therapy.  CHANGE Quetiapine from 100mg IR to 100mg XR  CONTINUE Bupsirone 10mg BID  CONTINUE Hydroxyzine 10mg every 6 hours as needed  CONTINUE Latuda 120mg at dinner- is aware of risks for TD and metabolic effects  CONTINUE lamotrigine 300mg XR. Patient is aware of SJS risks.  Labs- reviewed, EKG order placed due to additive Qtc prolongation risks with use of hydroxyzine, quetiapine, and lurasidone  CONTINUE weekly DBT at [facility]  New referral for long term psychiatry placed  Follow up in 4-6 weeks or sooner if needed       Patient Education:  Medication side effects and alternatives reviewed. Health promotion activities recommended and reviewed today. All questions addressed. Education and counseling completed regarding risks and benefits of medications and psychotherapy options.  Consent provided by patient/guardian  Call the psychiatric nurse line with medication questions or concerns at [phone].  MyChart may be used to communicate with your provider, but this is not intended to be used for emergencies.  LAMOTRIGINE: Discussed risk of rash and instructed to stop taking the drug at the first sign of a rash regardless of its type or severity, and contact the nurse line at [phone]  HARM REDUCTION:  Discussions regarding effects of mood altering substances, alcohol and cannabis, on mood and that approach is harm reduction, will continue to prescribe meds as they work to cut back use.     FIRST GENERATION ANTIPSYCHOTIC/ SECOND GENERATION ANTIPSYCHOTIC USE:  Atypical need for cardiometabolic monitoring with medication- B/P, weight, blood sugar, cholesterol.  Need to monitor for abnormal movements taught  Medlineplus.gov is information for patients.  It is run by the [employer] and it contains information about all disorders, diseases and all medications.      Community Resources:    National Suicide Prevention Lifeline: 845.256.1888 (TTY: 880.666.4289). Call anytime for help.  (www.suicidepreventionlifeline.org)  National [place] on Mental Illness (www.ana.org): 550.737.7577 or 780-111-4085.   Mental Health Association (www.mentalhealth.org): 506.340.8753 or 065-489-3305.  [place] Crisis Text Line: Text [place] to 566350  Suicide LifeLine Chat: suicideDigiboo.org/chat    Patient Status:  [facility] MD/DO/NP/PA providers offer care a specialty service for Primary Care Providers in the [facility] that seek to optimize psychotropic medications for unstable patients.  Once medications have been optimized, our providers discharge the patient back to the referring Primary Care Provider for ongoing medication management.  This type of system allows our providers to serve a high volume of patients.   Patient will continue to be seen for ongoing consultation and stabilization.    Administrative Billing:   Time spent with patient was greater than 50% of time and/or significant time was spent in counseling and coordination of care regarding above diagnoses and treatment plan. Pre charting time and post charting time/documentation/coordination are done on date of service.     Episode of Care     The longitudinal plan of care for the diagnosis(es)/condition(s) as documented were addressed during this visit. Due to the added complexity in care, I will continue to support Christophe in the subsequent management and with ongoing continuity of care.        Signed:   WILMER Avalos CNP on  7/28/2025 at 10:35 AM  [facility]

## 2025-07-28 NOTE — NURSING NOTE
Current patient location: [address] [place] [address] [address]  [facility] [address]    Is the patient currently in the state of [place]? YES    Visit mode: VIDEO    If the visit is dropped, the patient can be reconnected by:VIDEO VISIT: Text to cell phone:   Telephone Information:   Mobile [phone]       Will anyone else be joining the visit? NO  (If patient encounters technical issues they should call [phone])    Are changes needed to the allergy or medication list? Pt stated no changes to allergies and Pt stated no med changes    Are refills needed on medications prescribed by this physician? YES    Rooming Documentation:  Questionnaire(s) completed    Reason for visit: CHUN VICTOR

## 2025-07-29 RX ORDER — HYDROXYZINE HYDROCHLORIDE 10 MG/1
10 TABLET, FILM COATED ORAL EVERY 6 HOURS PRN
Qty: 120 TABLET | Refills: 0 | OUTPATIENT
Start: 2025-07-29

## 2025-07-29 NOTE — TELEPHONE ENCOUNTER
1) Reviewed refill request(s) from [facility]    2) Any Controlled Substance(s)? No    3) Refill(s) requested for:       pharmacy should have refill(s) on file      4) Action taken: contacted patient via [facility]  .    IRMA PATEL RN on 7/29/2025 at 9:09 AM

## 2025-08-30 ENCOUNTER — MYC REFILL (OUTPATIENT)
Dept: PSYCHIATRY | Facility: CLINIC | Age: 25
End: 2025-08-30
Payer: COMMERCIAL

## 2025-08-30 DIAGNOSIS — F41.9 ANXIETY: ICD-10-CM

## 2025-09-01 DIAGNOSIS — F41.9 ANXIETY: ICD-10-CM

## 2025-09-01 ASSESSMENT — ANXIETY QUESTIONNAIRES
8. IF YOU CHECKED OFF ANY PROBLEMS, HOW DIFFICULT HAVE THESE MADE IT FOR YOU TO DO YOUR WORK, TAKE CARE OF THINGS AT HOME, OR GET ALONG WITH OTHER PEOPLE?: VERY DIFFICULT
GAD7 TOTAL SCORE: 9
6. BECOMING EASILY ANNOYED OR IRRITABLE: SEVERAL DAYS
1. FEELING NERVOUS, ANXIOUS, OR ON EDGE: MORE THAN HALF THE DAYS
4. TROUBLE RELAXING: MORE THAN HALF THE DAYS
IF YOU CHECKED OFF ANY PROBLEMS ON THIS QUESTIONNAIRE, HOW DIFFICULT HAVE THESE PROBLEMS MADE IT FOR YOU TO DO YOUR WORK, TAKE CARE OF THINGS AT HOME, OR GET ALONG WITH OTHER PEOPLE: VERY DIFFICULT
3. WORRYING TOO MUCH ABOUT DIFFERENT THINGS: SEVERAL DAYS
7. FEELING AFRAID AS IF SOMETHING AWFUL MIGHT HAPPEN: NOT AT ALL
GAD7 TOTAL SCORE: 9
5. BEING SO RESTLESS THAT IT IS HARD TO SIT STILL: MORE THAN HALF THE DAYS
2. NOT BEING ABLE TO STOP OR CONTROL WORRYING: SEVERAL DAYS

## 2025-09-02 ENCOUNTER — MYC MEDICAL ADVICE (OUTPATIENT)
Dept: PSYCHIATRY | Facility: CLINIC | Age: 25
End: 2025-09-02
Payer: COMMERCIAL

## 2025-09-02 RX ORDER — HYDROXYZINE HYDROCHLORIDE 10 MG/1
10 TABLET, FILM COATED ORAL EVERY 6 HOURS PRN
Qty: 120 TABLET | Refills: 0 | Status: SHIPPED | OUTPATIENT
Start: 2025-09-02 | End: 2025-09-03

## 2025-09-02 RX ORDER — HYDROXYZINE HYDROCHLORIDE 10 MG/1
10 TABLET, FILM COATED ORAL EVERY 6 HOURS PRN
Qty: 120 TABLET | Refills: 0 | OUTPATIENT
Start: 2025-09-02

## 2025-09-03 ENCOUNTER — VIRTUAL VISIT (OUTPATIENT)
Dept: PSYCHIATRY | Facility: CLINIC | Age: 25
End: 2025-09-03
Payer: COMMERCIAL

## 2025-09-03 DIAGNOSIS — Z51.81 ENCOUNTER FOR THERAPEUTIC DRUG MONITORING: ICD-10-CM

## 2025-09-03 DIAGNOSIS — F31.9 BIPOLAR I DISORDER (H): Primary | ICD-10-CM

## 2025-09-03 DIAGNOSIS — F41.9 ANXIETY: ICD-10-CM

## 2025-09-03 DIAGNOSIS — F42.2 MIXED OBSESSIONAL THOUGHTS AND ACTS: ICD-10-CM

## 2025-09-03 DIAGNOSIS — Z72.0 VAPES NICOTINE CONTAINING SUBSTANCE: ICD-10-CM

## 2025-09-03 RX ORDER — LURASIDONE HYDROCHLORIDE 120 MG/1
120 TABLET, FILM COATED ORAL AT BEDTIME
Qty: 90 TABLET | Refills: 0 | Status: SHIPPED | OUTPATIENT
Start: 2025-09-03

## 2025-09-03 RX ORDER — QUETIAPINE FUMARATE 50 MG/1
100 TABLET, EXTENDED RELEASE ORAL AT BEDTIME
Qty: 180 TABLET | Refills: 0 | Status: SHIPPED | OUTPATIENT
Start: 2025-09-03

## 2025-09-03 RX ORDER — LAMOTRIGINE 300 MG/1
300 TABLET, EXTENDED RELEASE ORAL EVERY MORNING
Qty: 90 TABLET | Refills: 0 | Status: SHIPPED | OUTPATIENT
Start: 2025-09-03

## 2025-09-03 RX ORDER — BUSPIRONE HYDROCHLORIDE 10 MG/1
TABLET ORAL
Qty: 120 TABLET | Refills: 1 | Status: SHIPPED | OUTPATIENT
Start: 2025-09-03

## 2025-09-03 ASSESSMENT — PAIN SCALES - GENERAL: PAINLEVEL_OUTOF10: NO PAIN (0)

## (undated) RX ORDER — BUPIVACAINE HYDROCHLORIDE 2.5 MG/ML
INJECTION, SOLUTION EPIDURAL; INFILTRATION; INTRACAUDAL
Status: DISPENSED
Start: 2020-05-17

## (undated) RX ORDER — FENTANYL CITRATE 50 UG/ML
INJECTION, SOLUTION INTRAMUSCULAR; INTRAVENOUS
Status: DISPENSED
Start: 2020-05-17